# Patient Record
Sex: FEMALE | Race: WHITE | NOT HISPANIC OR LATINO | Employment: FULL TIME | URBAN - NONMETROPOLITAN AREA
[De-identification: names, ages, dates, MRNs, and addresses within clinical notes are randomized per-mention and may not be internally consistent; named-entity substitution may affect disease eponyms.]

---

## 2017-04-25 ENCOUNTER — TRANSCRIBE ORDERS (OUTPATIENT)
Dept: INFUSION THERAPY | Facility: HOSPITAL | Age: 27
End: 2017-04-25

## 2017-04-25 DIAGNOSIS — N39.0 URINARY TRACT INFECTION WITHOUT HEMATURIA, SITE UNSPECIFIED: Primary | ICD-10-CM

## 2017-04-26 ENCOUNTER — HOSPITAL ENCOUNTER (OUTPATIENT)
Dept: INFUSION THERAPY | Facility: HOSPITAL | Age: 27
Setting detail: INFUSION SERIES
Discharge: HOME | End: 2017-04-26

## 2017-04-26 ENCOUNTER — APPOINTMENT (OUTPATIENT)
Dept: CT IMAGING | Facility: HOSPITAL | Age: 27
End: 2017-04-26

## 2017-04-26 ENCOUNTER — APPOINTMENT (OUTPATIENT)
Dept: ULTRASOUND IMAGING | Facility: HOSPITAL | Age: 27
End: 2017-04-26

## 2017-04-26 ENCOUNTER — HOSPITAL ENCOUNTER (EMERGENCY)
Facility: HOSPITAL | Age: 27
Discharge: HOME | End: 2017-04-26
Attending: FAMILY MEDICINE | Admitting: FAMILY MEDICINE

## 2017-04-26 VITALS
RESPIRATION RATE: 18 BRPM | HEART RATE: 75 BPM | WEIGHT: 165 LBS | TEMPERATURE: 98 F | BODY MASS INDEX: 29.23 KG/M2 | HEIGHT: 63 IN | DIASTOLIC BLOOD PRESSURE: 79 MMHG | SYSTOLIC BLOOD PRESSURE: 121 MMHG

## 2017-04-26 VITALS
RESPIRATION RATE: 18 BRPM | OXYGEN SATURATION: 100 % | HEART RATE: 78 BPM | BODY MASS INDEX: 29.23 KG/M2 | SYSTOLIC BLOOD PRESSURE: 155 MMHG | DIASTOLIC BLOOD PRESSURE: 88 MMHG | TEMPERATURE: 98 F | WEIGHT: 165 LBS | HEIGHT: 63 IN

## 2017-04-26 DIAGNOSIS — N39.0 URINARY TRACT INFECTION, SITE UNSPECIFIED: ICD-10-CM

## 2017-04-26 DIAGNOSIS — R10.9 LEFT FLANK PAIN: Primary | ICD-10-CM

## 2017-04-26 LAB
ALBUMIN SERPL-MCNC: 4.7 G/DL (ref 3.5–5)
ALBUMIN/GLOB SERPL: 1.6 G/DL (ref 1.5–2.5)
ALP SERPL-CCNC: 94 U/L (ref 35–104)
ALT SERPL W P-5'-P-CCNC: 12 U/L (ref 10–36)
AMYLASE SERPL-CCNC: 59 U/L (ref 28–100)
ANION GAP SERPL CALCULATED.3IONS-SCNC: 3.1 MMOL/L (ref 3.6–11.2)
AST SERPL-CCNC: 18 U/L (ref 10–30)
BACTERIA UR QL AUTO: ABNORMAL /HPF
BASOPHILS # BLD AUTO: 0.02 10*3/MM3 (ref 0–0.3)
BASOPHILS NFR BLD AUTO: 0.2 % (ref 0–2)
BILIRUB SERPL-MCNC: 0.5 MG/DL (ref 0.2–1.8)
BILIRUB UR QL STRIP: NEGATIVE
BUN BLD-MCNC: 11 MG/DL (ref 7–21)
BUN/CREAT SERPL: 17.5 (ref 7–25)
CALCIUM SPEC-SCNC: 9.7 MG/DL (ref 7.7–10)
CHLORIDE SERPL-SCNC: 105 MMOL/L (ref 99–112)
CLARITY UR: CLEAR
CO2 SERPL-SCNC: 32.9 MMOL/L (ref 24.3–31.9)
COLOR UR: YELLOW
CREAT BLD-MCNC: 0.63 MG/DL (ref 0.43–1.29)
DEPRECATED RDW RBC AUTO: 39 FL (ref 37–54)
EOSINOPHIL # BLD AUTO: 0.19 10*3/MM3 (ref 0–0.7)
EOSINOPHIL NFR BLD AUTO: 2.1 % (ref 0–5)
ERYTHROCYTE [DISTWIDTH] IN BLOOD BY AUTOMATED COUNT: 12.8 % (ref 11.5–14.5)
GFR SERPL CREATININE-BSD FRML MDRD: 113 ML/MIN/1.73
GLOBULIN UR ELPH-MCNC: 3 GM/DL
GLUCOSE BLD-MCNC: 83 MG/DL (ref 70–110)
GLUCOSE UR STRIP-MCNC: NEGATIVE MG/DL
HCG SERPL QL: NEGATIVE
HCT VFR BLD AUTO: 41.2 % (ref 37–47)
HGB BLD-MCNC: 13.6 G/DL (ref 12–16)
HGB UR QL STRIP.AUTO: ABNORMAL
HYALINE CASTS UR QL AUTO: ABNORMAL /LPF
IMM GRANULOCYTES # BLD: 0.01 10*3/MM3 (ref 0–0.03)
IMM GRANULOCYTES NFR BLD: 0.1 % (ref 0–0.5)
KETONES UR QL STRIP: NEGATIVE
LEUKOCYTE ESTERASE UR QL STRIP.AUTO: ABNORMAL
LIPASE SERPL-CCNC: 28 U/L (ref 13–60)
LYMPHOCYTES # BLD AUTO: 3.45 10*3/MM3 (ref 1–3)
LYMPHOCYTES NFR BLD AUTO: 38.7 % (ref 21–51)
MCH RBC QN AUTO: 27.8 PG (ref 27–33)
MCHC RBC AUTO-ENTMCNC: 33 G/DL (ref 33–37)
MCV RBC AUTO: 84.3 FL (ref 80–94)
MONOCYTES # BLD AUTO: 0.67 10*3/MM3 (ref 0.1–0.9)
MONOCYTES NFR BLD AUTO: 7.5 % (ref 0–10)
NEUTROPHILS # BLD AUTO: 4.58 10*3/MM3 (ref 1.4–6.5)
NEUTROPHILS NFR BLD AUTO: 51.4 % (ref 30–70)
NITRITE UR QL STRIP: NEGATIVE
OSMOLALITY SERPL CALC.SUM OF ELEC: 279.8 MOSM/KG (ref 273–305)
PH UR STRIP.AUTO: 8 [PH] (ref 5–8)
PLATELET # BLD AUTO: 288 10*3/MM3 (ref 130–400)
PMV BLD AUTO: 10.9 FL (ref 6–10)
POTASSIUM BLD-SCNC: 3.9 MMOL/L (ref 3.5–5.3)
PROT SERPL-MCNC: 7.7 G/DL (ref 6–8)
PROT UR QL STRIP: NEGATIVE
RBC # BLD AUTO: 4.89 10*6/MM3 (ref 4.2–5.4)
RBC # UR: ABNORMAL /HPF
REF LAB TEST METHOD: ABNORMAL
SODIUM BLD-SCNC: 141 MMOL/L (ref 135–153)
SP GR UR STRIP: 1.01 (ref 1–1.03)
SQUAMOUS #/AREA URNS HPF: ABNORMAL /HPF
UROBILINOGEN UR QL STRIP: ABNORMAL
WBC NRBC COR # BLD: 8.92 10*3/MM3 (ref 4.5–12.5)
WBC UR QL AUTO: ABNORMAL /HPF

## 2017-04-26 PROCEDURE — 81001 URINALYSIS AUTO W/SCOPE: CPT | Performed by: FAMILY MEDICINE

## 2017-04-26 PROCEDURE — 82150 ASSAY OF AMYLASE: CPT | Performed by: FAMILY MEDICINE

## 2017-04-26 PROCEDURE — 96361 HYDRATE IV INFUSION ADD-ON: CPT

## 2017-04-26 PROCEDURE — 76775 US EXAM ABDO BACK WALL LIM: CPT | Performed by: RADIOLOGY

## 2017-04-26 PROCEDURE — 25010000002 KETOROLAC TROMETHAMINE PER 15 MG

## 2017-04-26 PROCEDURE — 83690 ASSAY OF LIPASE: CPT | Performed by: FAMILY MEDICINE

## 2017-04-26 PROCEDURE — 80053 COMPREHEN METABOLIC PANEL: CPT | Performed by: FAMILY MEDICINE

## 2017-04-26 PROCEDURE — 25010000002 CEFTRIAXONE PER 250 MG: Performed by: INTERNAL MEDICINE

## 2017-04-26 PROCEDURE — 99283 EMERGENCY DEPT VISIT LOW MDM: CPT

## 2017-04-26 PROCEDURE — 74176 CT ABD & PELVIS W/O CONTRAST: CPT | Performed by: RADIOLOGY

## 2017-04-26 PROCEDURE — 96374 THER/PROPH/DIAG INJ IV PUSH: CPT

## 2017-04-26 PROCEDURE — 84703 CHORIONIC GONADOTROPIN ASSAY: CPT | Performed by: FAMILY MEDICINE

## 2017-04-26 PROCEDURE — 76775 US EXAM ABDO BACK WALL LIM: CPT

## 2017-04-26 PROCEDURE — 96372 THER/PROPH/DIAG INJ SC/IM: CPT

## 2017-04-26 PROCEDURE — 74176 CT ABD & PELVIS W/O CONTRAST: CPT

## 2017-04-26 PROCEDURE — 85025 COMPLETE CBC W/AUTO DIFF WBC: CPT | Performed by: FAMILY MEDICINE

## 2017-04-26 RX ORDER — LIDOCAINE HYDROCHLORIDE 10 MG/ML
2.1 INJECTION, SOLUTION INFILTRATION; PERINEURAL ONCE
Status: CANCELLED
Start: 2017-04-27 | End: 2017-04-27

## 2017-04-26 RX ORDER — CYCLOBENZAPRINE HCL 10 MG
10 TABLET ORAL 3 TIMES DAILY PRN
Qty: 30 TABLET | Refills: 0 | Status: SHIPPED | OUTPATIENT
Start: 2017-04-26 | End: 2018-08-04

## 2017-04-26 RX ORDER — CEFTRIAXONE 1 G/1
1 INJECTION, POWDER, FOR SOLUTION INTRAMUSCULAR; INTRAVENOUS ONCE
Status: CANCELLED
Start: 2017-04-27 | End: 2017-04-27

## 2017-04-26 RX ORDER — KETOROLAC TROMETHAMINE 30 MG/ML
INJECTION, SOLUTION INTRAMUSCULAR; INTRAVENOUS
Status: COMPLETED
Start: 2017-04-26 | End: 2017-04-26

## 2017-04-26 RX ORDER — ORPHENADRINE CITRATE 30 MG/ML
60 INJECTION INTRAMUSCULAR; INTRAVENOUS ONCE
Status: DISCONTINUED | OUTPATIENT
Start: 2017-04-26 | End: 2017-04-26

## 2017-04-26 RX ORDER — SODIUM CHLORIDE 0.9 % (FLUSH) 0.9 %
10 SYRINGE (ML) INJECTION AS NEEDED
Status: DISCONTINUED | OUTPATIENT
Start: 2017-04-26 | End: 2017-04-26 | Stop reason: HOSPADM

## 2017-04-26 RX ORDER — CEFTRIAXONE 1 G/1
1 INJECTION, POWDER, FOR SOLUTION INTRAMUSCULAR; INTRAVENOUS ONCE
Status: COMPLETED | OUTPATIENT
Start: 2017-04-26 | End: 2017-04-26

## 2017-04-26 RX ORDER — ORPHENADRINE CITRATE 30 MG/ML
INJECTION INTRAMUSCULAR; INTRAVENOUS
Status: DISCONTINUED
Start: 2017-04-26 | End: 2017-04-26 | Stop reason: HOSPADM

## 2017-04-26 RX ORDER — GABAPENTIN 100 MG/1
100 CAPSULE ORAL 3 TIMES DAILY
COMMUNITY
End: 2018-08-10

## 2017-04-26 RX ORDER — KETOROLAC TROMETHAMINE 30 MG/ML
60 INJECTION, SOLUTION INTRAMUSCULAR; INTRAVENOUS ONCE
Status: DISCONTINUED | OUTPATIENT
Start: 2017-04-26 | End: 2017-04-26

## 2017-04-26 RX ORDER — LIDOCAINE HYDROCHLORIDE 10 MG/ML
2.1 INJECTION, SOLUTION INFILTRATION; PERINEURAL ONCE
Status: COMPLETED | OUTPATIENT
Start: 2017-04-26 | End: 2017-04-26

## 2017-04-26 RX ORDER — KETOROLAC TROMETHAMINE 30 MG/ML
30 INJECTION, SOLUTION INTRAMUSCULAR; INTRAVENOUS ONCE
Status: COMPLETED | OUTPATIENT
Start: 2017-04-26 | End: 2017-04-26

## 2017-04-26 RX ADMIN — SODIUM CHLORIDE 500 ML: 9 INJECTION, SOLUTION INTRAVENOUS at 19:38

## 2017-04-26 RX ADMIN — LIDOCAINE HYDROCHLORIDE 2.1 ML: 10 INJECTION, SOLUTION INFILTRATION; PERINEURAL at 11:41

## 2017-04-26 RX ADMIN — CEFTRIAXONE 1 G: 1 INJECTION, POWDER, FOR SOLUTION INTRAMUSCULAR; INTRAVENOUS at 11:40

## 2017-04-26 RX ADMIN — KETOROLAC TROMETHAMINE 30 MG: 30 INJECTION, SOLUTION INTRAMUSCULAR at 20:59

## 2017-04-26 RX ADMIN — KETOROLAC TROMETHAMINE 30 MG: 30 INJECTION, SOLUTION INTRAMUSCULAR; INTRAVENOUS at 20:59

## 2017-04-26 NOTE — PATIENT INSTRUCTIONS
Ceftriaxone injection  What is this medicine?  CEFTRIAXONE (sef try AX one) is a cephalosporin antibiotic. It is used to treat certain kinds of bacterial infections. It will not work for colds, flu, or other viral infections.  This medicine may be used for other purposes; ask your health care provider or pharmacist if you have questions.  COMMON BRAND NAME(S): Gypsy  What should I tell my health care provider before I take this medicine?  They need to know if you have any of these conditions:  -any chronic illness  -bowel disease, like colitis  -both kidney and liver disease  -high bilirubin level in  patients  -an unusual or allergic reaction to ceftriaxone, other cephalosporin or penicillin antibiotics, foods, dyes, or preservatives  -pregnant or trying to get pregnant  -breast-feeding  How should I use this medicine?  This medicine is injected into a muscle or infused it into a vein. It is usually given in a medical office or clinic. If you are to give this medicine you will be taught how to inject it. Follow instructions carefully. Use your doses at regular intervals. Do not take your medicine more often than directed. Do not skip doses or stop your medicine early even if you feel better. Do not stop taking except on your doctor's advice.  Talk to your pediatrician regarding the use of this medicine in children. Special care may be needed.  Overdosage: If you think you have taken too much of this medicine contact a poison control center or emergency room at once.  NOTE: This medicine is only for you. Do not share this medicine with others.  What if I miss a dose?  If you miss a dose, take it as soon as you can. If it is almost time for your next dose, take only that dose. Do not take double or extra doses.  What may interact with this medicine?  Do not take this medicine with any of the following medications:  -intravenous calcium  This medicine may also interact with the following medications:  -birth  control pills  This list may not describe all possible interactions. Give your health care provider a list of all the medicines, herbs, non-prescription drugs, or dietary supplements you use. Also tell them if you smoke, drink alcohol, or use illegal drugs. Some items may interact with your medicine.  What should I watch for while using this medicine?  Tell your doctor or health care professional if your symptoms do not improve or if they get worse.  Do not treat diarrhea with over the counter products. Contact your doctor if you have diarrhea that lasts more than 2 days or if it is severe and watery.  If you are being treated for a sexually transmitted disease, avoid sexual contact until you have finished your treatment. Having sex can infect your sexual partner.  Calcium may bind to this medicine and cause lung or kidney problems. Avoid calcium products while taking this medicine and for 48 hours after taking the last dose of this medicine.  What side effects may I notice from receiving this medicine?  Side effects that you should report to your doctor or health care professional as soon as possible:  -allergic reactions like skin rash, itching or hives, swelling of the face, lips, or tongue  -breathing problems  -fever, chills  -irregular heartbeat  -pain when passing urine  -seizures  -stomach pain, cramps  -unusual bleeding, bruising  -unusually weak or tired  Side effects that usually do not require medical attention (report to your doctor or health care professional if they continue or are bothersome):  -diarrhea  -dizzy, drowsy  -headache  -nausea, vomiting  -pain, swelling, irritation where injected  -stomach upset  -sweating  This list may not describe all possible side effects. Call your doctor for medical advice about side effects. You may report side effects to FDA at 8-229-FDA-7568.  Where should I keep my medicine?  Keep out of the reach of children.  Store at room temperature below 25 degrees C (24  degrees F). Protect from light. Throw away any unused vials after the expiration date.  NOTE: This sheet is a summary. It may not cover all possible information. If you have questions about this medicine, talk to your doctor, pharmacist, or health care provider.     © 2017, [employer]. (2015-07-06 09:14:54)

## 2017-04-26 NOTE — ED NOTES
Pt reports that she was seen at [facility] on Sunday and diagnosed with hydronephritis.  She reports that she is having increased pain at this time.  PT family at bedside.     Teri Stratton RN  04/26/17 1929

## 2017-04-26 NOTE — ED PROVIDER NOTES
Subjective   History of Present Illness  26 y/o F w/h/o Lupus p/w worsening L flank pain for 4 days. Pt states that the pain continues to worsen and she has increased difficulty urinating. Pt states that she was seen at [facility] on  and was diagnosed with nephritis. Pt states that since that time she has been receiving daily rocephin infusions. Pt denies any fevers/chills. No N/V/D.   Review of Systems   Constitutional: Negative for activity change, appetite change, chills and fever.   HENT: Negative for trouble swallowing and voice change.    Eyes: Negative for photophobia and visual disturbance.   Respiratory: Negative for apnea, cough, chest tightness, shortness of breath and wheezing.    Cardiovascular: Negative for chest pain and palpitations.   Gastrointestinal: Negative for abdominal distention, abdominal pain, diarrhea, nausea and vomiting.        +L flank pain   Genitourinary: Positive for decreased urine volume, difficulty urinating and flank pain. Negative for dysuria and urgency.   Musculoskeletal: Negative for neck pain and neck stiffness.   Skin: Negative for color change and pallor.       Past Medical History:   Diagnosis Date   • Fibromyalgia    • History of ear infections    • History of heart disease    • History of lupus    • History of migraine    • Hypertension    • Pulmonary stenosis        Allergies   Allergen Reactions   • Ciprofloxacin        Past Surgical History:   Procedure Laterality Date   •  SECTION     • ORIF SCAPHOID W VASCULARIZED BONE GRAFT         Family History   Problem Relation Age of Onset   • Heart disease Mother    • Heart disease Father    • Cancer Father        Social History     Social History   • Marital status: Single     Spouse name: N/A   • Number of children: N/A   • Years of education: N/A     Social History Main Topics   • Smoking status: Former Smoker   • Smokeless tobacco: None   • Alcohol use No   • Drug use: No   • Sexual activity: Defer      Other Topics Concern   • None     Social History Narrative   • None           Objective   Physical Exam   Constitutional: She is oriented to person, place, and time. She appears well-developed and well-nourished. She is active.   HENT:   Head: Normocephalic and atraumatic.   Right Ear: External ear normal.   Left Ear: External ear normal.   Nose: Nose normal.   Mouth/Throat: Uvula is midline and oropharynx is clear and moist.   Eyes: Conjunctivae, EOM and lids are normal. Pupils are equal, round, and reactive to light.   Neck: Trachea normal, normal range of motion, full passive range of motion without pain and phonation normal. Neck supple.   Cardiovascular: Normal rate, regular rhythm and normal heart sounds.    Pulmonary/Chest: Effort normal and breath sounds normal.   Abdominal: Soft. Normal appearance. There is no tenderness. There is CVA tenderness.   Neurological: She is alert and oriented to person, place, and time.   Skin: Skin is warm, dry and intact.   Psychiatric: She has a normal mood and affect. Her speech is normal and behavior is normal. Judgment and thought content normal. Cognition and memory are normal.   Nursing note and vitals reviewed.      Procedures         ED Course  ED Course      Pt's renal u/s and CT scan abd/pelvis were WNL. Pt to continue rocephin infusions. Pt given prescription for muscle relaxants given the chance that this pain is MSK. Pt to f/u with PCP within 24 hours.            MDM  Number of Diagnoses or Management Options  Left flank pain: established and worsening     Amount and/or Complexity of Data Reviewed  Clinical lab tests: ordered and reviewed  Tests in the radiology section of CPT®: reviewed and ordered  Independent visualization of images, tracings, or specimens: yes    Risk of Complications, Morbidity, and/or Mortality  Presenting problems: high  Diagnostic procedures: high  Management options: high    Patient Progress  Patient progress: stable      Final  diagnoses:   Left flank pain            Thanh Tovar MD  04/26/17 2049

## 2017-04-27 ENCOUNTER — HOSPITAL ENCOUNTER (OUTPATIENT)
Dept: INFUSION THERAPY | Facility: HOSPITAL | Age: 27
Setting detail: INFUSION SERIES
Discharge: HOME | End: 2017-04-27

## 2017-04-27 VITALS
SYSTOLIC BLOOD PRESSURE: 113 MMHG | TEMPERATURE: 98.3 F | RESPIRATION RATE: 17 BRPM | DIASTOLIC BLOOD PRESSURE: 67 MMHG | HEART RATE: 75 BPM

## 2017-04-27 DIAGNOSIS — N39.0 URINARY TRACT INFECTION, SITE UNSPECIFIED: ICD-10-CM

## 2017-04-27 PROCEDURE — 25010000002 CEFTRIAXONE PER 250 MG: Performed by: INTERNAL MEDICINE

## 2017-04-27 PROCEDURE — 96372 THER/PROPH/DIAG INJ SC/IM: CPT

## 2017-04-27 RX ORDER — CEFTRIAXONE 1 G/1
1 INJECTION, POWDER, FOR SOLUTION INTRAMUSCULAR; INTRAVENOUS ONCE
Status: COMPLETED | OUTPATIENT
Start: 2017-04-27 | End: 2017-04-27

## 2017-04-27 RX ORDER — LIDOCAINE HYDROCHLORIDE 10 MG/ML
2.1 INJECTION, SOLUTION INFILTRATION; PERINEURAL ONCE
Status: COMPLETED | OUTPATIENT
Start: 2017-04-27 | End: 2017-04-27

## 2017-04-27 RX ORDER — LIDOCAINE HYDROCHLORIDE 10 MG/ML
2.1 INJECTION, SOLUTION INFILTRATION; PERINEURAL ONCE
Status: CANCELLED
Start: 2017-04-28 | End: 2017-04-28

## 2017-04-27 RX ORDER — CEFTRIAXONE 1 G/1
1 INJECTION, POWDER, FOR SOLUTION INTRAMUSCULAR; INTRAVENOUS ONCE
Status: CANCELLED
Start: 2017-04-28 | End: 2017-04-28

## 2017-04-27 RX ADMIN — CEFTRIAXONE 1 G: 1 INJECTION, POWDER, FOR SOLUTION INTRAMUSCULAR; INTRAVENOUS at 11:14

## 2017-04-27 RX ADMIN — LIDOCAINE HYDROCHLORIDE 2.1 ML: 10 INJECTION, SOLUTION INFILTRATION; PERINEURAL at 11:15

## 2017-04-28 ENCOUNTER — HOSPITAL ENCOUNTER (OUTPATIENT)
Dept: INFUSION THERAPY | Facility: HOSPITAL | Age: 27
Setting detail: INFUSION SERIES
Discharge: HOME | End: 2017-04-28

## 2017-04-28 VITALS
SYSTOLIC BLOOD PRESSURE: 120 MMHG | RESPIRATION RATE: 16 BRPM | HEART RATE: 69 BPM | TEMPERATURE: 98.2 F | DIASTOLIC BLOOD PRESSURE: 67 MMHG

## 2017-04-28 DIAGNOSIS — N39.0 URINARY TRACT INFECTION, SITE UNSPECIFIED: ICD-10-CM

## 2017-04-28 PROCEDURE — 96372 THER/PROPH/DIAG INJ SC/IM: CPT

## 2017-04-28 PROCEDURE — 25010000002 CEFTRIAXONE PER 250 MG: Performed by: INTERNAL MEDICINE

## 2017-04-28 RX ORDER — CEFTRIAXONE 1 G/1
1 INJECTION, POWDER, FOR SOLUTION INTRAMUSCULAR; INTRAVENOUS ONCE
Status: CANCELLED
Start: 2017-04-29 | End: 2017-04-29

## 2017-04-28 RX ORDER — CEFTRIAXONE 1 G/1
1 INJECTION, POWDER, FOR SOLUTION INTRAMUSCULAR; INTRAVENOUS ONCE
Status: COMPLETED | OUTPATIENT
Start: 2017-04-28 | End: 2017-04-28

## 2017-04-28 RX ORDER — LIDOCAINE HYDROCHLORIDE 10 MG/ML
2.1 INJECTION, SOLUTION INFILTRATION; PERINEURAL ONCE
Status: COMPLETED | OUTPATIENT
Start: 2017-04-28 | End: 2017-04-28

## 2017-04-28 RX ORDER — LIDOCAINE HYDROCHLORIDE 10 MG/ML
2.1 INJECTION, SOLUTION INFILTRATION; PERINEURAL ONCE
Status: CANCELLED
Start: 2017-04-29 | End: 2017-04-29

## 2017-04-28 RX ADMIN — LIDOCAINE HYDROCHLORIDE 2.1 ML: 10 INJECTION, SOLUTION INFILTRATION; PERINEURAL at 11:22

## 2017-04-28 RX ADMIN — CEFTRIAXONE SODIUM 1 G: 1 INJECTION, POWDER, FOR SOLUTION INTRAMUSCULAR; INTRAVENOUS at 11:23

## 2017-04-29 ENCOUNTER — HOSPITAL ENCOUNTER (OUTPATIENT)
Dept: INFUSION THERAPY | Facility: HOSPITAL | Age: 27
Setting detail: INFUSION SERIES
Discharge: HOME | End: 2017-04-29

## 2017-04-29 VITALS
DIASTOLIC BLOOD PRESSURE: 78 MMHG | SYSTOLIC BLOOD PRESSURE: 130 MMHG | HEART RATE: 65 BPM | RESPIRATION RATE: 16 BRPM | TEMPERATURE: 98.3 F

## 2017-04-29 DIAGNOSIS — N39.0 URINARY TRACT INFECTION, SITE UNSPECIFIED: ICD-10-CM

## 2017-04-29 PROCEDURE — 25010000002 CEFTRIAXONE PER 250 MG: Performed by: INTERNAL MEDICINE

## 2017-04-29 PROCEDURE — 96372 THER/PROPH/DIAG INJ SC/IM: CPT

## 2017-04-29 RX ORDER — CEFTRIAXONE 1 G/1
1 INJECTION, POWDER, FOR SOLUTION INTRAMUSCULAR; INTRAVENOUS ONCE
Status: CANCELLED
Start: 2017-04-30 | End: 2017-04-30

## 2017-04-29 RX ORDER — LIDOCAINE HYDROCHLORIDE 10 MG/ML
2.1 INJECTION, SOLUTION INFILTRATION; PERINEURAL ONCE
Status: COMPLETED | OUTPATIENT
Start: 2017-04-29 | End: 2017-04-29

## 2017-04-29 RX ORDER — LIDOCAINE HYDROCHLORIDE 10 MG/ML
2.1 INJECTION, SOLUTION INFILTRATION; PERINEURAL ONCE
Status: CANCELLED
Start: 2017-04-30 | End: 2017-04-30

## 2017-04-29 RX ORDER — CEFTRIAXONE 1 G/1
1 INJECTION, POWDER, FOR SOLUTION INTRAMUSCULAR; INTRAVENOUS ONCE
Status: COMPLETED | OUTPATIENT
Start: 2017-04-29 | End: 2017-04-29

## 2017-04-29 RX ADMIN — LIDOCAINE HYDROCHLORIDE 2.1 ML: 10 INJECTION, SOLUTION INFILTRATION; PERINEURAL at 09:39

## 2017-04-29 RX ADMIN — CEFTRIAXONE SODIUM 1 G: 1 INJECTION, POWDER, FOR SOLUTION INTRAMUSCULAR; INTRAVENOUS at 09:39

## 2018-06-15 RX ORDER — TOPIRAMATE 25 MG/1
25 TABLET ORAL 2 TIMES DAILY
Qty: 60 TABLET | Refills: 0 | Status: CANCELLED | OUTPATIENT
Start: 2018-06-15

## 2018-06-16 ENCOUNTER — APPOINTMENT (OUTPATIENT)
Dept: GENERAL RADIOLOGY | Facility: HOSPITAL | Age: 28
End: 2018-06-16

## 2018-06-16 ENCOUNTER — HOSPITAL ENCOUNTER (EMERGENCY)
Facility: HOSPITAL | Age: 28
Discharge: HOME | End: 2018-06-16
Attending: EMERGENCY MEDICINE | Admitting: EMERGENCY MEDICINE

## 2018-06-16 ENCOUNTER — APPOINTMENT (OUTPATIENT)
Dept: ULTRASOUND IMAGING | Facility: HOSPITAL | Age: 28
End: 2018-06-16

## 2018-06-16 ENCOUNTER — APPOINTMENT (OUTPATIENT)
Dept: CT IMAGING | Facility: HOSPITAL | Age: 28
End: 2018-06-16

## 2018-06-16 VITALS
HEIGHT: 63 IN | WEIGHT: 192 LBS | TEMPERATURE: 98.6 F | RESPIRATION RATE: 17 BRPM | HEART RATE: 80 BPM | DIASTOLIC BLOOD PRESSURE: 70 MMHG | SYSTOLIC BLOOD PRESSURE: 112 MMHG | OXYGEN SATURATION: 99 % | BODY MASS INDEX: 34.02 KG/M2

## 2018-06-16 DIAGNOSIS — N83.209 CYST OF OVARY, UNSPECIFIED LATERALITY: ICD-10-CM

## 2018-06-16 DIAGNOSIS — N12 PYELONEPHRITIS: Primary | ICD-10-CM

## 2018-06-16 LAB
ALBUMIN SERPL-MCNC: 5.2 G/DL (ref 3.5–5)
ALBUMIN/GLOB SERPL: 1.9 G/DL (ref 1.5–2.5)
ALP SERPL-CCNC: 106 U/L (ref 35–104)
ALT SERPL W P-5'-P-CCNC: 15 U/L (ref 10–36)
ANION GAP SERPL CALCULATED.3IONS-SCNC: 11.9 MMOL/L (ref 3.6–11.2)
AST SERPL-CCNC: 19 U/L (ref 10–30)
B-HCG UR QL: NEGATIVE
BACTERIA UR QL AUTO: ABNORMAL /HPF
BASOPHILS # BLD AUTO: 0.03 10*3/MM3 (ref 0–0.3)
BASOPHILS NFR BLD AUTO: 0.2 % (ref 0–2)
BILIRUB SERPL-MCNC: 0.9 MG/DL (ref 0.2–1.8)
BILIRUB UR QL STRIP: NEGATIVE
BNP SERPL-MCNC: <2 PG/ML (ref 0–100)
BUN BLD-MCNC: 11 MG/DL (ref 7–21)
BUN/CREAT SERPL: 11.5 (ref 7–25)
C TRACH RRNA CVX QL NAA+PROBE: NOT DETECTED
CALCIUM SPEC-SCNC: 9.7 MG/DL (ref 7.7–10)
CHLORIDE SERPL-SCNC: 104 MMOL/L (ref 99–112)
CLARITY UR: ABNORMAL
CO2 SERPL-SCNC: 22.1 MMOL/L (ref 24.3–31.9)
COLOR UR: YELLOW
CREAT BLD-MCNC: 0.96 MG/DL (ref 0.43–1.29)
D DIMER PPP FEU-MCNC: <0.27 MCGFEU/ML (ref 0–0.5)
D-LACTATE SERPL-SCNC: 0.6 MMOL/L (ref 0.5–2)
D-LACTATE SERPL-SCNC: 2.4 MMOL/L (ref 0.5–2)
DEPRECATED RDW RBC AUTO: 40 FL (ref 37–54)
EOSINOPHIL # BLD AUTO: 0.43 10*3/MM3 (ref 0–0.7)
EOSINOPHIL NFR BLD AUTO: 3.4 % (ref 0–5)
ERYTHROCYTE [DISTWIDTH] IN BLOOD BY AUTOMATED COUNT: 12.9 % (ref 11.5–14.5)
GFR SERPL CREATININE-BSD FRML MDRD: 69 ML/MIN/1.73
GLOBULIN UR ELPH-MCNC: 2.8 GM/DL
GLUCOSE BLD-MCNC: 89 MG/DL (ref 70–110)
GLUCOSE UR STRIP-MCNC: NEGATIVE MG/DL
HCT VFR BLD AUTO: 44 % (ref 37–47)
HGB BLD-MCNC: 15 G/DL (ref 12–16)
HGB UR QL STRIP.AUTO: NEGATIVE
HOLD SPECIMEN: NORMAL
HYALINE CASTS UR QL AUTO: ABNORMAL /LPF
HYDATID CYST SPEC WET PREP: ABNORMAL
IMM GRANULOCYTES # BLD: 0.03 10*3/MM3 (ref 0–0.03)
IMM GRANULOCYTES NFR BLD: 0.2 % (ref 0–0.5)
INR PPP: 1.14 (ref 0.9–1.1)
KETONES UR QL STRIP: NEGATIVE
KOH PREP NAIL: NORMAL
LEUKOCYTE ESTERASE UR QL STRIP.AUTO: ABNORMAL
LIPASE SERPL-CCNC: 34 U/L (ref 13–60)
LYMPHOCYTES # BLD AUTO: 4.25 10*3/MM3 (ref 1–3)
LYMPHOCYTES NFR BLD AUTO: 33.6 % (ref 21–51)
MAGNESIUM SERPL-MCNC: 2.3 MG/DL (ref 1.7–2.6)
MCH RBC QN AUTO: 29.1 PG (ref 27–33)
MCHC RBC AUTO-ENTMCNC: 34.1 G/DL (ref 33–37)
MCV RBC AUTO: 85.4 FL (ref 80–94)
MONOCYTES # BLD AUTO: 1.33 10*3/MM3 (ref 0.1–0.9)
MONOCYTES NFR BLD AUTO: 10.5 % (ref 0–10)
N GONORRHOEA RRNA SPEC QL NAA+PROBE: NOT DETECTED
NEUTROPHILS # BLD AUTO: 6.58 10*3/MM3 (ref 1.4–6.5)
NEUTROPHILS NFR BLD AUTO: 52.1 % (ref 30–70)
NITRITE UR QL STRIP: NEGATIVE
OSMOLALITY SERPL CALC.SUM OF ELEC: 274.6 MOSM/KG (ref 273–305)
PH UR STRIP.AUTO: <=5 [PH] (ref 5–8)
PLATELET # BLD AUTO: 344 10*3/MM3 (ref 130–400)
PMV BLD AUTO: 11.4 FL (ref 6–10)
POTASSIUM BLD-SCNC: 3.3 MMOL/L (ref 3.5–5.3)
PROT SERPL-MCNC: 8 G/DL (ref 6–8)
PROT UR QL STRIP: NEGATIVE
PROTHROMBIN TIME: 14.8 SECONDS (ref 11–15.4)
RBC # BLD AUTO: 5.15 10*6/MM3 (ref 4.2–5.4)
RBC # UR: ABNORMAL /HPF
REF LAB TEST METHOD: ABNORMAL
SODIUM BLD-SCNC: 138 MMOL/L (ref 135–153)
SP GR UR STRIP: 1.02 (ref 1–1.03)
SQUAMOUS #/AREA URNS HPF: ABNORMAL /HPF
T VAGINALIS SPEC QL WET PREP: ABNORMAL
TROPONIN I SERPL-MCNC: <0.006 NG/ML
UROBILINOGEN UR QL STRIP: ABNORMAL
WBC NRBC COR # BLD: 12.65 10*3/MM3 (ref 4.5–12.5)
WBC SPEC QL WET PREP: ABNORMAL
WBC UR QL AUTO: ABNORMAL /HPF
WHOLE BLOOD HOLD SPECIMEN: NORMAL
WHOLE BLOOD HOLD SPECIMEN: NORMAL
YEAST GENITAL QL WET PREP: ABNORMAL

## 2018-06-16 PROCEDURE — 25010000002 MAGNESIUM SULFATE IN D5W 1G/100ML (PREMIX) 1-5 GM/100ML-% SOLUTION: Performed by: EMERGENCY MEDICINE

## 2018-06-16 PROCEDURE — 93005 ELECTROCARDIOGRAM TRACING: CPT | Performed by: EMERGENCY MEDICINE

## 2018-06-16 PROCEDURE — 71045 X-RAY EXAM CHEST 1 VIEW: CPT

## 2018-06-16 PROCEDURE — 99285 EMERGENCY DEPT VISIT HI MDM: CPT

## 2018-06-16 PROCEDURE — 83880 ASSAY OF NATRIURETIC PEPTIDE: CPT | Performed by: EMERGENCY MEDICINE

## 2018-06-16 PROCEDURE — 76830 TRANSVAGINAL US NON-OB: CPT

## 2018-06-16 PROCEDURE — 83605 ASSAY OF LACTIC ACID: CPT | Performed by: EMERGENCY MEDICINE

## 2018-06-16 PROCEDURE — 87491 CHLMYD TRACH DNA AMP PROBE: CPT | Performed by: EMERGENCY MEDICINE

## 2018-06-16 PROCEDURE — 96375 TX/PRO/DX INJ NEW DRUG ADDON: CPT

## 2018-06-16 PROCEDURE — 87210 SMEAR WET MOUNT SALINE/INK: CPT | Performed by: EMERGENCY MEDICINE

## 2018-06-16 PROCEDURE — 74176 CT ABD & PELVIS W/O CONTRAST: CPT | Performed by: RADIOLOGY

## 2018-06-16 PROCEDURE — 83735 ASSAY OF MAGNESIUM: CPT | Performed by: EMERGENCY MEDICINE

## 2018-06-16 PROCEDURE — 87077 CULTURE AEROBIC IDENTIFY: CPT | Performed by: EMERGENCY MEDICINE

## 2018-06-16 PROCEDURE — 87591 N.GONORRHOEAE DNA AMP PROB: CPT | Performed by: EMERGENCY MEDICINE

## 2018-06-16 PROCEDURE — 85025 COMPLETE CBC W/AUTO DIFF WBC: CPT | Performed by: EMERGENCY MEDICINE

## 2018-06-16 PROCEDURE — 81001 URINALYSIS AUTO W/SCOPE: CPT | Performed by: EMERGENCY MEDICINE

## 2018-06-16 PROCEDURE — 80053 COMPREHEN METABOLIC PANEL: CPT | Performed by: EMERGENCY MEDICINE

## 2018-06-16 PROCEDURE — 96367 TX/PROPH/DG ADDL SEQ IV INF: CPT

## 2018-06-16 PROCEDURE — 87040 BLOOD CULTURE FOR BACTERIA: CPT | Performed by: EMERGENCY MEDICINE

## 2018-06-16 PROCEDURE — 87186 SC STD MICRODIL/AGAR DIL: CPT | Performed by: EMERGENCY MEDICINE

## 2018-06-16 PROCEDURE — 25010000002 CEFTRIAXONE: Performed by: EMERGENCY MEDICINE

## 2018-06-16 PROCEDURE — 85610 PROTHROMBIN TIME: CPT | Performed by: EMERGENCY MEDICINE

## 2018-06-16 PROCEDURE — 87220 TISSUE EXAM FOR FUNGI: CPT | Performed by: EMERGENCY MEDICINE

## 2018-06-16 PROCEDURE — 81025 URINE PREGNANCY TEST: CPT | Performed by: EMERGENCY MEDICINE

## 2018-06-16 PROCEDURE — 71045 X-RAY EXAM CHEST 1 VIEW: CPT | Performed by: RADIOLOGY

## 2018-06-16 PROCEDURE — 83690 ASSAY OF LIPASE: CPT | Performed by: EMERGENCY MEDICINE

## 2018-06-16 PROCEDURE — 87086 URINE CULTURE/COLONY COUNT: CPT | Performed by: EMERGENCY MEDICINE

## 2018-06-16 PROCEDURE — 96365 THER/PROPH/DIAG IV INF INIT: CPT

## 2018-06-16 PROCEDURE — 76830 TRANSVAGINAL US NON-OB: CPT | Performed by: RADIOLOGY

## 2018-06-16 PROCEDURE — 85379 FIBRIN DEGRADATION QUANT: CPT | Performed by: EMERGENCY MEDICINE

## 2018-06-16 PROCEDURE — 74176 CT ABD & PELVIS W/O CONTRAST: CPT

## 2018-06-16 PROCEDURE — 25010000002 ONDANSETRON PER 1 MG: Performed by: EMERGENCY MEDICINE

## 2018-06-16 PROCEDURE — 36415 COLL VENOUS BLD VENIPUNCTURE: CPT

## 2018-06-16 PROCEDURE — 84484 ASSAY OF TROPONIN QUANT: CPT | Performed by: EMERGENCY MEDICINE

## 2018-06-16 RX ORDER — SODIUM CHLORIDE 0.9 % (FLUSH) 0.9 %
10 SYRINGE (ML) INJECTION AS NEEDED
Status: DISCONTINUED | OUTPATIENT
Start: 2018-06-16 | End: 2018-06-16 | Stop reason: HOSPADM

## 2018-06-16 RX ORDER — MAGNESIUM SULFATE 1 G/100ML
1 INJECTION INTRAVENOUS
Status: DISCONTINUED | OUTPATIENT
Start: 2018-06-16 | End: 2018-06-16

## 2018-06-16 RX ORDER — ONDANSETRON 2 MG/ML
4 INJECTION INTRAMUSCULAR; INTRAVENOUS ONCE
Status: COMPLETED | OUTPATIENT
Start: 2018-06-16 | End: 2018-06-16

## 2018-06-16 RX ORDER — FLUCONAZOLE 150 MG/1
150 TABLET ORAL ONCE
Qty: 1 TABLET | Refills: 0 | Status: SHIPPED | OUTPATIENT
Start: 2018-06-16 | End: 2018-06-16

## 2018-06-16 RX ORDER — POTASSIUM CHLORIDE 20 MEQ/1
40 TABLET, EXTENDED RELEASE ORAL ONCE
Status: COMPLETED | OUTPATIENT
Start: 2018-06-16 | End: 2018-06-16

## 2018-06-16 RX ORDER — HYDROCODONE BITARTRATE AND ACETAMINOPHEN 5; 325 MG/1; MG/1
1 TABLET ORAL ONCE
Status: COMPLETED | OUTPATIENT
Start: 2018-06-16 | End: 2018-06-16

## 2018-06-16 RX ORDER — CEFDINIR 300 MG/1
300 CAPSULE ORAL 2 TIMES DAILY
Qty: 20 CAPSULE | Refills: 0 | Status: SHIPPED | OUTPATIENT
Start: 2018-06-16 | End: 2018-08-04

## 2018-06-16 RX ORDER — CEFDINIR 300 MG/1
300 CAPSULE ORAL 2 TIMES DAILY
Qty: 20 CAPSULE | Refills: 0 | Status: SHIPPED | OUTPATIENT
Start: 2018-06-16 | End: 2018-06-16

## 2018-06-16 RX ADMIN — CEFTRIAXONE 2 G: 2 INJECTION, POWDER, FOR SOLUTION INTRAMUSCULAR; INTRAVENOUS at 11:56

## 2018-06-16 RX ADMIN — MAGNESIUM SULFATE HEPTAHYDRATE 1 G: 1 INJECTION, SOLUTION INTRAVENOUS at 10:50

## 2018-06-16 RX ADMIN — POTASSIUM CHLORIDE 40 MEQ: 1500 TABLET, EXTENDED RELEASE ORAL at 11:57

## 2018-06-16 RX ADMIN — SODIUM CHLORIDE 1000 ML: 9 INJECTION, SOLUTION INTRAVENOUS at 10:30

## 2018-06-16 RX ADMIN — SODIUM CHLORIDE 1000 ML: 9 INJECTION, SOLUTION INTRAVENOUS at 11:30

## 2018-06-16 RX ADMIN — HYDROCODONE BITARTRATE AND ACETAMINOPHEN 1 TABLET: 5; 325 TABLET ORAL at 17:50

## 2018-06-16 RX ADMIN — ONDANSETRON 4 MG: 2 INJECTION, SOLUTION INTRAMUSCULAR; INTRAVENOUS at 17:51

## 2018-06-16 NOTE — ED NOTES
Verbal order received from Dr. Ross to hold second bag of magnesium.  VORBV.     Amy Marquez RN  06/16/18 9125

## 2018-06-16 NOTE — ED PROVIDER NOTES
"Subjective   p tis a 27 yo lady who presented to the ED with multiple complaints:    1:  Pt c/o b/l flank pain assoicated with dysuria for the last few days. She says she has a hx of frequent UTIs and that she ends up with injections of Rocephin at least once a month for UTIs.  Patient denies any vaginal discharge or itching.  She is sexually active with only one partner.  She denies any significant pelvic pain.  No vaginal discharge or itching no bleeding.  No f/c. Pt does have some assoicated nausea but no vomiting    2: pt also c/o palpitations that she described as \"chest pain\".  no true chest pain.  No sob.  No hemopthysis. Pt has a hx of rapid heart rate but has not taken her meds for four days. No f/c.         Chest Pain   Associated symptoms: abdominal pain, nausea and palpitations    Associated symptoms: no fever, no shortness of breath and no vomiting        Review of Systems   Constitutional: Negative.  Negative for fever.   HENT: Negative.    Respiratory: Negative.  Negative for chest tightness, shortness of breath, wheezing and stridor.    Cardiovascular: Positive for palpitations. Negative for chest pain and leg swelling.   Gastrointestinal: Positive for abdominal pain and nausea. Negative for vomiting.   Endocrine: Negative.    Genitourinary: Positive for dysuria, flank pain and frequency. Negative for genital sores, pelvic pain, vaginal bleeding, vaginal discharge and vaginal pain.   Skin: Negative.    Neurological: Negative.    Psychiatric/Behavioral: Negative.    All other systems reviewed and are negative.      Past Medical History:   Diagnosis Date   • Fibromyalgia    • History of ear infections    • History of heart disease    • History of lupus    • History of migraine    • Hypertension    • Pulmonary stenosis        Allergies   Allergen Reactions   • Ciprofloxacin Rash       Past Surgical History:   Procedure Laterality Date   •  SECTION     • ORIF SCAPHOID W VASCULARIZED BONE GRAFT   "       Family History   Problem Relation Age of Onset   • Heart disease Mother    • Heart disease Father    • Cancer Father        Social History     Social History   • Marital status: Single     Social History Main Topics   • Smoking status: Former Smoker   • Alcohol use No   • Drug use: No   • Sexual activity: Defer     Other Topics Concern   • Not on file           Objective   Physical Exam   Constitutional: She is oriented to person, place, and time. She appears well-developed and well-nourished. No distress.   HENT:   Head: Normocephalic and atraumatic.   Right Ear: External ear normal.   Left Ear: External ear normal.   Nose: Nose normal.   Eyes: Conjunctivae and EOM are normal. Pupils are equal, round, and reactive to light.   Neck: Normal range of motion. Neck supple. No JVD present. No tracheal deviation present.   Cardiovascular: Normal rate, regular rhythm and normal heart sounds.    No murmur heard.  Pulmonary/Chest: Effort normal and breath sounds normal. No respiratory distress. She has no wheezes.   Abdominal: Soft. Bowel sounds are normal. There is tenderness.   Minimal suprapubic tenderness w/o rebound or guarding.  No cva tenderness    Genitourinary:   Genitourinary Comments: Chaperone nurse melanie.     Pt has minimal physiologic discharge.  No bleeding. No cmt. Mild rt adnexal tenderness on b/m exam but no mass appreciated b/l   Musculoskeletal: Normal range of motion. She exhibits no edema or deformity.   Neurological: She is alert and oriented to person, place, and time. No cranial nerve deficit.   Skin: Skin is warm and dry. Capillary refill takes less than 2 seconds. No rash noted. She is not diaphoretic. No erythema. No pallor.   Psychiatric: She has a normal mood and affect. Her behavior is normal. Thought content normal.   Vitals reviewed.      Procedures           ED Course  ED Course as of Aug 16 1123   Sat Jun 16, 2018   1707 Pt has been feeling much better for hours.  We did have a  delayed dispo secondary to waiting for pelvic exam.  The patient's lactic is normal after fluids.  Her pelvic shows no significant d/c, CMT, but she does have some rt adnexal tenderness on bimanual exam.  Nurse Amy was chaperoned my pelvic exam.  The patient understands all reasons return the emergency department I had a very long discussion with her about all reasons return.  She actually works here in the ED  and she understands all reasons to return to the ED.  I'm going to discharge the patient home at this time in improved and stable condition.  She has had a heart rate in the 80s for hours.  I will discharge her home with by mouth antibiotics. she was given Rocephin here in the emergency department. She was actually given  2 g Rocephin which is 24 hours of IV antibiotics. This should be sufficient start to transition to PO antibiotics.  She is not a diabetic  [NB]   1915 Note that the pt says she has home medication at home she just hadn't taken it for four days.  She says she has more than enough at home and the pharmacy has her refills at the pharmacy  [NB]   Mon Jul 02, 2018   1202 Urine Culture: (!) >100,000 CFU/mL Enterococcus faecalis [MELISSA]      ED Course User Index  [MELISSA] Cruz Plummer, APRN  [NB] Dustin Ross MD                  [facility]      Final diagnoses:   Pyelonephritis   Cyst of ovary, unspecified laterality            Dustin Ross MD  06/17/18 0957       Pam Montelongo,   08/16/18 1126

## 2018-06-16 NOTE — DISCHARGE INSTRUCTIONS
Read and follow all instructions in this handout  Fill and take all medications as directed.  Follow up with primary doctor or clinic in the next two days  Return to ED if symptoms persist or worsen.  Return to ED if you have any other medical concerns.  As discussed,If you develop worsening pain, fever, or can not drink fluids without vomiting return to the Emergency department immediately.

## 2018-06-19 LAB — BACTERIA SPEC AEROBE CULT: ABNORMAL

## 2018-06-21 LAB
BACTERIA SPEC AEROBE CULT: NORMAL
BACTERIA SPEC AEROBE CULT: NORMAL

## 2018-07-02 ENCOUNTER — HOSPITAL ENCOUNTER (EMERGENCY)
Facility: HOSPITAL | Age: 28
Discharge: HOME | End: 2018-07-02
Admitting: NURSE PRACTITIONER

## 2018-07-02 VITALS
RESPIRATION RATE: 20 BRPM | BODY MASS INDEX: 34.38 KG/M2 | TEMPERATURE: 98.2 F | WEIGHT: 194 LBS | SYSTOLIC BLOOD PRESSURE: 116 MMHG | HEART RATE: 68 BPM | HEIGHT: 63 IN | OXYGEN SATURATION: 100 % | DIASTOLIC BLOOD PRESSURE: 76 MMHG

## 2018-07-02 DIAGNOSIS — R10.9 FLANK PAIN: Primary | ICD-10-CM

## 2018-07-02 LAB
ALBUMIN SERPL-MCNC: 4.5 G/DL (ref 3.5–5)
ALBUMIN/GLOB SERPL: 1.8 G/DL (ref 1.5–2.5)
ALP SERPL-CCNC: 98 U/L (ref 35–104)
ALT SERPL W P-5'-P-CCNC: 12 U/L (ref 10–36)
ANION GAP SERPL CALCULATED.3IONS-SCNC: 6.8 MMOL/L (ref 3.6–11.2)
AST SERPL-CCNC: 17 U/L (ref 10–30)
BASOPHILS # BLD AUTO: 0.02 10*3/MM3 (ref 0–0.3)
BASOPHILS NFR BLD AUTO: 0.2 % (ref 0–2)
BILIRUB SERPL-MCNC: 0.4 MG/DL (ref 0.2–1.8)
BILIRUB UR QL STRIP: NEGATIVE
BUN BLD-MCNC: 17 MG/DL (ref 7–21)
BUN/CREAT SERPL: 21 (ref 7–25)
CALCIUM SPEC-SCNC: 9 MG/DL (ref 7.7–10)
CHLORIDE SERPL-SCNC: 110 MMOL/L (ref 99–112)
CLARITY UR: CLEAR
CO2 SERPL-SCNC: 24.2 MMOL/L (ref 24.3–31.9)
COLOR UR: YELLOW
CREAT BLD-MCNC: 0.81 MG/DL (ref 0.43–1.29)
D-LACTATE SERPL-SCNC: 1.4 MMOL/L (ref 0.5–2)
DEPRECATED RDW RBC AUTO: 38.6 FL (ref 37–54)
EOSINOPHIL # BLD AUTO: 0.24 10*3/MM3 (ref 0–0.7)
EOSINOPHIL NFR BLD AUTO: 2.3 % (ref 0–5)
ERYTHROCYTE [DISTWIDTH] IN BLOOD BY AUTOMATED COUNT: 12.6 % (ref 11.5–14.5)
GFR SERPL CREATININE-BSD FRML MDRD: 84 ML/MIN/1.73
GLOBULIN UR ELPH-MCNC: 2.5 GM/DL
GLUCOSE BLD-MCNC: 98 MG/DL (ref 70–110)
GLUCOSE UR STRIP-MCNC: NEGATIVE MG/DL
HCT VFR BLD AUTO: 36.8 % (ref 37–47)
HGB BLD-MCNC: 12.4 G/DL (ref 12–16)
HGB UR QL STRIP.AUTO: NEGATIVE
IMM GRANULOCYTES # BLD: 0.02 10*3/MM3 (ref 0–0.03)
IMM GRANULOCYTES NFR BLD: 0.2 % (ref 0–0.5)
KETONES UR QL STRIP: NEGATIVE
LEUKOCYTE ESTERASE UR QL STRIP.AUTO: NEGATIVE
LYMPHOCYTES # BLD AUTO: 2.6 10*3/MM3 (ref 1–3)
LYMPHOCYTES NFR BLD AUTO: 25.4 % (ref 21–51)
MCH RBC QN AUTO: 29 PG (ref 27–33)
MCHC RBC AUTO-ENTMCNC: 33.7 G/DL (ref 33–37)
MCV RBC AUTO: 86 FL (ref 80–94)
MONOCYTES # BLD AUTO: 0.63 10*3/MM3 (ref 0.1–0.9)
MONOCYTES NFR BLD AUTO: 6.2 % (ref 0–10)
NEUTROPHILS # BLD AUTO: 6.72 10*3/MM3 (ref 1.4–6.5)
NEUTROPHILS NFR BLD AUTO: 65.7 % (ref 30–70)
NITRITE UR QL STRIP: NEGATIVE
OSMOLALITY SERPL CALC.SUM OF ELEC: 282.8 MOSM/KG (ref 273–305)
PH UR STRIP.AUTO: 6 [PH] (ref 5–8)
PLATELET # BLD AUTO: 285 10*3/MM3 (ref 130–400)
PMV BLD AUTO: 11.2 FL (ref 6–10)
POTASSIUM BLD-SCNC: 3.6 MMOL/L (ref 3.5–5.3)
PROT SERPL-MCNC: 7 G/DL (ref 6–8)
PROT UR QL STRIP: NEGATIVE
RBC # BLD AUTO: 4.28 10*6/MM3 (ref 4.2–5.4)
SODIUM BLD-SCNC: 141 MMOL/L (ref 135–153)
SP GR UR STRIP: 1.02 (ref 1–1.03)
UROBILINOGEN UR QL STRIP: NORMAL
WBC NRBC COR # BLD: 10.23 10*3/MM3 (ref 4.5–12.5)

## 2018-07-02 PROCEDURE — 36415 COLL VENOUS BLD VENIPUNCTURE: CPT

## 2018-07-02 PROCEDURE — 83605 ASSAY OF LACTIC ACID: CPT | Performed by: NURSE PRACTITIONER

## 2018-07-02 PROCEDURE — 25010000002 CEFTRIAXONE: Performed by: NURSE PRACTITIONER

## 2018-07-02 PROCEDURE — 80053 COMPREHEN METABOLIC PANEL: CPT | Performed by: NURSE PRACTITIONER

## 2018-07-02 PROCEDURE — 96365 THER/PROPH/DIAG IV INF INIT: CPT

## 2018-07-02 PROCEDURE — 96361 HYDRATE IV INFUSION ADD-ON: CPT

## 2018-07-02 PROCEDURE — 85025 COMPLETE CBC W/AUTO DIFF WBC: CPT | Performed by: NURSE PRACTITIONER

## 2018-07-02 PROCEDURE — 87040 BLOOD CULTURE FOR BACTERIA: CPT | Performed by: NURSE PRACTITIONER

## 2018-07-02 PROCEDURE — 81003 URINALYSIS AUTO W/O SCOPE: CPT | Performed by: NURSE PRACTITIONER

## 2018-07-02 PROCEDURE — 99284 EMERGENCY DEPT VISIT MOD MDM: CPT

## 2018-07-02 RX ORDER — SODIUM CHLORIDE 0.9 % (FLUSH) 0.9 %
10 SYRINGE (ML) INJECTION AS NEEDED
Status: DISCONTINUED | OUTPATIENT
Start: 2018-07-02 | End: 2018-07-02 | Stop reason: HOSPADM

## 2018-07-02 RX ADMIN — CEFTRIAXONE 1 G: 1 INJECTION, POWDER, FOR SOLUTION INTRAMUSCULAR; INTRAVENOUS at 17:00

## 2018-07-02 RX ADMIN — SODIUM CHLORIDE 1000 ML: 9 INJECTION, SOLUTION INTRAVENOUS at 15:10

## 2018-07-02 NOTE — ED PROVIDER NOTES
Subjective   Patient is a 28 year old white female who returns to the emergency room today for recheck. Patient had urine culture that showed etiologic agent of Enterococcus faecalis. Patient was placed on cefdinir and she reports she had taken complete course. At time of urine culture review, Dr. Montelongo reviewed and advised patient be placed on Macrobid as sensitivity result did not test for cefdinir or cephalosporins specifically but did show susceptibility to nitrofurantoin. After attempting to contact patient unsuccessfully, patient received letter today and called ER. I spoke with patient and she stated she was still having some bilateral flank pain and reports that she is scheduled to see PCP tomorrow. Advises that if she felt she needed to be seen I would be happy to see her and recheck urine and labs in ER and evaluate for any problems.  Patient returns, denies fever, but reports she's had some chills and body aches and reports bilateral flank pain, worse on the right.  Patient denies any hematuria, dysuria, or other associated symptoms.        History provided by:  Patient   used: No    Flank Pain   Pain location:  R flank and L flank  Pain quality: sharp    Pain radiates to:  Does not radiate  Pain severity:  Moderate  Onset quality:  Sudden  Duration: reports she's had some bilateral flank pain last few days.  Timing:  Intermittent  Progression:  Waxing and waning  Chronicity:  Recurrent  Context: not alcohol use, not awakening from sleep, not diet changes, not eating, not laxative use, not medication withdrawal, not previous surgeries, not recent illness, not recent sexual activity, not recent travel, not sick contacts, not suspicious food intake and not trauma    Relieved by:  Nothing  Worsened by:  Nothing  Ineffective treatments:  None tried  Associated symptoms: chills    Associated symptoms: no anorexia, no belching, no chest pain, no constipation, no cough, no diarrhea, no  fatigue, no fever, no flatus, no hematemesis, no hematochezia, no melena, no nausea, no shortness of breath, no sore throat, no vaginal bleeding, no vaginal discharge and no vomiting    Risk factors: no alcohol abuse, no aspirin use, not elderly, has not had multiple surgeries, no NSAID use, not obese, not pregnant and no recent hospitalization        Review of Systems   Constitutional: Positive for chills. Negative for fatigue and fever.   HENT: Negative.  Negative for sore throat.    Eyes: Negative.    Respiratory: Negative.  Negative for cough and shortness of breath.    Cardiovascular: Negative.  Negative for chest pain.   Gastrointestinal: Negative.  Negative for anorexia, constipation, diarrhea, flatus, hematemesis, hematochezia, melena, nausea and vomiting.   Endocrine: Negative.    Genitourinary: Positive for flank pain. Negative for vaginal bleeding and vaginal discharge.   Skin: Negative.    Allergic/Immunologic: Negative.    Neurological: Negative.    Hematological: Negative.    Psychiatric/Behavioral: Negative.        Past Medical History:   Diagnosis Date   • Fibromyalgia    • History of ear infections    • History of heart disease    • History of lupus    • History of migraine    • Hypertension    • Pulmonary stenosis        Allergies   Allergen Reactions   • Ciprofloxacin Rash       Past Surgical History:   Procedure Laterality Date   •  SECTION     • ORIF SCAPHOID W VASCULARIZED BONE GRAFT         Family History   Problem Relation Age of Onset   • Heart disease Mother    • Heart disease Father    • Cancer Father        Social History     Social History   • Marital status: Single     Social History Main Topics   • Smoking status: Former Smoker   • Alcohol use No   • Drug use: No   • Sexual activity: Defer     Other Topics Concern   • Not on file           Objective   Physical Exam   Constitutional: She is oriented to person, place, and time. She appears well-developed and well-nourished.    HENT:   Head: Normocephalic.   Right Ear: External ear normal.   Left Ear: External ear normal.   Mouth/Throat: Oropharynx is clear and moist.   Eyes: EOM are normal. Pupils are equal, round, and reactive to light.   Neck: Normal range of motion. Neck supple.   Cardiovascular: Normal rate, regular rhythm and normal heart sounds.    Pulmonary/Chest: Effort normal and breath sounds normal.   Abdominal: Soft. Bowel sounds are normal.   Musculoskeletal: Normal range of motion.   Neurological: She is alert and oriented to person, place, and time.   Skin: Skin is warm and dry. Capillary refill takes less than 2 seconds.   Psychiatric: She has a normal mood and affect. Her behavior is normal.   Nursing note and vitals reviewed.      Procedures           ED Course                  MDM      Final diagnoses:   Flank pain            Cruz Plummer, APRN  07/02/18 7790

## 2018-07-02 NOTE — ED NOTES
Patient reports flank pain and straining to urinate. Patient was told by Maulik Plummer NP that she had an abnormal lab results.   Patient resting quietly on stretcher with no complaints. Pt AAOx4. No respiratory distress noted. Respirations even and unlabored. Pt denies any needs at this time. Skin PWD. Pt family at bedside. Will continue to monitor and follow plan of care. Bed rails up x 2, bed in lowest position, call light within reach.      Cheryl Lyons RN  07/02/18 3437

## 2018-07-07 LAB
BACTERIA SPEC AEROBE CULT: NORMAL
BACTERIA SPEC AEROBE CULT: NORMAL

## 2018-08-04 ENCOUNTER — HOSPITAL ENCOUNTER (EMERGENCY)
Facility: HOSPITAL | Age: 28
Discharge: HOME | End: 2018-08-04
Attending: EMERGENCY MEDICINE | Admitting: EMERGENCY MEDICINE

## 2018-08-04 VITALS
SYSTOLIC BLOOD PRESSURE: 134 MMHG | OXYGEN SATURATION: 98 % | DIASTOLIC BLOOD PRESSURE: 67 MMHG | HEIGHT: 64 IN | WEIGHT: 190 LBS | BODY MASS INDEX: 32.44 KG/M2 | HEART RATE: 70 BPM | RESPIRATION RATE: 18 BRPM | TEMPERATURE: 97.4 F

## 2018-08-04 DIAGNOSIS — N39.0 UTI (URINARY TRACT INFECTION), UNCOMPLICATED: Primary | ICD-10-CM

## 2018-08-04 LAB
ALBUMIN SERPL-MCNC: 4.6 G/DL (ref 3.5–5)
ALBUMIN/GLOB SERPL: 1.8 G/DL (ref 1.5–2.5)
ALP SERPL-CCNC: 85 U/L (ref 35–104)
ALT SERPL W P-5'-P-CCNC: 16 U/L (ref 10–36)
ANION GAP SERPL CALCULATED.3IONS-SCNC: 7.5 MMOL/L (ref 3.6–11.2)
AST SERPL-CCNC: 22 U/L (ref 10–30)
B-HCG UR QL: NEGATIVE
BACTERIA UR QL AUTO: ABNORMAL /HPF
BASOPHILS # BLD AUTO: 0.02 10*3/MM3 (ref 0–0.3)
BASOPHILS NFR BLD AUTO: 0.2 % (ref 0–2)
BILIRUB SERPL-MCNC: 0.7 MG/DL (ref 0.2–1.8)
BILIRUB UR QL STRIP: ABNORMAL
BUN BLD-MCNC: 17 MG/DL (ref 7–21)
BUN/CREAT SERPL: 17.9 (ref 7–25)
C TRACH RRNA CVX QL NAA+PROBE: NOT DETECTED
CALCIUM SPEC-SCNC: 9.4 MG/DL (ref 7.7–10)
CHLORIDE SERPL-SCNC: 109 MMOL/L (ref 99–112)
CLARITY UR: ABNORMAL
CO2 SERPL-SCNC: 22.5 MMOL/L (ref 24.3–31.9)
COLOR UR: ABNORMAL
CREAT BLD-MCNC: 0.95 MG/DL (ref 0.43–1.29)
DEPRECATED RDW RBC AUTO: 38.8 FL (ref 37–54)
EOSINOPHIL # BLD AUTO: 0.29 10*3/MM3 (ref 0–0.7)
EOSINOPHIL NFR BLD AUTO: 3 % (ref 0–5)
ERYTHROCYTE [DISTWIDTH] IN BLOOD BY AUTOMATED COUNT: 12.6 % (ref 11.5–14.5)
GFR SERPL CREATININE-BSD FRML MDRD: 70 ML/MIN/1.73
GLOBULIN UR ELPH-MCNC: 2.5 GM/DL
GLUCOSE BLD-MCNC: 94 MG/DL (ref 70–110)
GLUCOSE UR STRIP-MCNC: NEGATIVE MG/DL
HCT VFR BLD AUTO: 39.9 % (ref 37–47)
HGB BLD-MCNC: 13.3 G/DL (ref 12–16)
HGB UR QL STRIP.AUTO: NEGATIVE
HYALINE CASTS UR QL AUTO: ABNORMAL /LPF
IMM GRANULOCYTES # BLD: 0.02 10*3/MM3 (ref 0–0.03)
IMM GRANULOCYTES NFR BLD: 0.2 % (ref 0–0.5)
KETONES UR QL STRIP: ABNORMAL
LEUKOCYTE ESTERASE UR QL STRIP.AUTO: ABNORMAL
LYMPHOCYTES # BLD AUTO: 2.87 10*3/MM3 (ref 1–3)
LYMPHOCYTES NFR BLD AUTO: 30.1 % (ref 21–51)
MCH RBC QN AUTO: 28.7 PG (ref 27–33)
MCHC RBC AUTO-ENTMCNC: 33.3 G/DL (ref 33–37)
MCV RBC AUTO: 86.2 FL (ref 80–94)
MONOCYTES # BLD AUTO: 0.66 10*3/MM3 (ref 0.1–0.9)
MONOCYTES NFR BLD AUTO: 6.9 % (ref 0–10)
N GONORRHOEA RRNA SPEC QL NAA+PROBE: NOT DETECTED
NEUTROPHILS # BLD AUTO: 5.68 10*3/MM3 (ref 1.4–6.5)
NEUTROPHILS NFR BLD AUTO: 59.6 % (ref 30–70)
NITRITE UR QL STRIP: POSITIVE
OSMOLALITY SERPL CALC.SUM OF ELEC: 278.8 MOSM/KG (ref 273–305)
PH UR STRIP.AUTO: 6 [PH] (ref 5–8)
PLATELET # BLD AUTO: 320 10*3/MM3 (ref 130–400)
PMV BLD AUTO: 11 FL (ref 6–10)
POTASSIUM BLD-SCNC: 3.7 MMOL/L (ref 3.5–5.3)
PROT SERPL-MCNC: 7.1 G/DL (ref 6–8)
PROT UR QL STRIP: ABNORMAL
RBC # BLD AUTO: 4.63 10*6/MM3 (ref 4.2–5.4)
RBC # UR: ABNORMAL /HPF
REF LAB TEST METHOD: ABNORMAL
SODIUM BLD-SCNC: 139 MMOL/L (ref 135–153)
SP GR UR STRIP: 1.02 (ref 1–1.03)
SQUAMOUS #/AREA URNS HPF: ABNORMAL /HPF
UROBILINOGEN UR QL STRIP: ABNORMAL
WBC NRBC COR # BLD: 9.54 10*3/MM3 (ref 4.5–12.5)
WBC UR QL AUTO: ABNORMAL /HPF

## 2018-08-04 PROCEDURE — 99283 EMERGENCY DEPT VISIT LOW MDM: CPT

## 2018-08-04 PROCEDURE — 81001 URINALYSIS AUTO W/SCOPE: CPT | Performed by: EMERGENCY MEDICINE

## 2018-08-04 PROCEDURE — 80053 COMPREHEN METABOLIC PANEL: CPT | Performed by: PHYSICIAN ASSISTANT

## 2018-08-04 PROCEDURE — 87491 CHLMYD TRACH DNA AMP PROBE: CPT | Performed by: PHYSICIAN ASSISTANT

## 2018-08-04 PROCEDURE — 85025 COMPLETE CBC W/AUTO DIFF WBC: CPT | Performed by: PHYSICIAN ASSISTANT

## 2018-08-04 PROCEDURE — 87591 N.GONORRHOEAE DNA AMP PROB: CPT | Performed by: PHYSICIAN ASSISTANT

## 2018-08-04 PROCEDURE — 36415 COLL VENOUS BLD VENIPUNCTURE: CPT

## 2018-08-04 PROCEDURE — 81025 URINE PREGNANCY TEST: CPT | Performed by: PHYSICIAN ASSISTANT

## 2018-08-04 RX ORDER — LEVOFLOXACIN 750 MG/1
750 TABLET ORAL DAILY
Qty: 5 TABLET | Refills: 0 | Status: SHIPPED | OUTPATIENT
Start: 2018-08-04 | End: 2018-08-09

## 2018-08-04 RX ORDER — ONDANSETRON 4 MG/1
4 TABLET, ORALLY DISINTEGRATING ORAL EVERY 6 HOURS PRN
Qty: 10 TABLET | Refills: 0 | Status: SHIPPED | OUTPATIENT
Start: 2018-08-04 | End: 2019-09-03

## 2018-08-04 RX ORDER — ONDANSETRON 4 MG/1
4 TABLET, ORALLY DISINTEGRATING ORAL ONCE
Status: COMPLETED | OUTPATIENT
Start: 2018-08-04 | End: 2018-08-04

## 2018-08-04 RX ADMIN — ONDANSETRON 4 MG: 4 TABLET, ORALLY DISINTEGRATING ORAL at 10:44

## 2018-08-04 NOTE — ED PROVIDER NOTES
Subjective   28-year-old female presents to the ED today with urinary tract infection symptoms.  She states she has been dealing with these symptoms intermittently for the last 6 weeks.  She has been on Augmentin and Cefdinir most recently.  She states it will clear up for a couple days and then the symptoms will come back.  She states she finished her last round of antibiotics about 5-7 days ago.  The symptoms have been back for the last 2-3 days.  She states she has a history of frequent urinary tract infections since she was 12 years old.  She states she is having suprapubic pressure and low back pain.  She's been having chills.  She states she has dysuria with increased urinary urgency and frequency.  She denies any known fever.  She denies any vaginal discharge.  She states her last menstrual period was July 12.  She states she has been taking Azo home with no relief.  She is scheduled to see urology in about 2 weeks.        History provided by:  Patient  Urinary Tract Infection   Pain quality:  Burning  Pain severity:  Moderate  Onset quality:  Gradual  Duration:  3 days  Timing:  Constant  Progression:  Worsening  Chronicity:  Recurrent  Recent urinary tract infections: yes    Relieved by:  Nothing  Worsened by:  Nothing  Ineffective treatments:  Phenazopyridine  Urinary symptoms: discolored urine, foul-smelling urine, frequent urination and incontinence    Associated symptoms: abdominal pain and nausea    Associated symptoms: no fever, no flank pain, no genital lesions, no vaginal discharge and no vomiting    Risk factors: recurrent urinary tract infections    Risk factors: not pregnant        Review of Systems   Constitutional: Negative.  Negative for fever.   HENT: Negative.    Eyes: Negative.    Respiratory: Negative.    Cardiovascular: Negative.    Gastrointestinal: Positive for abdominal pain and nausea. Negative for constipation, diarrhea and vomiting.   Genitourinary: Positive for dysuria, frequency,  pelvic pain and urgency. Negative for difficulty urinating, flank pain, hematuria, vaginal bleeding, vaginal discharge and vaginal pain.   Musculoskeletal: Positive for back pain.   Skin: Negative.    Neurological: Negative.    Psychiatric/Behavioral: Negative.    All other systems reviewed and are negative.      Past Medical History:   Diagnosis Date   • Fibromyalgia    • History of ear infections    • History of heart disease    • History of lupus    • History of migraine    • Hypertension    • Pulmonary stenosis        No Known Allergies    Past Surgical History:   Procedure Laterality Date   •  SECTION     • LAPAROSCOPIC TUBAL LIGATION     • ORIF SCAPHOID W VASCULARIZED BONE GRAFT         Family History   Problem Relation Age of Onset   • Heart disease Mother    • Heart disease Father    • Cancer Father        Social History     Social History   • Marital status: Single     Social History Main Topics   • Smoking status: Former Smoker   • Alcohol use No   • Drug use: No   • Sexual activity: Defer     Other Topics Concern   • Not on file           Objective   Physical Exam   Constitutional: She is oriented to person, place, and time. She appears well-developed and well-nourished. No distress.   HENT:   Head: Normocephalic and atraumatic.   Right Ear: External ear normal.   Left Ear: External ear normal.   Nose: Nose normal.   Mouth/Throat: Oropharynx is clear and moist.   Eyes: Pupils are equal, round, and reactive to light. Conjunctivae and EOM are normal.   Neck: Normal range of motion. Neck supple.   Cardiovascular: Normal rate, regular rhythm, normal heart sounds and intact distal pulses.    Pulmonary/Chest: Effort normal and breath sounds normal.   Abdominal: Soft. Bowel sounds are normal. There is tenderness (mild in suprapubic area). There is no rebound and no guarding.   No CVA tenderness   Musculoskeletal: Normal range of motion.   Neurological: She is alert and oriented to person, place, and  time.   Skin: Skin is warm and dry. Capillary refill takes less than 2 seconds.   Psychiatric: She has a normal mood and affect. Her behavior is normal. Judgment and thought content normal.   Nursing note and vitals reviewed.      Procedures           ED Course  ED Course as of Aug 04 1105   Sat Aug 04, 2018   1037 Patient's urine culture was reviewed from June 16.  She grew out greater than 100,000 enterococcus.  It appeared to be susceptible to Levaquin.  She states the most recent antibiotic she was on was Augmentin and Cefdinir.  At this time I will start her on Levaquin 750 mg for 5 days.  She will follow-up with Dr. Melendez in the office for her scheduled appointment on August 16.  She will return to the ED if any of her symptoms change or worsen.  [AH]      ED Course User Index  [AH] Rosalba Horne PA                  MDM  Number of Diagnoses or Management Options  UTI (urinary tract infection), uncomplicated:      Amount and/or Complexity of Data Reviewed  Clinical lab tests: reviewed    Patient Progress  Patient progress: stable        Final diagnoses:   UTI (urinary tract infection), uncomplicated            Rosalba Horne PA  08/04/18 1104

## 2018-08-10 ENCOUNTER — APPOINTMENT (OUTPATIENT)
Dept: PREADMISSION TESTING | Facility: HOSPITAL | Age: 28
End: 2018-08-10

## 2018-08-13 ENCOUNTER — APPOINTMENT (OUTPATIENT)
Dept: GENERAL RADIOLOGY | Facility: HOSPITAL | Age: 28
End: 2018-08-13
Attending: OBSTETRICS & GYNECOLOGY

## 2018-08-13 ENCOUNTER — ANESTHESIA (OUTPATIENT)
Dept: PERIOP | Facility: HOSPITAL | Age: 28
End: 2018-08-13

## 2018-08-13 ENCOUNTER — ANESTHESIA EVENT (OUTPATIENT)
Dept: PERIOP | Facility: HOSPITAL | Age: 28
End: 2018-08-13

## 2018-08-13 ENCOUNTER — HOSPITAL ENCOUNTER (OUTPATIENT)
Facility: HOSPITAL | Age: 28
Setting detail: HOSPITAL OUTPATIENT SURGERY
Discharge: HOME | End: 2018-08-13
Attending: OBSTETRICS & GYNECOLOGY | Admitting: OBSTETRICS & GYNECOLOGY

## 2018-08-13 VITALS
DIASTOLIC BLOOD PRESSURE: 62 MMHG | RESPIRATION RATE: 16 BRPM | HEART RATE: 76 BPM | HEIGHT: 63 IN | BODY MASS INDEX: 34.38 KG/M2 | OXYGEN SATURATION: 97 % | SYSTOLIC BLOOD PRESSURE: 126 MMHG | TEMPERATURE: 98 F | WEIGHT: 194 LBS

## 2018-08-13 LAB
B-HCG UR QL: NEGATIVE
INTERNAL NEGATIVE CONTROL: NEGATIVE
INTERNAL POSITIVE CONTROL: POSITIVE
Lab: NORMAL

## 2018-08-13 PROCEDURE — 25010000002 MIDAZOLAM PER 1 MG: Performed by: NURSE ANESTHETIST, CERTIFIED REGISTERED

## 2018-08-13 PROCEDURE — 25010000002 ONDANSETRON PER 1 MG: Performed by: NURSE ANESTHETIST, CERTIFIED REGISTERED

## 2018-08-13 PROCEDURE — 25010000002 NEOSTIGMINE 10 MG/10ML SOLUTION: Performed by: NURSE ANESTHETIST, CERTIFIED REGISTERED

## 2018-08-13 PROCEDURE — 25010000002 FENTANYL CITRATE (PF) 100 MCG/2ML SOLUTION: Performed by: NURSE ANESTHETIST, CERTIFIED REGISTERED

## 2018-08-13 PROCEDURE — 25010000002 PROPOFOL 10 MG/ML EMULSION: Performed by: NURSE ANESTHETIST, CERTIFIED REGISTERED

## 2018-08-13 PROCEDURE — 81025 URINE PREGNANCY TEST: CPT | Performed by: OBSTETRICS & GYNECOLOGY

## 2018-08-13 DEVICE — CLIP FALLOP FILSHIE TI PR STRL BX/20: Type: IMPLANTABLE DEVICE | Site: FALLOPIAN TUBE | Status: FUNCTIONAL

## 2018-08-13 RX ORDER — ROCURONIUM BROMIDE 10 MG/ML
INJECTION, SOLUTION INTRAVENOUS AS NEEDED
Status: DISCONTINUED | OUTPATIENT
Start: 2018-08-13 | End: 2018-08-13 | Stop reason: SURG

## 2018-08-13 RX ORDER — OXYCODONE HYDROCHLORIDE AND ACETAMINOPHEN 5; 325 MG/1; MG/1
1 TABLET ORAL ONCE AS NEEDED
Status: DISCONTINUED | OUTPATIENT
Start: 2018-08-13 | End: 2018-08-13 | Stop reason: HOSPADM

## 2018-08-13 RX ORDER — MAGNESIUM HYDROXIDE 1200 MG/15ML
LIQUID ORAL AS NEEDED
Status: DISCONTINUED | OUTPATIENT
Start: 2018-08-13 | End: 2018-08-13 | Stop reason: HOSPADM

## 2018-08-13 RX ORDER — NEOSTIGMINE METHYLSULFATE 1 MG/ML
INJECTION, SOLUTION INTRAVENOUS AS NEEDED
Status: DISCONTINUED | OUTPATIENT
Start: 2018-08-13 | End: 2018-08-13 | Stop reason: SURG

## 2018-08-13 RX ORDER — LIDOCAINE HYDROCHLORIDE 20 MG/ML
INJECTION, SOLUTION INFILTRATION; PERINEURAL AS NEEDED
Status: DISCONTINUED | OUTPATIENT
Start: 2018-08-13 | End: 2018-08-13 | Stop reason: SURG

## 2018-08-13 RX ORDER — MIDAZOLAM HYDROCHLORIDE 1 MG/ML
INJECTION INTRAMUSCULAR; INTRAVENOUS AS NEEDED
Status: DISCONTINUED | OUTPATIENT
Start: 2018-08-13 | End: 2018-08-13 | Stop reason: SURG

## 2018-08-13 RX ORDER — SODIUM CHLORIDE 0.9 % (FLUSH) 0.9 %
1-10 SYRINGE (ML) INJECTION AS NEEDED
Status: DISCONTINUED | OUTPATIENT
Start: 2018-08-13 | End: 2018-08-13 | Stop reason: HOSPADM

## 2018-08-13 RX ORDER — FAMOTIDINE 10 MG/ML
INJECTION, SOLUTION INTRAVENOUS AS NEEDED
Status: DISCONTINUED | OUTPATIENT
Start: 2018-08-13 | End: 2018-08-13 | Stop reason: SURG

## 2018-08-13 RX ORDER — IPRATROPIUM BROMIDE AND ALBUTEROL SULFATE 2.5; .5 MG/3ML; MG/3ML
3 SOLUTION RESPIRATORY (INHALATION) ONCE AS NEEDED
Status: DISCONTINUED | OUTPATIENT
Start: 2018-08-13 | End: 2018-08-13 | Stop reason: HOSPADM

## 2018-08-13 RX ORDER — ONDANSETRON 2 MG/ML
INJECTION INTRAMUSCULAR; INTRAVENOUS AS NEEDED
Status: DISCONTINUED | OUTPATIENT
Start: 2018-08-13 | End: 2018-08-13 | Stop reason: SURG

## 2018-08-13 RX ORDER — IBUPROFEN 600 MG/1
600 TABLET ORAL EVERY 6 HOURS PRN
Qty: 60 TABLET | Refills: 0 | Status: SHIPPED | OUTPATIENT
Start: 2018-08-13 | End: 2019-09-03

## 2018-08-13 RX ORDER — MEPERIDINE HYDROCHLORIDE 50 MG/ML
12.5 INJECTION INTRAMUSCULAR; INTRAVENOUS; SUBCUTANEOUS
Status: DISCONTINUED | OUTPATIENT
Start: 2018-08-13 | End: 2018-08-13 | Stop reason: HOSPADM

## 2018-08-13 RX ORDER — ONDANSETRON 2 MG/ML
4 INJECTION INTRAMUSCULAR; INTRAVENOUS ONCE AS NEEDED
Status: DISCONTINUED | OUTPATIENT
Start: 2018-08-13 | End: 2018-08-13 | Stop reason: HOSPADM

## 2018-08-13 RX ORDER — SODIUM CHLORIDE, SODIUM LACTATE, POTASSIUM CHLORIDE, CALCIUM CHLORIDE 600; 310; 30; 20 MG/100ML; MG/100ML; MG/100ML; MG/100ML
125 INJECTION, SOLUTION INTRAVENOUS CONTINUOUS
Status: DISCONTINUED | OUTPATIENT
Start: 2018-08-13 | End: 2018-08-13 | Stop reason: HOSPADM

## 2018-08-13 RX ORDER — PROPOFOL 10 MG/ML
VIAL (ML) INTRAVENOUS AS NEEDED
Status: DISCONTINUED | OUTPATIENT
Start: 2018-08-13 | End: 2018-08-13 | Stop reason: SURG

## 2018-08-13 RX ORDER — FENTANYL CITRATE 50 UG/ML
INJECTION, SOLUTION INTRAMUSCULAR; INTRAVENOUS AS NEEDED
Status: DISCONTINUED | OUTPATIENT
Start: 2018-08-13 | End: 2018-08-13 | Stop reason: SURG

## 2018-08-13 RX ORDER — GLYCOPYRROLATE 0.2 MG/ML
INJECTION INTRAMUSCULAR; INTRAVENOUS AS NEEDED
Status: DISCONTINUED | OUTPATIENT
Start: 2018-08-13 | End: 2018-08-13 | Stop reason: SURG

## 2018-08-13 RX ORDER — FENTANYL CITRATE 50 UG/ML
50 INJECTION, SOLUTION INTRAMUSCULAR; INTRAVENOUS
Status: DISCONTINUED | OUTPATIENT
Start: 2018-08-13 | End: 2018-08-13 | Stop reason: HOSPADM

## 2018-08-13 RX ADMIN — FENTANYL CITRATE 50 MCG: 50 INJECTION INTRAMUSCULAR; INTRAVENOUS at 08:45

## 2018-08-13 RX ADMIN — ONDANSETRON 4 MG: 2 INJECTION, SOLUTION INTRAMUSCULAR; INTRAVENOUS at 08:22

## 2018-08-13 RX ADMIN — ROCURONIUM BROMIDE 5 MG: 10 INJECTION INTRAVENOUS at 08:45

## 2018-08-13 RX ADMIN — SODIUM CHLORIDE, POTASSIUM CHLORIDE, SODIUM LACTATE AND CALCIUM CHLORIDE 125 ML/HR: 600; 310; 30; 20 INJECTION, SOLUTION INTRAVENOUS at 07:29

## 2018-08-13 RX ADMIN — MIDAZOLAM HYDROCHLORIDE 2 MG: 1 INJECTION, SOLUTION INTRAMUSCULAR; INTRAVENOUS at 08:16

## 2018-08-13 RX ADMIN — ROCURONIUM BROMIDE 20 MG: 10 INJECTION INTRAVENOUS at 08:22

## 2018-08-13 RX ADMIN — FENTANYL CITRATE 50 MCG: 50 INJECTION INTRAMUSCULAR; INTRAVENOUS at 09:44

## 2018-08-13 RX ADMIN — LIDOCAINE HYDROCHLORIDE 40 MG: 20 INJECTION, SOLUTION INFILTRATION; PERINEURAL at 08:22

## 2018-08-13 RX ADMIN — FENTANYL CITRATE 50 MCG: 50 INJECTION INTRAMUSCULAR; INTRAVENOUS at 09:05

## 2018-08-13 RX ADMIN — GLYCOPYRROLATE 0.4 MG: 0.2 INJECTION, SOLUTION INTRAMUSCULAR; INTRAVENOUS at 09:01

## 2018-08-13 RX ADMIN — PROPOFOL 150 MG: 10 INJECTION, EMULSION INTRAVENOUS at 08:22

## 2018-08-13 RX ADMIN — FENTANYL CITRATE 100 MCG: 50 INJECTION INTRAMUSCULAR; INTRAVENOUS at 08:16

## 2018-08-13 RX ADMIN — SODIUM CHLORIDE, POTASSIUM CHLORIDE, SODIUM LACTATE AND CALCIUM CHLORIDE: 600; 310; 30; 20 INJECTION, SOLUTION INTRAVENOUS at 08:55

## 2018-08-13 RX ADMIN — FAMOTIDINE 20 MG: 10 INJECTION, SOLUTION INTRAVENOUS at 08:22

## 2018-08-13 RX ADMIN — MEPERIDINE HYDROCHLORIDE 12.5 MG: 50 INJECTION INTRAMUSCULAR; INTRAVENOUS; SUBCUTANEOUS at 09:55

## 2018-08-13 RX ADMIN — NEOSTIGMINE METHYLSULFATE 3 MG: 1 INJECTION, SOLUTION INTRAVENOUS at 09:01

## 2018-08-13 RX ADMIN — FENTANYL CITRATE 50 MCG: 50 INJECTION INTRAMUSCULAR; INTRAVENOUS at 09:36

## 2018-08-13 NOTE — ANESTHESIA POSTPROCEDURE EVALUATION
Patient: Cheryl Mcnulty    Procedure Summary     Date:  08/13/18 Room / Location:  [facility] OR  / [facility] OR    Anesthesia Start:  0816 Anesthesia Stop:  0911    Procedure:  TUBAL FALLOPE FILSHIE CLIPPING LAPAROSCOPIC EXTENSIVE LYSIS OF AHESIONS. DRAINAGE OF OVARIAN CYST. (Bilateral Vagina) Diagnosis:  (Z30.2)    Surgeon:  Rachel Caruso DO Provider:  Ronaldo Antonio DO    Anesthesia Type:  general ASA Status:  2          Anesthesia Type: general  Last vitals  BP   126/62 (08/13/18 1052)   Temp   98 °F (36.7 °C) (08/13/18 1006)   Pulse   76 (08/13/18 1052)   Resp   16 (08/13/18 1052)     SpO2   97 % (08/13/18 1052)     Post Anesthesia Care and Evaluation    Patient location during evaluation: PHASE II  Patient participation: complete - patient participated  Level of consciousness: awake and alert  Pain score: 1  Pain management: adequate  Airway patency: patent  Anesthetic complications: No anesthetic complications  PONV Status: controlled  Cardiovascular status: acceptable  Respiratory status: acceptable  Hydration status: acceptable

## 2018-08-13 NOTE — ANESTHESIA PREPROCEDURE EVALUATION
Anesthesia Evaluation                  Airway   Mallampati: II  TM distance: >3 FB  Neck ROM: full  No difficulty expected  Dental - normal exam   (+) poor dentition    Pulmonary - normal exam   Cardiovascular - normal exam    (+) hypertension,       Neuro/Psych  GI/Hepatic/Renal/Endo    (+) obesity,  GERD,      Musculoskeletal     Abdominal  - normal exam    Bowel sounds: normal.   Substance History      OB/GYN          Other   (+) arthritis                     Anesthesia Plan    ASA 2     general     intravenous induction   Anesthetic plan and risks discussed with patient.    Plan discussed with CRNA.

## 2018-08-13 NOTE — ANESTHESIA PROCEDURE NOTES
Airway  Urgency: elective    Date/Time: 8/13/2018 8:23 AM  Airway not difficult    General Information and Staff    Patient location during procedure: OR  Anesthesiologist: JOSUE ST  CRNA: JACQUELIN UNDERWOOD    Indications and Patient Condition  Indications for airway management: airway protection    Preoxygenated: yes  MILS maintained throughout  Mask difficulty assessment: 0 - not attempted    Final Airway Details  Final airway type: endotracheal airway      Successful airway: ETT  Cuffed: yes   Successful intubation technique: direct laryngoscopy  Facilitating devices/methods: intubating stylet  Endotracheal tube insertion site: oral  Blade: Annie  Blade size: #3  ETT size: 7.0 mm  Cormack-Lehane Classification: grade I - full view of glottis  Placement verified by: chest auscultation, capnometry and palpation of cuff   Cuff volume (mL): 10  Measured from: lips  ETT to lips (cm): 21  Number of attempts at approach: 1    Additional Comments  Dentition and lips same as preop

## 2018-08-13 NOTE — OP NOTE
Bilateral Tubal Ligation Operation Note    Cheryl Mcnulty  8/13/2018    Pre-op Diagnosis:   Desires sterilization    Post-op Diagnosis:     Same + dense adhesions from omentum to anterior abdominal wall and uterus, right ovarian cyst    Procedure(s):  TUBAL FALLOPE FILSHIE CLIPPING LAPAROSCOPIC EXTENSIVE LYSIS OF AHESIONS. DRAINAGE OF OVARIAN CYST.     Surgeon(s):  Rachel Caruso DO    Anesthesia: General    Staff:   Circulator: Atiya Samuel RN  Scrub Person: Meggan Bernal  Assistant: Marissa Rg CSA    Estimated Blood Loss: 10cc      Procedure     The patient was prepped and draped in normal sterile fashion. Umbillical incision was made with a scalpel. A nonbladed trocar was placed under direct visualization. The abdomen was insufflated with CO2 gas. Dense adhesions from omentum to anterior abd wall and uterine fundus were noted. A suprapubic port and RLQ port were also placed. The harmonic scalpel was used for lysis of these adhesions. A right ovarian simple appearing cyst was noted. The harmonic was used to puncture cyst wall and hemostasis was achieved with cautery. Filshie clips were placed on the pt's fallopian tube bilaterally. Adhesions distorted anatomy and 2 clips were placed on the right, one on the fallopian tube and one was previously placed on the round ligament. The skin was closed with 4-0 Monocryl and Dermabond. The patient tolerated the procedure and went to recovery room in stable condition.         Rachel Caruso DO     Date: 8/13/2018  Time: 9:04 AM

## 2018-08-13 NOTE — H&P
Chief complaint Desires sterilization    History of Present Illness: Patient is a 28 y.o. female who presents for a laparoscopic bilateral tubal ligation.      Past Medical History:   Diagnosis Date   • Congestive heart failure (CMS/HCC)    • Fibromyalgia    • GERD (gastroesophageal reflux disease)    • History of ear infections    • History of heart disease    • History of lupus    • History of migraine    • Hypertension    • Lown Ganong Parker syndrome    • Murmur    • Osteoarthritis    • Pulmonary stenosis        Past Surgical History:   Procedure Laterality Date   •  SECTION     • ORIF SCAPHOID W VASCULARIZED BONE GRAFT         Family History   Problem Relation Age of Onset   • Heart disease Mother    • Heart disease Father    • Cancer Father        Social History   Substance Use Topics   • Smoking status: Current Some Day Smoker     Types: Electronic Cigarette   • Smokeless tobacco: Never Used   • Alcohol use Yes      Comment: socially       Prescriptions Prior to Admission   Medication Sig Dispense Refill Last Dose   • atenolol (TENORMIN) 25 MG tablet Take 1 tablet by mouth Daily. 30 tablet 1 2018 at 2000   • cyclobenzaprine (FLEXERIL) 10 MG tablet Take 1 tablet by mouth Every Night at bedtime. 30 tablet 1 2018 at 2000   • topiramate (TOPAMAX) 25 MG tablet Take 1 tablet by mouth 2 (Two) Times a Day. 60 tablet 2 2018 at 2000   • vitamin D (ERGOCALCIFEROL) 94425 units capsule capsule Take 1 capsule by mouth 1 (One) Time Per Week. 4 capsule 0 2018 at Unknown time   • fluconazole (DIFLUCAN) 150 MG tablet Take 1 tablet by mouth 1 (One) Time Per Week. 2 tablet 0 2018   • fluconazole (DIFLUCAN) 150 MG tablet Take 1 tablet by mouth 1 (One) Time Per Week. 2 tablet 0 2018   • hepatitis A (HAVRIX) 1440 EL U/ML vaccine Inject 1 mL into the appropriate muscle as directed by prescriber. 1 mL 1 2018   • meloxicam (MOBIC) 15 MG tablet Take 1 tablet by mouth Daily. 30 tablet 1 8/10/2018    • NIFEdipine CC (ADALAT CC) 30 MG 24 hr tablet Take 1 tablet by mouth Daily. 30 tablet 1 8/10/2018   • omeprazole (priLOSEC) 40 MG capsule Take 1 capsule by mouth daily 30 capsule 0 8/9/2018   • ondansetron ODT (ZOFRAN-ODT) 4 MG disintegrating tablet Take 1 tablet by mouth Every 6 (Six) Hours As Needed for Nausea or Vomiting. 10 tablet 0 8/7/2018   • vitamin D (ERGOCALCIFEROL) 78955 units capsule capsule Take 1 capsule by mouth 1 (One) Time Per Week. 4 capsule 2 8/7/2018       Allergies:  Patient has no known allergies.      Vital Signs  Temp:  [98.5 °F (36.9 °C)] 98.5 °F (36.9 °C)  Heart Rate:  [81] 81  Resp:  [18] 18  BP: (132)/(84) 132/84      Review of Systems    The following systems were reviewed and negative;  ENT, respiratory, cardiovascular, gastrointestinal, genitourinary, breast, endocrine and allergies / immunologic.      Physical Exam:      General Appearance:    Alert, cooperative, in no acute distress   Head:    Normocephalic, without obvious abnormality, atraumatic   Eyes:            Lids and lashes normal, conjunctivae and sclerae normal, no   icterus, no pallor, corneas clear, PERRLA   Ears:    Ears appear intact with no abnormalities noted   Throat:   No oral lesions, no thrush, oral mucosa moist   Neck:   No adenopathy, supple, trachea midline, no thyromegaly, no     carotid bruit, no JVD   Back:     No kyphosis present, no scoliosis present, no skin lesions,       erythema or scars, no tenderness to percussion or                   palpation,   range of motion normal   Lungs:     Clear to auscultation,respirations regular, even and                   unlabored    Heart:    Regular rhythm and normal rate, normal S1 and S2, no            murmur, no gallop, no rub, no click   Breast Exam:    Deferred   Abdomen:     Normal bowel sounds, no masses, no organomegaly, soft        non-tender, non-distended, no guarding, no rebound                 tenderness   Genitalia:    Deferred   Extremities:   Moves  all extremities well, no edema, no cyanosis, no              redness   Pulses:   Pulses palpable and equal bilaterally   Skin:   No bleeding, bruising or rash   Lymph nodes:   No palpable adenopathy   Neurologic:   Cranial nerves 2 - 12 grossly intact, sensation intact, DTR        present and equal bilaterally         Assessment: Patient is a 28 y.o. female who presents with desire for sterilization.    Plan of Care: Proceed with laparoscopic bilateral tubal ligation.      Rachel Caruso DO  08/13/18  8:05 AM

## 2018-08-16 ENCOUNTER — OFFICE VISIT (OUTPATIENT)
Dept: UROLOGY | Facility: CLINIC | Age: 28
End: 2018-08-16

## 2018-08-16 VITALS
DIASTOLIC BLOOD PRESSURE: 84 MMHG | WEIGHT: 194 LBS | BODY MASS INDEX: 34.38 KG/M2 | HEIGHT: 63 IN | HEART RATE: 88 BPM | SYSTOLIC BLOOD PRESSURE: 172 MMHG

## 2018-08-16 DIAGNOSIS — N39.0 URINARY TRACT INFECTION WITHOUT HEMATURIA, SITE UNSPECIFIED: Primary | ICD-10-CM

## 2018-08-16 DIAGNOSIS — R33.9 INCOMPLETE BLADDER EMPTYING: ICD-10-CM

## 2018-08-16 LAB
BILIRUB BLD-MCNC: NEGATIVE MG/DL
CLARITY, POC: CLEAR
COLOR UR: YELLOW
GLUCOSE UR STRIP-MCNC: NEGATIVE MG/DL
KETONES UR QL: NEGATIVE
LEUKOCYTE EST, POC: NEGATIVE
NITRITE UR-MCNC: NEGATIVE MG/ML
PH UR: 5 [PH] (ref 5–8)
PROT UR STRIP-MCNC: ABNORMAL MG/DL
RBC # UR STRIP: ABNORMAL /UL
SP GR UR: 1.03 (ref 1–1.03)
UROBILINOGEN UR QL: NORMAL

## 2018-08-16 PROCEDURE — 81002 URINALYSIS NONAUTO W/O SCOPE: CPT | Performed by: UROLOGY

## 2018-08-16 PROCEDURE — 51798 US URINE CAPACITY MEASURE: CPT | Performed by: UROLOGY

## 2018-08-16 PROCEDURE — 99203 OFFICE O/P NEW LOW 30 MIN: CPT | Performed by: UROLOGY

## 2018-08-16 RX ORDER — SULFAMETHOXAZOLE AND TRIMETHOPRIM 400; 80 MG/1; MG/1
1 TABLET ORAL NIGHTLY
Qty: 30 TABLET | Refills: 11 | Status: SHIPPED | OUTPATIENT
Start: 2018-08-16 | End: 2019-01-02 | Stop reason: SDUPTHER

## 2018-08-16 NOTE — PROGRESS NOTES
Chief Complaint:          Urinary tract infections    HPI:   28 y.o. female.    HPI  Pt state that she has 6 to 7 infections a year.  Her ct scan in June did not show any stones or hydronephrosis.  Her urine culture in June grew >100,000 col of enterococcus.  Her post void bladder scan today was 50 cc.  Her ua today is negative for infection.  Her symptoms are bladder pressure and dysuria.    Past Medical History:        Past Medical History:   Diagnosis Date   • Congestive heart failure (CMS/HCC)    • Fibromyalgia    • GERD (gastroesophageal reflux disease)    • History of ear infections    • History of heart disease    • History of lupus    • History of migraine    • Hypertension    • Lown Ganong Parker syndrome    • Murmur    • Osteoarthritis    • Pulmonary stenosis          Current Meds:     Current Outpatient Prescriptions   Medication Sig Dispense Refill   • atenolol (TENORMIN) 25 MG tablet Take 1 tablet by mouth Daily. 30 tablet 1   • cyclobenzaprine (FLEXERIL) 10 MG tablet Take 1 tablet by mouth Every Night at bedtime. 30 tablet 1   • fluconazole (DIFLUCAN) 150 MG tablet Take 1 tablet by mouth 1 (One) Time Per Week. 2 tablet 0   • fluconazole (DIFLUCAN) 150 MG tablet Take 1 tablet by mouth 1 (One) Time Per Week. 2 tablet 0   • hepatitis A (HAVRIX) 1440 EL U/ML vaccine Inject 1 mL into the appropriate muscle as directed by prescriber. 1 mL 1   • HYDROcodone-acetaminophen (NORCO) 5-325 MG per tablet Take 1 tablet by mouth every 4 to 6 hours as needed for pain. 20 tablet 0   • ibuprofen (ADVIL,MOTRIN) 600 MG tablet Take 1 tablet by mouth Every 6 (Six) Hours As Needed for Mild Pain . 60 tablet 0   • meloxicam (MOBIC) 15 MG tablet Take 1 tablet by mouth Daily. 30 tablet 1   • NIFEdipine CC (ADALAT CC) 30 MG 24 hr tablet Take 1 tablet by mouth Daily. 30 tablet 1   • omeprazole (priLOSEC) 40 MG capsule Take 1 capsule by mouth daily 30 capsule 0   • ondansetron ODT (ZOFRAN-ODT) 4 MG disintegrating tablet Take 1  tablet by mouth Every 6 (Six) Hours As Needed for Nausea or Vomiting. 10 tablet 0   • topiramate (TOPAMAX) 25 MG tablet Take 1 tablet by mouth 2 (Two) Times a Day. 60 tablet 2   • vitamin D (ERGOCALCIFEROL) 25728 units capsule capsule Take 1 capsule by mouth 1 (One) Time Per Week. 4 capsule 2   • vitamin D (ERGOCALCIFEROL) 61829 units capsule capsule Take 1 capsule by mouth 1 (One) Time Per Week. 4 capsule 0     No current facility-administered medications for this visit.         Allergies:      No Known Allergies     Past Surgical History:     Past Surgical History:   Procedure Laterality Date   •  SECTION     • DIAGNOSTIC LAPAROSCOPY Right 2018    Procedure: LAPAROSCOPIC EXTENSIVE LYSIS OF AHESIONS. DRAINAGE OF OVARIAN CYST.;  Surgeon: Rachel Caruso DO;  Location: [facility];  Service: Obstetrics/Gynecology   • ORIF SCAPHOID W VASCULARIZED BONE GRAFT     • TUBAL ABDOMINAL LIGATION     • TUBAL COAGULATION LAPAROSCOPIC Bilateral 2018    Procedure: TUBAL FALLOPE FILSHIE CLIPPING LAPAROSCOPIC;  Surgeon: Rachel Caruso DO;  Location: [facility] OR;  Service: Obstetrics/Gynecology         Social History:     Social History     Social History   • Marital status: Single     Spouse name: N/A   • Number of children: N/A   • Years of education: N/A     Occupational History   • Not on file.     Social History Main Topics   • Smoking status: Former Smoker     Types: Electronic Cigarette   • Smokeless tobacco: Never Used   • Alcohol use Yes      Comment: socially   • Drug use: No   • Sexual activity: Defer     Other Topics Concern   • Not on file     Social History Narrative   • No narrative on file       Family History:     Family History   Problem Relation Age of Onset   • Heart disease Mother    • Heart disease Father    • Cancer Father        Review of Systems:     Review of Systems   Constitutional: Positive for fatigue.   HENT: Negative for sinus pain.    Eyes: Negative for pain.    Respiratory: Negative for chest tightness.    Cardiovascular: Negative for chest pain.   Gastrointestinal: Negative for abdominal pain and constipation.   Endocrine: Negative for heat intolerance.   Genitourinary: Positive for frequency and urgency.   Musculoskeletal: Positive for back pain.   Skin: Positive for rash.   Allergic/Immunologic: Negative for food allergies.   Neurological: Negative for headaches.   Hematological: Bruises/bleeds easily.   Psychiatric/Behavioral: The patient is not nervous/anxious.        I have reviewed the follow portions of the patient's history and confirmed they are accurate today:  allergies, current medications, past family history, past medical history, past social history, past surgical history, problem list and ROS  Physical Exam:     Physical Exam   Constitutional: She is oriented to person, place, and time. She appears well-developed.   HENT:   Head: Normocephalic.   Right Ear: External ear normal.   Left Ear: External ear normal.   Nose: Nose normal.   Mouth/Throat: Oropharynx is clear and moist.   Eyes: Pupils are equal, round, and reactive to light. Conjunctivae and EOM are normal.   Neck: Normal range of motion. Neck supple. No tracheal deviation present. No thyromegaly present.   Cardiovascular: Normal heart sounds.  Exam reveals no gallop and no friction rub.    No murmur heard.  Pulmonary/Chest: Effort normal and breath sounds normal. No respiratory distress. She has no wheezes. She has no rales. She exhibits no tenderness.   Abdominal: Soft. Bowel sounds are normal. She exhibits no distension and no mass. There is no tenderness. There is no rebound and no guarding. No hernia.   Genitourinary: Vagina normal and uterus normal.   Musculoskeletal: Normal range of motion. She exhibits no edema.   Lymphadenopathy:     She has no cervical adenopathy.   Neurological: She is alert and oriented to person, place, and time. She displays normal reflexes. No cranial nerve deficit.  "She exhibits normal muscle tone. Coordination normal.   Skin: Skin is warm. No rash noted.   Psychiatric: She has a normal mood and affect. Judgment normal.       /84 (BP Location: Right arm, Patient Position: Sitting, Cuff Size: Adult)   Pulse 88   Ht 160 cm (63\")   Wt 88 kg (194 lb)   LMP 07/23/2018   BMI 34.37 kg/m²    Procedure:         Assessment:     Encounter Diagnosis   Name Primary?   • Urinary tract infection without hematuria, site unspecified Yes     Orders Placed This Encounter   Procedures   • POC Urinalysis Dipstick       Plan:   Will try suppressive antibiotic therapy with septra.  Will see her back in 3 [facility]    Patient's Body mass index is 34.37 kg/m². BMI is above normal parameters. Recommendations include: educational material.      Counseling was given to patient for the following topics impressions as follows: recurrent infections. The interim medical history and current results were reviewed.  A treatment plan with follow-up was made for Urinary tract infection without hematuria, site unspecified [N39.0].  This document has been electronically signed by Izaiah Melendez MD August 16, 2018 1:41 PM  "

## 2018-09-07 RX ORDER — ATENOLOL 25 MG/1
25 TABLET ORAL DAILY
Qty: 30 TABLET | Refills: 1 | Status: CANCELLED | OUTPATIENT
Start: 2018-09-07

## 2018-09-14 ENCOUNTER — APPOINTMENT (OUTPATIENT)
Dept: GENERAL RADIOLOGY | Facility: HOSPITAL | Age: 28
End: 2018-09-14

## 2018-09-14 ENCOUNTER — HOSPITAL ENCOUNTER (EMERGENCY)
Facility: HOSPITAL | Age: 28
Discharge: HOME | End: 2018-09-14
Attending: EMERGENCY MEDICINE | Admitting: EMERGENCY MEDICINE

## 2018-09-14 VITALS
SYSTOLIC BLOOD PRESSURE: 110 MMHG | RESPIRATION RATE: 18 BRPM | DIASTOLIC BLOOD PRESSURE: 80 MMHG | HEIGHT: 63 IN | OXYGEN SATURATION: 99 % | TEMPERATURE: 97.2 F | BODY MASS INDEX: 32.78 KG/M2 | WEIGHT: 185 LBS | HEART RATE: 104 BPM

## 2018-09-14 DIAGNOSIS — R07.9 CHEST PAIN, UNSPECIFIED TYPE: Primary | ICD-10-CM

## 2018-09-14 DIAGNOSIS — R00.2 PALPITATIONS: ICD-10-CM

## 2018-09-14 LAB
ALBUMIN SERPL-MCNC: 4.7 G/DL (ref 3.5–5)
ALBUMIN/GLOB SERPL: 1.7 G/DL (ref 1.5–2.5)
ALP SERPL-CCNC: 93 U/L (ref 35–104)
ALT SERPL W P-5'-P-CCNC: 13 U/L (ref 10–36)
ANION GAP SERPL CALCULATED.3IONS-SCNC: 9.9 MMOL/L (ref 3.6–11.2)
APTT PPP: 27 SECONDS (ref 23.8–36.1)
AST SERPL-CCNC: 19 U/L (ref 10–30)
BACTERIA UR QL AUTO: ABNORMAL /HPF
BASOPHILS # BLD AUTO: 0.03 10*3/MM3 (ref 0–0.3)
BASOPHILS NFR BLD AUTO: 0.3 % (ref 0–2)
BILIRUB SERPL-MCNC: 0.4 MG/DL (ref 0.2–1.8)
BILIRUB UR QL STRIP: NEGATIVE
BUN BLD-MCNC: 13 MG/DL (ref 7–21)
BUN/CREAT SERPL: 14.9 (ref 7–25)
CALCIUM SPEC-SCNC: 9.3 MG/DL (ref 7.7–10)
CHLORIDE SERPL-SCNC: 109 MMOL/L (ref 99–112)
CK MB SERPL-CCNC: 0.34 NG/ML (ref 0–5)
CK MB SERPL-CCNC: <0.18 NG/ML (ref 0–5)
CK MB SERPL-RTO: 0.4 % (ref 0–3)
CK MB SERPL-RTO: NORMAL % (ref 0–3)
CK SERPL-CCNC: 85 U/L (ref 24–173)
CK SERPL-CCNC: 87 U/L (ref 24–173)
CLARITY UR: CLEAR
CO2 SERPL-SCNC: 20.1 MMOL/L (ref 24.3–31.9)
COLOR UR: YELLOW
CREAT BLD-MCNC: 0.87 MG/DL (ref 0.43–1.29)
DEPRECATED RDW RBC AUTO: 37.6 FL (ref 37–54)
EOSINOPHIL # BLD AUTO: 0.53 10*3/MM3 (ref 0–0.7)
EOSINOPHIL NFR BLD AUTO: 4.5 % (ref 0–5)
ERYTHROCYTE [DISTWIDTH] IN BLOOD BY AUTOMATED COUNT: 12.6 % (ref 11.5–14.5)
GFR SERPL CREATININE-BSD FRML MDRD: 78 ML/MIN/1.73
GLOBULIN UR ELPH-MCNC: 2.8 GM/DL
GLUCOSE BLD-MCNC: 101 MG/DL (ref 70–110)
GLUCOSE UR STRIP-MCNC: NEGATIVE MG/DL
HCT VFR BLD AUTO: 39.7 % (ref 37–47)
HGB BLD-MCNC: 13.5 G/DL (ref 12–16)
HGB UR QL STRIP.AUTO: NEGATIVE
HYALINE CASTS UR QL AUTO: ABNORMAL /LPF
IMM GRANULOCYTES # BLD: 0.01 10*3/MM3 (ref 0–0.03)
IMM GRANULOCYTES NFR BLD: 0.1 % (ref 0–0.5)
INR PPP: 1.15 (ref 0.9–1.1)
KETONES UR QL STRIP: NEGATIVE
LEUKOCYTE ESTERASE UR QL STRIP.AUTO: ABNORMAL
LYMPHOCYTES # BLD AUTO: 4.06 10*3/MM3 (ref 1–3)
LYMPHOCYTES NFR BLD AUTO: 34.8 % (ref 21–51)
MAGNESIUM SERPL-MCNC: 2.3 MG/DL (ref 1.7–2.6)
MCH RBC QN AUTO: 28.4 PG (ref 27–33)
MCHC RBC AUTO-ENTMCNC: 34 G/DL (ref 33–37)
MCV RBC AUTO: 83.6 FL (ref 80–94)
MONOCYTES # BLD AUTO: 0.83 10*3/MM3 (ref 0.1–0.9)
MONOCYTES NFR BLD AUTO: 7.1 % (ref 0–10)
NEUTROPHILS # BLD AUTO: 6.21 10*3/MM3 (ref 1.4–6.5)
NEUTROPHILS NFR BLD AUTO: 53.2 % (ref 30–70)
NITRITE UR QL STRIP: NEGATIVE
OSMOLALITY SERPL CALC.SUM OF ELEC: 277.8 MOSM/KG (ref 273–305)
PH UR STRIP.AUTO: 6 [PH] (ref 5–8)
PLATELET # BLD AUTO: 317 10*3/MM3 (ref 130–400)
PMV BLD AUTO: 11.1 FL (ref 6–10)
POTASSIUM BLD-SCNC: 3.6 MMOL/L (ref 3.5–5.3)
PROT SERPL-MCNC: 7.5 G/DL (ref 6–8)
PROT UR QL STRIP: NEGATIVE
PROTHROMBIN TIME: 14.9 SECONDS (ref 11–15.4)
RBC # BLD AUTO: 4.75 10*6/MM3 (ref 4.2–5.4)
RBC # UR: ABNORMAL /HPF
REF LAB TEST METHOD: ABNORMAL
SODIUM BLD-SCNC: 139 MMOL/L (ref 135–153)
SP GR UR STRIP: 1.01 (ref 1–1.03)
SQUAMOUS #/AREA URNS HPF: ABNORMAL /HPF
T4 FREE SERPL-MCNC: 1.26 NG/DL (ref 0.89–1.76)
TROPONIN I SERPL-MCNC: <0.006 NG/ML
TROPONIN I SERPL-MCNC: <0.006 NG/ML
TSH SERPL DL<=0.05 MIU/L-ACNC: 3.65 MIU/ML (ref 0.55–4.78)
UROBILINOGEN UR QL STRIP: ABNORMAL
WBC NRBC COR # BLD: 11.67 10*3/MM3 (ref 4.5–12.5)
WBC UR QL AUTO: ABNORMAL /HPF

## 2018-09-14 PROCEDURE — 93005 ELECTROCARDIOGRAM TRACING: CPT | Performed by: EMERGENCY MEDICINE

## 2018-09-14 PROCEDURE — 85610 PROTHROMBIN TIME: CPT | Performed by: EMERGENCY MEDICINE

## 2018-09-14 PROCEDURE — 93010 ELECTROCARDIOGRAM REPORT: CPT | Performed by: INTERNAL MEDICINE

## 2018-09-14 PROCEDURE — 84443 ASSAY THYROID STIM HORMONE: CPT | Performed by: EMERGENCY MEDICINE

## 2018-09-14 PROCEDURE — 80053 COMPREHEN METABOLIC PANEL: CPT | Performed by: EMERGENCY MEDICINE

## 2018-09-14 PROCEDURE — 96361 HYDRATE IV INFUSION ADD-ON: CPT

## 2018-09-14 PROCEDURE — 85025 COMPLETE CBC W/AUTO DIFF WBC: CPT | Performed by: EMERGENCY MEDICINE

## 2018-09-14 PROCEDURE — 81001 URINALYSIS AUTO W/SCOPE: CPT | Performed by: EMERGENCY MEDICINE

## 2018-09-14 PROCEDURE — 71046 X-RAY EXAM CHEST 2 VIEWS: CPT | Performed by: RADIOLOGY

## 2018-09-14 PROCEDURE — 71046 X-RAY EXAM CHEST 2 VIEWS: CPT

## 2018-09-14 PROCEDURE — 82550 ASSAY OF CK (CPK): CPT | Performed by: EMERGENCY MEDICINE

## 2018-09-14 PROCEDURE — 99283 EMERGENCY DEPT VISIT LOW MDM: CPT

## 2018-09-14 PROCEDURE — 85730 THROMBOPLASTIN TIME PARTIAL: CPT | Performed by: EMERGENCY MEDICINE

## 2018-09-14 PROCEDURE — 84484 ASSAY OF TROPONIN QUANT: CPT | Performed by: EMERGENCY MEDICINE

## 2018-09-14 PROCEDURE — 82553 CREATINE MB FRACTION: CPT | Performed by: EMERGENCY MEDICINE

## 2018-09-14 PROCEDURE — 84439 ASSAY OF FREE THYROXINE: CPT | Performed by: EMERGENCY MEDICINE

## 2018-09-14 PROCEDURE — 96360 HYDRATION IV INFUSION INIT: CPT

## 2018-09-14 PROCEDURE — 83735 ASSAY OF MAGNESIUM: CPT | Performed by: EMERGENCY MEDICINE

## 2018-09-14 PROCEDURE — 36415 COLL VENOUS BLD VENIPUNCTURE: CPT

## 2018-09-14 RX ORDER — SODIUM CHLORIDE 0.9 % (FLUSH) 0.9 %
10 SYRINGE (ML) INJECTION AS NEEDED
Status: DISCONTINUED | OUTPATIENT
Start: 2018-09-14 | End: 2018-09-14 | Stop reason: HOSPADM

## 2018-09-14 RX ORDER — SODIUM CHLORIDE 9 MG/ML
125 INJECTION, SOLUTION INTRAVENOUS CONTINUOUS
Status: DISCONTINUED | OUTPATIENT
Start: 2018-09-14 | End: 2018-09-14 | Stop reason: HOSPADM

## 2018-09-14 RX ADMIN — SODIUM CHLORIDE 125 ML/HR: 9 INJECTION, SOLUTION INTRAVENOUS at 02:29

## 2018-09-14 NOTE — ED PROVIDER NOTES
Subjective   Pt reports awakening from sleep feeling her heart racing and feeling like something was squeezing and twisting her heart.  Pt got up to walk it off.  Gradually decreased.  Now she feels pressure.  Heightened concern due to LGL and pulmonic stenosis.  No recent illness or injury.        History provided by:  Patient and medical records  Chest Pain   Pain location:  L chest  Pain quality: pressure    Pain quality: not aching, not burning, not crushing, not dull, not hot, not radiating, not sharp, not shooting, not stabbing, not tearing, not throbbing and no tightness    Pain radiates to:  Does not radiate  Pain severity:  Severe  Onset quality:  Sudden  Progression:  Partially resolved  Chronicity:  Recurrent  Context: not breathing, not drug use, not eating, not intercourse, not lifting, not movement, not raising an arm, not at rest, not stress and not trauma    Relieved by:  Nothing  Worsened by:  Nothing  Ineffective treatments:  None tried  Associated symptoms: diaphoresis and palpitations    Associated symptoms: no abdominal pain, no AICD problem, no altered mental status, no anorexia, no anxiety, no back pain, no claudication, no cough, no dizziness, no dysphagia, no fatigue, no fever, no headache, no heartburn, no lower extremity edema, no nausea, no near-syncope, no numbness, no orthopnea, no PND, no shortness of breath, no syncope, no vomiting and no weakness    Risk factors: no aortic disease, no birth control, no coronary artery disease, no diabetes mellitus, no Fatuma-Danlos syndrome, no high cholesterol, no hypertension, no immobilization, not male, no Marfan's syndrome, not obese, not pregnant, no prior DVT/PE, no smoking and no surgery        Review of Systems   Constitutional: Positive for diaphoresis. Negative for fatigue and fever.   HENT: Negative.  Negative for trouble swallowing.    Eyes: Negative.    Respiratory: Positive for chest tightness. Negative for cough and shortness of  breath.    Cardiovascular: Positive for chest pain and palpitations. Negative for orthopnea, claudication, syncope, PND and near-syncope.   Gastrointestinal: Negative.  Negative for abdominal pain, anorexia, heartburn, nausea and vomiting.   Endocrine: Negative.    Genitourinary: Negative.    Musculoskeletal: Negative.  Negative for back pain.   Skin: Negative.    Allergic/Immunologic: Negative.    Neurological: Negative.  Negative for dizziness, weakness, numbness and headaches.   Hematological: Negative.    Psychiatric/Behavioral: Negative.    All other systems reviewed and are negative.      Past Medical History:   Diagnosis Date   • Congestive heart failure (CMS/HCC)    • Fibromyalgia    • GERD (gastroesophageal reflux disease)    • History of ear infections    • History of heart disease    • History of lupus    • History of migraine    • Hypertension    • Lown Ganong Parker syndrome    • Murmur    • Osteoarthritis    • Pulmonary stenosis        No Known Allergies    Past Surgical History:   Procedure Laterality Date   •  SECTION     • DIAGNOSTIC LAPAROSCOPY Right 2018    Procedure: LAPAROSCOPIC EXTENSIVE LYSIS OF AHESIONS. DRAINAGE OF OVARIAN CYST.;  Surgeon: Rachel Caruso DO;  Location: [facility] OR;  Service: Obstetrics/Gynecology   • ORIF SCAPHOID W VASCULARIZED BONE GRAFT     • TUBAL ABDOMINAL LIGATION     • TUBAL COAGULATION LAPAROSCOPIC Bilateral 2018    Procedure: TUBAL FALLOPE FILSHIE CLIPPING LAPAROSCOPIC;  Surgeon: Rachel Caruso DO;  Location: [facility] OR;  Service: Obstetrics/Gynecology       Family History   Problem Relation Age of Onset   • Heart disease Mother    • Heart disease Father    • Cancer Father        Social History     Social History   • Marital status: Single     Social History Main Topics   • Smoking status: Former Smoker     Types: Electronic Cigarette   • Smokeless tobacco: Never Used   • Alcohol use Yes      Comment: socially   • Drug use: No    • Sexual activity: Defer     Other Topics Concern   • Not on file           Objective   Physical Exam   Constitutional: She is oriented to person, place, and time. She appears well-developed and well-nourished. No distress.   Tearful   HENT:   Head: Normocephalic and atraumatic.   Nose: Nose normal.   Mouth/Throat: Oropharynx is clear and moist.   Eyes: Conjunctivae and EOM are normal. Right eye exhibits no discharge. Left eye exhibits no discharge. No scleral icterus.   Neck: Normal range of motion. Neck supple. No JVD present. No tracheal deviation present. No thyromegaly present.   Cardiovascular: Normal heart sounds and intact distal pulses.  Exam reveals no gallop and no friction rub.    No murmur heard.  Minimal tachycardia   Pulmonary/Chest: Effort normal and breath sounds normal. No stridor. No respiratory distress. She has no wheezes. She has no rales.   Abdominal: Soft. She exhibits no distension and no mass. There is no tenderness. There is no guarding.   Genitourinary:   Genitourinary Comments: No CVAT   Musculoskeletal: Normal range of motion. She exhibits no edema.   Lymphadenopathy:     She has no cervical adenopathy.   Neurological: She is alert and oriented to person, place, and time. She exhibits normal muscle tone. Coordination normal.   Skin: Skin is warm and dry. Capillary refill takes less than 2 seconds. No pallor.   Psychiatric: She has a normal mood and affect. Her behavior is normal. Judgment and thought content normal.   Nursing note and vitals reviewed.      Procedures           ED Course  ED Course as of Sep 14 0555   Fri Sep 14, 2018   0431 12-lead EKG performed at 0144 hrs.  Interpreted by me at 0148 hrs.  Normal sinus rhythm.  Ventricular rate 94.  WA interval 124.  QRS duration 90.  .  QTc 460.  No pathologic blocks.  No sustained ectopy or dysrhythmia area no acute ischemic ST segment elevation.  No pathologic or anatomic T wave inversion.  No criteria for acute infarct,  STEMI or dysrhythmia. ECG 12 Lead [TZ]   0552 Patient hemodynamically stable.  Negative EKG and serial cardiac enzymes.  No dysrhythmia.  Labs unremarkable.  Patient can follow-up with Dr. Leong who is her routine cardiologist.  [TZ]      ED Course User Index  [TZ] Daniel Bronson MD      XR Chest 2 View    (Results Pending)     Labs Reviewed   COMPREHENSIVE METABOLIC PANEL - Abnormal; Notable for the following:        Result Value    CO2 20.1 (*)     All other components within normal limits   PROTIME-INR - Abnormal; Notable for the following:     INR 1.15 (*)     All other components within normal limits    Narrative:     Suggested INR therapeutic range for stable oral anticoagulant therapy:    Low Intensity therapy:   1.5-2.0  Moderate Intensity therapy:   2.0-3.0  High Intensity therapy:   2.5-4.0   URINALYSIS W/ MICROSCOPIC IF INDICATED (NO CULTURE) - Abnormal; Notable for the following:     Leuk Esterase, UA Trace (*)     All other components within normal limits   CBC WITH AUTO DIFFERENTIAL - Abnormal; Notable for the following:     MPV 11.1 (*)     Lymphocytes, Absolute 4.06 (*)     All other components within normal limits   URINALYSIS, MICROSCOPIC ONLY - Abnormal; Notable for the following:     WBC, UA 3-5 (*)     Bacteria, UA Trace (*)     Squamous Epithelial Cells, UA 3-6 (*)     All other components within normal limits   APTT - Normal    Narrative:     PTT Heparin Therapeutic Range:  59 - 95 seconds   MAGNESIUM - Normal   TSH - Normal   T4, FREE - Normal   CK - Normal   CK MB - Normal   TROPONIN (IN-HOUSE) - Normal    Narrative:     Ultra Troponin I Reference Range:         <=0.039 ng/mL: Negative    0.04-0.779 ng/mL: Indeterminate Range. Suspicious of MI.  Clinical correlation required.       >=0.78  ng/mL: Consistent with myocardial injury.  Clinical correlation required.   OSMOLALITY, CALCULATED - Normal   CK - Normal   CK MB - Normal   TROPONIN (IN-HOUSE) - Normal    Narrative:     Ultra  Troponin I Reference Range:         <=0.039 ng/mL: Negative    0.04-0.779 ng/mL: Indeterminate Range. Suspicious of MI.  Clinical correlation required.       >=0.78  ng/mL: Consistent with myocardial injury.  Clinical correlation required.   CKMB INDEX CALCULATION - Normal   CKMB INDEX CALCULATION   CBC AND DIFFERENTIAL    Narrative:     The following orders were created for panel order CBC & Differential.  Procedure                               Abnormality         Status                     ---------                               -----------         ------                     CBC Auto Differential[[id number]]        Abnormal            Final result                 Please view results for these tests on the individual orders.        Medication List      CONTINUE taking these medications    atenolol 25 MG tablet  Commonly known as:  TENORMIN  Take 1 tablet by mouth Daily.     cyclobenzaprine 10 MG tablet  Commonly known as:  FLEXERIL  Take 1 tablet by mouth at bedtime.     DULoxetine 60 MG capsule  Commonly known as:  CYMBALTA  Take 1 capsule by mouth Daily.     * fluconazole 150 MG tablet  Commonly known as:  DIFLUCAN  Take 1 tablet by mouth 1 (One) Time Per Week.     * fluconazole 150 MG tablet  Commonly known as:  DIFLUCAN  Take 1 tablet by mouth 1 (One) Time Per Week.     * fluconazole 150 MG tablet  Commonly known as:  DIFLUCAN  Take 1 tablet by mouth 1 (One) Time Per Week.     HAVRIX 1440 EL U/ML vaccine  Generic drug:  hepatitis A  Inject 1 mL into the appropriate muscle as directed by prescriber.     HYDROcodone-acetaminophen 5-325 MG per tablet  Commonly known as:  NORCO  Take 1 tablet by mouth every 4 to 6 hours as needed for pain.     ibuprofen 600 MG tablet  Commonly known as:  ADVIL,MOTRIN  Take 1 tablet by mouth Every 6 (Six) Hours As Needed for Mild Pain .     meloxicam 15 MG tablet  Commonly known as:  MOBIC  Take 1 tablet by mouth Daily.     NIFEdipine CC 30 MG 24 hr tablet  Commonly known as:   ADALAT CC  Take 1 tablet by mouth Daily.     omeprazole 40 MG capsule  Commonly known as:  priLOSEC  Take 1 capsule by mouth daily     ondansetron ODT 4 MG disintegrating tablet  Commonly known as:  ZOFRAN-ODT  Take 1 tablet by mouth Every 6 (Six) Hours As Needed for Nausea or   Vomiting.     REXULTI 2 MG tablet  Generic drug:  Brexpiprazole  Take 1 tablet by mouth daily     sulfamethoxazole-trimethoprim 400-80 MG tablet  Commonly known as:  BACTRIM,SEPTRA  Take 1 tablet by mouth Every Night.     topiramate 25 MG tablet  Commonly known as:  TOPAMAX  Take 1 tablet by mouth 2 (Two) Times a Day.     * vitamin D 93979 units capsule capsule  Commonly known as:  ERGOCALCIFEROL  Take 1 capsule by mouth 1 (One) Time Per Week.     * vitamin D 38285 units capsule capsule  Commonly known as:  ERGOCALCIFEROL  Take 1 capsule by mouth 1 (One) Time Per Week.        * This list has 5 medication(s) that are the same as other medications   prescribed for you. Read the directions carefully, and ask your doctor or   other care provider to review them with you.                        MDM  Number of Diagnoses or Management Options  Chest pain, unspecified type: new and requires workup  Palpitations: new and requires workup     Amount and/or Complexity of Data Reviewed  Clinical lab tests: ordered and reviewed  Tests in the radiology section of CPT®: ordered and reviewed  Decide to obtain previous medical records or to obtain history from someone other than the patient: yes  Independent visualization of images, tracings, or specimens: yes    Risk of Complications, Morbidity, and/or Mortality  Presenting problems: high  Diagnostic procedures: high  Management options: moderate    Patient Progress  Patient progress: improved        Final diagnoses:   Chest pain, unspecified type   Palpitations            Daniel Bronson MD  09/14/18 0555

## 2018-09-21 ENCOUNTER — TRANSCRIBE ORDERS (OUTPATIENT)
Dept: ADMINISTRATIVE | Facility: HOSPITAL | Age: 28
End: 2018-09-21

## 2018-09-24 ENCOUNTER — TRANSCRIBE ORDERS (OUTPATIENT)
Dept: ADMINISTRATIVE | Facility: HOSPITAL | Age: 28
End: 2018-09-24

## 2018-09-24 DIAGNOSIS — R07.9 CHEST PAIN, UNSPECIFIED TYPE: Primary | ICD-10-CM

## 2019-02-01 RX ORDER — NIFEDIPINE 30 MG/1
30 TABLET, FILM COATED, EXTENDED RELEASE ORAL DAILY
Qty: 30 TABLET | Refills: 0 | Status: CANCELLED | OUTPATIENT
Start: 2019-02-01

## 2019-02-07 ENCOUNTER — HOSPITAL ENCOUNTER (OUTPATIENT)
Dept: CARDIOLOGY | Facility: HOSPITAL | Age: 29
Discharge: HOME | End: 2019-02-07
Attending: INTERNAL MEDICINE | Admitting: INTERNAL MEDICINE

## 2019-02-07 ENCOUNTER — HOSPITAL ENCOUNTER (OUTPATIENT)
Dept: CARDIOLOGY | Facility: HOSPITAL | Age: 29
Discharge: HOME | End: 2019-02-07
Attending: INTERNAL MEDICINE

## 2019-02-07 VITALS — HEIGHT: 63 IN | BODY MASS INDEX: 31.89 KG/M2 | WEIGHT: 180 LBS

## 2019-02-07 DIAGNOSIS — R07.9 CHEST PAIN, UNSPECIFIED TYPE: ICD-10-CM

## 2019-02-07 LAB
BH CV ECHO MEAS - % IVS THICK: 21.3 %
BH CV ECHO MEAS - % LVPW THICK: 53.9 %
BH CV ECHO MEAS - ACS: 1.7 CM
BH CV ECHO MEAS - AO MAX PG: 9.1 MMHG
BH CV ECHO MEAS - AO MEAN PG: 3.9 MMHG
BH CV ECHO MEAS - AO ROOT AREA (BSA CORRECTED): 1.3
BH CV ECHO MEAS - AO ROOT AREA: 4.9 CM^2
BH CV ECHO MEAS - AO ROOT DIAM: 2.5 CM
BH CV ECHO MEAS - AO V2 MAX: 150.7 CM/SEC
BH CV ECHO MEAS - AO V2 MEAN: 87.4 CM/SEC
BH CV ECHO MEAS - AO V2 VTI: 30.5 CM
BH CV ECHO MEAS - BSA(HAYCOCK): 2 M^2
BH CV ECHO MEAS - BSA: 1.9 M^2
BH CV ECHO MEAS - BZI_BMI: 32.8 KILOGRAMS/M^2
BH CV ECHO MEAS - BZI_METRIC_HEIGHT: 160 CM
BH CV ECHO MEAS - BZI_METRIC_WEIGHT: 83.9 KG
BH CV ECHO MEAS - EDV(CUBED): 72.9 ML
BH CV ECHO MEAS - EDV(MOD-SP4): 55 ML
BH CV ECHO MEAS - EDV(TEICH): 77.6 ML
BH CV ECHO MEAS - EF(CUBED): 57.6 %
BH CV ECHO MEAS - EF(MOD-SP4): 58.2 %
BH CV ECHO MEAS - EF(TEICH): 49.6 %
BH CV ECHO MEAS - ESV(CUBED): 30.9 ML
BH CV ECHO MEAS - ESV(MOD-SP4): 23 ML
BH CV ECHO MEAS - ESV(TEICH): 39.1 ML
BH CV ECHO MEAS - FS: 24.9 %
BH CV ECHO MEAS - IVS/LVPW: 0.96
BH CV ECHO MEAS - IVSD: 0.85 CM
BH CV ECHO MEAS - IVSS: 1 CM
BH CV ECHO MEAS - LA DIMENSION: 2.9 CM
BH CV ECHO MEAS - LA/AO: 1.2
BH CV ECHO MEAS - LV DIASTOLIC VOL/BSA (35-75): 29.4 ML/M^2
BH CV ECHO MEAS - LV MASS(C)D: 111.3 GRAMS
BH CV ECHO MEAS - LV MASS(C)DI: 59.5 GRAMS/M^2
BH CV ECHO MEAS - LV MASS(C)S: 115 GRAMS
BH CV ECHO MEAS - LV MASS(C)SI: 61.4 GRAMS/M^2
BH CV ECHO MEAS - LV SYSTOLIC VOL/BSA (12-30): 12.3 ML/M^2
BH CV ECHO MEAS - LVIDD: 4.2 CM
BH CV ECHO MEAS - LVIDS: 3.1 CM
BH CV ECHO MEAS - LVLD AP4: 7.1 CM
BH CV ECHO MEAS - LVLS AP4: 6 CM
BH CV ECHO MEAS - LVOT AREA (M): 3.1 CM^2
BH CV ECHO MEAS - LVOT AREA: 3.2 CM^2
BH CV ECHO MEAS - LVOT DIAM: 2 CM
BH CV ECHO MEAS - LVPWD: 0.88 CM
BH CV ECHO MEAS - LVPWS: 1.4 CM
BH CV ECHO MEAS - MV A MAX VEL: 39.5 CM/SEC
BH CV ECHO MEAS - MV E MAX VEL: 72.6 CM/SEC
BH CV ECHO MEAS - MV E/A: 1.8
BH CV ECHO MEAS - PA MAX PG (FULL): 15.2 MMHG
BH CV ECHO MEAS - PA MAX PG: 18.3 MMHG
BH CV ECHO MEAS - PA MEAN PG (FULL): 8.3 MMHG
BH CV ECHO MEAS - PA MEAN PG: 9.5 MMHG
BH CV ECHO MEAS - PA V2 MAX: 213.8 CM/SEC
BH CV ECHO MEAS - PA V2 MEAN: 142 CM/SEC
BH CV ECHO MEAS - PA V2 VTI: 43.5 CM
BH CV ECHO MEAS - PVA(I,A): 1.3 CM^2
BH CV ECHO MEAS - PVA(I,D): 1.3 CM^2
BH CV ECHO MEAS - PVA(V,A): 1.4 CM^2
BH CV ECHO MEAS - PVA(V,D): 1.4 CM^2
BH CV ECHO MEAS - RAP SYSTOLE: 10 MMHG
BH CV ECHO MEAS - RV MAX PG: 3.1 MMHG
BH CV ECHO MEAS - RV MEAN PG: 1.3 MMHG
BH CV ECHO MEAS - RV V1 MAX: 87.4 CM/SEC
BH CV ECHO MEAS - RV V1 MEAN: 50 CM/SEC
BH CV ECHO MEAS - RV V1 VTI: 16.6 CM
BH CV ECHO MEAS - RVOT AREA: 3.4 CM^2
BH CV ECHO MEAS - RVOT DIAM: 2.1 CM
BH CV ECHO MEAS - RVSP: 38.3 MMHG
BH CV ECHO MEAS - SI(AO): 79.5 ML/M^2
BH CV ECHO MEAS - SI(CUBED): 22.5 ML/M^2
BH CV ECHO MEAS - SI(MOD-SP4): 17.1 ML/M^2
BH CV ECHO MEAS - SI(TEICH): 20.6 ML/M^2
BH CV ECHO MEAS - SV(AO): 148.8 ML
BH CV ECHO MEAS - SV(CUBED): 42 ML
BH CV ECHO MEAS - SV(MOD-SP4): 32 ML
BH CV ECHO MEAS - SV(RVOT): 57 ML
BH CV ECHO MEAS - SV(TEICH): 38.5 ML
BH CV ECHO MEAS - TR MAX VEL: 266 CM/SEC

## 2019-02-07 PROCEDURE — 93306 TTE W/DOPPLER COMPLETE: CPT

## 2019-02-07 PROCEDURE — 93306 TTE W/DOPPLER COMPLETE: CPT | Performed by: INTERNAL MEDICINE

## 2019-02-07 PROCEDURE — 93017 CV STRESS TEST TRACING ONLY: CPT

## 2019-02-07 PROCEDURE — 93018 CV STRESS TEST I&R ONLY: CPT | Performed by: INTERNAL MEDICINE

## 2019-02-11 LAB
BH CV STRESS BP STAGE 1: NORMAL
BH CV STRESS BP STAGE 2: NORMAL
BH CV STRESS BP STAGE 3: NORMAL
BH CV STRESS BP STAGE 4: NORMAL
BH CV STRESS DURATION MIN STAGE 1: 3
BH CV STRESS DURATION MIN STAGE 2: 3
BH CV STRESS DURATION MIN STAGE 3: 3
BH CV STRESS DURATION SEC STAGE 1: 0
BH CV STRESS DURATION SEC STAGE 2: 0
BH CV STRESS DURATION SEC STAGE 3: 0
BH CV STRESS DURATION SEC STAGE 4: 30
BH CV STRESS GRADE STAGE 1: 10
BH CV STRESS GRADE STAGE 2: 12
BH CV STRESS GRADE STAGE 3: 14
BH CV STRESS GRADE STAGE 4: 16
BH CV STRESS HR STAGE 1: 112
BH CV STRESS HR STAGE 2: 136
BH CV STRESS HR STAGE 3: 162
BH CV STRESS HR STAGE 4: 173
BH CV STRESS METS STAGE 1: 5
BH CV STRESS METS STAGE 2: 7.5
BH CV STRESS METS STAGE 3: 10
BH CV STRESS METS STAGE 4: 13.5
BH CV STRESS PROTOCOL 1: NORMAL
BH CV STRESS RECOVERY BP: NORMAL MMHG
BH CV STRESS RECOVERY HR: 91 BPM
BH CV STRESS SPEED STAGE 1: 1.7
BH CV STRESS SPEED STAGE 2: 2.5
BH CV STRESS SPEED STAGE 3: 3.4
BH CV STRESS SPEED STAGE 4: 4.2
BH CV STRESS STAGE 1: 1
BH CV STRESS STAGE 2: 2
BH CV STRESS STAGE 3: 3
BH CV STRESS STAGE 4: 4
MAXIMAL PREDICTED HEART RATE: 191 BPM
PERCENT MAX PREDICTED HR: 90.58 %
STRESS BASELINE BP: NORMAL MMHG
STRESS BASELINE HR: 87 BPM
STRESS PERCENT HR: 107 %
STRESS POST ESTIMATED WORKLOAD: 10.1 METS
STRESS POST EXERCISE DUR MIN: 9 MIN
STRESS POST EXERCISE DUR SEC: 30 SEC
STRESS POST PEAK BP: NORMAL MMHG
STRESS POST PEAK HR: 173 BPM
STRESS TARGET HR: 162 BPM

## 2019-02-13 ENCOUNTER — OFFICE VISIT (OUTPATIENT)
Dept: UROLOGY | Facility: CLINIC | Age: 29
End: 2019-02-13

## 2019-02-13 VITALS — HEIGHT: 63 IN | WEIGHT: 180 LBS | BODY MASS INDEX: 31.89 KG/M2

## 2019-02-13 DIAGNOSIS — N39.0 URINARY TRACT INFECTION WITHOUT HEMATURIA, SITE UNSPECIFIED: Primary | ICD-10-CM

## 2019-02-13 LAB
BILIRUB BLD-MCNC: NEGATIVE MG/DL
CLARITY, POC: ABNORMAL
COLOR UR: YELLOW
GLUCOSE UR STRIP-MCNC: NEGATIVE MG/DL
KETONES UR QL: NEGATIVE
LEUKOCYTE EST, POC: NEGATIVE
NITRITE UR-MCNC: NEGATIVE MG/ML
PH UR: 7 [PH] (ref 5–8)
PROT UR STRIP-MCNC: NEGATIVE MG/DL
RBC # UR STRIP: NEGATIVE /UL
SP GR UR: 1.01 (ref 1–1.03)
UROBILINOGEN UR QL: NORMAL

## 2019-02-13 PROCEDURE — 99213 OFFICE O/P EST LOW 20 MIN: CPT | Performed by: UROLOGY

## 2019-02-13 PROCEDURE — 81002 URINALYSIS NONAUTO W/O SCOPE: CPT | Performed by: UROLOGY

## 2019-02-13 RX ORDER — SULFAMETHOXAZOLE AND TRIMETHOPRIM 400; 80 MG/1; MG/1
1 TABLET ORAL NIGHTLY
Qty: 30 TABLET | Refills: 8 | Status: SHIPPED | OUTPATIENT
Start: 2019-02-13 | End: 2019-09-03 | Stop reason: SDUPTHER

## 2019-02-13 NOTE — PROGRESS NOTES
Chief Complaint:            recurrent infections    HPI:   29 y.o. female.  She is doing well on suppressive antibiotic therapy.  I has been six months since we have seen her and she has not had an infection.    HPI      Past Medical History:        Past Medical History:   Diagnosis Date   • Congestive heart failure (CMS/HCC)    • Fibromyalgia    • GERD (gastroesophageal reflux disease)    • History of ear infections    • History of heart disease    • History of lupus    • History of migraine    • Hypertension    • Lown Ganong Parker syndrome    • Murmur    • Osteoarthritis    • Pulmonary stenosis          Current Meds:     Current Outpatient Medications   Medication Sig Dispense Refill   • atenolol (TENORMIN) 25 MG tablet Take 1 tablet by mouth Daily. 30 tablet 2   • atenolol (TENORMIN) 25 MG tablet Take 1 tablet by mouth Daily. 30 tablet 2   • Brexpiprazole 2 MG tablet Take 1 tablet by mouth daily 30 tablet 2   • Brexpiprazole 2 MG tablet Take 1 tablet by mouth Daily. 30 tablet 1   • cyclobenzaprine (FLEXERIL) 10 MG tablet Take 1 tablet by mouth at bedtime. 30 tablet 2   • cyclobenzaprine (FLEXERIL) 10 MG tablet Take 1 tablet by mouth Every Night at bedtime 30 tablet 0   • DULoxetine (CYMBALTA) 60 MG capsule Take 1 capsule by mouth Daily. 30 capsule 2   • DULoxetine (CYMBALTA) 60 MG capsule Take 1 capsule by mouth Daily. 30 capsule 2   • fluconazole (DIFLUCAN) 150 MG tablet Take 1 tablet by mouth 1 (One) Time Per Week. 2 tablet 0   • fluconazole (DIFLUCAN) 150 MG tablet Take 1 tablet by mouth 1 (One) Time Per Week. 2 tablet 0   • fluconazole (DIFLUCAN) 150 MG tablet Take 1 tablet by mouth 1 (One) Time Per Week. 2 tablet 0   • fluconazole (DIFLUCAN) 150 MG tablet Take 1 tablet by mouth 1 (One) Time Per Week. 2 tablet 0   • hepatitis A (HAVRIX) 1440 EL U/ML vaccine Inject 1 mL into the appropriate muscle as directed by prescriber. 1 mL 1   • hydrochlorothiazide (HYDRODIURIL) 12.5 MG tablet Take 1 tablet by mouth  Daily. 30 tablet 0   • hydrochlorothiazide (HYDRODIURIL) 12.5 MG tablet Take 1 tablet by mouth Daily. 30 tablet 2   • HYDROcodone-acetaminophen (NORCO) 5-325 MG per tablet Take 1 tablet by mouth every 4 to 6 hours as needed for pain. 20 tablet 0   • ibuprofen (ADVIL,MOTRIN) 600 MG tablet Take 1 tablet by mouth Every 6 (Six) Hours As Needed for Mild Pain . 60 tablet 0   • ibuprofen (ADVIL,MOTRIN) 800 MG tablet Take 1 tablet by mouth 3 (Three) Times a Day. 90 tablet 2   • lisinopril (PRINIVIL,ZESTRIL) 10 MG tablet Take 1 tablet by mouth Daily. 30 tablet 2   • meloxicam (MOBIC) 15 MG tablet Take 1 tablet by mouth Daily. 30 tablet 1   • NIFEdipine CC (ADALAT CC) 30 MG 24 hr tablet Take 1 tablet by mouth Daily. 30 tablet 2   • NIFEdipine CC (ADALAT CC) 30 MG 24 hr tablet Take 1 tablet by mouth Daily. 30 tablet 0   • nitroglycerin (NITROSTAT) 0.3 MG SL tablet Place 1 tablet under tongue at onset of chest pain; may repeat dose every 5 minutes x 3 total.  If no relief after 3rd dose, go to E.R. 100 tablet 0   • omeprazole (priLOSEC) 40 MG capsule Take 1 capsule by mouth daily 30 capsule 0   • ondansetron (ZOFRAN) 4 MG tablet Take 1 tablet by mouth  every 4 (Four) hours as needed. 30 tablet 1   • ondansetron ODT (ZOFRAN-ODT) 4 MG disintegrating tablet Take 1 tablet by mouth Every 6 (Six) Hours As Needed for Nausea or Vomiting. 10 tablet 0   • Probiotic Product (PROBIOTIC ADVANCED) capsule Take 1 capsule by mouth Daily. 30 capsule 2   • sulfamethoxazole-trimethoprim (BACTRIM,SEPTRA) 400-80 MG tablet Take 1 tablet by mouth Every Night at bedtime. 30 tablet 8   • topiramate (TOPAMAX) 25 MG tablet Take 1 tablet by mouth 2 (Two) Times a Day. 60 tablet 2   • topiramate (TOPAMAX) 25 MG tablet Take 1 tablet by mouth 2 (Two) Times a Day. 60 tablet 1   • topiramate (TOPAMAX) 50 MG tablet Take 1 tablet by mouth 2 (Two) Times a Day. 60 tablet 2   • vitamin D (ERGOCALCIFEROL) 44114 units capsule capsule Take 1 capsule by mouth 1 (One)  Time Per Week. 4 capsule 2   • vitamin D (ERGOCALCIFEROL) 83884 units capsule capsule Take 1 capsule by mouth 1 (One) Time Per Week. 4 capsule 0     No current facility-administered medications for this visit.         Allergies:      No Known Allergies     Past Surgical History:     Past Surgical History:   Procedure Laterality Date   •  SECTION     • DIAGNOSTIC LAPAROSCOPY Right 2018    Procedure: LAPAROSCOPIC EXTENSIVE LYSIS OF AHESIONS. DRAINAGE OF OVARIAN CYST.;  Surgeon: Rachel Caruso DO;  Location: [facility];  Service: Obstetrics/Gynecology   • ORIF SCAPHOID W VASCULARIZED BONE GRAFT     • TUBAL ABDOMINAL LIGATION     • TUBAL COAGULATION LAPAROSCOPIC Bilateral 2018    Procedure: TUBAL FALLOPE FILSHIE CLIPPING LAPAROSCOPIC;  Surgeon: Rachel Caruso DO;  Location: [facility] OR;  Service: Obstetrics/Gynecology         Social History:     Social History     Socioeconomic History   • Marital status: Single     Spouse name: Not on file   • Number of children: Not on file   • Years of education: Not on file   • Highest education level: Not on file   Social Needs   • Financial resource strain: Not on file   • Food insecurity - worry: Not on file   • Food insecurity - inability: Not on file   • Transportation needs - medical: Not on file   • Transportation needs - non-medical: Not on file   Occupational History   • Not on file   Tobacco Use   • Smoking status: Former Smoker     Types: Electronic Cigarette   • Smokeless tobacco: Never Used   Substance and Sexual Activity   • Alcohol use: Yes     Comment: socially   • Drug use: No   • Sexual activity: Yes     Partners: Male     Birth control/protection: Surgical   Other Topics Concern   • Not on file   Social History Narrative   • Not on file       Family History:     Family History   Problem Relation Age of Onset   • Heart disease Mother    • Heart disease Father    • Cancer Father        Review of Systems:     Review of Systems  "  Constitutional: Negative for fatigue.   HENT: Negative for congestion.    Eyes: Negative for visual disturbance.   Respiratory: Negative for shortness of breath.    Cardiovascular: Negative for chest pain.   Gastrointestinal: Negative for abdominal pain.   Endocrine: Negative for cold intolerance and heat intolerance.   Genitourinary: Positive for difficulty urinating, flank pain and frequency. Negative for decreased urine volume, dyspareunia, dysuria, enuresis, genital sores, hematuria, menstrual problem, pelvic pain, urgency, vaginal bleeding, vaginal discharge and vaginal pain.   Musculoskeletal: Negative for back pain.   Skin: Negative for color change.   Allergic/Immunologic: Negative for immunocompromised state.   Neurological: Negative for dizziness and headaches.   Hematological: Does not bruise/bleed easily.   Psychiatric/Behavioral: Negative for confusion.         I have reviewed the follow portions of the patient's history and confirmed they are accurate today:  allergies, current medications, past family history, past medical history, past social history, past surgical history, problem list and ROS  Physical Exam:     Physical Exam    Ht 160 cm (63\")   Wt 81.6 kg (180 lb)   BMI 31.89 kg/m²    Procedure:         Assessment:     Encounter Diagnosis   Name Primary?   • Urinary tract infection without hematuria, site unspecified Yes     Orders Placed This Encounter   Procedures   • POC Urinalysis Dipstick       Plan:   Will see pt back in a year.    Patient's Body mass index is 31.89 kg/m². BMI is above normal parameters. Recommendations include: educational material.      Counseling was given to patient for the following topics instructions for management as follows: uti. The interim medical history and current results were reviewed.  A treatment plan with follow-up was made for Urinary tract infection without hematuria, site unspecified [N39.0]. I spent 18 minutes face to face with Cheryl Mcnulty and " 80 percentage was spent in counseling.    This document has been electronically signed by Izaiah Melendez MD February 13, 2019 10:31 AM

## 2019-03-01 ENCOUNTER — DOCUMENTATION (OUTPATIENT)
Dept: GASTROENTEROLOGY | Facility: CLINIC | Age: 29
End: 2019-03-01

## 2019-03-01 DIAGNOSIS — N39.0 RECURRENT UTI: Primary | ICD-10-CM

## 2019-03-01 RX ORDER — SULFAMETHOXAZOLE AND TRIMETHOPRIM 400; 80 MG/1; MG/1
1 TABLET ORAL NIGHTLY
Qty: 30 TABLET | Refills: 8 | Status: SHIPPED | OUTPATIENT
Start: 2019-03-01 | End: 2019-10-31

## 2019-03-01 NOTE — PROGRESS NOTES
Patient called and was concerned about her prescription for Bactrim that was sent to her Pharamacy. I called and spoke with Joshua at [facility] and she stated that they did have patient's prescription and it was ready for her to . Patient is aware that it is at [facility] pharmacy. Patient has 2 pharmacy's listed in chart and was just wondering where the Rx went to.

## 2019-05-15 RX ORDER — ERGOCALCIFEROL 1.25 MG/1
50000 CAPSULE ORAL WEEKLY
Qty: 4 CAPSULE | Refills: 0 | Status: CANCELLED | OUTPATIENT
Start: 2019-05-15

## 2019-05-22 ENCOUNTER — APPOINTMENT (OUTPATIENT)
Dept: GENERAL RADIOLOGY | Facility: HOSPITAL | Age: 29
End: 2019-05-22

## 2019-05-22 ENCOUNTER — HOSPITAL ENCOUNTER (EMERGENCY)
Facility: HOSPITAL | Age: 29
Discharge: HOME | End: 2019-05-22
Admitting: EMERGENCY MEDICINE

## 2019-05-22 VITALS
BODY MASS INDEX: 35.08 KG/M2 | SYSTOLIC BLOOD PRESSURE: 124 MMHG | WEIGHT: 198 LBS | HEART RATE: 95 BPM | OXYGEN SATURATION: 100 % | DIASTOLIC BLOOD PRESSURE: 75 MMHG | HEIGHT: 63 IN | TEMPERATURE: 98.2 F | RESPIRATION RATE: 18 BRPM

## 2019-05-22 DIAGNOSIS — S93.401A SPRAIN OF RIGHT ANKLE, UNSPECIFIED LIGAMENT, INITIAL ENCOUNTER: Primary | ICD-10-CM

## 2019-05-22 PROCEDURE — 73590 X-RAY EXAM OF LOWER LEG: CPT | Performed by: RADIOLOGY

## 2019-05-22 PROCEDURE — 99283 EMERGENCY DEPT VISIT LOW MDM: CPT

## 2019-05-22 PROCEDURE — 73590 X-RAY EXAM OF LOWER LEG: CPT

## 2019-05-22 PROCEDURE — 73610 X-RAY EXAM OF ANKLE: CPT | Performed by: RADIOLOGY

## 2019-05-22 PROCEDURE — 73610 X-RAY EXAM OF ANKLE: CPT

## 2019-05-22 RX ORDER — TRAMADOL HYDROCHLORIDE 50 MG/1
50 TABLET ORAL EVERY 6 HOURS PRN
Qty: 10 TABLET | Refills: 0 | Status: SHIPPED | OUTPATIENT
Start: 2019-05-22 | End: 2019-09-03

## 2019-09-03 ENCOUNTER — OFFICE VISIT (OUTPATIENT)
Dept: FAMILY MEDICINE CLINIC | Facility: CLINIC | Age: 29
End: 2019-09-03

## 2019-09-03 VITALS
OXYGEN SATURATION: 98 % | HEART RATE: 81 BPM | HEIGHT: 63 IN | BODY MASS INDEX: 36.32 KG/M2 | DIASTOLIC BLOOD PRESSURE: 80 MMHG | TEMPERATURE: 98.5 F | WEIGHT: 205 LBS | SYSTOLIC BLOOD PRESSURE: 115 MMHG

## 2019-09-03 DIAGNOSIS — R31.29 OTHER MICROSCOPIC HEMATURIA: ICD-10-CM

## 2019-09-03 DIAGNOSIS — F32.9 MAJOR DEPRESSIVE DISORDER, SINGLE EPISODE: ICD-10-CM

## 2019-09-03 DIAGNOSIS — M54.50 CHRONIC BILATERAL LOW BACK PAIN WITHOUT SCIATICA: Primary | ICD-10-CM

## 2019-09-03 DIAGNOSIS — G89.29 CHRONIC BILATERAL LOW BACK PAIN WITHOUT SCIATICA: Primary | ICD-10-CM

## 2019-09-03 DIAGNOSIS — I10 ESSENTIAL (PRIMARY) HYPERTENSION: ICD-10-CM

## 2019-09-03 DIAGNOSIS — M79.7 FIBROMYALGIA: ICD-10-CM

## 2019-09-03 LAB
BILIRUB BLD-MCNC: NEGATIVE MG/DL
CLARITY, POC: ABNORMAL
COLOR UR: YELLOW
GLUCOSE UR STRIP-MCNC: NEGATIVE MG/DL
KETONES UR QL: NEGATIVE
LEUKOCYTE EST, POC: NEGATIVE
NITRITE UR-MCNC: POSITIVE MG/ML
PH UR: 6 [PH] (ref 5–8)
PROT UR STRIP-MCNC: NEGATIVE MG/DL
RBC # UR STRIP: ABNORMAL /UL
SP GR UR: 1.02 (ref 1–1.03)
UROBILINOGEN UR QL: NORMAL

## 2019-09-03 PROCEDURE — 87088 URINE BACTERIA CULTURE: CPT | Performed by: FAMILY MEDICINE

## 2019-09-03 PROCEDURE — 84443 ASSAY THYROID STIM HORMONE: CPT | Performed by: FAMILY MEDICINE

## 2019-09-03 PROCEDURE — 80053 COMPREHEN METABOLIC PANEL: CPT | Performed by: FAMILY MEDICINE

## 2019-09-03 PROCEDURE — 81003 URINALYSIS AUTO W/O SCOPE: CPT | Performed by: FAMILY MEDICINE

## 2019-09-03 PROCEDURE — 87086 URINE CULTURE/COLONY COUNT: CPT | Performed by: FAMILY MEDICINE

## 2019-09-03 PROCEDURE — 82607 VITAMIN B-12: CPT | Performed by: FAMILY MEDICINE

## 2019-09-03 PROCEDURE — 99204 OFFICE O/P NEW MOD 45 MIN: CPT | Performed by: FAMILY MEDICINE

## 2019-09-03 PROCEDURE — 85025 COMPLETE CBC W/AUTO DIFF WBC: CPT | Performed by: FAMILY MEDICINE

## 2019-09-03 PROCEDURE — 87186 SC STD MICRODIL/AGAR DIL: CPT | Performed by: FAMILY MEDICINE

## 2019-09-03 RX ORDER — DULOXETIN HYDROCHLORIDE 60 MG/1
60 CAPSULE, DELAYED RELEASE ORAL DAILY
Qty: 90 CAPSULE | Refills: 1 | Status: SHIPPED | OUTPATIENT
Start: 2019-09-03 | End: 2020-03-10 | Stop reason: SDUPTHER

## 2019-09-03 RX ORDER — TOPIRAMATE 50 MG/1
50 TABLET, FILM COATED ORAL 2 TIMES DAILY
Qty: 180 TABLET | Refills: 1 | Status: SHIPPED | OUTPATIENT
Start: 2019-09-03 | End: 2020-03-10

## 2019-09-03 RX ORDER — ATENOLOL 25 MG/1
25 TABLET ORAL DAILY
Qty: 90 TABLET | Refills: 1 | Status: SHIPPED | OUTPATIENT
Start: 2019-09-03 | End: 2020-03-10 | Stop reason: SDUPTHER

## 2019-09-03 RX ORDER — OMEPRAZOLE 40 MG/1
40 CAPSULE, DELAYED RELEASE ORAL DAILY
Qty: 90 CAPSULE | Refills: 1 | Status: SHIPPED | OUTPATIENT
Start: 2019-09-03 | End: 2020-03-10

## 2019-09-03 NOTE — PATIENT INSTRUCTIONS
Call Dr. Brooks for an appointment.     Walk - it will help the circulation - 30 minutes daily at a pace where you cannot talk on the phone.

## 2019-09-04 ENCOUNTER — TELEPHONE (OUTPATIENT)
Dept: FAMILY MEDICINE CLINIC | Facility: CLINIC | Age: 29
End: 2019-09-04

## 2019-09-04 LAB
ALBUMIN SERPL-MCNC: 4.2 G/DL (ref 3.5–5.2)
ALBUMIN/GLOB SERPL: 1.5 G/DL
ALP SERPL-CCNC: 102 U/L (ref 39–117)
ALT SERPL W P-5'-P-CCNC: 7 U/L (ref 1–33)
ANION GAP SERPL CALCULATED.3IONS-SCNC: 13.5 MMOL/L (ref 5–15)
AST SERPL-CCNC: 13 U/L (ref 1–32)
BASOPHILS # BLD AUTO: 0.03 10*3/MM3 (ref 0–0.2)
BASOPHILS NFR BLD AUTO: 0.3 % (ref 0–1.5)
BILIRUB SERPL-MCNC: 0.3 MG/DL (ref 0.2–1.2)
BUN BLD-MCNC: 10 MG/DL (ref 6–20)
BUN/CREAT SERPL: 11.8 (ref 7–25)
CALCIUM SPEC-SCNC: 9.2 MG/DL (ref 8.6–10.5)
CHLORIDE SERPL-SCNC: 104 MMOL/L (ref 98–107)
CO2 SERPL-SCNC: 21.5 MMOL/L (ref 22–29)
CREAT BLD-MCNC: 0.85 MG/DL (ref 0.57–1)
DEPRECATED RDW RBC AUTO: 43.1 FL (ref 37–54)
EOSINOPHIL # BLD AUTO: 0.15 10*3/MM3 (ref 0–0.4)
EOSINOPHIL NFR BLD AUTO: 1.7 % (ref 0.3–6.2)
ERYTHROCYTE [DISTWIDTH] IN BLOOD BY AUTOMATED COUNT: 12.5 % (ref 12.3–15.4)
GFR SERPL CREATININE-BSD FRML MDRD: 79 ML/MIN/1.73
GLOBULIN UR ELPH-MCNC: 2.8 GM/DL
GLUCOSE BLD-MCNC: 82 MG/DL (ref 65–99)
HCT VFR BLD AUTO: 41.8 % (ref 34–46.6)
HGB BLD-MCNC: 12.5 G/DL (ref 12–15.9)
IMM GRANULOCYTES # BLD AUTO: 0.03 10*3/MM3 (ref 0–0.05)
IMM GRANULOCYTES NFR BLD AUTO: 0.3 % (ref 0–0.5)
LYMPHOCYTES # BLD AUTO: 2.16 10*3/MM3 (ref 0.7–3.1)
LYMPHOCYTES NFR BLD AUTO: 23.8 % (ref 19.6–45.3)
MCH RBC QN AUTO: 28 PG (ref 26.6–33)
MCHC RBC AUTO-ENTMCNC: 29.9 G/DL (ref 31.5–35.7)
MCV RBC AUTO: 93.5 FL (ref 79–97)
MONOCYTES # BLD AUTO: 0.65 10*3/MM3 (ref 0.1–0.9)
MONOCYTES NFR BLD AUTO: 7.2 % (ref 5–12)
NEUTROPHILS # BLD AUTO: 6.05 10*3/MM3 (ref 1.7–7)
NEUTROPHILS NFR BLD AUTO: 66.7 % (ref 42.7–76)
NRBC BLD AUTO-RTO: 0 /100 WBC (ref 0–0.2)
PLATELET # BLD AUTO: 280 10*3/MM3 (ref 140–450)
PMV BLD AUTO: 11.3 FL (ref 6–12)
POTASSIUM BLD-SCNC: 4.2 MMOL/L (ref 3.5–5.2)
PROT SERPL-MCNC: 7 G/DL (ref 6–8.5)
RBC # BLD AUTO: 4.47 10*6/MM3 (ref 3.77–5.28)
SODIUM BLD-SCNC: 139 MMOL/L (ref 136–145)
TSH SERPL DL<=0.05 MIU/L-ACNC: 2.06 UIU/ML (ref 0.27–4.2)
VIT B12 BLD-MCNC: 553 PG/ML (ref 211–946)
WBC NRBC COR # BLD: 9.07 10*3/MM3 (ref 3.4–10.8)

## 2019-09-04 NOTE — TELEPHONE ENCOUNTER
----- Message from Suzanne Jackson MD sent at 9/4/2019  9:14 AM EDT -----  Labs stable. Okay to either call or send letter to patient. Thanks.      Stable letter mailed.

## 2019-09-05 LAB — BACTERIA SPEC AEROBE CULT: ABNORMAL

## 2019-09-23 NOTE — PROGRESS NOTES
"Cheryl Mcnulty     VITALS: Blood pressure 115/80, pulse 81, temperature 98.5 °F (36.9 °C), temperature source Oral, height 160 cm (62.99\"), weight 93 kg (205 lb), SpO2 98 %.    Subjective  Chief Complaint:   Chief Complaint   Patient presents with   • Fatigue   • Weight Gain        History of Present Illness:  Patient is a 29 y.o.  female who presents to clinic secondary to establishment of care.  She complains of fatigue today along with lower back pain.  She denies any dysuria, hematuria, urinary frequency, or urinary retention.  She denies any fevers or chills.  She denies any acute injury or trauma.  Lower back pain is a dull ache localized right above her buttocks.    Patient has fibromyalgia and is currently on duloxetine 60 mg orally daily and topiramate 50 mg orally twice a day without any side effects.  She states that this does help her fibromyalgia.  On bad days, however, her fibromyalgia is exacerbated.    Patient has GERD and is currently on omeprazole 40 mg orally daily without any side effects.  She denies any exacerbations.  She denies any metallic taste in her mouth.  She denies any problems with sleep.  She denies any nausea, vomiting, hiccuping, midepigastric pain.    Patient has hypertension and is currently on atenolol 25 mg orally daily without any side effects.  She tries to follow a low-salt diet.  She denies any dizziness, blurry vision, shortness of breath, chest pain, or edema.    Patient has depression with anxiety and is currently on duloxetine 60 mg orally daily, Rexulti 2 mg orally daily, topiramate 50 mg orally twice a day without any side effects.  Patient has anhedonia.  She is not sure if the medications are really working.  She is able to sleep.  She does have an appetite.  She is able to make goals.  She is not crying.  She denies any auditory or visual hallucinations.  She denies any suicidal or homicidal ideations.    No complaints about any of the medications.    The following " portions of the patient's history were reviewed and updated as appropriate: allergies, current medications, past family history, past medical history, past social history, past surgical history and problem list.    Past Medical History  Past Medical History:   Diagnosis Date   • Congestive heart failure (CMS/HCC)    • Fibromyalgia    • GERD (gastroesophageal reflux disease)    • History of ear infections    • History of heart disease    • History of lupus    • History of migraine    • Hypertension    • Lown Ganong Parker syndrome    • Murmur    • Osteoarthritis    • Pulmonary stenosis        Review of Systems   Constitutional: Positive for fatigue. Negative for chills and fever.   Respiratory: Negative for shortness of breath and wheezing.    Cardiovascular: Negative for chest pain and palpitations.   Gastrointestinal: Negative for constipation, diarrhea, nausea and vomiting.       Surgical History  Past Surgical History:   Procedure Laterality Date   •  SECTION     • DIAGNOSTIC LAPAROSCOPY Right 2018    Procedure: LAPAROSCOPIC EXTENSIVE LYSIS OF AHESIONS. DRAINAGE OF OVARIAN CYST.;  Surgeon: Rachel Caruso DO;  Location: [facility];  Service: Obstetrics/Gynecology   • ORIF SCAPHOID W VASCULARIZED BONE GRAFT     • TUBAL ABDOMINAL LIGATION     • TUBAL COAGULATION LAPAROSCOPIC Bilateral 2018    Procedure: TUBAL FALLOPE FILSHIE CLIPPING LAPAROSCOPIC;  Surgeon: Rachel Caruso DO;  Location: [facility] OR;  Service: Obstetrics/Gynecology       Family History  Family History   Problem Relation Age of Onset   • Heart disease Mother    • Thyroid disease Mother    • Anxiety disorder Mother    • Depression Mother    • Bipolar disorder Mother    • Heart disease Father    • Cancer Father    • Heart disease Maternal Grandmother    • Diabetes Maternal Grandmother    • Diabetes Maternal Grandfather    • Lupus Paternal Grandmother    • Rheum arthritis Paternal Grandmother        Social  History  Social History     Socioeconomic History   • Marital status: Single     Spouse name: Not on file   • Number of children: Not on file   • Years of education: Not on file   • Highest education level: Not on file   Tobacco Use   • Smoking status: Former Smoker     Packs/day: 0.00     Types: Electronic Cigarette   • Smokeless tobacco: Never Used   Substance and Sexual Activity   • Alcohol use: Yes     Comment: socially   • Drug use: No   • Sexual activity: Yes     Partners: Male     Birth control/protection: Surgical       Objective  Physical Exam    Gen: Patient in NAD. Pleasant and answers appropriately. A&Ox3.    Skin: Warm and dry with normal turgor. No purpura, rashes, or unusual pigmentation noted. Hair is normal in appearance and distribution.    HEENT: NC/AT. No lesions noted. Conjunctiva clear, sclera nonicteric. PERRL. EOMI without nystagmus or strabismus. Fundi appear benign. No hemorrhages or exudates of eyes. Auditory canals are patent bilaterally without lesions. TMs intact,  nonerythematous, nonbulging without lesions. Nasal mucosa pink, nonerythematous, and nonedematous. Frontal and maxillary sinuses are nontender. O/P nonerythematous and moist without exudate.    Neck: Supple without lymph nodes palpated. FROM.     Lungs: CTA B/L without rales, rhonchi, crackles, or wheezes.    Heart: RRR. S1 and S2 normal. No S3 or S4. No MRGT.    Abd: Soft, nontender,nondistended. (+)BSx4 quadrants.     Extrem: No CCE. Radial pulses 2+/4 and equal B/L. FROMx4. Decreased ROM of back.    Neuro: No focal motor/sensory deficits.    Procedures    Assessment/Plan  Cheryl Mcnulty is a 29 y.o. here for establishment of care.  Diagnoses and all orders for this visit:    Chronic bilateral low back pain without sciatica  -     POCT urinalysis dipstick, automated    Essential (primary) hypertension  -     atenolol (TENORMIN) 25 MG tablet; Take 1 tablet by mouth Daily.    Major depressive disorder, single episode  -      Brexpiprazole 2 MG tablet; Take 1 tablet by mouth daily  -     DULoxetine (CYMBALTA) 60 MG capsule; Take 1 capsule by mouth Daily.    Fibromyalgia  -     omeprazole (priLOSEC) 40 MG capsule; Take 1 capsule by mouth Daily.  -     topiramate (TOPAMAX) 50 MG tablet; Take 1 tablet by mouth 2 (Two) Times a Day.  -     Comprehensive Metabolic Panel; Future  -     CBC Auto Differential; Future  -     TSH; Future  -     Vitamin B12; Future  -     Comprehensive Metabolic Panel  -     CBC Auto Differential  -     TSH  -     Vitamin B12    Other microscopic hematuria  -     Urine Culture - Urine, Urine, Clean Catch; Future  -     Urine Culture - Urine, Urine, Clean Catch      Patient's Body mass index is 36.32 kg/m². BMI is above normal parameters. Recommendations include: exercise counseling and nutrition counseling.     Findings and plans discussed with patient who verbalizes understanding and agreement. Will followup with patient once results are in. Patient to followup at clinic PRN or in one month for further medical followup.    MD SAHIL Dougherty Dragon/Transcription Disclaimer:  Much of this encounter note is an electronic transcription/translation of spoken language to printed text.  The electronic translation of spoken language may permit erroneous, or at times, nonsensical words or phrases to be inadvertently transcribed.  Although I have reviewed the note for such errors, some may still exist.

## 2019-09-26 ENCOUNTER — TELEPHONE (OUTPATIENT)
Dept: FAMILY MEDICINE CLINIC | Facility: CLINIC | Age: 29
End: 2019-09-26

## 2019-09-26 RX ORDER — CIPROFLOXACIN 500 MG/1
500 TABLET, FILM COATED ORAL 2 TIMES DAILY
Qty: 14 TABLET | Refills: 0 | Status: SHIPPED | OUTPATIENT
Start: 2019-09-26 | End: 2019-10-31

## 2019-09-27 RX ORDER — FLUCONAZOLE 150 MG/1
150 TABLET ORAL ONCE
Qty: 1 TABLET | Refills: 0 | Status: SHIPPED | OUTPATIENT
Start: 2019-09-27 | End: 2020-01-18

## 2019-10-01 ENCOUNTER — OFFICE VISIT (OUTPATIENT)
Dept: FAMILY MEDICINE CLINIC | Facility: CLINIC | Age: 29
End: 2019-10-01

## 2019-10-01 VITALS
HEIGHT: 63 IN | HEART RATE: 80 BPM | BODY MASS INDEX: 36.68 KG/M2 | DIASTOLIC BLOOD PRESSURE: 70 MMHG | OXYGEN SATURATION: 99 % | SYSTOLIC BLOOD PRESSURE: 119 MMHG | TEMPERATURE: 97.9 F | WEIGHT: 207 LBS

## 2019-10-01 DIAGNOSIS — R30.0 DYSURIA: Primary | ICD-10-CM

## 2019-10-01 LAB
BILIRUB BLD-MCNC: NEGATIVE MG/DL
CLARITY, POC: CLEAR
COLOR UR: YELLOW
GLUCOSE UR STRIP-MCNC: NEGATIVE MG/DL
KETONES UR QL: NEGATIVE
LEUKOCYTE EST, POC: NEGATIVE
NITRITE UR-MCNC: NEGATIVE MG/ML
PH UR: 6 [PH] (ref 5–8)
PROT UR STRIP-MCNC: NEGATIVE MG/DL
RBC # UR STRIP: ABNORMAL /UL
SP GR UR: 1.03 (ref 1–1.03)
UROBILINOGEN UR QL: NORMAL

## 2019-10-01 PROCEDURE — 99213 OFFICE O/P EST LOW 20 MIN: CPT | Performed by: FAMILY MEDICINE

## 2019-10-01 PROCEDURE — 81003 URINALYSIS AUTO W/O SCOPE: CPT | Performed by: FAMILY MEDICINE

## 2019-10-02 PROCEDURE — 87086 URINE CULTURE/COLONY COUNT: CPT | Performed by: FAMILY MEDICINE

## 2019-10-03 LAB — BACTERIA SPEC AEROBE CULT: NO GROWTH

## 2019-10-04 ENCOUNTER — TELEPHONE (OUTPATIENT)
Dept: FAMILY MEDICINE CLINIC | Facility: CLINIC | Age: 29
End: 2019-10-04

## 2019-10-04 NOTE — TELEPHONE ENCOUNTER
----- Message from Suzanne Jackson MD sent at 10/4/2019  8:57 AM EDT -----  Labs stable. Okay to either call or send letter to patient. Thanks.      Stable letter mailed.

## 2019-10-21 NOTE — PROGRESS NOTES
"Cheryl Mcnulty     VITALS: Blood pressure 119/70, pulse 80, temperature 97.9 °F (36.6 °C), temperature source Oral, height 160 cm (62.99\"), weight 93.9 kg (207 lb), SpO2 99 %, not currently breastfeeding.    Subjective  Chief Complaint:   Chief Complaint   Patient presents with   • Follow-up   • Back Pain        History of Present Illness:  Patient is a 29 y.o.  female with a medical history significant for lupus, CHF, GERD, fibromyalgia who presents to clinic secondary to an acute concern.  She has been having a 2-day history of fatigue, lower back pain, dysuria.  She denies any hematuria, urinary frequency, or urinary retention.  She denies any fevers or chills.    No complaints about any of the medications.    The following portions of the patient's history were reviewed and updated as appropriate: allergies, current medications, past family history, past medical history, past social history, past surgical history and problem list.    Past Medical History  Past Medical History:   Diagnosis Date   • Congestive heart failure (CMS/HCC)    • Fibromyalgia    • GERD (gastroesophageal reflux disease)    • History of ear infections    • History of heart disease    • History of lupus (CMS/HCC)    • History of migraine    • Hypertension    • Lown Ganong Parker syndrome    • Murmur    • Osteoarthritis    • Pulmonary stenosis        Review of Systems   Respiratory: Negative for shortness of breath and wheezing.    Cardiovascular: Negative for chest pain and palpitations.   Genitourinary: Positive for dysuria. Negative for frequency and hematuria.       Surgical History  Past Surgical History:   Procedure Laterality Date   •  SECTION     • DIAGNOSTIC LAPAROSCOPY Right 2018    Procedure: LAPAROSCOPIC EXTENSIVE LYSIS OF AHESIONS. DRAINAGE OF OVARIAN CYST.;  Surgeon: Rachel Caruso DO;  Location: [facility];  Service: Obstetrics/Gynecology   • ORIF SCAPHOID W VASCULARIZED BONE GRAFT     • TUBAL ABDOMINAL " LIGATION     • TUBAL COAGULATION LAPAROSCOPIC Bilateral 8/13/2018    Procedure: TUBAL FALLOPE FILSHIE CLIPPING LAPAROSCOPIC;  Surgeon: Rachel Caruso DO;  Location: [facility];  Service: Obstetrics/Gynecology       Family History  Family History   Problem Relation Age of Onset   • Heart disease Mother    • Thyroid disease Mother    • Anxiety disorder Mother    • Depression Mother    • Bipolar disorder Mother    • Heart disease Father    • Cancer Father    • Heart disease Maternal Grandmother    • Diabetes Maternal Grandmother    • Diabetes Maternal Grandfather    • Lupus Paternal Grandmother    • Rheum arthritis Paternal Grandmother        Social History  Social History     Socioeconomic History   • Marital status: Single     Spouse name: Not on file   • Number of children: Not on file   • Years of education: Not on file   • Highest education level: Not on file   Tobacco Use   • Smoking status: Former Smoker     Packs/day: 0.00     Types: Electronic Cigarette   • Smokeless tobacco: Never Used   Substance and Sexual Activity   • Alcohol use: Yes     Comment: socially   • Drug use: No   • Sexual activity: Yes     Partners: Male     Birth control/protection: Surgical       Objective  Physical Exam    Gen: Patient in NAD. Pleasant and answers appropriately. A&Ox3.    Skin: Warm and dry with normal turgor. No purpura, rashes, or unusual pigmentation noted. Hair is normal in appearance and distribution.    HEENT: NC/AT. No lesions noted. Conjunctiva clear, sclera nonicteric. PERRL. EOMI without nystagmus or strabismus. Fundi appear benign. No hemorrhages or exudates of eyes. Auditory canals are patent bilaterally without lesions. TMs intact,  nonerythematous, nonbulging without lesions. Nasal mucosa pink, nonerythematous, and nonedematous. Frontal and maxillary sinuses are nontender. O/P nonerythematous and moist without exudate.    Neck: Supple without lymph nodes palpated. FROM.     Lungs: CTA B/L without  rales, rhonchi, crackles, or wheezes.    Heart: RRR. S1 and S2 normal. No S3 or S4. No MRGT.    Abd: Soft, nontender,nondistended. (+)BSx4 quadrants.     Extrem: No CCE. Radial pulses 2+/4 and equal B/L. FROMx4. No bone, joint, or muscle tenderness noted.    Neuro: No focal motor/sensory deficits.    Procedures    Assessment/Plan  Cheryl Mcnulty is a 29 y.o. here for an acute concern.  Diagnoses and all orders for this visit:    Dysuria  -     POC Urinalysis Dipstick, Automated  -     Urine Culture - Urine, Urine, Clean Catch; Future  -     Urine Culture - Urine, Urine, Clean Catch      Patient's Body mass index is 36.68 kg/m². BMI is above normal parameters. Recommendations include: exercise counseling and nutrition counseling.     Findings and plans discussed with patient who verbalizes understanding and agreement. Will followup with patient once results are in. Patient to followup at clinic PRN or in three months for further medical followup.    Suzanne Jackson MD    EMR Dragon/Transcription Disclaimer:  Much of this encounter note is an electronic transcription/translation of spoken language to printed text.  The electronic translation of spoken language may permit erroneous, or at times, nonsensical words or phrases to be inadvertently transcribed.  Although I have reviewed the note for such errors, some may still exist.

## 2019-10-24 ENCOUNTER — OFFICE VISIT (OUTPATIENT)
Dept: FAMILY MEDICINE CLINIC | Facility: CLINIC | Age: 29
End: 2019-10-24

## 2019-10-24 VITALS
HEIGHT: 63 IN | WEIGHT: 207 LBS | BODY MASS INDEX: 36.68 KG/M2 | TEMPERATURE: 98.5 F | DIASTOLIC BLOOD PRESSURE: 82 MMHG | SYSTOLIC BLOOD PRESSURE: 122 MMHG | HEART RATE: 81 BPM | OXYGEN SATURATION: 97 %

## 2019-10-24 DIAGNOSIS — R63.5 WEIGHT GAIN: Primary | ICD-10-CM

## 2019-10-24 DIAGNOSIS — F32.4 MAJOR DEPRESSIVE DISORDER WITH SINGLE EPISODE, IN PARTIAL REMISSION (HCC): ICD-10-CM

## 2019-10-24 DIAGNOSIS — M25.50 ARTHRALGIA, UNSPECIFIED JOINT: ICD-10-CM

## 2019-10-24 PROCEDURE — 99214 OFFICE O/P EST MOD 30 MIN: CPT | Performed by: FAMILY MEDICINE

## 2019-10-31 NOTE — PROGRESS NOTES
"Cheryl Mcnulty     VITALS: Blood pressure 122/82, pulse 81, temperature 98.5 °F (36.9 °C), temperature source Oral, height 160 cm (62.99\"), weight 93.9 kg (207 lb), SpO2 97 %, not currently breastfeeding.    Subjective  Chief Complaint:   Chief Complaint   Patient presents with   • Weight Gain        History of Present Illness:  Patient is a 29 y.o.  female with a medical history significant for GERD who presents to clinic secondary to medical followup.  She is wanting FMLA paperwork done today secondary to fibromyalgia and osteoarthritis.  She states that she was diagnosed with fibromyalgia by Dr. Mcgregor.  She is not sure how she was diagnosed.  She states that she has osteoarthritis in her wrists.  She states that this prevents her from working on occasion.  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  She states that her hands hurt so she cannot type at work.  She states that she needs the FMLA paperwork filled out because she had called in on Saturday secondary to the pain.  We do not have any paperwork addressing her diagnosis of fibromyalgia.  Her last wrist x-ray done in July 2016 shows mild arthritis of her wrist.  She currently is on Mobic 15 mg orally daily and Cymbalta 60 mg orally daily.    No complaints about any of the medications.    The following portions of the patient's history were reviewed and updated as appropriate: allergies, current medications, past family history, past medical history, past social history, past surgical history and problem list.    Past Medical History  Past Medical History:   Diagnosis Date   • Congestive heart failure (CMS/HCC)    • Fibromyalgia    • GERD (gastroesophageal reflux disease)    • History of ear infections    • History of heart disease    • History of lupus (CMS/HCC)    • History of migraine    • Hypertension    • Lown Ganong Parker syndrome    • Murmur    • Osteoarthritis    • Pulmonary stenosis        Review of Systems   Respiratory: Negative for shortness " of breath and wheezing.    Cardiovascular: Negative for chest pain and palpitations.   Musculoskeletal: Positive for arthralgias and myalgias.       Surgical History  Past Surgical History:   Procedure Laterality Date   •  SECTION     • DIAGNOSTIC LAPAROSCOPY Right 2018    Procedure: LAPAROSCOPIC EXTENSIVE LYSIS OF AHESIONS. DRAINAGE OF OVARIAN CYST.;  Surgeon: Rachel Caruso DO;  Location: [facility];  Service: Obstetrics/Gynecology   • ORIF SCAPHOID W VASCULARIZED BONE GRAFT     • TUBAL ABDOMINAL LIGATION     • TUBAL COAGULATION LAPAROSCOPIC Bilateral 2018    Procedure: TUBAL FALLOPE FILSHIE CLIPPING LAPAROSCOPIC;  Surgeon: Rachel Caruso DO;  Location: [facility] OR;  Service: Obstetrics/Gynecology       Family History  Family History   Problem Relation Age of Onset   • Heart disease Mother    • Thyroid disease Mother    • Anxiety disorder Mother    • Depression Mother    • Bipolar disorder Mother    • Heart disease Father    • Cancer Father    • Heart disease Maternal Grandmother    • Diabetes Maternal Grandmother    • Diabetes Maternal Grandfather    • Lupus Paternal Grandmother    • Rheum arthritis Paternal Grandmother        Social History  Social History     Socioeconomic History   • Marital status: Single     Spouse name: Not on file   • Number of children: Not on file   • Years of education: Not on file   • Highest education level: Not on file   Tobacco Use   • Smoking status: Former Smoker     Packs/day: 0.00     Types: Electronic Cigarette   • Smokeless tobacco: Never Used   Substance and Sexual Activity   • Alcohol use: Yes     Comment: socially   • Drug use: No   • Sexual activity: Yes     Partners: Male     Birth control/protection: Surgical       Objective  Physical Exam    Gen: Patient in NAD. Pleasant and answers appropriately. A&Ox3.    Skin: Warm and dry with normal turgor. No purpura, rashes, or unusual pigmentation noted. Hair is normal in appearance and  distribution.    HEENT: NC/AT. No lesions noted. Conjunctiva clear, sclera nonicteric. PERRL. EOMI without nystagmus or strabismus. Fundi appear benign. No hemorrhages or exudates of eyes. Auditory canals are patent bilaterally without lesions. TMs intact,  nonerythematous, nonbulging without lesions. Nasal mucosa pink, nonerythematous, and nonedematous. Frontal and maxillary sinuses are nontender. O/P nonerythematous and moist without exudate.    Neck: Supple without lymph nodes palpated. FROM.     Lungs: CTA B/L without rales, rhonchi, crackles, or wheezes.    Heart: RRR. S1 and S2 normal. No S3 or S4. No MRGT.    Abd: Soft, nontender,nondistended. (+)BSx4 quadrants.     Extrem: No CCE. Radial pulses 2+/4 and equal B/L. FROMx4, inconsistent with subjective exam.    Neuro: No focal motor/sensory deficits.    Procedures    Assessment/Plan  Cheryl Mcnulty is a 29 y.o. here for medical followup.  Diagnoses and all orders for this visit:    Weight gain  -     metFORMIN (GLUCOPHAGE) 500 MG tablet; Take 1 tablet by mouth 2 (Two) Times a Day With Meals.    Major depressive disorder with single episode, in partial remission (CMS/HCC)  Continue Cymbalta 60 mg orally daily and brexpiprazole 2 mg orally daily.     Arthralgia, unspecified joint  Discussed at length with patient.  In order to fill out the [facility] paperwork, we usually request old records.  Physical exam is not consistent with her subjective story.  Her x-ray does not really indicate worsening arthritis.  She is not in physical therapy.  I discussed with the patient that we need her old records in regards to how she was diagnosed with fibromyalgia and how her arthritis has been treated in the past prior to even filling out her [place] paperwork.    Patient's Body mass index is 36.68 kg/m². BMI is above normal parameters. Recommendations include: exercise counseling and nutrition counseling.     Findings and plans discussed with patient who verbalizes understanding and  agreement. Will followup with patient once results are in. Patient to followup at clinic PRN or in one month for further medical followup.    Suzanne Jackson MD    EMR Dragon/Transcription Disclaimer:  Much of this encounter note is an electronic transcription/translation of spoken language to printed text.  The electronic translation of spoken language may permit erroneous, or at times, nonsensical words or phrases to be inadvertently transcribed.  Although I have reviewed the note for such errors, some may still exist.

## 2019-11-24 ENCOUNTER — HOSPITAL ENCOUNTER (EMERGENCY)
Facility: HOSPITAL | Age: 29
Discharge: HOME | End: 2019-11-24
Attending: EMERGENCY MEDICINE | Admitting: EMERGENCY MEDICINE

## 2019-11-24 ENCOUNTER — APPOINTMENT (OUTPATIENT)
Dept: CT IMAGING | Facility: HOSPITAL | Age: 29
End: 2019-11-24

## 2019-11-24 VITALS
SYSTOLIC BLOOD PRESSURE: 157 MMHG | HEART RATE: 74 BPM | BODY MASS INDEX: 36.5 KG/M2 | RESPIRATION RATE: 18 BRPM | TEMPERATURE: 98 F | DIASTOLIC BLOOD PRESSURE: 99 MMHG | OXYGEN SATURATION: 98 % | HEIGHT: 63 IN | WEIGHT: 206 LBS

## 2019-11-24 DIAGNOSIS — S29.012A STRAIN OF THORACIC BACK REGION: ICD-10-CM

## 2019-11-24 DIAGNOSIS — S16.1XXA STRAIN OF NECK MUSCLE, INITIAL ENCOUNTER: Primary | ICD-10-CM

## 2019-11-24 PROCEDURE — 99283 EMERGENCY DEPT VISIT LOW MDM: CPT

## 2019-11-24 PROCEDURE — 72128 CT CHEST SPINE W/O DYE: CPT

## 2019-11-24 PROCEDURE — 72125 CT NECK SPINE W/O DYE: CPT

## 2019-11-24 NOTE — DISCHARGE INSTRUCTIONS
Follow up with your primary care provider in 2-3 days.    Return to the emergency room for worsening symptom
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
## 2020-03-10 ENCOUNTER — OFFICE VISIT (OUTPATIENT)
Dept: FAMILY MEDICINE CLINIC | Facility: CLINIC | Age: 30
End: 2020-03-10

## 2020-03-10 VITALS
OXYGEN SATURATION: 100 % | BODY MASS INDEX: 35.08 KG/M2 | HEART RATE: 81 BPM | TEMPERATURE: 98.4 F | HEIGHT: 63 IN | SYSTOLIC BLOOD PRESSURE: 131 MMHG | WEIGHT: 198 LBS | DIASTOLIC BLOOD PRESSURE: 82 MMHG

## 2020-03-10 DIAGNOSIS — I10 ESSENTIAL (PRIMARY) HYPERTENSION: Primary | ICD-10-CM

## 2020-03-10 DIAGNOSIS — M25.50 ARTHRALGIA, UNSPECIFIED JOINT: ICD-10-CM

## 2020-03-10 DIAGNOSIS — F32.0 CURRENT MILD EPISODE OF MAJOR DEPRESSIVE DISORDER WITHOUT PRIOR EPISODE (HCC): ICD-10-CM

## 2020-03-10 PROCEDURE — 99214 OFFICE O/P EST MOD 30 MIN: CPT | Performed by: FAMILY MEDICINE

## 2020-03-10 RX ORDER — ATENOLOL 25 MG/1
25 TABLET ORAL DAILY
Qty: 90 TABLET | Refills: 1 | Status: SHIPPED | OUTPATIENT
Start: 2020-03-10 | End: 2021-03-17 | Stop reason: SDUPTHER

## 2020-03-10 RX ORDER — DULOXETIN HYDROCHLORIDE 60 MG/1
60 CAPSULE, DELAYED RELEASE ORAL DAILY
Qty: 90 CAPSULE | Refills: 1 | Status: SHIPPED | OUTPATIENT
Start: 2020-03-10 | End: 2021-03-17 | Stop reason: SDUPTHER

## 2020-03-23 ENCOUNTER — OFFICE VISIT (OUTPATIENT)
Dept: FAMILY MEDICINE CLINIC | Facility: CLINIC | Age: 30
End: 2020-03-23

## 2020-03-23 VITALS
SYSTOLIC BLOOD PRESSURE: 123 MMHG | TEMPERATURE: 98.4 F | HEART RATE: 102 BPM | OXYGEN SATURATION: 98 % | WEIGHT: 195 LBS | HEIGHT: 63 IN | DIASTOLIC BLOOD PRESSURE: 70 MMHG | BODY MASS INDEX: 34.55 KG/M2

## 2020-03-23 DIAGNOSIS — N39.0 URINARY TRACT INFECTION WITHOUT HEMATURIA, SITE UNSPECIFIED: Primary | ICD-10-CM

## 2020-03-23 LAB
BILIRUB BLD-MCNC: NEGATIVE MG/DL
CLARITY, POC: CLEAR
COLOR UR: YELLOW
GLUCOSE UR STRIP-MCNC: NEGATIVE MG/DL
KETONES UR QL: NEGATIVE
LEUKOCYTE EST, POC: ABNORMAL
NITRITE UR-MCNC: POSITIVE MG/ML
PH UR: 6.5 [PH] (ref 5–8)
PROT UR STRIP-MCNC: ABNORMAL MG/DL
RBC # UR STRIP: NEGATIVE /UL
SP GR UR: 1.01 (ref 1–1.03)
UROBILINOGEN UR QL: NORMAL

## 2020-03-23 PROCEDURE — 99213 OFFICE O/P EST LOW 20 MIN: CPT | Performed by: FAMILY MEDICINE

## 2020-03-23 PROCEDURE — 87186 SC STD MICRODIL/AGAR DIL: CPT | Performed by: FAMILY MEDICINE

## 2020-03-23 PROCEDURE — 87077 CULTURE AEROBIC IDENTIFY: CPT | Performed by: FAMILY MEDICINE

## 2020-03-23 PROCEDURE — 81003 URINALYSIS AUTO W/O SCOPE: CPT | Performed by: FAMILY MEDICINE

## 2020-03-23 PROCEDURE — 87086 URINE CULTURE/COLONY COUNT: CPT | Performed by: FAMILY MEDICINE

## 2020-03-23 RX ORDER — SULFAMETHOXAZOLE AND TRIMETHOPRIM 800; 160 MG/1; MG/1
1 TABLET ORAL 2 TIMES DAILY
Qty: 6 TABLET | Refills: 0 | Status: SHIPPED | OUTPATIENT
Start: 2020-03-23 | End: 2020-03-31

## 2020-03-25 ENCOUNTER — TELEPHONE (OUTPATIENT)
Dept: FAMILY MEDICINE CLINIC | Facility: CLINIC | Age: 30
End: 2020-03-25

## 2020-03-25 LAB
BACTERIA SPEC AEROBE CULT: ABNORMAL
BACTERIA SPEC AEROBE CULT: ABNORMAL

## 2020-03-25 NOTE — TELEPHONE ENCOUNTER
----- Message from Suzanne Jackson MD sent at 3/25/2020  9:07 AM EDT -----  Please call Cheryl and let her know that she did have an UTI and it grew out two bacterias - E. Coli and Klebsiella. The bactrim I gave her should have taken care of both. Is she feeling better? Thanks.      Spoke with patient she is feeling much better.

## 2020-03-30 NOTE — PROGRESS NOTES
"Cheryl Mcnulty     VITALS: Blood pressure 131/82, pulse 81, temperature 98.4 °F (36.9 °C), temperature source Oral, height 160 cm (62.99\"), weight 89.8 kg (198 lb), SpO2 100 %, not currently breastfeeding.    Subjective  Chief Complaint:   Chief Complaint   Patient presents with   • Hypertension        History of Present Illness:  Patient is a 30 y.o.  female with a medical history significant for pulmonary valvar stenosis, hypertension, and GERD who presents to clinic secondary to medical followup.  No new or acute concerns.  Patient is doing well.  She has not been on her medications.  She would like to get back on her medications.    Patient has hypertension and used to be on atenolol 25 mg orally daily without any side effects.  She denies any current dizziness, blurry vision, shortness of breath, chest pain, or edema.  She does not check her blood pressures at home.  She tries to follow a low-salt diet.    Patient has depression and used to be on Cymbalta 60 mg orally daily.  She has failed brexpiprazole in the past.  She would like to go back on the Cymbalta.  It did not give her any side effects.  It helped her mood.  Right now, her sleep and her appetite are affected.  She has a hard time getting out of bed to do daily activities.  She is in increased pain.  She finds it hard to make goals.  Positive anhedonia.  No guilt.  No crying.  Decreased memory and concentration ability.  She does not have any energy.  She denies any auditory or visual hallucinations.  She denies any suicidal or homicidal ideations.    Patient has chronic joint pain.  She sees Dr. Brooks.  She states that she has been diagnosed with fibromyalgia and osteoarthritis.  She is currently on Mobic 15 mg orally daily.  She was previously on Topamax, prednisone, and Zanaflex.  She states that she saw Dr. Brooks recently.  She did not want to be put back on meds.  Most recent x-ray of the wrist in July 2016 shows mild arthritis.    No complaints " about any of the medications.    The following portions of the patient's history were reviewed and updated as appropriate: allergies, current medications, past family history, past medical history, past social history, past surgical history and problem list.    Past Medical History  Past Medical History:   Diagnosis Date   • Congestive heart failure (CMS/HCC)    • Fibromyalgia    • GERD (gastroesophageal reflux disease)    • History of ear infections    • History of heart disease    • History of lupus (CMS/HCC)    • History of migraine    • Hypertension    • Lown Ganong Parker syndrome    • Murmur    • Osteoarthritis    • Pulmonary stenosis        Review of Systems   Respiratory: Negative for shortness of breath and wheezing.    Cardiovascular: Negative for chest pain and palpitations.       Surgical History  Past Surgical History:   Procedure Laterality Date   •  SECTION     • DIAGNOSTIC LAPAROSCOPY Right 2018    Procedure: LAPAROSCOPIC EXTENSIVE LYSIS OF AHESIONS. DRAINAGE OF OVARIAN CYST.;  Surgeon: Rachel Caruso DO;  Location: [facility];  Service: Obstetrics/Gynecology   • ORIF SCAPHOID W VASCULARIZED BONE GRAFT     • TUBAL ABDOMINAL LIGATION     • TUBAL COAGULATION LAPAROSCOPIC Bilateral 2018    Procedure: TUBAL FALLOPE FILSHIE CLIPPING LAPAROSCOPIC;  Surgeon: Rachel Caruso DO;  Location: [facility];  Service: Obstetrics/Gynecology       Family History  Family History   Problem Relation Age of Onset   • Heart disease Mother    • Thyroid disease Mother    • Anxiety disorder Mother    • Depression Mother    • Bipolar disorder Mother    • Heart disease Father    • Cancer Father    • Heart disease Maternal Grandmother    • Diabetes Maternal Grandmother    • Diabetes Maternal Grandfather    • Lupus Paternal Grandmother    • Rheum arthritis Paternal Grandmother        Social History  Social History     Socioeconomic History   • Marital status: Single     Spouse name: Not on  file   • Number of children: Not on file   • Years of education: Not on file   • Highest education level: Not on file   Tobacco Use   • Smoking status: Former Smoker     Packs/day: 0.00     Types: Electronic Cigarette   • Smokeless tobacco: Never Used   Substance and Sexual Activity   • Alcohol use: Yes     Comment: socially   • Drug use: No   • Sexual activity: Yes     Partners: Male     Birth control/protection: Surgical       Objective  Physical Exam    Gen: Patient in NAD. Pleasant and answers appropriately. A&Ox3.    Skin: Warm and dry with normal turgor. No purpura, rashes, or unusual pigmentation noted. Hair is normal in appearance and distribution.    HEENT: NC/AT. No lesions noted. Conjunctiva clear, sclera nonicteric. PERRL. EOMI without nystagmus or strabismus. Fundi appear benign. No hemorrhages or exudates of eyes. Auditory canals are patent bilaterally without lesions. TMs intact,  nonerythematous, nonbulging without lesions. Nasal mucosa pink, nonerythematous, and nonedematous. Frontal and maxillary sinuses are nontender. O/P nonerythematous and moist without exudate.    Neck: Supple without lymph nodes palpated. FROM.     Lungs: CTA B/L without rales, rhonchi, crackles, or wheezes.    Heart: RRR. S1 and S2 normal. No S3 or S4. No MRGT.    Abd: Soft, nontender,nondistended. (+)BSx4 quadrants.     Extrem: No CCE. Radial pulses 2+/4 and equal B/L. FROMx4. No bone, joint, or muscle tenderness noted.    Neuro: No focal motor/sensory deficits.    Procedures    Assessment/Plan  Cheryl Mcnulty is a 30 y.o. here for medical followup.  Diagnoses and all orders for this visit:    Essential (primary) hypertension  -     atenolol (TENORMIN) 25 MG tablet; Take 1 tablet by mouth Daily.  We will start patient back on atenolol 25 mg orally daily.  Continue to monitor.    Current mild episode of major depressive disorder without prior episode (CMS/HCC)  -     DULoxetine (CYMBALTA) 60 MG capsule; Take 1 capsule by  mouth Daily.  Restart Cymbalta 60 mg orally daily.    Arthralgia, unspecified joint  -     DULoxetine (CYMBALTA) 60 MG capsule; Take 1 capsule by mouth Daily.  Continue on Mobic 15 mg orally daily and Cymbalta 60 mg orally daily.  Will need to get old rheumatology notes to compare.    Patient's Body mass index is 35.08 kg/m². BMI is above normal parameters. Recommendations include: exercise counseling and nutrition counseling.      Findings and plans discussed with patient who verbalizes understanding and agreement. Will followup with patient once results are in. Patient to followup at clinic PRN or in three months for further medical followup.    Suzanne Jackson MD    EMR Dragon/Transcription Disclaimer:  Much of this encounter note is an electronic transcription/translation of spoken language to printed text.  The electronic translation of spoken language may permit erroneous, or at times, nonsensical words or phrases to be inadvertently transcribed.  Although I have reviewed the note for such errors, some may still exist.

## 2020-03-31 ENCOUNTER — OFFICE VISIT (OUTPATIENT)
Dept: FAMILY MEDICINE CLINIC | Facility: CLINIC | Age: 30
End: 2020-03-31

## 2020-03-31 VITALS
OXYGEN SATURATION: 99 % | HEIGHT: 63 IN | WEIGHT: 198.6 LBS | BODY MASS INDEX: 35.19 KG/M2 | TEMPERATURE: 98 F | DIASTOLIC BLOOD PRESSURE: 72 MMHG | SYSTOLIC BLOOD PRESSURE: 126 MMHG | HEART RATE: 91 BPM

## 2020-03-31 DIAGNOSIS — M54.50 CHRONIC RIGHT-SIDED LOW BACK PAIN WITHOUT SCIATICA: ICD-10-CM

## 2020-03-31 DIAGNOSIS — G89.29 CHRONIC RIGHT-SIDED LOW BACK PAIN WITHOUT SCIATICA: ICD-10-CM

## 2020-03-31 DIAGNOSIS — R30.0 DYSURIA: Primary | ICD-10-CM

## 2020-03-31 LAB
BILIRUB BLD-MCNC: NEGATIVE MG/DL
CLARITY, POC: CLEAR
COLOR UR: YELLOW
GLUCOSE UR STRIP-MCNC: NEGATIVE MG/DL
KETONES UR QL: NEGATIVE
LEUKOCYTE EST, POC: NEGATIVE
NITRITE UR-MCNC: NEGATIVE MG/ML
PH UR: 6.5 [PH] (ref 5–8)
PROT UR STRIP-MCNC: NEGATIVE MG/DL
RBC # UR STRIP: NEGATIVE /UL
SP GR UR: 1.02 (ref 1–1.03)
UROBILINOGEN UR QL: NORMAL

## 2020-03-31 PROCEDURE — 99214 OFFICE O/P EST MOD 30 MIN: CPT | Performed by: FAMILY MEDICINE

## 2020-03-31 PROCEDURE — 81003 URINALYSIS AUTO W/O SCOPE: CPT | Performed by: FAMILY MEDICINE

## 2020-04-07 ENCOUNTER — TELEPHONE (OUTPATIENT)
Dept: FAMILY MEDICINE CLINIC | Facility: CLINIC | Age: 30
End: 2020-04-07

## 2020-04-07 LAB
A VAGINAE DNA VAG QL NAA+PROBE: NORMAL SCORE
BVAB2 DNA VAG QL NAA+PROBE: NORMAL SCORE
C ALBICANS DNA VAG QL NAA+PROBE: NEGATIVE
C GLABRATA DNA VAG QL NAA+PROBE: NEGATIVE
MEGA1 DNA VAG QL NAA+PROBE: NORMAL SCORE
T VAGINALIS DNA VAG QL NAA+PROBE: NEGATIVE

## 2020-04-07 NOTE — TELEPHONE ENCOUNTER
----- Message from Suzanne Jackson MD sent at 4/7/2020  8:33 AM EDT -----  Please call Cheryl. Swabs are negative. Is she still having her urinary symptoms? Lots of water. Also could try cranberry juice without the sugar.      Spoke with patient,she is asymptomatic,verbalized understanding.

## 2020-04-13 NOTE — PROGRESS NOTES
"Cheryl Mcnulty     VITALS: Blood pressure 123/70, pulse 102, temperature 98.4 °F (36.9 °C), temperature source Oral, height 160 cm (62.99\"), weight 88.5 kg (195 lb), SpO2 98 %, not currently breastfeeding.    Subjective  Chief Complaint:   Chief Complaint   Patient presents with   • Back Pain     Right    • Urinary Frequency        History of Present Illness:  Patient is a 30 y.o.  female with a medical history significant for depression and anxiety who presents to clinic secondary to an acute concern.  She is having a 1 day history of right lower back pain along with urinary frequency.  She denies any fevers or chills.  She denies any hematuria, dysuria, or urinary retention.    No complaints about any of the medications.    The following portions of the patient's history were reviewed and updated as appropriate: allergies, current medications, past family history, past medical history, past social history, past surgical history and problem list.    Past Medical History  Past Medical History:   Diagnosis Date   • Congestive heart failure (CMS/[facility])    • Fibromyalgia    • GERD (gastroesophageal reflux disease)    • History of ear infections    • History of heart disease    • History of lupus (CMS/HCC)    • History of migraine    • Hypertension    • Lown Ganong Parker syndrome    • Murmur    • Osteoarthritis    • Pulmonary stenosis        Review of Systems   Respiratory: Negative for shortness of breath and wheezing.    Cardiovascular: Negative for chest pain and palpitations.       Surgical History  Past Surgical History:   Procedure Laterality Date   •  SECTION     • DIAGNOSTIC LAPAROSCOPY Right 2018    Procedure: LAPAROSCOPIC EXTENSIVE LYSIS OF AHESIONS. DRAINAGE OF OVARIAN CYST.;  Surgeon: Rachel Caruso DO;  Location: [facility];  Service: Obstetrics/Gynecology   • ORIF SCAPHOID W VASCULARIZED BONE GRAFT     • TUBAL ABDOMINAL LIGATION     • TUBAL COAGULATION LAPAROSCOPIC Bilateral 2018 "    Procedure: TUBAL FALLOPE FILSHIE CLIPPING LAPAROSCOPIC;  Surgeon: Rachel Caruso DO;  Location: [facility] OR;  Service: Obstetrics/Gynecology       Family History  Family History   Problem Relation Age of Onset   • Heart disease Mother    • Thyroid disease Mother    • Anxiety disorder Mother    • Depression Mother    • Bipolar disorder Mother    • Heart disease Father    • Cancer Father    • Heart disease Maternal Grandmother    • Diabetes Maternal Grandmother    • Diabetes Maternal Grandfather    • Lupus Paternal Grandmother    • Rheum arthritis Paternal Grandmother        Social History  Social History     Socioeconomic History   • Marital status: Single     Spouse name: Not on file   • Number of children: Not on file   • Years of education: Not on file   • Highest education level: Not on file   Tobacco Use   • Smoking status: Former Smoker     Packs/day: 0.00     Types: Electronic Cigarette   • Smokeless tobacco: Never Used   Substance and Sexual Activity   • Alcohol use: Yes     Comment: socially   • Drug use: No   • Sexual activity: Yes     Partners: Male     Birth control/protection: Surgical       Objective  Physical Exam    Gen: Patient in NAD. Pleasant and answers appropriately. A&Ox3.    Skin: Warm and dry with normal turgor. No purpura, rashes, or unusual pigmentation noted. Hair is normal in appearance and distribution.    HEENT: NC/AT. No lesions noted. Conjunctiva clear, sclera nonicteric. PERRL. EOMI without nystagmus or strabismus. Fundi appear benign. No hemorrhages or exudates of eyes. Auditory canals are patent bilaterally without lesions. TMs intact,  nonerythematous, nonbulging without lesions. Nasal mucosa pink, nonerythematous, and nonedematous. Frontal and maxillary sinuses are nontender. O/P nonerythematous and moist without exudate.    Neck: Supple without lymph nodes palpated. FROM.     Lungs: CTA B/L without rales, rhonchi, crackles, or wheezes.    Heart: RRR. S1 and S2 normal.  No S3 or S4. No MRGT.    Abd: Soft, nontender,nondistended. (+)BSx4 quadrants. Positive right flank tenderness.    Extrem: No CCE. Radial pulses 2+/4 and equal B/L. FROMx4.      Neuro: No focal motor/sensory deficits.    Procedures    Assessment/Plan  Cheryl Mcnulty is a 30 y.o. here for an acute concern.  Diagnoses and all orders for this visit:    Urinary tract infection without hematuria, site unspecified  -     POCT urinalysis dipstick, automated  -     Urine Culture - Urine, Urine, Clean Catch  -     sulfamethoxazole-trimethoprim (Bactrim DS) 800-160 MG per tablet; Take 1 tablet by mouth 2 (Two) Times a Day.  Supportive care indicated, including increased fluids and rest. Patient to monitor. Patient to call if symptoms continue or worsen.       Patient's Body mass index is 34.55 kg/m². BMI is above normal parameters. Recommendations include: exercise counseling and nutrition counseling.     Findings and plans discussed with patient who verbalizes understanding and agreement. Will followup with patient once results are in. Patient to followup at clinic PRN or in three months for further medical followup.    Suzanne Jackson MD    EMR Dragon/Transcription Disclaimer:  Much of this encounter note is an electronic transcription/translation of spoken language to printed text.  The electronic translation of spoken language may permit erroneous, or at times, nonsensical words or phrases to be inadvertently transcribed.  Although I have reviewed the note for such errors, some may still exist.

## 2020-04-20 NOTE — PROGRESS NOTES
"Cheryl Mcnulty     VITALS: Blood pressure 126/72, pulse 91, temperature 98 °F (36.7 °C), temperature source Oral, height 160 cm (62.99\"), weight 90.1 kg (198 lb 9.6 oz), SpO2 99 %, not currently breastfeeding.    Subjective  Chief Complaint:   Chief Complaint   Patient presents with   • Dysuria        History of Present Illness:  Patient is a 30 y.o.  female with a medical history significant for hypertension who presents to clinic secondary to an acute concern.  Patient continues to have dysuria status post treatment of an UTI.  She complains of burning in her genitals.  She denies any hematuria, urinary frequency, or urinary retention.  She denies any vaginal discharge, vaginal odor, or vaginal bleeding.  She denies any changes in sexual partners.  She denies any changes in underwear or soaps.  Patient also complains of pain over the right side of her lower back.  She denies any new trauma or injury.  She states that the pain is a dull, throbbing ache that started approximately 2 or 3 weeks ago.  It does not radiate to her legs.  She denies any numbness or tingling in her lower extremities.  She denies any saddle anesthesia.  She denies any urinary incontinence or changes in bowel movements.  She has not tried anything for the pain.  She does have a history of joint pain and possible fibromyalgia.    No complaints about any of the medications.    The following portions of the patient's history were reviewed and updated as appropriate: allergies, current medications, past family history, past medical history, past social history, past surgical history and problem list.    Past Medical History  Past Medical History:   Diagnosis Date   • Congestive heart failure (CMS/[facility])    • Fibromyalgia    • GERD (gastroesophageal reflux disease)    • History of ear infections    • History of heart disease    • History of lupus (CMS/HCC)    • History of migraine    • Hypertension    • Lown Ganong Parker syndrome    • Murmur    • " Osteoarthritis    • Pulmonary stenosis        Review of Systems   Respiratory: Negative for shortness of breath and wheezing.    Cardiovascular: Negative for chest pain and palpitations.       Surgical History  Past Surgical History:   Procedure Laterality Date   •  SECTION     • DIAGNOSTIC LAPAROSCOPY Right 2018    Procedure: LAPAROSCOPIC EXTENSIVE LYSIS OF AHESIONS. DRAINAGE OF OVARIAN CYST.;  Surgeon: Rachel Caruso DO;  Location: [facility] OR;  Service: Obstetrics/Gynecology   • ORIF SCAPHOID W VASCULARIZED BONE GRAFT     • TUBAL ABDOMINAL LIGATION     • TUBAL COAGULATION LAPAROSCOPIC Bilateral 2018    Procedure: TUBAL FALLOPE FILSHIE CLIPPING LAPAROSCOPIC;  Surgeon: Rachel Caruso DO;  Location: [facility] OR;  Service: Obstetrics/Gynecology       Family History  Family History   Problem Relation Age of Onset   • Heart disease Mother    • Thyroid disease Mother    • Anxiety disorder Mother    • Depression Mother    • Bipolar disorder Mother    • Heart disease Father    • Cancer Father    • Heart disease Maternal Grandmother    • Diabetes Maternal Grandmother    • Diabetes Maternal Grandfather    • Lupus Paternal Grandmother    • Rheum arthritis Paternal Grandmother        Social History  Social History     Socioeconomic History   • Marital status: Single     Spouse name: Not on file   • Number of children: Not on file   • Years of education: Not on file   • Highest education level: Not on file   Tobacco Use   • Smoking status: Former Smoker     Packs/day: 0.00     Types: Electronic Cigarette   • Smokeless tobacco: Never Used   Substance and Sexual Activity   • Alcohol use: Yes     Comment: socially   • Drug use: No   • Sexual activity: Yes     Partners: Male     Birth control/protection: Surgical       Objective  Physical Exam    Gen: Patient in NAD. Pleasant and answers appropriately. A&Ox3.    Skin: Warm and dry with normal turgor. No purpura, rashes, or unusual pigmentation  noted. Hair is normal in appearance and distribution.    HEENT: NC/AT. No lesions noted. Conjunctiva clear, sclera nonicteric. PERRL. O/P nonerythematous and moist without exudate.    Neck: Supple without lymph nodes palpated. FROM.     Lungs: CTA B/L without rales, rhonchi, crackles, or wheezes.    Heart: RRR. S1 and S2 normal. No S3 or S4. No MRGT.    Abd: Soft, nontender,nondistended. (+)BSx4 quadrants.     Back: Tenderness to palpation over right lower back at L2-L4.  Range of motion within normal limits.  No flank tenderness bilaterally.  Straight leg raise testing benign bilaterally.    Extrem: No CCE. Radial pulses 2+/4 and equal B/L. FROMx4.     Neuro: No focal motor/sensory deficits.    Procedures    Assessment/Plan  Cheryl Mcnulty is a 30 y.o. here for an acute concern.  Diagnoses and all orders for this visit:    Dysuria  -     POCT urinalysis dipstick, automated  -     NuSwab Vaginitis (VG) - Swab, Vagina  Urinalysis benign.  Will test Nuswab.  In the meantime, patient to increase fluids.    Chronic right-sided low back pain without sciatica  -     POCT urinalysis dipstick, automated  -     NuSwab Vaginitis (VG) - Swab, Vagina  Most likely secondary to tight muscles.  Patient to do stretching exercises at home.  Home exercises given to patient.  Patient declines physical therapy at this point.    Patient's Body mass index is 35.19 kg/m². BMI is above normal parameters. Recommendations include: exercise counseling and nutrition counseling.        Findings and plans discussed with patient who verbalizes understanding and agreement. Will followup with patient once results are in. Patient to followup at clinic PRN or in three months for further medical followup.    Suzanne Jackson MD    EMR Dragon/Transcription Disclaimer:  Much of this encounter note is an electronic transcription/translation of spoken language to printed text.  The electronic translation of spoken language may permit erroneous, or at  times, nonsensical words or phrases to be inadvertently transcribed.  Although I have reviewed the note for such errors, some may still exist.

## 2020-05-08 ENCOUNTER — HOSPITAL ENCOUNTER (EMERGENCY)
Facility: HOSPITAL | Age: 30
Discharge: HOME | End: 2020-05-08
Attending: EMERGENCY MEDICINE | Admitting: EMERGENCY MEDICINE

## 2020-05-08 ENCOUNTER — APPOINTMENT (OUTPATIENT)
Dept: CT IMAGING | Facility: HOSPITAL | Age: 30
End: 2020-05-08

## 2020-05-08 VITALS
SYSTOLIC BLOOD PRESSURE: 123 MMHG | HEART RATE: 102 BPM | HEIGHT: 63 IN | DIASTOLIC BLOOD PRESSURE: 80 MMHG | TEMPERATURE: 98.9 F | WEIGHT: 190 LBS | RESPIRATION RATE: 18 BRPM | BODY MASS INDEX: 33.66 KG/M2 | OXYGEN SATURATION: 99 %

## 2020-05-08 DIAGNOSIS — N12 PYELONEPHRITIS: Primary | ICD-10-CM

## 2020-05-08 LAB
ALBUMIN SERPL-MCNC: 4.51 G/DL (ref 3.5–5.2)
ALBUMIN/GLOB SERPL: 1.6 G/DL
ALP SERPL-CCNC: 115 U/L (ref 39–117)
ALT SERPL W P-5'-P-CCNC: 10 U/L (ref 1–33)
ANION GAP SERPL CALCULATED.3IONS-SCNC: 18.4 MMOL/L (ref 5–15)
AST SERPL-CCNC: 16 U/L (ref 1–32)
BACTERIA UR QL AUTO: ABNORMAL /HPF
BASOPHILS # BLD AUTO: 0.05 10*3/MM3 (ref 0–0.2)
BASOPHILS NFR BLD AUTO: 0.3 % (ref 0–1.5)
BILIRUB SERPL-MCNC: 1.2 MG/DL (ref 0.2–1.2)
BILIRUB UR QL STRIP: NEGATIVE
BUN BLD-MCNC: 9 MG/DL (ref 6–20)
BUN/CREAT SERPL: 11.4 (ref 7–25)
CALCIUM SPEC-SCNC: 9.1 MG/DL (ref 8.6–10.5)
CHLORIDE SERPL-SCNC: 98 MMOL/L (ref 98–107)
CLARITY UR: ABNORMAL
CO2 SERPL-SCNC: 19.6 MMOL/L (ref 22–29)
COLOR UR: YELLOW
CREAT BLD-MCNC: 0.79 MG/DL (ref 0.57–1)
D-LACTATE SERPL-SCNC: 1.6 MMOL/L (ref 0.5–2)
DEPRECATED RDW RBC AUTO: 38.8 FL (ref 37–54)
EOSINOPHIL # BLD AUTO: 0.02 10*3/MM3 (ref 0–0.4)
EOSINOPHIL NFR BLD AUTO: 0.1 % (ref 0.3–6.2)
ERYTHROCYTE [DISTWIDTH] IN BLOOD BY AUTOMATED COUNT: 12.8 % (ref 12.3–15.4)
GFR SERPL CREATININE-BSD FRML MDRD: 85 ML/MIN/1.73
GLOBULIN UR ELPH-MCNC: 2.8 GM/DL
GLUCOSE BLD-MCNC: 110 MG/DL (ref 65–99)
GLUCOSE UR STRIP-MCNC: NEGATIVE MG/DL
HCT VFR BLD AUTO: 40.4 % (ref 34–46.6)
HGB BLD-MCNC: 13.3 G/DL (ref 12–15.9)
HGB UR QL STRIP.AUTO: ABNORMAL
HYALINE CASTS UR QL AUTO: ABNORMAL /LPF
IMM GRANULOCYTES # BLD AUTO: 0.12 10*3/MM3 (ref 0–0.05)
IMM GRANULOCYTES NFR BLD AUTO: 0.7 % (ref 0–0.5)
KETONES UR QL STRIP: NEGATIVE
LEUKOCYTE ESTERASE UR QL STRIP.AUTO: ABNORMAL
LYMPHOCYTES # BLD AUTO: 0.7 10*3/MM3 (ref 0.7–3.1)
LYMPHOCYTES NFR BLD AUTO: 4 % (ref 19.6–45.3)
MCH RBC QN AUTO: 27.5 PG (ref 26.6–33)
MCHC RBC AUTO-ENTMCNC: 32.9 G/DL (ref 31.5–35.7)
MCV RBC AUTO: 83.6 FL (ref 79–97)
MONOCYTES # BLD AUTO: 0.84 10*3/MM3 (ref 0.1–0.9)
MONOCYTES NFR BLD AUTO: 4.8 % (ref 5–12)
NEUTROPHILS # BLD AUTO: 15.64 10*3/MM3 (ref 1.7–7)
NEUTROPHILS NFR BLD AUTO: 90.1 % (ref 42.7–76)
NITRITE UR QL STRIP: POSITIVE
NRBC BLD AUTO-RTO: 0 /100 WBC (ref 0–0.2)
PH UR STRIP.AUTO: 7.5 [PH] (ref 5–8)
PLATELET # BLD AUTO: 213 10*3/MM3 (ref 140–450)
PMV BLD AUTO: 11.2 FL (ref 6–12)
POTASSIUM BLD-SCNC: 3.6 MMOL/L (ref 3.5–5.2)
PROT SERPL-MCNC: 7.3 G/DL (ref 6–8.5)
PROT UR QL STRIP: ABNORMAL
RBC # BLD AUTO: 4.83 10*6/MM3 (ref 3.77–5.28)
RBC # UR: ABNORMAL /HPF
REF LAB TEST METHOD: ABNORMAL
SODIUM BLD-SCNC: 136 MMOL/L (ref 136–145)
SP GR UR STRIP: 1.02 (ref 1–1.03)
SQUAMOUS #/AREA URNS HPF: ABNORMAL /HPF
UROBILINOGEN UR QL STRIP: ABNORMAL
WBC NRBC COR # BLD: 17.37 10*3/MM3 (ref 3.4–10.8)
WBC UR QL AUTO: ABNORMAL /HPF

## 2020-05-08 PROCEDURE — 87086 URINE CULTURE/COLONY COUNT: CPT | Performed by: EMERGENCY MEDICINE

## 2020-05-08 PROCEDURE — 80053 COMPREHEN METABOLIC PANEL: CPT | Performed by: EMERGENCY MEDICINE

## 2020-05-08 PROCEDURE — 87040 BLOOD CULTURE FOR BACTERIA: CPT | Performed by: EMERGENCY MEDICINE

## 2020-05-08 PROCEDURE — 81001 URINALYSIS AUTO W/SCOPE: CPT | Performed by: EMERGENCY MEDICINE

## 2020-05-08 PROCEDURE — 25010000002 MORPHINE PER 10 MG: Performed by: EMERGENCY MEDICINE

## 2020-05-08 PROCEDURE — 99285 EMERGENCY DEPT VISIT HI MDM: CPT

## 2020-05-08 PROCEDURE — 96365 THER/PROPH/DIAG IV INF INIT: CPT

## 2020-05-08 PROCEDURE — 74176 CT ABD & PELVIS W/O CONTRAST: CPT | Performed by: RADIOLOGY

## 2020-05-08 PROCEDURE — 85025 COMPLETE CBC W/AUTO DIFF WBC: CPT | Performed by: EMERGENCY MEDICINE

## 2020-05-08 PROCEDURE — 25010000002 LEVOFLOXACIN PER 250 MG: Performed by: EMERGENCY MEDICINE

## 2020-05-08 PROCEDURE — 87088 URINE BACTERIA CULTURE: CPT | Performed by: EMERGENCY MEDICINE

## 2020-05-08 PROCEDURE — 25010000002 ONDANSETRON PER 1 MG: Performed by: EMERGENCY MEDICINE

## 2020-05-08 PROCEDURE — 74176 CT ABD & PELVIS W/O CONTRAST: CPT

## 2020-05-08 PROCEDURE — 83605 ASSAY OF LACTIC ACID: CPT | Performed by: EMERGENCY MEDICINE

## 2020-05-08 PROCEDURE — 87186 SC STD MICRODIL/AGAR DIL: CPT | Performed by: EMERGENCY MEDICINE

## 2020-05-08 PROCEDURE — 36415 COLL VENOUS BLD VENIPUNCTURE: CPT

## 2020-05-08 PROCEDURE — 25010000002 KETOROLAC TROMETHAMINE PER 15 MG: Performed by: EMERGENCY MEDICINE

## 2020-05-08 PROCEDURE — 96375 TX/PRO/DX INJ NEW DRUG ADDON: CPT

## 2020-05-08 RX ORDER — ONDANSETRON 2 MG/ML
4 INJECTION INTRAMUSCULAR; INTRAVENOUS ONCE
Status: COMPLETED | OUTPATIENT
Start: 2020-05-08 | End: 2020-05-08

## 2020-05-08 RX ORDER — LEVOFLOXACIN 750 MG/1
750 TABLET ORAL DAILY
Qty: 7 TABLET | Refills: 0 | Status: SHIPPED | OUTPATIENT
Start: 2020-05-08 | End: 2021-03-17 | Stop reason: SDUPTHER

## 2020-05-08 RX ORDER — KETOROLAC TROMETHAMINE 30 MG/ML
30 INJECTION, SOLUTION INTRAMUSCULAR; INTRAVENOUS ONCE
Status: COMPLETED | OUTPATIENT
Start: 2020-05-08 | End: 2020-05-08

## 2020-05-08 RX ORDER — SODIUM CHLORIDE 0.9 % (FLUSH) 0.9 %
10 SYRINGE (ML) INJECTION AS NEEDED
Status: DISCONTINUED | OUTPATIENT
Start: 2020-05-08 | End: 2020-05-08 | Stop reason: HOSPADM

## 2020-05-08 RX ORDER — CYCLOBENZAPRINE HCL 10 MG
10 TABLET ORAL AS NEEDED
COMMUNITY
End: 2020-05-12

## 2020-05-08 RX ORDER — LEVOFLOXACIN 5 MG/ML
750 INJECTION, SOLUTION INTRAVENOUS ONCE
Status: COMPLETED | OUTPATIENT
Start: 2020-05-08 | End: 2020-05-08

## 2020-05-08 RX ADMIN — ONDANSETRON HYDROCHLORIDE 4 MG: 2 SOLUTION INTRAMUSCULAR; INTRAVENOUS at 11:59

## 2020-05-08 RX ADMIN — MORPHINE SULFATE 4 MG: 4 INJECTION, SOLUTION INTRAMUSCULAR; INTRAVENOUS at 12:01

## 2020-05-08 RX ADMIN — KETOROLAC TROMETHAMINE 30 MG: 30 INJECTION, SOLUTION INTRAMUSCULAR at 08:38

## 2020-05-08 RX ADMIN — LEVOFLOXACIN 750 MG: 750 INJECTION, SOLUTION INTRAVENOUS at 12:23

## 2020-05-08 NOTE — ED PROVIDER NOTES
Subjective   30-year-old white female complains of flank pain.  Patient complains of a 2-week history of right flank pain.  In recent days she has had more midline back pain radiating to the right leg.  She has a history of frequent UTIs and was on daily suppression therapy for a year in the past.  She has had subjective fever and chills.  She denies any dysuria, hematuria, nausea, vomiting, diarrhea or other complaints.  She denies cough, shortness of breath, travel, ill contacts.          Review of Systems   All other systems reviewed and are negative.      Past Medical History:   Diagnosis Date   • Congestive heart failure (CMS/HCC)    • Fibromyalgia    • GERD (gastroesophageal reflux disease)    • History of ear infections    • History of heart disease    • History of lupus (CMS/HCC)    • History of migraine    • Hypertension    • Lown Ganong Parker syndrome    • Murmur    • Osteoarthritis    • Pulmonary stenosis        No Known Allergies    Past Surgical History:   Procedure Laterality Date   •  SECTION     • DIAGNOSTIC LAPAROSCOPY Right 2018    Procedure: LAPAROSCOPIC EXTENSIVE LYSIS OF AHESIONS. DRAINAGE OF OVARIAN CYST.;  Surgeon: Rachel Caruso DO;  Location: [facility] OR;  Service: Obstetrics/Gynecology   • ORIF SCAPHOID W VASCULARIZED BONE GRAFT     • TUBAL ABDOMINAL LIGATION     • TUBAL COAGULATION LAPAROSCOPIC Bilateral 2018    Procedure: TUBAL FALLOPE FILSHIE CLIPPING LAPAROSCOPIC;  Surgeon: Rachel Caruso DO;  Location: [facility] OR;  Service: Obstetrics/Gynecology       Family History   Problem Relation Age of Onset   • Heart disease Mother    • Thyroid disease Mother    • Anxiety disorder Mother    • Depression Mother    • Bipolar disorder Mother    • Heart disease Father    • Cancer Father    • Heart disease Maternal Grandmother    • Diabetes Maternal Grandmother    • Diabetes Maternal Grandfather    • Lupus Paternal Grandmother    • Rheum arthritis Paternal  Grandmother        Social History     Socioeconomic History   • Marital status: Single     Spouse name: Not on file   • Number of children: Not on file   • Years of education: Not on file   • Highest education level: Not on file   Tobacco Use   • Smoking status: Former Smoker     Packs/day: 0.00     Types: Electronic Cigarette   • Smokeless tobacco: Never Used   Substance and Sexual Activity   • Alcohol use: Yes     Comment: socially   • Drug use: No   • Sexual activity: Yes     Partners: Male     Birth control/protection: Surgical           Objective   Physical Exam   Constitutional: She is oriented to person, place, and time. She appears well-developed and well-nourished.   HENT:   Head: Normocephalic and atraumatic.   Eyes: No scleral icterus.   Neck: Normal range of motion.   Cardiovascular: Normal rate, regular rhythm and normal heart sounds. Exam reveals no gallop and no friction rub.   No murmur heard.  Pulmonary/Chest: Effort normal and breath sounds normal. No respiratory distress. She has no wheezes. She has no rales.   Abdominal: Soft. Bowel sounds are normal. She exhibits no distension. There is no tenderness.   Musculoskeletal: Normal range of motion. She exhibits no edema.   Neurological: She is alert and oriented to person, place, and time.   Skin: Skin is warm and dry.   Psychiatric: She has a normal mood and affect.   Nursing note and vitals reviewed.      Procedures  Results for orders placed or performed during the hospital encounter of 05/08/20   Comprehensive Metabolic Panel   Result Value Ref Range    Glucose 110 (H) 65 - 99 mg/dL    BUN 9 6 - 20 mg/dL    Creatinine 0.79 0.57 - 1.00 mg/dL    Sodium 136 136 - 145 mmol/L    Potassium 3.6 3.5 - 5.2 mmol/L    Chloride 98 98 - 107 mmol/L    CO2 19.6 (L) 22.0 - 29.0 mmol/L    Calcium 9.1 8.6 - 10.5 mg/dL    Total Protein 7.3 6.0 - 8.5 g/dL    Albumin 4.51 3.50 - 5.20 g/dL    ALT (SGPT) 10 1 - 33 U/L    AST (SGOT) 16 1 - 32 U/L    Alkaline Phosphatase  115 39 - 117 U/L    Total Bilirubin 1.2 0.2 - 1.2 mg/dL    eGFR Non African Amer 85 >60 mL/min/1.73    Globulin 2.8 gm/dL    A/G Ratio 1.6 g/dL    BUN/Creatinine Ratio 11.4 7.0 - 25.0    Anion Gap 18.4 (H) 5.0 - 15.0 mmol/L   Urinalysis With Culture If Indicated - Urine, Clean Catch   Result Value Ref Range    Color, UA Yellow Yellow, Straw    Appearance, UA Cloudy (A) Clear    pH, UA 7.5 5.0 - 8.0    Specific Gravity, UA 1.023 1.005 - 1.030    Glucose, UA Negative Negative    Ketones, UA Negative Negative    Bilirubin, UA Negative Negative    Blood, UA Moderate (2+) (A) Negative    Protein, UA 30 mg/dL (1+) (A) Negative    Leuk Esterase, UA Large (3+) (A) Negative    Nitrite, UA Positive (A) Negative    Urobilinogen, UA 1.0 E.U./dL 0.2 - 1.0 E.U./dL   CBC Auto Differential   Result Value Ref Range    WBC 17.37 (H) 3.40 - 10.80 10*3/mm3    RBC 4.83 3.77 - 5.28 10*6/mm3    Hemoglobin 13.3 12.0 - 15.9 g/dL    Hematocrit 40.4 34.0 - 46.6 %    MCV 83.6 79.0 - 97.0 fL    MCH 27.5 26.6 - 33.0 pg    MCHC 32.9 31.5 - 35.7 g/dL    RDW 12.8 12.3 - 15.4 %    RDW-SD 38.8 37.0 - 54.0 fl    MPV 11.2 6.0 - 12.0 fL    Platelets 213 140 - 450 10*3/mm3    Neutrophil % 90.1 (H) 42.7 - 76.0 %    Lymphocyte % 4.0 (L) 19.6 - 45.3 %    Monocyte % 4.8 (L) 5.0 - 12.0 %    Eosinophil % 0.1 (L) 0.3 - 6.2 %    Basophil % 0.3 0.0 - 1.5 %    Immature Grans % 0.7 (H) 0.0 - 0.5 %    Neutrophils, Absolute 15.64 (H) 1.70 - 7.00 10*3/mm3    Lymphocytes, Absolute 0.70 0.70 - 3.10 10*3/mm3    Monocytes, Absolute 0.84 0.10 - 0.90 10*3/mm3    Eosinophils, Absolute 0.02 0.00 - 0.40 10*3/mm3    Basophils, Absolute 0.05 0.00 - 0.20 10*3/mm3    Immature Grans, Absolute 0.12 (H) 0.00 - 0.05 10*3/mm3    nRBC 0.0 0.0 - 0.2 /100 WBC   Urinalysis, Microscopic Only - Urine, Clean Catch   Result Value Ref Range    RBC, UA 31-50 (A) None Seen, 0-2 /HPF    WBC, UA Too Numerous to Count (A) None Seen, 0-2 /HPF    Bacteria, UA 3+ (A) None Seen /HPF    Squamous  Epithelial Cells, UA 13-20 (A) None Seen, 0-2 /HPF    Hyaline Casts, UA None Seen None Seen /LPF    Methodology Manual Light Microscopy    Lactic Acid, Plasma   Result Value Ref Range    Lactate 1.6 0.5 - 2.0 mmol/L     Ct Abdomen Pelvis Stone Protocol    Result Date: 5/8/2020  Narrative:  CT ABDOMEN PELVIS STONE PROTOCOL-  TECHNIQUE: Multiple axial CT images were obtained from lung bases through pubic symphysis without administration of IV contrast. Reformatted images in the coronal and/or sagittal plane(s) were generated from the axial data set to facilitate diagnostic accuracy and/or surgical planning. Oral Contrast:NONE.  Radiation dose reduction techniques were utilized per ALARA protocol. Automated exposure control was initiated through either or [employer] or [employer] software packages by  protocol.     Clinical information Flank pain, stone disease suspected  Comparison NONE.  Findings LOWER THORAX: Clear. No effusions.  ABDOMEN:     LIVER: Homogeneous. No focal hepatic mass or ductal dilatation.     GALLBLADDER: No dilation or stone identified.     PANCREAS: Unremarkable. No mass or ductal dilatation.     SPLEEN: Homogeneous. No splenomegaly.     ADRENALS: No mass.     KIDNEYS: STRANDING AROUND THE RIGHT KIDNEY AND THERE IS MILD HYDRONEPHROSIS WITH NO DEFINITE CALCULUS IDENTIFIED AND COULD REPRESENT SEQUELAE OF RECENTLY PASSED CALCULUS OR INFECTION.     GI TRACT: Non-dilated. No definite wall thickening.     PERITONEUM: No free air. No free fluid or loculated fluid collections.     MESENTERY: Unremarkable.     LYMPH NODES: No lymphadenopathy.     VASCULATURE: No evidence of aneurysm.     ABDOMINAL WALL: No focal hernia or mass.      OTHER: None.  PELVIS:     BLADDER: No focal mass or significant wall thickening     REPRODUCTIVE: Unremarkable as visualized.     APPENDIX: Nondistended. No surrounding inflammation.  BONES: No acute bony abnormality.      Impression: Impression: Stranding around the  right kidney and there is mild hydronephrosis with no definite calculus identified and could represent sequelae of recently passed calculus or infection.  This report was finalized on 5/8/2020 11:27 AM by Dr. Boyd Bailey MD.               ED Course                                           MDM  Number of Diagnoses or Management Options  Pyelonephritis:      Amount and/or Complexity of Data Reviewed  Clinical lab tests: reviewed  Tests in the radiology section of CPT®: reviewed    Risk of Complications, Morbidity, and/or Mortality  Presenting problems: high  Diagnostic procedures: high  Management options: high        Final diagnoses:   Pyelonephritis            Fady Reid MD  05/08/20 1246

## 2020-05-10 LAB — BACTERIA SPEC AEROBE CULT: ABNORMAL

## 2020-05-11 ENCOUNTER — EPISODE CHANGES (OUTPATIENT)
Dept: CASE MANAGEMENT | Facility: OTHER | Age: 30
End: 2020-05-11

## 2020-05-12 ENCOUNTER — HOSPITAL ENCOUNTER (OUTPATIENT)
Dept: CT IMAGING | Facility: HOSPITAL | Age: 30
Discharge: HOME | End: 2020-05-12
Admitting: FAMILY MEDICINE

## 2020-05-12 ENCOUNTER — OFFICE VISIT (OUTPATIENT)
Dept: FAMILY MEDICINE CLINIC | Facility: CLINIC | Age: 30
End: 2020-05-12

## 2020-05-12 VITALS
HEART RATE: 68 BPM | BODY MASS INDEX: 35.51 KG/M2 | SYSTOLIC BLOOD PRESSURE: 108 MMHG | WEIGHT: 193 LBS | DIASTOLIC BLOOD PRESSURE: 80 MMHG | TEMPERATURE: 97.3 F | OXYGEN SATURATION: 98 % | HEIGHT: 62 IN

## 2020-05-12 DIAGNOSIS — N10 ACUTE PYELONEPHRITIS: Primary | ICD-10-CM

## 2020-05-12 DIAGNOSIS — N10 ACUTE PYELONEPHRITIS: ICD-10-CM

## 2020-05-12 LAB
ALBUMIN SERPL-MCNC: 4.01 G/DL (ref 3.5–5.2)
ALBUMIN/GLOB SERPL: 1.4 G/DL
ALP SERPL-CCNC: 78 U/L (ref 39–117)
ALT SERPL W P-5'-P-CCNC: 8 U/L (ref 1–33)
ANION GAP SERPL CALCULATED.3IONS-SCNC: 13 MMOL/L (ref 5–15)
AST SERPL-CCNC: 8 U/L (ref 1–32)
B-HCG UR QL: NEGATIVE
BASOPHILS # BLD AUTO: 0.03 10*3/MM3 (ref 0–0.2)
BASOPHILS NFR BLD AUTO: 0.4 % (ref 0–1.5)
BILIRUB BLD-MCNC: NEGATIVE MG/DL
BILIRUB SERPL-MCNC: 0.3 MG/DL (ref 0.2–1.2)
BUN BLD-MCNC: 9 MG/DL (ref 6–20)
BUN/CREAT SERPL: 11.7 (ref 7–25)
CALCIUM SPEC-SCNC: 9.5 MG/DL (ref 8.6–10.5)
CHLORIDE SERPL-SCNC: 102 MMOL/L (ref 98–107)
CLARITY, POC: CLEAR
CO2 SERPL-SCNC: 26 MMOL/L (ref 22–29)
COLOR UR: ABNORMAL
CREAT BLD-MCNC: 0.77 MG/DL (ref 0.57–1)
DEPRECATED RDW RBC AUTO: 39.3 FL (ref 37–54)
EOSINOPHIL # BLD AUTO: 0.1 10*3/MM3 (ref 0–0.4)
EOSINOPHIL NFR BLD AUTO: 1.2 % (ref 0.3–6.2)
ERYTHROCYTE [DISTWIDTH] IN BLOOD BY AUTOMATED COUNT: 12.8 % (ref 12.3–15.4)
GFR SERPL CREATININE-BSD FRML MDRD: 88 ML/MIN/1.73
GLOBULIN UR ELPH-MCNC: 2.9 GM/DL
GLUCOSE BLD-MCNC: 92 MG/DL (ref 65–99)
GLUCOSE UR STRIP-MCNC: NEGATIVE MG/DL
HCT VFR BLD AUTO: 36.5 % (ref 34–46.6)
HGB BLD-MCNC: 11.8 G/DL (ref 12–15.9)
IMM GRANULOCYTES # BLD AUTO: 0.05 10*3/MM3 (ref 0–0.05)
IMM GRANULOCYTES NFR BLD AUTO: 0.6 % (ref 0–0.5)
INTERNAL NEGATIVE CONTROL: NEGATIVE
INTERNAL POSITIVE CONTROL: POSITIVE
KETONES UR QL: NEGATIVE
LEUKOCYTE EST, POC: NEGATIVE
LYMPHOCYTES # BLD AUTO: 1.95 10*3/MM3 (ref 0.7–3.1)
LYMPHOCYTES NFR BLD AUTO: 23.9 % (ref 19.6–45.3)
Lab: NORMAL
MCH RBC QN AUTO: 27.3 PG (ref 26.6–33)
MCHC RBC AUTO-ENTMCNC: 32.3 G/DL (ref 31.5–35.7)
MCV RBC AUTO: 84.3 FL (ref 79–97)
MONOCYTES # BLD AUTO: 0.87 10*3/MM3 (ref 0.1–0.9)
MONOCYTES NFR BLD AUTO: 10.7 % (ref 5–12)
NEUTROPHILS # BLD AUTO: 5.15 10*3/MM3 (ref 1.7–7)
NEUTROPHILS NFR BLD AUTO: 63.2 % (ref 42.7–76)
NITRITE UR-MCNC: NEGATIVE MG/ML
NRBC BLD AUTO-RTO: 0 /100 WBC (ref 0–0.2)
PH UR: 6 [PH] (ref 5–8)
PLATELET # BLD AUTO: 257 10*3/MM3 (ref 140–450)
PMV BLD AUTO: 10.9 FL (ref 6–12)
POTASSIUM BLD-SCNC: 5 MMOL/L (ref 3.5–5.2)
PROT SERPL-MCNC: 6.9 G/DL (ref 6–8.5)
PROT UR STRIP-MCNC: ABNORMAL MG/DL
RBC # BLD AUTO: 4.33 10*6/MM3 (ref 3.77–5.28)
RBC # UR STRIP: ABNORMAL /UL
SODIUM BLD-SCNC: 141 MMOL/L (ref 136–145)
SP GR UR: 1.02 (ref 1–1.03)
UROBILINOGEN UR QL: NORMAL
WBC NRBC COR # BLD: 8.15 10*3/MM3 (ref 3.4–10.8)

## 2020-05-12 PROCEDURE — 81025 URINE PREGNANCY TEST: CPT | Performed by: FAMILY MEDICINE

## 2020-05-12 PROCEDURE — 81003 URINALYSIS AUTO W/O SCOPE: CPT | Performed by: FAMILY MEDICINE

## 2020-05-12 PROCEDURE — 74176 CT ABD & PELVIS W/O CONTRAST: CPT | Performed by: RADIOLOGY

## 2020-05-12 PROCEDURE — 87086 URINE CULTURE/COLONY COUNT: CPT | Performed by: FAMILY MEDICINE

## 2020-05-12 PROCEDURE — 99213 OFFICE O/P EST LOW 20 MIN: CPT | Performed by: FAMILY MEDICINE

## 2020-05-12 PROCEDURE — 85025 COMPLETE CBC W/AUTO DIFF WBC: CPT | Performed by: FAMILY MEDICINE

## 2020-05-12 PROCEDURE — 80053 COMPREHEN METABOLIC PANEL: CPT | Performed by: FAMILY MEDICINE

## 2020-05-12 PROCEDURE — 74176 CT ABD & PELVIS W/O CONTRAST: CPT

## 2020-05-12 RX ORDER — HYDROCODONE BITARTRATE AND ACETAMINOPHEN 5; 325 MG/1; MG/1
1 TABLET ORAL 2 TIMES DAILY PRN
Qty: 6 TABLET | Refills: 0 | Status: SHIPPED | OUTPATIENT
Start: 2020-05-12 | End: 2021-03-17

## 2020-05-12 RX ORDER — TIZANIDINE 4 MG/1
4 TABLET ORAL NIGHTLY PRN
COMMUNITY
End: 2021-03-17 | Stop reason: SDUPTHER

## 2020-05-13 DIAGNOSIS — R71.0 DECREASED HEMOGLOBIN: ICD-10-CM

## 2020-05-13 DIAGNOSIS — D73.89 SPLENIC LESION: Primary | ICD-10-CM

## 2020-05-13 LAB
BACTERIA SPEC AEROBE CULT: NO GROWTH
BACTERIA SPEC AEROBE CULT: NORMAL
BACTERIA SPEC AEROBE CULT: NORMAL

## 2020-05-13 NOTE — PROGRESS NOTES
Patient notified and expressed understanding.  No fever noted.  She wants to move forward with the MRI.

## 2020-05-14 ENCOUNTER — EPISODE CHANGES (OUTPATIENT)
Dept: CASE MANAGEMENT | Facility: OTHER | Age: 30
End: 2020-05-14

## 2020-05-18 ENCOUNTER — TELEPHONE (OUTPATIENT)
Dept: FAMILY MEDICINE CLINIC | Facility: CLINIC | Age: 30
End: 2020-05-18

## 2020-05-18 NOTE — TELEPHONE ENCOUNTER
Cheryl called this morning stating if you didn't fill out her FMLA that she is going to lose her job.  Please advise.

## 2020-05-18 NOTE — TELEPHONE ENCOUNTER
Please reiterate that I am really sorry, but FMLA and excused absences have to be discussed previously before she takes off. She can't just take off when she feels like the situation is warranted. We had discussed this when she had taken off a weekend for her fibromyalgia last year. We can write her a letter saying that she was in the ER 5/8 and saw us again 5/12. I would write an excuse for those days or fill out paperwork for those days if that would help her.

## 2020-05-18 NOTE — TELEPHONE ENCOUNTER
Called patient to make her aware, no answer and voicemail box is full.     Attempted to reach patient again, no answer vm box is full.     Patient returned called and says it is the policy at work that she have FMLA for anything over 3 days She says a letter for those days she was in the ER and office will be okay.

## 2020-05-19 ENCOUNTER — HOSPITAL ENCOUNTER (OUTPATIENT)
Dept: MRI IMAGING | Facility: HOSPITAL | Age: 30
Discharge: HOME | End: 2020-05-19
Admitting: FAMILY MEDICINE

## 2020-05-19 ENCOUNTER — OFFICE VISIT (OUTPATIENT)
Dept: UROLOGY | Facility: CLINIC | Age: 30
End: 2020-05-19

## 2020-05-19 VITALS — WEIGHT: 199 LBS | HEIGHT: 62 IN | TEMPERATURE: 98.8 F | BODY MASS INDEX: 36.62 KG/M2

## 2020-05-19 DIAGNOSIS — D73.89 SPLENIC LESION: ICD-10-CM

## 2020-05-19 DIAGNOSIS — R71.0 DECREASED HEMOGLOBIN: ICD-10-CM

## 2020-05-19 DIAGNOSIS — N20.1 URETERAL CALCULUS: ICD-10-CM

## 2020-05-19 DIAGNOSIS — N39.0 URINARY TRACT INFECTION WITHOUT HEMATURIA, SITE UNSPECIFIED: Primary | ICD-10-CM

## 2020-05-19 LAB
BILIRUB BLD-MCNC: NEGATIVE MG/DL
CLARITY, POC: CLEAR
COLOR UR: YELLOW
GLUCOSE UR STRIP-MCNC: NEGATIVE MG/DL
KETONES UR QL: NEGATIVE
LEUKOCYTE EST, POC: NEGATIVE
NITRITE UR-MCNC: NEGATIVE MG/ML
PH UR: 7 [PH] (ref 5–8)
PROT UR STRIP-MCNC: NEGATIVE MG/DL
RBC # UR STRIP: NEGATIVE /UL
SP GR UR: 1.01 (ref 1–1.03)
UROBILINOGEN UR QL: NORMAL

## 2020-05-19 PROCEDURE — 81003 URINALYSIS AUTO W/O SCOPE: CPT | Performed by: UROLOGY

## 2020-05-19 PROCEDURE — 74183 MRI ABD W/O CNTR FLWD CNTR: CPT

## 2020-05-19 PROCEDURE — 0 GADOBENATE DIMEGLUMINE 529 MG/ML SOLUTION: Performed by: FAMILY MEDICINE

## 2020-05-19 PROCEDURE — A9577 INJ MULTIHANCE: HCPCS | Performed by: FAMILY MEDICINE

## 2020-05-19 PROCEDURE — 74183 MRI ABD W/O CNTR FLWD CNTR: CPT | Performed by: RADIOLOGY

## 2020-05-19 PROCEDURE — 99213 OFFICE O/P EST LOW 20 MIN: CPT | Performed by: UROLOGY

## 2020-05-19 RX ORDER — NITROFURANTOIN 25; 75 MG/1; MG/1
100 CAPSULE ORAL DAILY
Qty: 56 CAPSULE | Refills: 3 | Status: SHIPPED | OUTPATIENT
Start: 2020-05-19 | End: 2020-11-10 | Stop reason: SDUPTHER

## 2020-05-19 RX ADMIN — GADOBENATE DIMEGLUMINE 17 ML: 529 INJECTION, SOLUTION INTRAVENOUS at 10:06

## 2020-05-19 NOTE — PROGRESS NOTES
Chief Complaint:          Chief Complaint   Patient presents with   • Polynephritis       HPI:   30 y.o. female furred from the emergency room with a history of pyelonephritis.  She had a urinary tract infection had severe right-sided flank pain the CT was reviewed looks like she passed a stone she is much better she is doing well she has recurrent infections will get started on prophylactic antibiotics she has normal bowel movements.  She has had 2 C-sections.  Her exam is entirely unremarkable.  Currently, she has recurrent infections characterized with frequency urgency pressure etc. she does not have significant fevers.      Past Medical History:        Past Medical History:   Diagnosis Date   • Congestive heart failure (CMS/HCC)    • Fibromyalgia    • GERD (gastroesophageal reflux disease)    • History of ear infections    • History of heart disease    • History of lupus (CMS/HCC)    • History of migraine    • Hypertension    • Lown Ganong Parker syndrome    • Murmur    • Nephritis    • Osteoarthritis    • Pulmonary stenosis          Current Meds:     Current Outpatient Medications   Medication Sig Dispense Refill   • atenolol (TENORMIN) 25 MG tablet Take 1 tablet by mouth Daily. 90 tablet 1   • DULoxetine (CYMBALTA) 60 MG capsule Take 1 capsule by mouth Daily. 90 capsule 1   • HYDROcodone-acetaminophen (NORCO) 5-325 MG per tablet Take 1 tablet by mouth 2 (Two) Times a Day As Needed for Moderate Pain . 6 tablet 0   • levoFLOXacin (LEVAQUIN) 750 MG tablet Take 1 tablet by mouth Daily. 7 tablet 0   • meloxicam (MOBIC) 15 MG tablet Take 1 tablet by mouth Daily. 30 tablet 3   • tiZANidine (ZANAFLEX) 4 MG tablet Take 4 mg by mouth At Night As Needed for Muscle Spasms.       No current facility-administered medications for this visit.         Allergies:      No Known Allergies     Past Surgical History:     Past Surgical History:   Procedure Laterality Date   •  SECTION     • DIAGNOSTIC LAPAROSCOPY Right  8/13/2018    Procedure: LAPAROSCOPIC EXTENSIVE LYSIS OF AHESIONS. DRAINAGE OF OVARIAN CYST.;  Surgeon: Rachel Caruso DO;  Location: [facility] OR;  Service: Obstetrics/Gynecology   • ORIF SCAPHOID W VASCULARIZED BONE GRAFT     • TUBAL ABDOMINAL LIGATION     • TUBAL COAGULATION LAPAROSCOPIC Bilateral 8/13/2018    Procedure: TUBAL FALLOPE FILSHIE CLIPPING LAPAROSCOPIC;  Surgeon: Rachel Caruso DO;  Location: [facility] OR;  Service: Obstetrics/Gynecology         Social History:     Social History     Socioeconomic History   • Marital status: Single     Spouse name: Not on file   • Number of children: Not on file   • Years of education: Not on file   • Highest education level: Not on file   Tobacco Use   • Smoking status: Former Smoker     Packs/day: 0.00     Types: Electronic Cigarette   • Smokeless tobacco: Never Used   Substance and Sexual Activity   • Alcohol use: Yes     Comment: socially   • Drug use: No   • Sexual activity: Yes     Partners: Male     Birth control/protection: Surgical       Family History:     Family History   Problem Relation Age of Onset   • Heart disease Mother    • Thyroid disease Mother    • Anxiety disorder Mother    • Depression Mother    • Bipolar disorder Mother    • Heart disease Father    • Cancer Father    • Heart disease Maternal Grandmother    • Diabetes Maternal Grandmother    • Diabetes Maternal Grandfather    • Lupus Paternal Grandmother    • Rheum arthritis Paternal Grandmother        Review of Systems:     Review of Systems   Constitutional: Negative.  Negative for activity change, appetite change, chills, diaphoresis, fatigue and unexpected weight change.   HENT: Negative for congestion, dental problem, drooling, ear discharge, ear pain, facial swelling, hearing loss, mouth sores, nosebleeds, postnasal drip, rhinorrhea, sinus pressure, sneezing, sore throat, tinnitus, trouble swallowing and voice change.    Eyes: Negative.  Negative for photophobia, pain,  discharge, redness, itching and visual disturbance.   Respiratory: Negative.  Negative for apnea, cough, choking, chest tightness, shortness of breath, wheezing and stridor.    Cardiovascular: Negative.  Negative for chest pain, palpitations and leg swelling.   Gastrointestinal: Negative.  Negative for abdominal distention, abdominal pain, anal bleeding, blood in stool, constipation, diarrhea, nausea, rectal pain and vomiting.   Endocrine: Negative.  Negative for cold intolerance, heat intolerance, polydipsia, polyphagia and polyuria.   Musculoskeletal: Negative.  Negative for arthralgias, back pain, gait problem, joint swelling, myalgias, neck pain and neck stiffness.   Skin: Negative.  Negative for color change, pallor, rash and wound.   Allergic/Immunologic: Negative.  Negative for environmental allergies, food allergies and immunocompromised state.   Neurological: Negative.  Negative for dizziness, tremors, seizures, syncope, facial asymmetry, speech difficulty, weakness, light-headedness, numbness and headaches.   Hematological: Negative.  Negative for adenopathy. Does not bruise/bleed easily.   Psychiatric/Behavioral: Negative for agitation, behavioral problems, confusion, decreased concentration, dysphoric mood, hallucinations, self-injury, sleep disturbance and suicidal ideas. The patient is not nervous/anxious and is not hyperactive.    All other systems reviewed and are negative.      Physical Exam:     Physical Exam   Constitutional: She appears well-developed and well-nourished.   HENT:   Head: Normocephalic and atraumatic.   Right Ear: External ear normal.   Left Ear: External ear normal.   Mouth/Throat: Oropharynx is clear and moist.   Eyes: Pupils are equal, round, and reactive to light. Conjunctivae are normal.   Cardiovascular: Normal rate, regular rhythm, normal heart sounds and intact distal pulses.   Pulmonary/Chest: Effort normal and breath sounds normal.   Abdominal: Soft. Bowel sounds are  normal. She exhibits no distension and no mass. There is no tenderness. There is no rebound and no guarding.   Genitourinary: No vaginal discharge found.   Genitourinary Comments: Soft nontender abdomen with no organomegaly, rigidity, guarding or tenderness.  Normal vaginal orifice.  No evidence of prolapse no palpable masses.  No significant perineal body abnormalities and a normal external anus.  Neurologic exam is nonfocal.   Musculoskeletal: Normal range of motion.   Neurological: She is alert. She has normal reflexes.   Skin: Skin is warm and dry.   Psychiatric: She has a normal mood and affect. Her behavior is normal. Judgment and thought content normal.       I have reviewed the following portions of the patient's history: allergies, current medications, past family history, past medical history, past social history, past surgical history, problem list and ROS and confirm it's accurate.      Procedure:       Assessment/Plan:   Ureteral calculus-patient has been diagnosed with a ureteral calculus.  We have discussed the various parameters regarding spontaneous passage including the notion that a 2 mm stone has a high likelihood of spontaneous passage versus a larger stone being caught up in the upper areas of the urinary tract.  We also discussed the medical management of stone disease and the use of medical expulsive therapy in the form of Flomax.  This is used in an off label setting.  We discussed the indicators for intervention including  absolute indicators such as sepsis and uncontrollable severe pain as well as  the relative indicators of moderate pain that is well-controlled with various analgesia.  I also talked about nonoperative management including ambulation and increasing fluids and hot tub as being an effective adjuncts in the treatment of a ureteral stone.  She passed a ureteral stone clearly as she had a CT that showed changes consistent with passage and resolution of the pain  Recurrent  urinary tract infections-patient has been referred and diagnosed with recurrent urinary tract infections.  We discussed the types of organisms that are found in the urinary tract indicating that the vast majority are results of the patient's own gastrointestinal patience.  We discussed how many of the antibiotics that are utilized can actually exacerbate these infections by creating resistant organisms and there is only a very few antibiotics that are concentrated in the urine and do not affect the rectal reservoir nor cause recurrent yeast vaginitis.  We discussed the risk factors for recurrent infections being intercourse in younger patients and atrophic changes in older patients.  We discussed the symptoms that are found including pain, pressure, burning, frequency, urgency suprapubic pain and painful intercourse.  I discussed upper tract symptoms including fevers, chills, and indicated the workup would be much more aggressive if the patient were to present with recurrent infections in the face of upper tract symptomatology such as fever.  I discussed the history of vesicoureteral reflux in young patients and finally chronic renal scarring as a result of such.  I recommend concomitant probiotics with treatment with antibiotics to protect the rectal reservoir including over-the-counter yogurt preparations to rebecca oral pills containing the appropriate probiotics.  Started on prophylactic antibiotics            Patient's Body mass index is 36.4 kg/m². BMI is above normal parameters. Recommendations include: educational material.              This document has been electronically signed by NAKUL AWAD MD May 19, 2020 11:22

## 2020-05-20 ENCOUNTER — TELEPHONE (OUTPATIENT)
Dept: FAMILY MEDICINE CLINIC | Facility: CLINIC | Age: 30
End: 2020-05-20

## 2020-05-20 ENCOUNTER — EPISODE CHANGES (OUTPATIENT)
Dept: CASE MANAGEMENT | Facility: OTHER | Age: 30
End: 2020-05-20

## 2020-05-20 NOTE — TELEPHONE ENCOUNTER
----- Message from Suzanne Jackson MD sent at 5/20/2020  6:23 AM EDT -----  Please call Cheryl. Her MRI just showed a splenic cyst. It is okay. We will monitor.      Called patient to make her aware, no answer and voicemail box is full.

## 2020-05-22 PROBLEM — N20.1 URETERAL CALCULUS: Status: ACTIVE | Noted: 2020-05-22

## 2020-05-28 ENCOUNTER — EPISODE CHANGES (OUTPATIENT)
Dept: CASE MANAGEMENT | Facility: OTHER | Age: 30
End: 2020-05-28

## 2020-06-01 NOTE — PROGRESS NOTES
"Cheryl Mcnulty     VITALS: Blood pressure 108/80, pulse 68, temperature 97.3 °F (36.3 °C), height 157.5 cm (62\"), weight 87.5 kg (193 lb), last menstrual period 05/01/2020, SpO2 98 %, not currently breastfeeding.    Subjective  Chief Complaint:   Chief Complaint   Patient presents with   • Follow-up     recent ER visit    • Back Pain     low back pain        History of Present Illness:  Patient is a 30 y.o.  female with a medical history significant for pulmonary valve stenosis, hypertension, and GERD who presents to clinic secondary to medical followup.  She had been seen in the ER approximately 4 days ago for right flank pain.  She states that she continues to have symptoms of pain over her right back.  She states that this is been going on for 3 weeks.  She states that the pain is sharp, stabbing, and continuous.  She denies any fevers or chills.  She denies any dysuria, hematuria, urinary frequency, or urinary retention.  At the ER, her CT of the abdomen and pelvis showed: Stranding around the right kidney and there is mild hydronephrosis with no definite calculus identified and could represent sequelae of recently passed calculus or infection.  She did have leukocytosis.  She was discharged on a course of Levaquin.  She continues on the course of Levaquin.  Her urine culture did grow out E. coli.  Patient states that she has been using alcohol to deal with the pain.    No complaints about any of the medications.    The following portions of the patient's history were reviewed and updated as appropriate: allergies, current medications, past family history, past medical history, past social history, past surgical history and problem list.    Past Medical History  Past Medical History:   Diagnosis Date   • Congestive heart failure (CMS/HCC)    • Fibromyalgia    • GERD (gastroesophageal reflux disease)    • History of ear infections    • History of heart disease    • History of lupus (CMS/HCC)    • History of " migraine    • Hypertension    • Lown Ganong Parker syndrome    • Murmur    • Nephritis    • Osteoarthritis    • Pulmonary stenosis        Review of Systems   Respiratory: Negative for shortness of breath and wheezing.    Cardiovascular: Negative for chest pain and palpitations.       Surgical History  Past Surgical History:   Procedure Laterality Date   •  SECTION     • DIAGNOSTIC LAPAROSCOPY Right 2018    Procedure: LAPAROSCOPIC EXTENSIVE LYSIS OF AHESIONS. DRAINAGE OF OVARIAN CYST.;  Surgeon: Rachel Caruso DO;  Location: [facility] OR;  Service: Obstetrics/Gynecology   • ORIF SCAPHOID W VASCULARIZED BONE GRAFT     • TUBAL ABDOMINAL LIGATION     • TUBAL COAGULATION LAPAROSCOPIC Bilateral 2018    Procedure: TUBAL FALLOPE FILSHIE CLIPPING LAPAROSCOPIC;  Surgeon: Rachel Caruso DO;  Location: [facility] OR;  Service: Obstetrics/Gynecology       Family History  Family History   Problem Relation Age of Onset   • Heart disease Mother    • Thyroid disease Mother    • Anxiety disorder Mother    • Depression Mother    • Bipolar disorder Mother    • Heart disease Father    • Cancer Father    • Heart disease Maternal Grandmother    • Diabetes Maternal Grandmother    • Diabetes Maternal Grandfather    • Lupus Paternal Grandmother    • Rheum arthritis Paternal Grandmother        Social History  Social History     Socioeconomic History   • Marital status: Single     Spouse name: Not on file   • Number of children: Not on file   • Years of education: Not on file   • Highest education level: Not on file   Tobacco Use   • Smoking status: Former Smoker     Packs/day: 0.00     Types: Electronic Cigarette   • Smokeless tobacco: Never Used   Substance and Sexual Activity   • Alcohol use: Yes     Comment: socially   • Drug use: No   • Sexual activity: Yes     Partners: Male     Birth control/protection: Surgical       Objective  Physical Exam    Gen: Patient in NAD. Pleasant and answers appropriately.  A&Ox3.  Patient keeps rubbing her right flank.    Skin: Warm and dry with normal turgor. No purpura, rashes, or unusual pigmentation noted. Hair is normal in appearance and distribution.    HEENT: NC/AT. No lesions noted. Conjunctiva clear, sclera nonicteric. PERRL. EOMI without nystagmus or strabismus. Fundi appear benign. No hemorrhages or exudates of eyes. Auditory canals are patent bilaterally without lesions. TMs intact,  nonerythematous, nonbulging without lesions. Nasal mucosa pink, nonerythematous, and nonedematous. Frontal and maxillary sinuses are nontender. O/P nonerythematous and moist without exudate.    Neck: Supple without lymph nodes palpated. FROM.     Lungs: CTA B/L without rales, rhonchi, crackles, or wheezes.    Heart: RRR. S1 and S2 normal. No S3 or S4. No MRGT.    Abd: Soft, nontender,nondistended. (+)BSx4 quadrants.  Interestingly enough, negative for bilateral flank tenderness.    Extrem: No CCE. Radial pulses 2+/4 and equal B/L. FROMx4. No bone, joint, or muscle tenderness noted.    Neuro: No focal motor/sensory deficits.    Procedures    Assessment/Plan  Cheryl Mcnulty is a 30 y.o. here for medical followup.  Diagnoses and all orders for this visit:    Acute pyelonephritis  -     CT Abdomen Pelvis Stone Protocol; Future  -     CBC & Differential; Future  -     Comprehensive Metabolic Panel; Future  -     POC Pregnancy, Urine  -     Urine Culture - Urine, Urine, Clean Catch; Future  -     POCT urinalysis dipstick, automated  -     HYDROcodone-acetaminophen (NORCO) 5-325 MG per tablet; Take 1 tablet by mouth 2 (Two) Times a Day As Needed for Moderate Pain .  -     CBC & Differential  -     Comprehensive Metabolic Panel  -     Urine Culture - Urine, Urine, Clean Catch  -     CBC Auto Differential  Secondary to a white blood cell count of 17 in the ER, will repeat lab work today.  Secondary to patient complaining that she continues to have pain in her right flank, will repeat the CT.  We will  also give patient a trial of pain medications.    Oliver #[id number] reviewed and is consistent.  UDS  reviewed and is consistent.  Patient comfort assessment guide reviewed and updated today.    As part of the patient's treatment plan they are being prescribed a controlled substance/ substances with potential for abuse.  This patient has been made aware of the appropriate use of such medications, including potential risk of somnolence, limited ability to drive and/or work safely, and potential for overdose.  It has also been made clear these medications are for use by the patient only, without concomitant use of alcohol or other substances unless prescribed/advised by medical provider.    Patient has completed prescribing agreement detailing terms of continued prescribing of controlled substances including monitoring OLIVER reports, urine drug screens, and pill counts.  The patient is aware OLIVER reports are reviewed on a regular basis and scanned into the chart.    History and physical exam exhibit continued safe and appropriate use of controlled substances.        Patient's Body mass index is 35.3 kg/m². BMI is above normal parameters. Recommendations include: exercise counseling and nutrition counseling.     Findings and plans discussed with patient who verbalizes understanding and agreement. Will followup with patient once results are in. Patient to followup at clinic PRN or in two weeks for further medical followup.    Suzanne Jackson MD    EMR Dragon/Transcription Disclaimer:  Much of this encounter note is an electronic transcription/translation of spoken language to printed text.  The electronic translation of spoken language may permit erroneous, or at times, nonsensical words or phrases to be inadvertently transcribed.  Although I have reviewed the note for such errors, some may still exist.

## 2020-07-23 ENCOUNTER — TELEPHONE (OUTPATIENT)
Dept: UROLOGY | Facility: CLINIC | Age: 30
End: 2020-07-23

## 2020-07-23 NOTE — TELEPHONE ENCOUNTER
Pt states that she is keeping yeast infections. Pt was seen by her regular doctor today, and they suggested asking Dr. Orozco to decrease her Macrobid to 50mg instead of 100. If so she uses [facility] at the hospital.      [phone]

## 2020-07-23 NOTE — TELEPHONE ENCOUNTER
Dr Orozco stated patient is not getting yeast infection from her macrobid. Dr orozco stated patient needs to come in sooner for an appt. Transferred call to [place] for a sooner appt.

## 2020-07-29 ENCOUNTER — TRANSCRIBE ORDERS (OUTPATIENT)
Dept: LAB | Facility: HOSPITAL | Age: 30
End: 2020-07-29

## 2020-07-29 ENCOUNTER — LAB (OUTPATIENT)
Dept: LAB | Facility: HOSPITAL | Age: 30
End: 2020-07-29

## 2020-07-29 DIAGNOSIS — M35.9 DIFFUSE DISEASE OF CONNECTIVE TISSUE (HCC): ICD-10-CM

## 2020-07-29 DIAGNOSIS — R53.83 TIREDNESS: ICD-10-CM

## 2020-07-29 DIAGNOSIS — M13.0 POLYARTHROPATHY: ICD-10-CM

## 2020-07-29 DIAGNOSIS — M79.7 SCAPULOHUMERAL FIBROSITIS: ICD-10-CM

## 2020-07-29 DIAGNOSIS — G93.32 CHRONIC FATIGUE SYNDROME: ICD-10-CM

## 2020-07-29 DIAGNOSIS — I73.00 SECONDARY RAYNAUD'S PHENOMENON: Primary | ICD-10-CM

## 2020-07-29 DIAGNOSIS — I73.00 SECONDARY RAYNAUD'S PHENOMENON: ICD-10-CM

## 2020-07-29 LAB — CRP SERPL-MCNC: 0.13 MG/DL (ref 0–0.5)

## 2020-07-29 PROCEDURE — 36415 COLL VENOUS BLD VENIPUNCTURE: CPT

## 2020-07-29 PROCEDURE — 86140 C-REACTIVE PROTEIN: CPT

## 2020-07-29 PROCEDURE — 85652 RBC SED RATE AUTOMATED: CPT

## 2020-07-30 LAB — ERYTHROCYTE [SEDIMENTATION RATE] IN BLOOD: 5 MM/HR (ref 0–20)

## 2020-08-13 ENCOUNTER — OFFICE VISIT (OUTPATIENT)
Dept: UROLOGY | Facility: CLINIC | Age: 30
End: 2020-08-13

## 2020-08-13 ENCOUNTER — HOSPITAL ENCOUNTER (OUTPATIENT)
Dept: GENERAL RADIOLOGY | Facility: HOSPITAL | Age: 30
Discharge: HOME | End: 2020-08-13
Admitting: UROLOGY

## 2020-08-13 VITALS — TEMPERATURE: 98.5 F | WEIGHT: 194 LBS | HEIGHT: 62 IN | BODY MASS INDEX: 35.7 KG/M2

## 2020-08-13 DIAGNOSIS — N39.0 URINARY TRACT INFECTION WITHOUT HEMATURIA, SITE UNSPECIFIED: Primary | ICD-10-CM

## 2020-08-13 DIAGNOSIS — N20.1 URETERAL CALCULUS: ICD-10-CM

## 2020-08-13 PROCEDURE — 74021 RADEX ABDOMEN 3+ VIEWS: CPT | Performed by: RADIOLOGY

## 2020-08-13 PROCEDURE — 74018 RADEX ABDOMEN 1 VIEW: CPT

## 2020-08-13 PROCEDURE — 99213 OFFICE O/P EST LOW 20 MIN: CPT | Performed by: UROLOGY

## 2020-08-13 PROCEDURE — 81001 URINALYSIS AUTO W/SCOPE: CPT | Performed by: UROLOGY

## 2020-08-13 NOTE — PROGRESS NOTES
Chief Complaint:          Chief Complaint   Patient presents with   • Pyelonephritis       HPI:   30 y.o. female returns today she is here for follow-up of kidney infection her urine is negative she had some right flank pain she has lupus she is on Diflucan for recurrent yeast her physical exam is entirely unremarkable.  I will see her back on an as-needed basis.      Past Medical History:        Past Medical History:   Diagnosis Date   • Congestive heart failure (CMS/[facility])    • Fibromyalgia    • GERD (gastroesophageal reflux disease)    • History of ear infections    • History of heart disease    • History of lupus (CMS/HCC)    • History of migraine    • Hypertension    • Lown Ganong Parker syndrome    • Murmur    • Nephritis    • Osteoarthritis    • Pulmonary stenosis          Current Meds:     Current Outpatient Medications   Medication Sig Dispense Refill   • atenolol (TENORMIN) 25 MG tablet Take 1 tablet by mouth Daily. 90 tablet 1   • Brexpiprazole 0.5 MG tablet Take tablet by mouth Daily. 30 tablet 2   • Brexpiprazole 1 MG tablet Take 1 tablet by mouth daily 30 tablet 2   • DULoxetine (CYMBALTA) 30 MG capsule Take 1 capsule by mouth Daily. 30 capsule 2   • DULoxetine (CYMBALTA) 60 MG capsule Take 1 capsule by mouth Daily. 90 capsule 1   • DULoxetine (CYMBALTA) 60 MG capsule Take 1 capsule by mouth Daily. 30 capsule 2   • fluconazole (DIFLUCAN) 150 MG tablet Take 1 tablet by mouth once a week 4 tablet 0   • HYDROcodone-acetaminophen (NORCO) 5-325 MG per tablet Take 1 tablet by mouth 2 (Two) Times a Day As Needed for Moderate Pain . 6 tablet 0   • hydroxychloroquine (PLAQUENIL) 200 MG tablet Take 1 tablet by mouth Daily. 30 tablet 4   • levoFLOXacin (LEVAQUIN) 750 MG tablet Take 1 tablet by mouth Daily. 7 tablet 0   • meloxicam (MOBIC) 15 MG tablet Take 1 tablet by mouth Daily. 30 tablet 3   • meloxicam (MOBIC) 15 MG tablet Take 1 tablet by mouth Daily. 30 tablet 2   • meloxicam (MOBIC) 7.5 MG tablet Take 1  tablet by mouth Daily As Needed with food for joint and muscle pains. 30 tablet 4   • nitrofurantoin, macrocrystal-monohydrate, (Macrobid) 100 MG capsule Take 1 capsule by mouth Daily. 56 capsule 3   • ondansetron (ZOFRAN) 4 MG tablet Take 1 tablet every four hours, as needed 30 tablet 1   • tiZANidine (ZANAFLEX) 4 MG tablet Take 4 mg by mouth At Night As Needed for Muscle Spasms.     • tiZANidine (ZANAFLEX) 4 MG tablet Take 1 tablet by mouth Daily. 30 tablet 3     No current facility-administered medications for this visit.         Allergies:      No Known Allergies     Past Surgical History:     Past Surgical History:   Procedure Laterality Date   •  SECTION     • DIAGNOSTIC LAPAROSCOPY Right 2018    Procedure: LAPAROSCOPIC EXTENSIVE LYSIS OF AHESIONS. DRAINAGE OF OVARIAN CYST.;  Surgeon: Rachel Caruso DO;  Location: [facility];  Service: Obstetrics/Gynecology   • ORIF SCAPHOID W VASCULARIZED BONE GRAFT     • TUBAL ABDOMINAL LIGATION     • TUBAL COAGULATION LAPAROSCOPIC Bilateral 2018    Procedure: TUBAL FALLOPE FILSHIE CLIPPING LAPAROSCOPIC;  Surgeon: Rachel Caruso DO;  Location: [facility];  Service: Obstetrics/Gynecology         Social History:     Social History     Socioeconomic History   • Marital status: Single     Spouse name: Not on file   • Number of children: Not on file   • Years of education: Not on file   • Highest education level: Not on file   Tobacco Use   • Smoking status: Former Smoker     Packs/day: 0.00     Types: Electronic Cigarette   • Smokeless tobacco: Never Used   Substance and Sexual Activity   • Alcohol use: Yes     Comment: socially   • Drug use: No   • Sexual activity: Yes     Partners: Male     Birth control/protection: Surgical       Family History:     Family History   Problem Relation Age of Onset   • Heart disease Mother    • Thyroid disease Mother    • Anxiety disorder Mother    • Depression Mother    • Bipolar disorder Mother    • Heart  disease Father    • Cancer Father    • Heart disease Maternal Grandmother    • Diabetes Maternal Grandmother    • Diabetes Maternal Grandfather    • Lupus Paternal Grandmother    • Rheum arthritis Paternal Grandmother        Review of Systems:     Review of Systems   Constitutional: Negative.  Negative for activity change, appetite change, chills, diaphoresis, fatigue and unexpected weight change.   HENT: Negative for congestion, dental problem, drooling, ear discharge, ear pain, facial swelling, hearing loss, mouth sores, nosebleeds, postnasal drip, rhinorrhea, sinus pressure, sneezing, sore throat, tinnitus, trouble swallowing and voice change.    Eyes: Negative.  Negative for photophobia, pain, discharge, redness, itching and visual disturbance.   Respiratory: Negative.  Negative for apnea, cough, choking, chest tightness, shortness of breath, wheezing and stridor.    Cardiovascular: Negative.  Negative for chest pain, palpitations and leg swelling.   Gastrointestinal: Negative.  Negative for abdominal distention, abdominal pain, anal bleeding, blood in stool, constipation, diarrhea, nausea, rectal pain and vomiting.   Endocrine: Negative.  Negative for cold intolerance, heat intolerance, polydipsia, polyphagia and polyuria.   Musculoskeletal: Negative.  Negative for arthralgias, back pain, gait problem, joint swelling, myalgias, neck pain and neck stiffness.   Skin: Negative.  Negative for color change, pallor, rash and wound.   Allergic/Immunologic: Negative.  Negative for environmental allergies, food allergies and immunocompromised state.   Neurological: Negative.  Negative for dizziness, tremors, seizures, syncope, facial asymmetry, speech difficulty, weakness, light-headedness, numbness and headaches.   Hematological: Negative.  Negative for adenopathy. Does not bruise/bleed easily.   Psychiatric/Behavioral: Negative for agitation, behavioral problems, confusion, decreased concentration, dysphoric mood,  hallucinations, self-injury, sleep disturbance and suicidal ideas. The patient is not nervous/anxious and is not hyperactive.    All other systems reviewed and are negative.      Physical Exam:     Physical Exam   Constitutional: She appears well-developed and well-nourished.   HENT:   Head: Normocephalic and atraumatic.   Right Ear: External ear normal.   Left Ear: External ear normal.   Mouth/Throat: Oropharynx is clear and moist.   Eyes: Pupils are equal, round, and reactive to light. Conjunctivae are normal.   Cardiovascular: Normal rate, regular rhythm, normal heart sounds and intact distal pulses.   Pulmonary/Chest: Effort normal and breath sounds normal.   Abdominal: Soft. Bowel sounds are normal. She exhibits no distension and no mass. There is no tenderness. There is no rebound and no guarding.   Genitourinary: No vaginal discharge found.   Musculoskeletal: Normal range of motion.   Neurological: She is alert. She has normal reflexes.   Skin: Skin is warm and dry.   Psychiatric: She has a normal mood and affect. Her behavior is normal. Judgment and thought content normal.       I have reviewed the following portions of the patient's history: allergies, current medications, past family history, past medical history, past social history, past surgical history, problem list and ROS and confirm it's accurate.      Procedure:       Assessment/Plan:   Recurrent urinary tract infections-patient has been referred and diagnosed with recurrent urinary tract infections.  We discussed the types of organisms that are found in the urinary tract indicating that the vast majority are results of the patient's own gastrointestinal patience.  We discussed how many of the antibiotics that are utilized can actually exacerbate these infections by creating resistant organisms and there is only a very few antibiotics that are concentrated in the urine and do not affect the rectal reservoir nor cause recurrent yeast vaginitis.  We  discussed the risk factors for recurrent infections being intercourse in younger patients and atrophic changes in older patients.  We discussed the symptoms that are found including pain, pressure, burning, frequency, urgency suprapubic pain and painful intercourse.  I discussed upper tract symptoms including fevers, chills, and indicated the workup would be much more aggressive if the patient were to present with recurrent infections in the face of upper tract symptomatology such as fever.  I discussed the history of vesicoureteral reflux in young patients and finally chronic renal scarring as a result of such.  I recommend concomitant probiotics with treatment with antibiotics to protect the rectal reservoir including over-the-counter yogurt preparations to rebecca oral pills containing the appropriate probiotics.      .      Patient's Body mass index is 35.47 kg/m². BMI is above normal parameters. Recommendations include: educational material.              This document has been electronically signed by NAKUL AWAD MD August 13, 2020 10:04

## 2020-09-28 ENCOUNTER — TRANSCRIBE ORDERS (OUTPATIENT)
Dept: ADMINISTRATIVE | Facility: HOSPITAL | Age: 30
End: 2020-09-28

## 2020-09-28 ENCOUNTER — LAB (OUTPATIENT)
Dept: LAB | Facility: HOSPITAL | Age: 30
End: 2020-09-28

## 2020-09-28 DIAGNOSIS — Z20.828 EXPOSURE TO SARS-ASSOCIATED CORONAVIRUS: ICD-10-CM

## 2020-09-28 DIAGNOSIS — Z20.828 EXPOSURE TO SARS-ASSOCIATED CORONAVIRUS: Primary | ICD-10-CM

## 2020-09-28 PROCEDURE — C9803 HOPD COVID-19 SPEC COLLECT: HCPCS

## 2020-09-28 PROCEDURE — U0004 COV-19 TEST NON-CDC HGH THRU: HCPCS

## 2020-09-29 LAB — SARS-COV-2 RNA NOSE QL NAA+PROBE: NOT DETECTED

## 2020-11-10 ENCOUNTER — OFFICE VISIT (OUTPATIENT)
Dept: UROLOGY | Facility: CLINIC | Age: 30
End: 2020-11-10

## 2020-11-10 VITALS — HEIGHT: 62 IN | BODY MASS INDEX: 35.7 KG/M2 | WEIGHT: 194 LBS

## 2020-11-10 DIAGNOSIS — N39.0 RECURRENT UTI: ICD-10-CM

## 2020-11-10 DIAGNOSIS — N39.0 URINARY TRACT INFECTION WITHOUT HEMATURIA, SITE UNSPECIFIED: Primary | ICD-10-CM

## 2020-11-10 DIAGNOSIS — R35.0 URINE FREQUENCY: ICD-10-CM

## 2020-11-10 DIAGNOSIS — R30.0 SPASTIC DYSURIA: ICD-10-CM

## 2020-11-10 PROCEDURE — 81003 URINALYSIS AUTO W/O SCOPE: CPT | Performed by: NURSE PRACTITIONER

## 2020-11-10 PROCEDURE — 99214 OFFICE O/P EST MOD 30 MIN: CPT | Performed by: NURSE PRACTITIONER

## 2020-11-10 PROCEDURE — 51798 US URINE CAPACITY MEASURE: CPT | Performed by: NURSE PRACTITIONER

## 2020-11-10 RX ORDER — PHENAZOPYRIDINE HYDROCHLORIDE 200 MG/1
200 TABLET, FILM COATED ORAL 3 TIMES DAILY PRN
Qty: 20 TABLET | Refills: 0 | Status: SHIPPED | OUTPATIENT
Start: 2020-11-10 | End: 2021-03-17 | Stop reason: SDUPTHER

## 2020-11-10 RX ORDER — NITROFURANTOIN 25; 75 MG/1; MG/1
100 CAPSULE ORAL DAILY
Qty: 56 CAPSULE | Refills: 3 | Status: SHIPPED | OUTPATIENT
Start: 2020-11-10 | End: 2021-05-20 | Stop reason: SDUPTHER

## 2020-11-10 NOTE — PROGRESS NOTES
Chief Complaint:          Chief Complaint   Patient presents with   • Urinary Tract Infection       HPI:   30 y.o. female.  Patient evaluated in clinic today.  She is a follow-up for recurrent urinary tract infections and Pyelonephritis.  She reports currently doing better on antibiotic prophylaxis with Macrobid.  She also reports an improvement in her urinary symptoms.    However she reports she ran out of her medications five days ago and has had some dysuria and burning on urination since then. She states she still has episodes of frequency, urgency, and nocturia 1-2 times.  However, she denies any episodes of gross hematuria.  Denies back pain, pelvic pain and pressure, she denies fever or chills, she does not have nausea, vomiting, or diarrhea. Her Urine dipstick today complete negative for any infection, she has trace microscopic hematuria, negative nitrites, negative gross hematuria. Her PVR -O    She reports generalized discomfort with body aches, and pains. Patient has Lupus. Overall she reports doing better, she states she was  taking her antibiotics and probiotics diligently, until she ran out and later became symptomatic.  She reports  increasing her p.o. fluid intake to at least 2 L daily, and has significantly reduced her intake of soda drinks-Mountain Dew.       Past Medical History:        Past Medical History:   Diagnosis Date   • Congestive heart failure (CMS/[facility])    • Fibromyalgia    • GERD (gastroesophageal reflux disease)    • History of ear infections    • History of heart disease    • History of lupus    • History of migraine    • Hypertension    • Lown Ganong Parker syndrome    • Murmur    • Nephritis    • Osteoarthritis    • Pulmonary stenosis      The following portions of the patient's history were reviewed and updated as appropriate: allergies, current medications, past family history, past medical history, past social history, past surgical history and problem list.    Current Meds:      Current Outpatient Medications   Medication Sig Dispense Refill   • atenolol (TENORMIN) 25 MG tablet Take 1 tablet by mouth Daily. 90 tablet 1   • atenolol (TENORMIN) 25 MG tablet Take 1 tablet by mouth Daily. 30 tablet 2   • atenolol (TENORMIN) 25 MG tablet Take 1 tablet by mouth Daily. 90 tablet 2   • atomoxetine (STRATTERA) 10 MG capsule Take 1 capsule by mouth daily 30 capsule 2   • atomoxetine (STRATTERA) 18 MG capsule Take 1 (one) Capsule daily 30 capsule 2   • atomoxetine (STRATTERA) 25 MG capsule Take 1 capsule by mouth Daily. 30 capsule 2   • atomoxetine (STRATTERA) 40 MG capsule Take 1 capsule by mouth daily 30 capsule 2   • Brexpiprazole 0.5 MG tablet Take tablet by mouth Daily. 30 tablet 2   • Brexpiprazole 1 MG tablet Take 1 tablet by mouth daily 30 tablet 2   • Brexpiprazole 1 MG tablet Take 1 tablet by mouth Daily. 90 tablet 2   • DULoxetine (CYMBALTA) 30 MG capsule Take 1 capsule by mouth Daily. 30 capsule 2   • DULoxetine (CYMBALTA) 60 MG capsule Take 1 capsule by mouth Daily. 90 capsule 1   • DULoxetine (CYMBALTA) 60 MG capsule Take 1 capsule by mouth Daily. 30 capsule 2   • DULoxetine (CYMBALTA) 60 MG capsule Take 1 capsule by mouth Daily. 90 capsule 2   • fluconazole (DIFLUCAN) 150 MG tablet Take 1 tablet by mouth once repeat 2nd dose in 48 hours 2 tablet 0   • HYDROcodone-acetaminophen (NORCO) 5-325 MG per tablet Take 1 tablet by mouth 2 (Two) Times a Day As Needed for Moderate Pain . 6 tablet 0   • hydroxychloroquine (PLAQUENIL) 200 MG tablet Take 1 tablet by mouth Daily. 30 tablet 4   • hydroxychloroquine (PLAQUENIL) 200 MG tablet Take 1 tablet by mouth Daily. 30 tablet 4   • levoFLOXacin (LEVAQUIN) 750 MG tablet Take 1 tablet by mouth Daily. 7 tablet 0   • meloxicam (MOBIC) 15 MG tablet Take 1 tablet by mouth Daily. 30 tablet 3   • meloxicam (MOBIC) 15 MG tablet Take 1 tablet by mouth Daily. 30 tablet 2   • meloxicam (MOBIC) 15 MG tablet Take 1 tablet by mouth Daily. 90 tablet 2   •  meloxicam (MOBIC) 7.5 MG tablet Take 1 tablet by mouth Daily As Needed with food for joint and muscle pains. 30 tablet 4   • meloxicam (MOBIC) 7.5 MG tablet Take 1 tablet by mouth Daily As Needed with food for joint and muscle pains. 30 tablet 4   • nitrofurantoin, macrocrystal-monohydrate, (Macrobid) 100 MG capsule Take 1 capsule by mouth Daily. 56 capsule 3   • nitrofurantoin, macrocrystal-monohydrate, (MACROBID) 100 MG capsule Take 1 capsule by mouth Daily. 56 capsule 1   • omeprazole (priLOSEC) 40 MG capsule Take 1 capsule by mouth Daily. 30 capsule 2   • omeprazole (priLOSEC) 40 MG capsule Take 1 capsule by mouth Daily. 90 capsule 2   • ondansetron (ZOFRAN) 4 MG tablet Take 1 tablet every four hours, as needed 30 tablet 1   • terconazole (TERAZOL 7) 0.4 % vaginal cream Insert 1 applicatorful by vaginal route every day for 7 days at bedtime 45 g 0   • tiZANidine (ZANAFLEX) 4 MG tablet Take 4 mg by mouth At Night As Needed for Muscle Spasms.     • tiZANidine (ZANAFLEX) 4 MG tablet Take 1 tablet by mouth Daily. 30 tablet 3   • tiZANidine (ZANAFLEX) 4 MG tablet Take 1 tablet by mouth Daily. 30 tablet 3     No current facility-administered medications for this visit.         Allergies:      No Known Allergies     Past Surgical History:     Past Surgical History:   Procedure Laterality Date   •  SECTION     • DIAGNOSTIC LAPAROSCOPY Right 2018    Procedure: LAPAROSCOPIC EXTENSIVE LYSIS OF AHESIONS. DRAINAGE OF OVARIAN CYST.;  Surgeon: Rachel Caruso DO;  Location: [facility] OR;  Service: Obstetrics/Gynecology   • ORIF SCAPHOID W VASCULARIZED BONE GRAFT     • TUBAL ABDOMINAL LIGATION     • TUBAL COAGULATION LAPAROSCOPIC Bilateral 2018    Procedure: TUBAL FALLOPE FILSHIE CLIPPING LAPAROSCOPIC;  Surgeon: Rachel Caruso DO;  Location: [facility] OR;  Service: Obstetrics/Gynecology         Social History:     Social History     Socioeconomic History   • Marital status: Single     Spouse  name: Not on file   • Number of children: Not on file   • Years of education: Not on file   • Highest education level: Not on file   Tobacco Use   • Smoking status: Former Smoker     Packs/day: 0.00     Types: Electronic Cigarette   • Smokeless tobacco: Never Used   Substance and Sexual Activity   • Alcohol use: Yes     Comment: socially   • Drug use: No   • Sexual activity: Yes     Partners: Male     Birth control/protection: Surgical       Family History:     Family History   Problem Relation Age of Onset   • Heart disease Mother    • Thyroid disease Mother    • Anxiety disorder Mother    • Depression Mother    • Bipolar disorder Mother    • Heart disease Father    • Cancer Father    • Heart disease Maternal Grandmother    • Diabetes Maternal Grandmother    • Diabetes Maternal Grandfather    • Lupus Paternal Grandmother    • Rheum arthritis Paternal Grandmother        Review of Systems:     Review of Systems   Constitutional: Positive for activity change, appetite change and fatigue. Negative for chills and fever.   HENT: Negative for congestion.    Eyes: Negative for blurred vision.   Gastrointestinal: Positive for abdominal distention and abdominal pain. Negative for nausea and vomiting.   Genitourinary: Positive for dyspareunia, dysuria, flank pain, frequency, pelvic pain, pelvic pressure and urgency. Negative for difficulty urinating, genital sores, hematuria, urinary incontinence and vaginal discharge.   Musculoskeletal: Negative for back pain.   Neurological: Negative for dizziness, headache and confusion.   Psychiatric/Behavioral: Positive for stress. Negative for behavioral problems and decreased concentration. The patient is nervous/anxious.         Physical Exam:     Physical Exam  Constitutional:       General: She is not in acute distress.     Appearance: She is well-developed.   HENT:      Head: Normocephalic and atraumatic.   Eyes:      Pupils: Pupils are equal, round, and reactive to light.   Neck:       Musculoskeletal: Normal range of motion.      Thyroid: No thyromegaly.      Trachea: No tracheal deviation.   Cardiovascular:      Rate and Rhythm: Normal rate and regular rhythm.      Heart sounds: No murmur.   Pulmonary:      Effort: Pulmonary effort is normal. No respiratory distress.      Breath sounds: Normal breath sounds. No stridor. No wheezing.   Abdominal:      General: Bowel sounds are normal.      Palpations: Abdomen is soft.      Tenderness: There is abdominal tenderness.   Genitourinary:     Labia:         Right: No tenderness.         Left: No tenderness.       Vagina: Normal. No vaginal discharge.   Musculoskeletal: Normal range of motion.         General: Tenderness present. No deformity.   Skin:     General: Skin is warm and dry.      Capillary Refill: Capillary refill takes less than 2 seconds.      Coloration: Skin is not pale.      Findings: No erythema or rash.   Neurological:      Mental Status: She is alert and oriented to person, place, and time.      Cranial Nerves: No cranial nerve deficit.      Sensory: No sensory deficit.      Coordination: Coordination normal.   Psychiatric:         Behavior: Behavior normal.         Thought Content: Thought content normal.         Judgment: Judgment normal.         Procedure:       Assessment/Plan:        ASSESSMENT  Recurrent UTI/Urine Frequency /Dysuria : Very pleasant 30 year old female evaluated in clinic today for Recurrent UTI's,Dysuria and burning on urination. she reports episodes of urine frequency, incontinence and urgency, however she denies back/abdominal pain. She does not have CVA tenderness or pain at her pubic area. Her urine dipstick today is completely negative for any infection, it shows trace microscopic hematuria otherwise negative for nitrites/gross hematuria.    She reports doing relatively better on her antibiotic prophylaxis , and would like to continue. We discussed the risk factors for recurrent infections being  intercourse in younger patients and atrophic changes in older patients.  We discussed the symptoms that are found including pain, pressure, burning, frequency, urgency suprapubic pain and painful intercourse.     I discussed upper tract symptoms including fevers, chills, and indicated the workup would be much more aggressive if the patient were to present with recurrent infections in the face of upper tract symptomatology such as fever.       PLAN  We will resend her urine for culture today, I will call her with results if any positive bacterial growth, not sensitive to current therapy.    We discussed she continue her antibiotic prophylaxis with Macrobid daily as directed.  She should increase her p.o. fluid intake to at least 1 to 2 L daily and avoid bladder irritants such as caffeine products, spicy foods, and citrusy foods.     I recommend concomitant probiotics with treatment with antibiotics to protect the rectal reservoir including over-the-counter yogurt preparations to rebecca oral pills containing the appropriate probiotics. Patient reports the diligent use of Probiotics.    Pyridium 200mg TID PRN Dysuria    Will see her back in 6 weeks for Follow up in clinic and recheck her urinalysis at that time.    Patient is agreeable to plan of care.    Patient reports that she is not currently experiencing any symptoms of urinary incontinence.    Patient's Body mass index is 35.47 kg/m². BMI is above normal parameters. Recommendations include: educational material, exercise counseling and nutrition counseling    Smoking Cessation Counseling:  Former smoker.Electronic Cigs  I advised patient to quit tobacco use and offered support.  I provided patient with tobacco cessation educational material printed in the patient's After Visit Summary.     Counseling was given to patient for the following topics diagnostic results including: Recurrent UTIs, dysuria., instructions for management as follows: Increase p.o. fluid  intake, continue antibiotic suppressive therapy, Pyridium as needed and risk factor reductions including: Bladder irritants such as caffeine products, soda drinks, citrusy/spicy foods.. The interim medical history and current results were reviewed.  A treatment plan with follow-up was made for Urinary tract infection without hematuria, site unspecified [N39.0].            This document has been electronically signed by Griselda Cheng-Akwa, APRN November 10, 2020 14:11 EST

## 2020-11-16 NOTE — PATIENT INSTRUCTIONS
Dysuria  Dysuria is pain or discomfort while urinating. The pain or discomfort may be felt in the part of your body that drains urine from the bladder (urethra) or in the surrounding tissue of the genitals. The pain may also be felt in the groin area, lower abdomen, or lower back. You may have to urinate frequently or have the sudden feeling that you have to urinate (urgency). Dysuria can affect both men and women, but it is more common in women.  Dysuria can be caused by many different things, including:  · Urinary tract infection.  · Kidney stones or bladder stones.  · Certain sexually transmitted infections (STIs), such as chlamydia.  · Dehydration.  · Inflammation of the tissues of the vagina.  · Use of certain medicines.  · Use of certain soaps or scented products that cause irritation.  Follow these instructions at home:  General instructions  · Watch your condition for any changes.  · Urinate often. Avoid holding urine for long periods of time.  · After a bowel movement or urination, women should cleanse from front to back, using each tissue only once.  · Urinate after sexual intercourse.  · Keep all follow-up visits as told by your health care provider. This is important.  · If you had any tests done to find the cause of dysuria, it is up to you to get your test results. Ask your health care provider, or the department that is doing the test, when your results will be ready.  Eating and drinking    · Drink enough fluid to keep your urine pale yellow.  · Avoid caffeine, tea, and alcohol. They can irritate the bladder and make dysuria worse. In men, alcohol may irritate the prostate.  Medicines  · Take over-the-counter and prescription medicines only as told by your health care provider.  · If you were prescribed an antibiotic medicine, take it as told by your health care provider. Do not stop taking the antibiotic even if you start to feel better.  Contact a health care provider if:  · You have a  fever.  · You develop pain in your back or sides.  · You have nausea or vomiting.  · You have blood in your urine.  · You are not urinating as often as you usually do.  Get help right away if:  · Your pain is severe and not relieved with medicines.  · You cannot eat or drink without vomiting.  · You are confused.  · You have a rapid heartbeat while at rest.  · You have shaking or chills.  · You feel extremely weak.  Summary  · Dysuria is pain or discomfort while urinating. Many different conditions can lead to dysuria.  · If you have dysuria, you may have to urinate frequently or have the sudden feeling that you have to urinate (urgency).  · Watch your condition for any changes. Keep all follow-up visits as told by your health care provider.  · Make sure that you urinate often and drink enough fluid to keep your urine pale yellow.  This information is not intended to replace advice given to you by your health care provider. Make sure you discuss any questions you have with your health care provider.  Document Released: 09/15/2005 Document Revised: 11/30/2018 Document Reviewed: 10/04/2018  [employer] Patient Education © 2020 [employer]  Urinary Tract Infection, Adult    A urinary tract infection (UTI) is an infection of any part of the urinary tract. The urinary tract includes the kidneys, ureters, bladder, and urethra. These organs make, store, and get rid of urine in the body.  Your health care provider may use other names to describe the infection. An upper UTI affects the ureters and kidneys (pyelonephritis). A lower UTI affects the bladder (cystitis) and urethra (urethritis).  What are the causes?  Most urinary tract infections are caused by bacteria in your genital area, around the entrance to your urinary tract (urethra). These bacteria grow and cause inflammation of your urinary tract.  What increases the risk?  You are more likely to develop this condition if:  · You have a urinary catheter that stays in  place (indwelling).  · You are not able to control when you urinate or have a bowel movement (you have incontinence).  · You are female and you:  ? Use a spermicide or diaphragm for birth control.  ? Have low estrogen levels.  ? Are pregnant.  · You have certain genes that increase your risk (genetics).  · You are sexually active.  · You take antibiotic medicines.  · You have a condition that causes your flow of urine to slow down, such as:  ? An enlarged prostate, if you are male.  ? Blockage in your urethra (stricture).  ? A kidney stone.  ? A nerve condition that affects your bladder control (neurogenic bladder).  ? Not getting enough to drink, or not urinating often.  · You have certain medical conditions, such as:  ? Diabetes.  ? A weak disease-fighting system (immunesystem).  ? Sickle cell disease.  ? Gout.  ? Spinal cord injury.  What are the signs or symptoms?  Symptoms of this condition include:  · Needing to urinate right away (urgently).  · Frequent urination or passing small amounts of urine frequently.  · Pain or burning with urination.  · Blood in the urine.  · Urine that smells bad or unusual.  · Trouble urinating.  · Cloudy urine.  · Vaginal discharge, if you are female.  · Pain in the abdomen or the lower back.  You may also have:  · Vomiting or a decreased appetite.  · Confusion.  · Irritability or tiredness.  · A fever.  · Diarrhea.  The first symptom in older adults may be confusion. In some cases, they may not have any symptoms until the infection has worsened.  How is this diagnosed?  This condition is diagnosed based on your medical history and a physical exam. You may also have other tests, including:  · Urine tests.  · Blood tests.  · Tests for sexually transmitted infections (STIs).  If you have had more than one UTI, a cystoscopy or imaging studies may be done to determine the cause of the infections.  How is this treated?  Treatment for this condition includes:  · Antibiotic  medicine.  · Over-the-counter medicines to treat discomfort.  · Drinking enough water to stay hydrated.  If you have frequent infections or have other conditions such as a kidney stone, you may need to see a health care provider who specializes in the urinary tract (urologist).  In rare cases, urinary tract infections can cause sepsis. Sepsis is a life-threatening condition that occurs when the body responds to an infection. Sepsis is treated in the hospital with IV antibiotics, fluids, and other medicines.  Follow these instructions at home:    Medicines  · Take over-the-counter and prescription medicines only as told by your health care provider.  · If you were prescribed an antibiotic medicine, take it as told by your health care provider. Do not stop using the antibiotic even if you start to feel better.  General instructions  · Make sure you:  ? Empty your bladder often and completely. Do not hold urine for long periods of time.  ? Empty your bladder after sex.  ? Wipe from front to back after a bowel movement if you are female. Use each tissue one time when you wipe.  · Drink enough fluid to keep your urine pale yellow.  · Keep all follow-up visits as told by your health care provider. This is important.  Contact a health care provider if:  · Your symptoms do not get better after 1-2 days.  · Your symptoms go away and then return.  Get help right away if you have:  · Severe pain in your back or your lower abdomen.  · A fever.  · Nausea or vomiting.  Summary  · A urinary tract infection (UTI) is an infection of any part of the urinary tract, which includes the kidneys, ureters, bladder, and urethra.  · Most urinary tract infections are caused by bacteria in your genital area, around the entrance to your urinary tract (urethra).  · Treatment for this condition often includes antibiotic medicines.  · If you were prescribed an antibiotic medicine, take it as told by your health care provider. Do not stop using the  antibiotic even if you start to feel better.  · Keep all follow-up visits as told by your health care provider. This is important.  This information is not intended to replace advice given to you by your health care provider. Make sure you discuss any questions you have with your health care provider.  Document Released: 09/27/2006 Document Revised: 12/05/2019 Document Reviewed: 06/27/2019  [employer] Patient Education © 2020 [employer]

## 2020-12-01 ENCOUNTER — TRANSCRIBE ORDERS (OUTPATIENT)
Dept: ADMINISTRATIVE | Facility: HOSPITAL | Age: 30
End: 2020-12-01

## 2020-12-01 ENCOUNTER — LAB (OUTPATIENT)
Dept: LAB | Facility: HOSPITAL | Age: 30
End: 2020-12-01

## 2020-12-01 DIAGNOSIS — R05.9 COUGH: Primary | ICD-10-CM

## 2020-12-01 DIAGNOSIS — R05.9 COUGH: ICD-10-CM

## 2020-12-01 PROCEDURE — C9803 HOPD COVID-19 SPEC COLLECT: HCPCS

## 2020-12-01 PROCEDURE — U0004 COV-19 TEST NON-CDC HGH THRU: HCPCS | Performed by: INTERNAL MEDICINE

## 2020-12-02 LAB — SARS-COV-2 RNA RESP QL NAA+PROBE: NOT DETECTED

## 2021-01-15 ENCOUNTER — IMMUNIZATION (OUTPATIENT)
Dept: VACCINE CLINIC | Facility: HOSPITAL | Age: 31
End: 2021-01-15

## 2021-01-15 PROCEDURE — 0002A: CPT | Performed by: FAMILY MEDICINE

## 2021-01-15 PROCEDURE — 91300 HC SARSCOV02 VAC 30MCG/0.3ML IM: CPT | Performed by: FAMILY MEDICINE

## 2021-01-15 PROCEDURE — 0001A: CPT | Performed by: FAMILY MEDICINE

## 2021-02-05 ENCOUNTER — IMMUNIZATION (OUTPATIENT)
Dept: VACCINE CLINIC | Facility: HOSPITAL | Age: 31
End: 2021-02-05

## 2021-02-05 PROCEDURE — 0002A: CPT | Performed by: INTERNAL MEDICINE

## 2021-02-05 PROCEDURE — 91300 HC SARSCOV02 VAC 30MCG/0.3ML IM: CPT | Performed by: INTERNAL MEDICINE

## 2021-02-23 ENCOUNTER — OFFICE VISIT (OUTPATIENT)
Dept: CARDIOLOGY | Facility: CLINIC | Age: 31
End: 2021-02-23

## 2021-02-23 VITALS
HEART RATE: 63 BPM | HEIGHT: 62 IN | WEIGHT: 207.8 LBS | BODY MASS INDEX: 38.24 KG/M2 | SYSTOLIC BLOOD PRESSURE: 125 MMHG | DIASTOLIC BLOOD PRESSURE: 87 MMHG | RESPIRATION RATE: 16 BRPM | TEMPERATURE: 97.3 F

## 2021-02-23 DIAGNOSIS — R00.2 PALPITATIONS: Primary | ICD-10-CM

## 2021-02-23 DIAGNOSIS — R07.9 CHEST PAIN, UNSPECIFIED TYPE: ICD-10-CM

## 2021-02-23 PROCEDURE — 93000 ELECTROCARDIOGRAM COMPLETE: CPT | Performed by: INTERNAL MEDICINE

## 2021-02-23 PROCEDURE — 99204 OFFICE O/P NEW MOD 45 MIN: CPT | Performed by: INTERNAL MEDICINE

## 2021-02-23 RX ORDER — YOHIMBE BARK 500 MG
CAPSULE ORAL DAILY
COMMUNITY
End: 2022-08-25

## 2021-02-23 NOTE — PROGRESS NOTES
Maureen Gerardo APRN  Cheryl Mcnulty  2021    Patient Active Problem List   Diagnosis   • UTI (urinary tract infection)   • Ureteral calculus       Dear Maureen Gearrdo APRN:    Subjective     Cheryl Mcnulty is a 31 y.o. female with the problems as listed above, presents with,    Chief complaint: Recurrent chest pains and palpitations.    History of Present Illness: Cheryl is a pleasant 31-year-old  female with no history of known heart disease or cardiac arrhythmias, presents with complaints of having recurrent episodes of chest pains for about a month or so.  She describes these chest pains as being felt as sharp stabbing pains on the left side of her chest which seem to occur at random with no relation to exertion and sometimes have woken her up from sleep.  They sometimes are fairly intense and associated with shortness of breath.  She would just try to relax and take a deep breath and the pain seems to ease off slowly.  She also has been having a recurrent episodes of palpitations with skipped heartbeats and fluttering in her chest associated with some dizziness but no syncope.  These seem to occur almost daily.  She admits to drinking about 3-4 bottles of Mountain Dew daily.  She has history of lupus and is on hydroxychloroquine for that.     Cheryl Mcnulty  Echo Complete w/Doppler and Color Flow  Order# [id number]  Reading physician:   Russell Leong MD Ordering physician:   Aby Bernstein MD Study date: 19   Patient Information    Patient Name   Cheryl Mcnulty MRN   [id number] Sex   Female  (Age)   1990 (31 y.o.)   Interpretation Summary    · Normal left ventricular cavity size and wall thickness noted. All left ventricular wall segments contract normally.  · Estimated EF appears to be in the range of 61 - 65%.  · The aortic valve is structurally normal. No aortic valve regurgitation is present. No aortic valve stenosis is present.  · The mitral valve  is normal in structure. No mitral valve regurgitation is present. No significant mitral valve stenosis is present.  · The tricuspid valve is normal. No tricuspid valve stenosis is present. Mild tricuspid valve regurgitation is present. Estimated right ventricular systolic pressure from tricuspid regurgitation is mildly elevated (35-45 mmHg).  · There is no evidence of pericardial effusion.       No Known Allergies:    Current Outpatient Medications:   •  atenolol (TENORMIN) 25 MG tablet, Take 1 tablet by mouth Daily., Disp: 90 tablet, Rfl: 1  •  atomoxetine (STRATTERA) 60 MG capsule, Take 1 (one) Capsule by mouth daily, Disp: 30 capsule, Rfl: 2  •  Brexpiprazole 1 MG tablet, Take 1 tablet by mouth daily, Disp: 30 tablet, Rfl: 2  •  DULoxetine (CYMBALTA) 60 MG capsule, Take 1 capsule by mouth Daily., Disp: 90 capsule, Rfl: 2  •  folic acid (FOLVITE) 1 MG tablet, Take 1 tablet by mouth Daily., Disp: 30 tablet, Rfl: 4  •  hydroxychloroquine (PLAQUENIL) 200 MG tablet, Take 1 tablet by mouth Daily., Disp: 30 tablet, Rfl: 4  •  Lactobacillus (Acidophilus) 100 MG capsule, Take  by mouth Daily., Disp: , Rfl:   •  meloxicam (MOBIC) 15 MG tablet, Take 1 tablet by mouth Daily. (Patient taking differently: Take 7.5 mg by mouth Daily.), Disp: 30 tablet, Rfl: 3  •  nitrofurantoin, macrocrystal-monohydrate, (Macrobid) 100 MG capsule, Take 1 capsule by mouth Daily., Disp: 56 capsule, Rfl: 3  •  omeprazole (priLOSEC) 40 MG capsule, Take 1 capsule by mouth Daily., Disp: 30 capsule, Rfl: 2  •  tiZANidine (ZANAFLEX) 4 MG tablet, Take 1 tablet by mouth 2 (two) times a day., Disp: 60 tablet, Rfl: 4  •  atenolol (TENORMIN) 25 MG tablet, Take 1 tablet by mouth Daily., Disp: 30 tablet, Rfl: 2  •  atomoxetine (STRATTERA) 10 MG capsule, Take 1 capsule by mouth daily, Disp: 30 capsule, Rfl: 2  •  Brexpiprazole (Rexulti) 1 MG tablet, Take 1 tablet by mouth Daily., Disp: 90 tablet, Rfl: 2  •  DULoxetine (CYMBALTA) 30 MG capsule, Take 1 capsule  by mouth Daily., Disp: 30 capsule, Rfl: 2  •  fluconazole (DIFLUCAN) 150 MG tablet, Take 1 tablet by mouth once repeat 2nd dose in 48 hours, Disp: 2 tablet, Rfl: 0  •  HYDROcodone-acetaminophen (NORCO) 5-325 MG per tablet, Take 1 tablet by mouth 2 (Two) Times a Day As Needed for Moderate Pain ., Disp: 6 tablet, Rfl: 0  •  levoFLOXacin (LEVAQUIN) 750 MG tablet, Take 1 tablet by mouth Daily., Disp: 7 tablet, Rfl: 0  •  meloxicam (MOBIC) 7.5 MG tablet, Take 1 tablet by mouth Daily As Needed with food for joint and muscle pains., Disp: 30 tablet, Rfl: 4  •  nitrofurantoin, macrocrystal-monohydrate, (MACROBID) 100 MG capsule, Take 1 capsule by mouth Daily., Disp: 56 capsule, Rfl: 1  •  omeprazole (priLOSEC) 40 MG capsule, Take 1 capsule by mouth Daily., Disp: 90 capsule, Rfl: 2  •  ondansetron (ZOFRAN) 4 MG tablet, Take 1 tablet every four hours, as needed, Disp: 30 tablet, Rfl: 1  •  phenazopyridine (Pyridium) 200 MG tablet, Take 1 tablet by mouth 3 (Three) Times a Day As Needed for Bladder Spasms., Disp: 20 tablet, Rfl: 0  •  terconazole (TERAZOL 7) 0.4 % vaginal cream, Insert 1 applicatorful vaginally once daily as directed, Disp: 45 g, Rfl: 0  •  tiZANidine (ZANAFLEX) 4 MG tablet, Take 4 mg by mouth At Night As Needed for Muscle Spasms., Disp: , Rfl:     Past Medical History:   Diagnosis Date   • Congestive heart failure (CMS/HCC)    • Fibromyalgia    • GERD (gastroesophageal reflux disease)    • History of ear infections    • History of heart disease    • History of lupus    • History of migraine    • Hypertension    • 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 DRAINAGE OF OVARIAN CYST.;  Surgeon: Rachel Caruso DO;  Location: [facility] OR;  Service: Obstetrics/Gynecology   •  ORIF SCAPHOID W VASCULARIZED BONE GRAFT     • TUBAL ABDOMINAL LIGATION     • TUBAL COAGULATION LAPAROSCOPIC Bilateral 8/13/2018    Procedure: TUBAL FALLOPE FILSHIE CLIPPING LAPAROSCOPIC;  Surgeon: Rachel Caruso DO;  Location: [facility];  Service: Obstetrics/Gynecology     Family History   Problem Relation Age of Onset   • Heart disease Mother    • Thyroid disease Mother    • Anxiety disorder Mother    • Depression Mother    • Bipolar disorder Mother    • Heart disease Father    • Cancer Father    • Heart disease Maternal Grandmother    • Diabetes Maternal Grandmother    • Diabetes Maternal Grandfather    • Lupus Paternal Grandmother    • Rheum arthritis Paternal Grandmother      Social History     Tobacco Use   • Smoking status: Former Smoker     Packs/day: 0.00     Types: Electronic Cigarette   • Smokeless tobacco: Never Used   Substance Use Topics   • Alcohol use: Yes     Comment: socially   • Drug use: No       Review of Systems   Constitution: Positive for malaise/fatigue. Negative for chills, diaphoresis and fever.   HENT:        Mouth sores   Eyes: Positive for visual disturbance.   Cardiovascular: Positive for chest pain, leg swelling and palpitations. Negative for orthopnea and paroxysmal nocturnal dyspnea.   Respiratory: Positive for shortness of breath. Negative for cough and hemoptysis.    Endocrine: Negative for cold intolerance and heat intolerance.   Hematologic/Lymphatic: Does not bruise/bleed easily.        Hx anemia   Skin: Negative for rash.   Musculoskeletal: Positive for joint pain, muscle weakness and stiffness. Negative for myalgias.   Gastrointestinal: Negative for abdominal pain, constipation, diarrhea, nausea and vomiting.   Genitourinary: Positive for bladder incontinence, dysuria and hesitancy. Negative for hematuria.   Neurological: Positive for headaches and light-headedness. Negative for dizziness, focal weakness and numbness.   Psychiatric/Behavioral: Positive for  "depression.       Objective   Blood pressure 125/87, pulse 63, temperature 97.3 °F (36.3 °C), resp. rate 16, height 157.5 cm (62\"), weight 94.3 kg (207 lb 12.8 oz), not currently breastfeeding.  Body mass index is 38.01 kg/m².    Vitals signs reviewed.   Constitutional:       Appearance: Well-developed.   Eyes:      Pupils: Pupils are equal, round, and reactive to light.   Neck:      Musculoskeletal: Neck supple.      Thyroid: No thyromegaly.      Vascular: No JVD.      Trachea: No tracheal deviation.      Lymphadenopathy: No cervical adenopathy.   Pulmonary:      Effort: Pulmonary effort is normal.      Breath sounds: Normal breath sounds.   Cardiovascular:      PMI at left midclavicular line. Normal rate. Regular rhythm. Normal S1. Normal S2.      Murmurs: There is no murmur.      No gallop. No click. No rub.   Pulses:     Intact distal pulses.   Edema:     Peripheral edema absent.   Abdominal:      General: Bowel sounds are normal.      Palpations: Abdomen is soft. There is no abdominal mass.      Tenderness: There is no abdominal tenderness.   Musculoskeletal: Normal range of motion.   Skin:     General: Skin is warm and dry.      Findings: No rash.   Neurological:      Mental Status: Alert and oriented to person, place, and time.         Lab Results   Component Value Date     05/12/2020    K 5.0 05/12/2020     05/12/2020    CO2 26.0 05/12/2020    BUN 9 05/12/2020    CREATININE 0.77 05/12/2020    GLUCOSE 92 05/12/2020    CALCIUM 9.5 05/12/2020    AST 8 05/12/2020    ALT 8 05/12/2020    ALKPHOS 78 05/12/2020    LABIL2 1.7 05/25/2016     Lab Results   Component Value Date    CKTOTAL 85 09/14/2018     Lab Results   Component Value Date    WBC 8.15 05/12/2020    HGB 11.8 (L) 05/12/2020    HCT 36.5 05/12/2020     05/12/2020     Lab Results   Component Value Date    INR 1.15 (H) 09/14/2018    INR 1.14 (H) 06/16/2018     Lab Results   Component Value Date    MG 2.3 09/14/2018     Lab Results "   Component Value Date    TSH 2.060 09/03/2019    CHLPL 153 05/25/2016    TRIG 55 05/25/2016    HDL 61 05/25/2016    LDL 81 05/25/2016      Lab Results   Component Value Date    BNP <2.0 06/16/2018       ECG 12 Lead    Date/Time: 2/23/2021 3:54 PM  Performed by: Russell Leong MD  Authorized by: Russell Leong MD   Comparison: compared with previous ECG from 9/14/2018  Comparison to previous ECG: Patient had some nonspecific ST-T wave changes on the previous EKG which are not evident on this EKG.  Rhythm: sinus rhythm  Conduction: conduction normal  ST Segments: ST segments normal  T Waves: T waves normal    Clinical impression: normal ECG               Assessment/Plan :   Diagnosis Plan   1. Palpitations  Cardiac Event Monitor   2. Chest pain, unspecified type  Treadmill Stress Test        Recommendations:  Orders Placed This Encounter   Procedures   • Cardiac Event Monitor   • Treadmill Stress Test   • ECG 12 Lead        1. We will evaluate her chest pains with a treadmill stress EKG test.  2. We will evaluate her palpitations with an event monitor.  3. I told her to cut back on consumption of Mountain Dew and other caffeinated beverages.    Return in about 5 weeks (around 3/30/2021) for or sooner if needed.    As always, Maureen  I appreciate very much the opportunity to participate in the cardiovascular care of your patients. Please do not hesitate to call me with any questions with regards to Cheryl Mcnulty evaluation and management.       With Best Regards,        Russell Leong MD, [facility]    Please note that portions of this note were completed with a voice recognition program.

## 2021-03-01 ENCOUNTER — TELEPHONE (OUTPATIENT)
Dept: CARDIOLOGY | Facility: CLINIC | Age: 31
End: 2021-03-01

## 2021-03-01 NOTE — TELEPHONE ENCOUNTER
Called pt to advise that I called her insurance Brian and they stated as of today she no longer has insurance with them REF-I -[id number].     She stated she is going to call medicare and get it started and call us with the info.

## 2021-03-09 ENCOUNTER — APPOINTMENT (OUTPATIENT)
Dept: CT IMAGING | Facility: HOSPITAL | Age: 31
End: 2021-03-09

## 2021-03-09 ENCOUNTER — HOSPITAL ENCOUNTER (EMERGENCY)
Facility: HOSPITAL | Age: 31
Discharge: HOME | End: 2021-03-09
Attending: EMERGENCY MEDICINE | Admitting: EMERGENCY MEDICINE

## 2021-03-09 ENCOUNTER — APPOINTMENT (OUTPATIENT)
Dept: GENERAL RADIOLOGY | Facility: HOSPITAL | Age: 31
End: 2021-03-09

## 2021-03-09 VITALS
HEIGHT: 63 IN | SYSTOLIC BLOOD PRESSURE: 132 MMHG | TEMPERATURE: 99.1 F | BODY MASS INDEX: 35.44 KG/M2 | WEIGHT: 200 LBS | OXYGEN SATURATION: 98 % | DIASTOLIC BLOOD PRESSURE: 83 MMHG | RESPIRATION RATE: 16 BRPM | HEART RATE: 66 BPM

## 2021-03-09 DIAGNOSIS — A49.9 BACTERIAL UTI: Primary | ICD-10-CM

## 2021-03-09 DIAGNOSIS — N39.0 BACTERIAL UTI: Primary | ICD-10-CM

## 2021-03-09 DIAGNOSIS — R30.0 DYSURIA: ICD-10-CM

## 2021-03-09 LAB
ALBUMIN SERPL-MCNC: 4.56 G/DL (ref 3.5–5.2)
ALBUMIN/GLOB SERPL: 1.9 G/DL
ALP SERPL-CCNC: 104 U/L (ref 39–117)
ALT SERPL W P-5'-P-CCNC: 11 U/L (ref 1–33)
AMYLASE SERPL-CCNC: 59 U/L (ref 28–100)
ANION GAP SERPL CALCULATED.3IONS-SCNC: 10.3 MMOL/L (ref 5–15)
AST SERPL-CCNC: 15 U/L (ref 1–32)
BACTERIA UR QL AUTO: ABNORMAL /HPF
BASOPHILS # BLD AUTO: 0.03 10*3/MM3 (ref 0–0.2)
BASOPHILS NFR BLD AUTO: 0.4 % (ref 0–1.5)
BILIRUB SERPL-MCNC: 0.4 MG/DL (ref 0–1.2)
BILIRUB UR QL STRIP: NEGATIVE
BUN SERPL-MCNC: 8 MG/DL (ref 6–20)
BUN/CREAT SERPL: 10.4 (ref 7–25)
CALCIUM SPEC-SCNC: 9.3 MG/DL (ref 8.6–10.5)
CHLORIDE SERPL-SCNC: 105 MMOL/L (ref 98–107)
CLARITY UR: CLEAR
CO2 SERPL-SCNC: 26.7 MMOL/L (ref 22–29)
COLOR UR: ABNORMAL
CREAT SERPL-MCNC: 0.77 MG/DL (ref 0.57–1)
CRP SERPL-MCNC: 0.49 MG/DL (ref 0–0.5)
D-LACTATE SERPL-SCNC: 1.1 MMOL/L (ref 0.5–2)
DEPRECATED RDW RBC AUTO: 39 FL (ref 37–54)
EOSINOPHIL # BLD AUTO: 0.15 10*3/MM3 (ref 0–0.4)
EOSINOPHIL NFR BLD AUTO: 1.8 % (ref 0.3–6.2)
ERYTHROCYTE [DISTWIDTH] IN BLOOD BY AUTOMATED COUNT: 12.1 % (ref 12.3–15.4)
FERRITIN SERPL-MCNC: 49.29 NG/ML (ref 13–150)
FLUAV RNA RESP QL NAA+PROBE: NOT DETECTED
FLUBV RNA RESP QL NAA+PROBE: NOT DETECTED
GFR SERPL CREATININE-BSD FRML MDRD: 87 ML/MIN/1.73
GLOBULIN UR ELPH-MCNC: 2.4 GM/DL
GLUCOSE SERPL-MCNC: 86 MG/DL (ref 65–99)
GLUCOSE UR STRIP-MCNC: NEGATIVE MG/DL
HCG SERPL QL: NEGATIVE
HCT VFR BLD AUTO: 39.5 % (ref 34–46.6)
HGB BLD-MCNC: 12.8 G/DL (ref 12–15.9)
HGB UR QL STRIP.AUTO: ABNORMAL
HOLD SPECIMEN: NORMAL
HYALINE CASTS UR QL AUTO: ABNORMAL /LPF
IMM GRANULOCYTES # BLD AUTO: 0.01 10*3/MM3 (ref 0–0.05)
IMM GRANULOCYTES NFR BLD AUTO: 0.1 % (ref 0–0.5)
KETONES UR QL STRIP: NEGATIVE
LDH SERPL-CCNC: 211 U/L (ref 135–214)
LEUKOCYTE ESTERASE UR QL STRIP.AUTO: ABNORMAL
LIPASE SERPL-CCNC: 21 U/L (ref 13–60)
LYMPHOCYTES # BLD AUTO: 2.58 10*3/MM3 (ref 0.7–3.1)
LYMPHOCYTES NFR BLD AUTO: 30.2 % (ref 19.6–45.3)
MAGNESIUM SERPL-MCNC: 2.3 MG/DL (ref 1.6–2.6)
MCH RBC QN AUTO: 28.1 PG (ref 26.6–33)
MCHC RBC AUTO-ENTMCNC: 32.4 G/DL (ref 31.5–35.7)
MCV RBC AUTO: 86.8 FL (ref 79–97)
MONOCYTES # BLD AUTO: 0.55 10*3/MM3 (ref 0.1–0.9)
MONOCYTES NFR BLD AUTO: 6.4 % (ref 5–12)
NEUTROPHILS NFR BLD AUTO: 5.21 10*3/MM3 (ref 1.7–7)
NEUTROPHILS NFR BLD AUTO: 61.1 % (ref 42.7–76)
NITRITE UR QL STRIP: NEGATIVE
NRBC BLD AUTO-RTO: 0 /100 WBC (ref 0–0.2)
PH UR STRIP.AUTO: 7 [PH] (ref 5–8)
PLATELET # BLD AUTO: 263 10*3/MM3 (ref 140–450)
PMV BLD AUTO: 10.8 FL (ref 6–12)
POTASSIUM SERPL-SCNC: 4.1 MMOL/L (ref 3.5–5.2)
PROT SERPL-MCNC: 7 G/DL (ref 6–8.5)
PROT UR QL STRIP: NEGATIVE
RBC # BLD AUTO: 4.55 10*6/MM3 (ref 3.77–5.28)
RBC # UR: ABNORMAL /HPF
REF LAB TEST METHOD: ABNORMAL
SARS-COV-2 RNA RESP QL NAA+PROBE: NOT DETECTED
SODIUM SERPL-SCNC: 142 MMOL/L (ref 136–145)
SP GR UR STRIP: 1.02 (ref 1–1.03)
SQUAMOUS #/AREA URNS HPF: ABNORMAL /HPF
UROBILINOGEN UR QL STRIP: ABNORMAL
WBC # BLD AUTO: 8.53 10*3/MM3 (ref 3.4–10.8)
WBC UR QL AUTO: ABNORMAL /HPF
WHOLE BLOOD HOLD SPECIMEN: NORMAL
WHOLE BLOOD HOLD SPECIMEN: NORMAL

## 2021-03-09 PROCEDURE — 25010000002 KETOROLAC TROMETHAMINE PER 15 MG: Performed by: EMERGENCY MEDICINE

## 2021-03-09 PROCEDURE — 83690 ASSAY OF LIPASE: CPT | Performed by: PHYSICIAN ASSISTANT

## 2021-03-09 PROCEDURE — 80053 COMPREHEN METABOLIC PANEL: CPT | Performed by: PHYSICIAN ASSISTANT

## 2021-03-09 PROCEDURE — 96375 TX/PRO/DX INJ NEW DRUG ADDON: CPT

## 2021-03-09 PROCEDURE — 99285 EMERGENCY DEPT VISIT HI MDM: CPT

## 2021-03-09 PROCEDURE — 36415 COLL VENOUS BLD VENIPUNCTURE: CPT

## 2021-03-09 PROCEDURE — 83615 LACTATE (LD) (LDH) ENZYME: CPT | Performed by: PHYSICIAN ASSISTANT

## 2021-03-09 PROCEDURE — 85025 COMPLETE CBC W/AUTO DIFF WBC: CPT | Performed by: PHYSICIAN ASSISTANT

## 2021-03-09 PROCEDURE — 82728 ASSAY OF FERRITIN: CPT | Performed by: PHYSICIAN ASSISTANT

## 2021-03-09 PROCEDURE — 87077 CULTURE AEROBIC IDENTIFY: CPT | Performed by: PHYSICIAN ASSISTANT

## 2021-03-09 PROCEDURE — 87040 BLOOD CULTURE FOR BACTERIA: CPT | Performed by: PHYSICIAN ASSISTANT

## 2021-03-09 PROCEDURE — 25010000002 CEFTRIAXONE PER 250 MG: Performed by: EMERGENCY MEDICINE

## 2021-03-09 PROCEDURE — 87086 URINE CULTURE/COLONY COUNT: CPT | Performed by: PHYSICIAN ASSISTANT

## 2021-03-09 PROCEDURE — 87186 SC STD MICRODIL/AGAR DIL: CPT | Performed by: PHYSICIAN ASSISTANT

## 2021-03-09 PROCEDURE — 87636 SARSCOV2 & INF A&B AMP PRB: CPT | Performed by: PHYSICIAN ASSISTANT

## 2021-03-09 PROCEDURE — 71045 X-RAY EXAM CHEST 1 VIEW: CPT | Performed by: RADIOLOGY

## 2021-03-09 PROCEDURE — 81001 URINALYSIS AUTO W/SCOPE: CPT | Performed by: PHYSICIAN ASSISTANT

## 2021-03-09 PROCEDURE — 83735 ASSAY OF MAGNESIUM: CPT | Performed by: PHYSICIAN ASSISTANT

## 2021-03-09 PROCEDURE — 96365 THER/PROPH/DIAG IV INF INIT: CPT

## 2021-03-09 PROCEDURE — 84145 PROCALCITONIN (PCT): CPT | Performed by: PHYSICIAN ASSISTANT

## 2021-03-09 PROCEDURE — 86140 C-REACTIVE PROTEIN: CPT | Performed by: PHYSICIAN ASSISTANT

## 2021-03-09 PROCEDURE — 82150 ASSAY OF AMYLASE: CPT | Performed by: PHYSICIAN ASSISTANT

## 2021-03-09 PROCEDURE — 71045 X-RAY EXAM CHEST 1 VIEW: CPT

## 2021-03-09 PROCEDURE — 74176 CT ABD & PELVIS W/O CONTRAST: CPT

## 2021-03-09 PROCEDURE — 83605 ASSAY OF LACTIC ACID: CPT | Performed by: PHYSICIAN ASSISTANT

## 2021-03-09 PROCEDURE — 84703 CHORIONIC GONADOTROPIN ASSAY: CPT | Performed by: PHYSICIAN ASSISTANT

## 2021-03-09 RX ORDER — SODIUM CHLORIDE 0.9 % (FLUSH) 0.9 %
10 SYRINGE (ML) INJECTION AS NEEDED
Status: DISCONTINUED | OUTPATIENT
Start: 2021-03-09 | End: 2021-03-10 | Stop reason: HOSPADM

## 2021-03-09 RX ORDER — LEVOFLOXACIN 750 MG/1
750 TABLET ORAL DAILY
Qty: 10 TABLET | Refills: 0 | Status: SHIPPED | OUTPATIENT
Start: 2021-03-09 | End: 2021-04-16

## 2021-03-09 RX ORDER — PROMETHAZINE HYDROCHLORIDE 25 MG/1
25 TABLET ORAL EVERY 6 HOURS PRN
Qty: 30 TABLET | Refills: 0 | Status: SHIPPED | OUTPATIENT
Start: 2021-03-09 | End: 2021-04-16 | Stop reason: SDUPTHER

## 2021-03-09 RX ORDER — KETOROLAC TROMETHAMINE 30 MG/ML
30 INJECTION, SOLUTION INTRAMUSCULAR; INTRAVENOUS ONCE
Status: COMPLETED | OUTPATIENT
Start: 2021-03-09 | End: 2021-03-09

## 2021-03-09 RX ORDER — KETOROLAC TROMETHAMINE 10 MG/1
10 TABLET, FILM COATED ORAL EVERY 6 HOURS PRN
Qty: 15 TABLET | Refills: 0 | Status: SHIPPED | OUTPATIENT
Start: 2021-03-09 | End: 2022-08-25

## 2021-03-09 RX ADMIN — SODIUM CHLORIDE 1000 ML: 9 INJECTION, SOLUTION INTRAVENOUS at 19:56

## 2021-03-09 RX ADMIN — CEFTRIAXONE 1 G: 1 INJECTION, POWDER, FOR SOLUTION INTRAMUSCULAR; INTRAVENOUS at 21:30

## 2021-03-09 RX ADMIN — SODIUM CHLORIDE 1000 ML: 9 INJECTION, SOLUTION INTRAVENOUS at 21:30

## 2021-03-09 RX ADMIN — KETOROLAC TROMETHAMINE 30 MG: 30 INJECTION, SOLUTION INTRAMUSCULAR at 21:30

## 2021-03-10 LAB — PROCALCITONIN SERPL-MCNC: 0.04 NG/ML (ref 0–0.25)

## 2021-03-10 NOTE — ED PROVIDER NOTES
Subjective   31-year-old female who presents to the ED today for generalized weakness and fatigue.  She states this started suddenly yesterday.  She states she has been having a difficult time urinating.  She states she feels like she has to push a lot to get any urine out.  She states she is also having some right flank pain.  She states she is having diffuse body aches.  She has not had a fever but she has had chills.  She is had nausea but no vomiting or diarrhea.  She states she has been having normal bowel movements.  She states she has had some congestion but denies any sore throat or cough.  She states she has had both of her Covid vaccines.  She states last night she broke out in a diffuse rash all over.  She showed me a picture of the rash on her phone and it looked urticarial.  She states it was burning.  She states she put aloe gel on it last night and when she woke up this morning it was gone.  She is concerned that she may be having a lupus flare.  She states she has been taking all of her regular medications as prescribed.      History provided by:  Patient  Illness  Severity:  Moderate  Onset quality:  Sudden  Duration: started last night.  Timing:  Constant  Progression:  Unchanged  Chronicity:  New  Associated symptoms: abdominal pain, congestion, fatigue, myalgias, nausea and rash    Associated symptoms: no chest pain, no cough, no diarrhea, no ear pain, no fever, no headaches, no rhinorrhea, no shortness of breath, no sore throat, no vomiting and no wheezing        Review of Systems   Constitutional: Positive for chills and fatigue. Negative for fever.   HENT: Positive for congestion. Negative for ear pain, rhinorrhea and sore throat.    Eyes: Negative.    Respiratory: Negative for cough, shortness of breath and wheezing.    Cardiovascular: Negative for chest pain.   Gastrointestinal: Positive for abdominal pain and nausea. Negative for diarrhea and vomiting.   Genitourinary: Positive for flank  pain.   Musculoskeletal: Positive for myalgias.   Skin: Positive for rash.   Neurological: Positive for weakness. Negative for headaches.   Psychiatric/Behavioral: Negative.    All other systems reviewed and are negative.      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 DRAINAGE OF OVARIAN CYST.;  Surgeon: Rachel Caruso DO;  Location: [facility];  Service: Obstetrics/Gynecology   • ORIF SCAPHOID W VASCULARIZED BONE GRAFT     • TUBAL ABDOMINAL LIGATION     • TUBAL COAGULATION LAPAROSCOPIC Bilateral 2018    Procedure: TUBAL FALLOPE FILSHIE CLIPPING LAPAROSCOPIC;  Surgeon: Rachel Caruso DO;  Location: [facility];  Service: Obstetrics/Gynecology       Family History   Problem Relation Age of Onset   • Heart disease Mother    • Thyroid disease Mother    • Anxiety disorder Mother    • Depression Mother    • Bipolar disorder Mother    • Heart disease Father    • Cancer Father    • Heart disease Maternal Grandmother    • Diabetes Maternal Grandmother    • Diabetes Maternal Grandfather    • Lupus Paternal Grandmother    • Rheum arthritis Paternal Grandmother        Social History     Socioeconomic History   • Marital status: Single     Spouse name: Not on file   • Number of children: Not on file   • Years of education: Not on file   • Highest education level: Not on file   Tobacco Use   • Smoking status: Former Smoker     Packs/day: 0.00     Types: Electronic Cigarette   •  Smokeless tobacco: Never Used   Substance and Sexual Activity   • Alcohol use: Yes     Comment: socially   • Drug use: No   • Sexual activity: Yes     Partners: Male     Birth control/protection: Surgical           Objective   Physical Exam  Vitals and nursing note reviewed.   Constitutional:       General: She is not in acute distress.     Appearance: Normal appearance.   HENT:      Head: Normocephalic and atraumatic.      Right Ear: External ear normal.      Left Ear: External ear normal.   Eyes:      Conjunctiva/sclera: Conjunctivae normal.      Pupils: Pupils are equal, round, and reactive to light.   Cardiovascular:      Rate and Rhythm: Normal rate and regular rhythm.      Pulses: Normal pulses.      Heart sounds: Normal heart sounds.   Pulmonary:      Effort: Pulmonary effort is normal.      Breath sounds: Normal breath sounds.   Abdominal:      General: Bowel sounds are normal.      Palpations: Abdomen is soft.      Tenderness: There is abdominal tenderness (suprapubic). There is right CVA tenderness. There is no guarding or rebound.   Musculoskeletal:         General: Normal range of motion.      Cervical back: Normal range of motion and neck supple.   Skin:     General: Skin is warm and dry.      Capillary Refill: Capillary refill takes less than 2 seconds.   Neurological:      General: No focal deficit present.      Mental Status: She is alert and oriented to person, place, and time.   Psychiatric:         Mood and Affect: Mood normal.         Procedures           ED Course  ED Course as of Mar 17 0814   Tue Mar 09, 2021   2003 Endorsed to Dr. Garvey    []   5201 IMPRESSION:    Unremarkable exam. No acute cardiopulmonary findings identified.   XR Chest 1 View [ES]   2241 IMPRESSION:  1. No definite acute intra-abdominal abnormality. There is mild prominence to the patient's right renal pelvis and proximal right ureter but there is no definite radiopaque obstructing stone. Consider clinical  correlation for infection or potential other  etiology.  2. Nonspecific cystic lesion is seen in the inferior portion of the spleen and appears slightly larger.   CT Abdomen Pelvis Without Contrast [ES]      ED Course User Index  [AH] Rosalba Horne PA  [ES] Baron Garvey MD                                           MDM  Number of Diagnoses or Management Options     Amount and/or Complexity of Data Reviewed  Clinical lab tests: reviewed        Final diagnoses:   Bacterial UTI   Dysuria            Rosalba Horne PA  03/09/21 2009       Rosalba Horne PA  03/17/21 0815

## 2021-03-11 LAB — BACTERIA SPEC AEROBE CULT: ABNORMAL

## 2021-03-14 LAB
BACTERIA SPEC AEROBE CULT: NORMAL
BACTERIA SPEC AEROBE CULT: NORMAL

## 2021-03-17 ENCOUNTER — OFFICE VISIT (OUTPATIENT)
Dept: UROLOGY | Facility: CLINIC | Age: 31
End: 2021-03-17

## 2021-03-17 VITALS — WEIGHT: 205 LBS | BODY MASS INDEX: 36.32 KG/M2 | TEMPERATURE: 98.5 F | HEIGHT: 63 IN

## 2021-03-17 DIAGNOSIS — N39.0 URINARY TRACT INFECTION WITHOUT HEMATURIA, SITE UNSPECIFIED: Primary | ICD-10-CM

## 2021-03-17 DIAGNOSIS — R35.0 URINE FREQUENCY: ICD-10-CM

## 2021-03-17 DIAGNOSIS — R30.0 SPASTIC DYSURIA: ICD-10-CM

## 2021-03-17 PROCEDURE — 99214 OFFICE O/P EST MOD 30 MIN: CPT | Performed by: NURSE PRACTITIONER

## 2021-03-17 RX ORDER — PHENAZOPYRIDINE HYDROCHLORIDE 200 MG/1
200 TABLET, FILM COATED ORAL 3 TIMES DAILY PRN
Qty: 20 TABLET | Refills: 1 | Status: SHIPPED | OUTPATIENT
Start: 2021-03-17 | End: 2021-04-16 | Stop reason: SDUPTHER

## 2021-03-17 NOTE — PROGRESS NOTES
Chief Complaint  Recurrent UTI (4 mnth f/u) and Er f/u    Subjective          Cheryl Mcnulty presents to [facility]  History of Present Illness  Very pleasant Patient evaluated in clinic  today with significant other at site..  She is an ER follow-up for difficulty urinating.  Patient had a lot of diffuse symptoms, she reports she feels like she has to push a lot to get any urine out, which causes her significant flank pain.  When she does, she denies any burning on urination, denies urine frequency, urgency, or any episodes of gross hematuria.  She reports pelvic pressure and suprapubic discomfort, she has lower back pain, but denies any CVA tenderness.      Patient's last urine culture was positive for Klebsiella aeruginenes on 03/9/2021 for which she was treated with antibiotic therapy Levaquin 750 mg daily for 10 days by her ER..  Repeat culture on 03/15/2021 Per PCP was negative for growth. Her Urine Dipstick today is completely negative.     She also reports  A significant  improvement in her urinary symptoms.  She denies having any more episodes of frequency, urgency, or nocturia. She denies dysuria, burning on urination, or gross hematuria.  Denies back pain, pelvic pain and pressure, she denies fever or chills, she does not have nausea, vomiting, or diarrhea.  Reports generalized malaise patient believes she is having a lupus flare with significant rash all over her body.    We both reviewed/discussed her recent CT scan done which shows no definite acute intra-abdominal abnormality. There is mild prominence to the patient's right renal pelvis and proximal right ureter but there is no definite radiopaque obstructing stone.     Overall she reports doing better, post ER.   She reports  increasing her p.o. fluid intake to at least 2 L daily, and has significantly reduced her intake of soda drinks. She drinks more water and cranberry drinks.    Patient has been  "advised to follow up with rheumatology, states she has scheduled appointment next month.    Objective   Vital Signs:   Temp 98.5 °F (36.9 °C)   Ht 160 cm (63\")   Wt 93 kg (205 lb)   BMI 36.31 kg/m²     Physical Exam  Constitutional:       General: She is not in acute distress.     Appearance: She is well-developed. She is obese. She is ill-appearing.   HENT:      Head: Normocephalic and atraumatic.   Eyes:      Pupils: Pupils are equal, round, and reactive to light.   Neck:      Thyroid: No thyromegaly.      Trachea: No tracheal deviation.   Cardiovascular:      Rate and Rhythm: Normal rate and regular rhythm.      Heart sounds: No murmur heard.     Pulmonary:      Effort: Pulmonary effort is normal. No respiratory distress.      Breath sounds: Normal breath sounds. No stridor. No wheezing.   Abdominal:      General: Bowel sounds are normal.      Palpations: Abdomen is soft.      Tenderness: There is abdominal tenderness.   Genitourinary:     Labia:         Right: No tenderness.         Left: No tenderness.       Vagina: Normal. No vaginal discharge.   Musculoskeletal:         General: Tenderness present. No deformity. Normal range of motion.      Cervical back: Normal range of motion.   Skin:     General: Skin is warm and dry.      Capillary Refill: Capillary refill takes less than 2 seconds.      Coloration: Skin is not pale.      Findings: No erythema or rash.   Neurological:      Mental Status: She is alert and oriented to person, place, and time.      Cranial Nerves: No cranial nerve deficit.      Sensory: No sensory deficit.      Coordination: Coordination normal.   Psychiatric:         Behavior: Behavior normal.         Thought Content: Thought content normal.         Judgment: Judgment normal.        Result Review :     Common labs    Common Labsle 5/8/20 5/8/20 5/12/20 5/12/20 3/9/21 3/9/21    0835 0835 1055 1055 1958 1958   Glucose  110 (A)  92  86   BUN  9  9  8   Creatinine  0.79  0.77  0.77   eGFR " Non  Am  85  88  87   Sodium  136  141  142   Potassium  3.6  5.0  4.1   Chloride  98  102  105   Calcium  9.1  9.5  9.3   Albumin  4.51  4.01  4.56   Total Bilirubin  1.2  0.3  0.4   Alkaline Phosphatase  115  78  104   AST (SGOT)  16  8  15   ALT (SGPT)  10  8  11   WBC 17.37 (A)  8.15  8.53    Hemoglobin 13.3  11.8 (A)  12.8    Hematocrit 40.4  36.5  39.5    Platelets 213  257  263    (A) Abnormal value            CMP    CMP 5/8/20 5/12/20 3/9/21   Glucose 110 (A) 92 86   BUN 9 9 8   Creatinine 0.79 0.77 0.77   eGFR Non African Am 85 88 87   Sodium 136 141 142   Potassium 3.6 5.0 4.1   Chloride 98 102 105   Calcium 9.1 9.5 9.3   Albumin 4.51 4.01 4.56   Total Bilirubin 1.2 0.3 0.4   Alkaline Phosphatase 115 78 104   AST (SGOT) 16 8 15   ALT (SGPT) 10 8 11   (A) Abnormal value            CBC w/diff    CBC w/Diff 5/8/20 5/12/20 3/9/21   WBC 17.37 (A) 8.15 8.53   RBC 4.83 4.33 4.55   Hemoglobin 13.3 11.8 (A) 12.8   Hematocrit 40.4 36.5 39.5   MCV 83.6 84.3 86.8   MCH 27.5 27.3 28.1   MCHC 32.9 32.3 32.4   RDW 12.8 12.8 12.1 (A)   Platelets 213 257 263   Neutrophil Rel % 90.1 (A) 63.2 61.1   Immature Granulocyte Rel % 0.7 (A) 0.6 (A) 0.1   Lymphocyte Rel % 4.0 (A) 23.9 30.2   Monocyte Rel % 4.8 (A) 10.7 6.4   Eosinophil Rel % 0.1 (A) 1.2 1.8   Basophil Rel % 0.3 0.4 0.4   (A) Abnormal value            Renal Profile    Renal Profile 5/8/20 5/12/20 3/9/21   BUN 9 9 8   Creatinine 0.79 0.77 0.77   eGFR Non  Am 85 88 87           UA    Urinalysis 8/13/20 11/10/20 3/9/21 3/9/21      2055 2055   Squamous Epithelial Cells, UA    3-6 (A)   Specific Gravity, UA   1.016    Ketones, UA Negative Negative Negative    Blood, UA   Trace (A)    Leukocytes, UA Negative Negative Small (1+) (A)    Nitrite, UA   Negative    RBC, UA    3-5 (A)   WBC, UA    21-30 (A)   Bacteria, UA    1+ (A)   (A) Abnormal value            Urine Culture    Urine Culture 5/8/20 5/12/20 3/9/21   Urine Culture >100,000 CFU/mL Escherichia coli  (A) No growth >100,000 CFU/mL Klebsiella aerogenes (A)   (A) Abnormal value            Covid Tests    Common Labsle 3/9/21   COVID19 Not Detected           Data reviewed: Radiologic studies Scan done 03/09/2021.          Assessment and Plan    Diagnoses and all orders for this visit:    1. Urinary tract infection without hematuria, site unspecified (Primary)  -     phenazopyridine (Pyridium) 200 MG tablet; Take 1 tablet by mouth 3 (Three) Times a Day As Needed for Bladder Spasms.  Dispense: 20 tablet; Refill: 1    2. Urine frequency  -     POC Urinalysis Dipstick, Automated  -     phenazopyridine (Pyridium) 200 MG tablet; Take 1 tablet by mouth 3 (Three) Times a Day As Needed for Bladder Spasms.  Dispense: 20 tablet; Refill: 1    3. Spastic dysuria  -     phenazopyridine (Pyridium) 200 MG tablet; Take 1 tablet by mouth 3 (Three) Times a Day As Needed for Bladder Spasms.  Dispense: 20 tablet; Refill: 1    Recurrent urinary tract infections /Acute cystitis without hematuria/spastic dysuria    We discussed the types of organisms that are found in the urinary tract indicating that the vast majority are results of the patient's own gastrointestinal patience.  We discussed how many of the antibiotics that are utilized can actually exacerbate these infections by creating resistant organisms and there is only a very few antibiotics that are concentrated in the urine and do not affect the rectal reservoir nor cause recurrent yeast vaginitis.      We discussed the risk factors for recurrent infections being intercourse in younger patients and atrophic changes in older patients.  We discussed the symptoms that are found including pain, pressure, burning, frequency, urgency suprapubic pain and painful intercourse.  I discussed the history of vesicoureteral reflux in young patients and finally chronic renal scarring as a result of such.      I discussed upper tract symptoms including fevers, chills, and indicated the workup would  be much more aggressive if the patient were to present with recurrent infections in the face of upper tract symptomatology such as fever.     Will Send her urine out for culture, I will call her if resistant to current Antibiotics.    We discussed she continue her antibiotic prophylaxis if positive. She should increase her p.o. fluid intake to at least 1 to 2 L daily and avoid bladder irritants such as caffeine products, spicy foods, and citrusy foods.     I recommend concomitant probiotics with treatment with antibiotics to protect the rectal reservoir including over-the-counter yogurt preparations to rebecca oral pills containing the appropriate probiotics. Patient reports the diligent use of Probiotics.     Will see her back in Six weeks for Follow up in clinic and recheck her urinalysis at that time.    Patient is agreeable to plan of care         Follow Up   Return in about 6 weeks (around 4/28/2021) for Next scheduled follow up.  Patient was given instructions and counseling regarding her condition or for health maintenance advice. Please see specific information pulled into the AVS if appropriate.

## 2021-03-23 LAB
BILIRUB BLD-MCNC: NEGATIVE MG/DL
CLARITY, POC: ABNORMAL
COLOR UR: YELLOW
GLUCOSE UR STRIP-MCNC: NEGATIVE MG/DL
KETONES UR QL: NEGATIVE
LEUKOCYTE EST, POC: NEGATIVE
NITRITE UR-MCNC: NEGATIVE MG/ML
PH UR: 5.5 [PH] (ref 5–8)
PROT UR STRIP-MCNC: ABNORMAL MG/DL
RBC # UR STRIP: NEGATIVE /UL
SP GR UR: 1.02 (ref 1–1.03)
UROBILINOGEN UR QL: NORMAL

## 2021-03-24 NOTE — PATIENT INSTRUCTIONS
Dysuria  Dysuria is pain or discomfort while urinating. The pain or discomfort may be felt in the part of your body that drains urine from the bladder (urethra) or in the surrounding tissue of the genitals. The pain may also be felt in the groin area, lower abdomen, or lower back. You may have to urinate frequently or have the sudden feeling that you have to urinate (urgency). Dysuria can affect both men and women, but it is more common in women.  Dysuria can be caused by many different things, including:  · Urinary tract infection.  · Kidney stones or bladder stones.  · Certain sexually transmitted infections (STIs), such as chlamydia.  · Dehydration.  · Inflammation of the tissues of the vagina.  · Use of certain medicines.  · Use of certain soaps or scented products that cause irritation.  Follow these instructions at home:  General instructions  · Watch your condition for any changes.  · Urinate often. Avoid holding urine for long periods of time.  · After a bowel movement or urination, women should cleanse from front to back, using each tissue only once.  · Urinate after sexual intercourse.  · Keep all follow-up visits as told by your health care provider. This is important.  · If you had any tests done to find the cause of dysuria, it is up to you to get your test results. Ask your health care provider, or the department that is doing the test, when your results will be ready.  Eating and drinking    · Drink enough fluid to keep your urine pale yellow.  · Avoid caffeine, tea, and alcohol. They can irritate the bladder and make dysuria worse. In men, alcohol may irritate the prostate.  Medicines  · Take over-the-counter and prescription medicines only as told by your health care provider.  · If you were prescribed an antibiotic medicine, take it as told by your health care provider. Do not stop taking the antibiotic even if you start to feel better.  Contact a health care provider if:  · You have a  fever.  · You develop pain in your back or sides.  · You have nausea or vomiting.  · You have blood in your urine.  · You are not urinating as often as you usually do.  Get help right away if:  · Your pain is severe and not relieved with medicines.  · You cannot eat or drink without vomiting.  · You are confused.  · You have a rapid heartbeat while at rest.  · You have shaking or chills.  · You feel extremely weak.  Summary  · Dysuria is pain or discomfort while urinating. Many different conditions can lead to dysuria.  · If you have dysuria, you may have to urinate frequently or have the sudden feeling that you have to urinate (urgency).  · Watch your condition for any changes. Keep all follow-up visits as told by your health care provider.  · Make sure that you urinate often and drink enough fluid to keep your urine pale yellow.  This information is not intended to replace advice given to you by your health care provider. Make sure you discuss any questions you have with your health care provider.  Document Revised: 11/30/2018 Document Reviewed: 10/04/2018  [employer]. Patient Education © 2021 [employer]  Urinary Tract Infection, Adult    A urinary tract infection (UTI) is an infection of any part of the urinary tract. The urinary tract includes the kidneys, ureters, bladder, and urethra. These organs make, store, and get rid of urine in the body.  Your health care provider may use other names to describe the infection. An upper UTI affects the ureters and kidneys (pyelonephritis). A lower UTI affects the bladder (cystitis) and urethra (urethritis).  What are the causes?  Most urinary tract infections are caused by bacteria in your genital area, around the entrance to your urinary tract (urethra). These bacteria grow and cause inflammation of your urinary tract.  What increases the risk?  You are more likely to develop this condition if:  · You have a urinary catheter that stays in place (indwelling).  · You are  not able to control when you urinate or have a bowel movement (you have incontinence).  · You are female and you:  ? Use a spermicide or diaphragm for birth control.  ? Have low estrogen levels.  ? Are pregnant.  · You have certain genes that increase your risk (genetics).  · You are sexually active.  · You take antibiotic medicines.  · You have a condition that causes your flow of urine to slow down, such as:  ? An enlarged prostate, if you are male.  ? Blockage in your urethra (stricture).  ? A kidney stone.  ? A nerve condition that affects your bladder control (neurogenic bladder).  ? Not getting enough to drink, or not urinating often.  · You have certain medical conditions, such as:  ? Diabetes.  ? A weak disease-fighting system (immunesystem).  ? Sickle cell disease.  ? Gout.  ? Spinal cord injury.  What are the signs or symptoms?  Symptoms of this condition include:  · Needing to urinate right away (urgently).  · Frequent urination or passing small amounts of urine frequently.  · Pain or burning with urination.  · Blood in the urine.  · Urine that smells bad or unusual.  · Trouble urinating.  · Cloudy urine.  · Vaginal discharge, if you are female.  · Pain in the abdomen or the lower back.  You may also have:  · Vomiting or a decreased appetite.  · Confusion.  · Irritability or tiredness.  · A fever.  · Diarrhea.  The first symptom in older adults may be confusion. In some cases, they may not have any symptoms until the infection has worsened.  How is this diagnosed?  This condition is diagnosed based on your medical history and a physical exam. You may also have other tests, including:  · Urine tests.  · Blood tests.  · Tests for sexually transmitted infections (STIs).  If you have had more than one UTI, a cystoscopy or imaging studies may be done to determine the cause of the infections.  How is this treated?  Treatment for this condition includes:  · Antibiotic medicine.  · Over-the-counter medicines to  treat discomfort.  · Drinking enough water to stay hydrated.  If you have frequent infections or have other conditions such as a kidney stone, you may need to see a health care provider who specializes in the urinary tract (urologist).  In rare cases, urinary tract infections can cause sepsis. Sepsis is a life-threatening condition that occurs when the body responds to an infection. Sepsis is treated in the hospital with IV antibiotics, fluids, and other medicines.  Follow these instructions at home:    Medicines  · Take over-the-counter and prescription medicines only as told by your health care provider.  · If you were prescribed an antibiotic medicine, take it as told by your health care provider. Do not stop using the antibiotic even if you start to feel better.  General instructions  · Make sure you:  ? Empty your bladder often and completely. Do not hold urine for long periods of time.  ? Empty your bladder after sex.  ? Wipe from front to back after a bowel movement if you are female. Use each tissue one time when you wipe.  · Drink enough fluid to keep your urine pale yellow.  · Keep all follow-up visits as told by your health care provider. This is important.  Contact a health care provider if:  · Your symptoms do not get better after 1-2 days.  · Your symptoms go away and then return.  Get help right away if you have:  · Severe pain in your back or your lower abdomen.  · A fever.  · Nausea or vomiting.  Summary  · A urinary tract infection (UTI) is an infection of any part of the urinary tract, which includes the kidneys, ureters, bladder, and urethra.  · Most urinary tract infections are caused by bacteria in your genital area, around the entrance to your urinary tract (urethra).  · Treatment for this condition often includes antibiotic medicines.  · If you were prescribed an antibiotic medicine, take it as told by your health care provider. Do not stop using the antibiotic even if you start to feel  better.  · Keep all follow-up visits as told by your health care provider. This is important.  This information is not intended to replace advice given to you by your health care provider. Make sure you discuss any questions you have with your health care provider.  Document Revised: 12/05/2019 Document Reviewed: 06/27/2019  [employer] Patient Education © 2021 [employer]

## 2021-03-30 ENCOUNTER — CLINICAL SUPPORT (OUTPATIENT)
Dept: CARDIOLOGY | Facility: CLINIC | Age: 31
End: 2021-03-30

## 2021-03-30 PROCEDURE — 93270 REMOTE 30 DAY ECG REV/REPORT: CPT | Performed by: INTERNAL MEDICINE

## 2021-04-16 ENCOUNTER — HOSPITAL ENCOUNTER (OUTPATIENT)
Dept: GENERAL RADIOLOGY | Facility: HOSPITAL | Age: 31
Discharge: HOME | End: 2021-04-16
Admitting: NURSE PRACTITIONER

## 2021-04-16 ENCOUNTER — OFFICE VISIT (OUTPATIENT)
Dept: UROLOGY | Facility: CLINIC | Age: 31
End: 2021-04-16

## 2021-04-16 VITALS — WEIGHT: 207 LBS | BODY MASS INDEX: 36.68 KG/M2 | TEMPERATURE: 97.8 F | HEIGHT: 63 IN

## 2021-04-16 DIAGNOSIS — R30.0 SPASTIC DYSURIA: ICD-10-CM

## 2021-04-16 DIAGNOSIS — K59.04 CHRONIC IDIOPATHIC CONSTIPATION: ICD-10-CM

## 2021-04-16 DIAGNOSIS — R10.9 FLANK PAIN: ICD-10-CM

## 2021-04-16 DIAGNOSIS — N20.1 URETERAL CALCULUS: ICD-10-CM

## 2021-04-16 DIAGNOSIS — R11.0 NAUSEA WITHOUT VOMITING: ICD-10-CM

## 2021-04-16 DIAGNOSIS — R10.30 LOWER ABDOMINAL PAIN: ICD-10-CM

## 2021-04-16 DIAGNOSIS — N39.0 RECURRENT UTI: Primary | ICD-10-CM

## 2021-04-16 DIAGNOSIS — R35.0 URINE FREQUENCY: ICD-10-CM

## 2021-04-16 DIAGNOSIS — N39.0 URINARY TRACT INFECTION WITHOUT HEMATURIA, SITE UNSPECIFIED: ICD-10-CM

## 2021-04-16 LAB
BILIRUB BLD-MCNC: NEGATIVE MG/DL
CLARITY, POC: ABNORMAL
COLOR UR: YELLOW
GLUCOSE UR STRIP-MCNC: NEGATIVE MG/DL
KETONES UR QL: NEGATIVE
LEUKOCYTE EST, POC: NEGATIVE
NITRITE UR-MCNC: NEGATIVE MG/ML
PH UR: 6 [PH] (ref 5–8)
PROT UR STRIP-MCNC: NEGATIVE MG/DL
RBC # UR STRIP: NEGATIVE /UL
SP GR UR: 1.01 (ref 1–1.03)
UROBILINOGEN UR QL: NORMAL

## 2021-04-16 PROCEDURE — 74018 RADEX ABDOMEN 1 VIEW: CPT

## 2021-04-16 PROCEDURE — 87077 CULTURE AEROBIC IDENTIFY: CPT | Performed by: NURSE PRACTITIONER

## 2021-04-16 PROCEDURE — 74018 RADEX ABDOMEN 1 VIEW: CPT | Performed by: RADIOLOGY

## 2021-04-16 PROCEDURE — 99214 OFFICE O/P EST MOD 30 MIN: CPT | Performed by: NURSE PRACTITIONER

## 2021-04-16 PROCEDURE — 87086 URINE CULTURE/COLONY COUNT: CPT | Performed by: NURSE PRACTITIONER

## 2021-04-16 PROCEDURE — 87186 SC STD MICRODIL/AGAR DIL: CPT | Performed by: NURSE PRACTITIONER

## 2021-04-16 RX ORDER — ATOMOXETINE 40 MG/1
40 CAPSULE ORAL DAILY
COMMUNITY
Start: 2021-03-23 | End: 2021-08-18 | Stop reason: DRUGHIGH

## 2021-04-16 RX ORDER — PHENAZOPYRIDINE HYDROCHLORIDE 200 MG/1
200 TABLET, FILM COATED ORAL 3 TIMES DAILY PRN
Qty: 20 TABLET | Refills: 1 | Status: SHIPPED | OUTPATIENT
Start: 2021-04-16 | End: 2021-05-20 | Stop reason: SDUPTHER

## 2021-04-16 RX ORDER — POLYETHYLENE GLYCOL 3350 17 G/17G
17 POWDER, FOR SOLUTION ORAL DAILY PRN
Qty: 30 EACH | Refills: 3 | Status: SHIPPED | OUTPATIENT
Start: 2021-04-16 | End: 2021-08-18 | Stop reason: SDUPTHER

## 2021-04-16 RX ORDER — PROMETHAZINE HYDROCHLORIDE 25 MG/1
25 TABLET ORAL EVERY 6 HOURS PRN
Qty: 30 TABLET | Refills: 0 | Status: SHIPPED | OUTPATIENT
Start: 2021-04-16 | End: 2021-05-20 | Stop reason: SDUPTHER

## 2021-04-16 RX ORDER — MELOXICAM 15 MG/1
7.5 TABLET ORAL DAILY
COMMUNITY
Start: 2021-03-20 | End: 2021-08-18 | Stop reason: DRUGHIGH

## 2021-04-16 NOTE — PROGRESS NOTES
Chief Complaint  Difficulty Urinating/Back Pain/ Recurrent UTIS (FOLOW UP DYSURIA/FLANK PAIN)    Subjective          Cheryl Mcnulty presents to [facility]  History of Present Illness     MS. Cheryl Mcnulty is a very pleasant  31-year-old very Patient WHO returns to clinic for evaluation today. She is a follow-up for recurrent urinary tract infections and dysuria. She reports currently doing better , and to 3 days ago she started having dysuria and low back pain.  She reports pelvic pressure, suprapubic discomfort, no abdominal pain, back pain but denies any CVA tenderness. She denies any burning on urination, and denies any episodes of gross hematuria. She denies having any more episodes  urgency, or nocturia. Her Urine dipstick today show trace leukocyte esterase, it is negative for gross/microscopic hematuria.    Patient was seen in ER last month with urinary symptoms including difficulty urinating.Patient's last urine culture was positive for Klebsiella aeruginenes on 03/9/2021 for which she was treated with antibiotic therapy Levaquin 750 mg daily for 10 days by her ER. Repeat culture on 03/15/2021 Per PCP was negative for growth.         We both reviewed/discussed her recent CT scan IN ER which showed no definite acute intra-abdominal abnormality. There is mild prominence to the patient's right renal pelvis and proximal right ureter but there is no definite radiopaque obstructing stone.  Nevertheless,  Her repeat KUB today is also unremarkable, however patient has Abundant Right Colonic stool.  No dilation.    Patient has significant irritable bowel syndrome, characterized by extreme amounts of constipation.  We spoke about the impact of this on bladder function.  We spoke about  its relationship to recurrent urinary tract infections.  We discussed the need for increasing p.o. fluid intake to at least 2 to 3 L of water daily, and discussed the physiology of  "colonic motility as well as use of MiraLAX as a bulk laxative versus the newer class of serotonin uptake blockers such as Linzess.  We stressed the need for a daily bowel movement and discussed the [place] stool scale at length.  Patient has been scheduled follow-up with GI     Objective   Vital Signs:   Temp 97.8 °F (36.6 °C)   Ht 160 cm (63\")   Wt 93.9 kg (207 lb)   BMI 36.67 kg/m²     Physical Exam  Constitutional:       General: She is not in acute distress.     Appearance: She is well-developed. She is ill-appearing.   HENT:      Head: Normocephalic and atraumatic.   Eyes:      Pupils: Pupils are equal, round, and reactive to light.   Neck:      Thyroid: No thyromegaly.      Trachea: No tracheal deviation.   Cardiovascular:      Rate and Rhythm: Normal rate and regular rhythm.      Heart sounds: No murmur heard.     Pulmonary:      Effort: Pulmonary effort is normal. No respiratory distress.      Breath sounds: Normal breath sounds. No stridor. No wheezing.   Abdominal:      General: Bowel sounds are normal.      Palpations: Abdomen is soft.      Tenderness: There is abdominal tenderness.   Genitourinary:     Labia:         Right: No tenderness.         Left: No tenderness.       Vagina: Normal. No vaginal discharge.   Musculoskeletal:         General: Tenderness present. No deformity. Normal range of motion.      Cervical back: Normal range of motion.   Skin:     General: Skin is warm and dry.      Coloration: Skin is not pale.      Findings: No erythema or rash.   Neurological:      Mental Status: She is alert and oriented to person, place, and time.      Cranial Nerves: No cranial nerve deficit.      Sensory: No sensory deficit.      Coordination: Coordination normal.   Psychiatric:         Behavior: Behavior normal.         Thought Content: Thought content normal.         Judgment: Judgment normal.        Result Review :     UA    Urinalysis 3/9/21 3/9/21 3/23/21 4/16/21    2055 2055     Squamous " Epithelial Cells, UA  3-6 (A)     Specific Gravity, UA 1.016      Ketones, UA Negative  Negative Negative   Blood, UA Trace (A)      Leukocytes, UA Small (1+) (A)  Negative Negative   Nitrite, UA Negative      RBC, UA  3-5 (A)     WBC, UA  21-30 (A)     Bacteria, UA  1+ (A)     (A) Abnormal value            Urine Culture    Urine Culture 5/12/20 3/9/21 4/16/21   Urine Culture No growth >100,000 CFU/mL Klebsiella aerogenes (A) 25,000 CFU/mL Klebsiella aerogenes (A)   (A) Abnormal value            Data reviewed: Radiologic studies CT abdomen and pelvis done 03/09/2021, KUB done 04/16/2021          Assessment and Plan    Diagnoses and all orders for this visit:    1. Recurrent UTI (Primary)  -     Urine Culture - Urine, Urine, Random Void  -     polyethylene glycol (MiraLax Mix-In [place]) 17 g packet; Take 17 g by mouth Daily As Needed (constipation).  Dispense: 30 each; Refill: 3  -     Ambulatory Referral to Gastroenterology  -     promethazine (PHENERGAN) 25 MG tablet; Take 1 tablet by mouth Every 6 (Six) Hours As Needed for Vomiting.  Dispense: 30 tablet; Refill: 0  -     phenazopyridine (Pyridium) 200 MG tablet; Take 1 tablet by mouth 3 (Three) Times a Day As Needed for Bladder Spasms.  Dispense: 20 tablet; Refill: 1    2. Urine frequency  -     POC Urinalysis Dipstick, Automated  -     polyethylene glycol (MiraLax Mix-In [place]) 17 g packet; Take 17 g by mouth Daily As Needed (constipation).  Dispense: 30 each; Refill: 3  -     Ambulatory Referral to Gastroenterology  -     promethazine (PHENERGAN) 25 MG tablet; Take 1 tablet by mouth Every 6 (Six) Hours As Needed for Vomiting.  Dispense: 30 tablet; Refill: 0  -     phenazopyridine (Pyridium) 200 MG tablet; Take 1 tablet by mouth 3 (Three) Times a Day As Needed for Bladder Spasms.  Dispense: 20 tablet; Refill: 1    3. Ureteral calculus  -     XR abdomen kub    4. Chronic idiopathic constipation  -     polyethylene glycol (MiraLax Mix-In [place]) 17 g packet; Take 17 g by  mouth Daily As Needed (constipation).  Dispense: 30 each; Refill: 3  -     Ambulatory Referral to Gastroenterology    5. Flank pain  -     polyethylene glycol (MiraLax Mix-In [place]) 17 g packet; Take 17 g by mouth Daily As Needed (constipation).  Dispense: 30 each; Refill: 3  -     Ambulatory Referral to Gastroenterology  -     promethazine (PHENERGAN) 25 MG tablet; Take 1 tablet by mouth Every 6 (Six) Hours As Needed for Vomiting.  Dispense: 30 tablet; Refill: 0  -     phenazopyridine (Pyridium) 200 MG tablet; Take 1 tablet by mouth 3 (Three) Times a Day As Needed for Bladder Spasms.  Dispense: 20 tablet; Refill: 1    6. Lower abdominal pain    7. Nausea without vomiting  -     polyethylene glycol (MiraLax Mix-In [place]) 17 g packet; Take 17 g by mouth Daily As Needed (constipation).  Dispense: 30 each; Refill: 3  -     Ambulatory Referral to Gastroenterology    8. Urinary tract infection without hematuria, site unspecified  -     phenazopyridine (Pyridium) 200 MG tablet; Take 1 tablet by mouth 3 (Three) Times a Day As Needed for Bladder Spasms.  Dispense: 20 tablet; Refill: 1    9. Spastic dysuria  -     phenazopyridine (Pyridium) 200 MG tablet; Take 1 tablet by mouth 3 (Three) Times a Day As Needed for Bladder Spasms.  Dispense: 20 tablet; Refill: 1      Recurrent urinary tract infections/FLANK PAIN/CONSTIPATION: Evaluated with a lower back pain today, urinary symptoms of dysuria, pelvic pressure with suprapubic discomfort.  Again,  We discussed the types of organisms that are found in the urinary tract indicating that the vast majority are results of the patient's own gastrointestinal patience.  We discussed how many of the antibiotics that are utilized can actually exacerbate these infections by creating resistant organisms and there is only a very few antibiotics that are concentrated in the urine and do not affect the rectal reservoir nor cause recurrent yeast vaginitis.      We discussed the risk factors for  recurrent infections being intercourse in younger patients and atrophic changes in older patients.  We discussed the symptoms that are found including pain, pressure, burning, frequency, urgency suprapubic pain and painful intercourse.  I discussed upper tract symptoms including fevers, chills, and indicated the workup would be much more aggressive if the patient were to present with recurrent infections in the face of upper tract symptomatology such as fever. I discussed the history of vesicoureteral reflux in young patients and finally chronic renal scarring as a result of such.         PLAN  We resent her urine for culture. I will call her with results if any bacteria growth.    We discussed starting Macrobid 100 mg PO Daily -Suppressive Therapy. She should increase her p.o. fluid intake to at least 1 to 2 L daily and avoid bladder irritants such as caffeine products, spicy foods, and citrusy foods.     I recommend concomitant probiotics with treatment with antibiotics to protect the rectal reservoir including over-the-counter yogurt preparations to rebecca oral pills containing the appropriate probiotics. Patient reports the diligent use of Probiotics.    Also continue bowel regimen as prescribed by GI-severe constipation    Will see her back in 2 months for Follow up in clinic and recheck her urinalysis at that time.    Patient is agreeable to plan of care.      Follow Up   Return in about 2 months (around 6/17/2021) for Next scheduled follow up, recheck urinalysis.  Patient was given instructions and counseling regarding her condition or for health maintenance advice. Please see specific information pulled into the AVS if appropriate.

## 2021-04-16 NOTE — PATIENT INSTRUCTIONS
Flank Pain, Adult  Flank pain is pain in your side. The flank is the area of your side between your upper belly (abdomen) and your back. The pain may occur over a short time (acute), or it may be long-term or come back often (chronic). It may be mild or very bad. Pain in this area can be caused by many different things.  Follow these instructions at home:    · Drink enough fluid to keep your pee (urine) clear or pale yellow.  · Rest as told by your doctor.  · Take over-the-counter and prescription medicines only as told by your doctor.  · Keep a journal to keep track of:  ? What has caused your flank pain.  ? What has made it feel better.  · Keep all follow-up visits as told by your doctor. This is important.  Contact a doctor if:  · Medicine does not help your pain.  · You have new symptoms.  · Your pain gets worse.  · You have a fever.  · Your symptoms last longer than 2-3 days.  · You have trouble peeing.  · You are peeing more often than normal.  Get help right away if:  · You have trouble breathing.  · You are short of breath.  · Your belly hurts, or it is swollen or red.  · You feel sick to your stomach (nauseous).  · You throw up (vomit).  · You feel like you will pass out, or you do pass out (faint).  · You have blood in your pee.  Summary  · Flank pain is pain in your side. The flank is the area of your side between your upper belly (abdomen) and your back.  · Flank pain may occur over a short time (acute), or it may be long-term or come back often (chronic). It may be mild or very bad.  · Pain in this area can be caused by many different things.  · Contact your doctor if your symptoms get worse or they last longer than 2-3 days.  This information is not intended to replace advice given to you by your health care provider. Make sure you discuss any questions you have with your health care provider.  Document Revised: 11/30/2018 Document Reviewed: 04/09/2018  Elsevier Patient Education © 2021 [employer]  Dysuria  Dysuria is pain or discomfort while urinating. The pain or discomfort may be felt in the part of your body that drains urine from the bladder (urethra) or in the surrounding tissue of the genitals. The pain may also be felt in the groin area, lower abdomen, or lower back. You may have to urinate frequently or have the sudden feeling that you have to urinate (urgency). Dysuria can affect both men and women, but it is more common in women.  Dysuria can be caused by many different things, including:  · Urinary tract infection.  · Kidney stones or bladder stones.  · Certain sexually transmitted infections (STIs), such as chlamydia.  · Dehydration.  · Inflammation of the tissues of the vagina.  · Use of certain medicines.  · Use of certain soaps or scented products that cause irritation.  Follow these instructions at home:  General instructions  · Watch your condition for any changes.  · Urinate often. Avoid holding urine for long periods of time.  · After a bowel movement or urination, women should cleanse from front to back, using each tissue only once.  · Urinate after sexual intercourse.  · Keep all follow-up visits as told by your health care provider. This is important.  · If you had any tests done to find the cause of dysuria, it is up to you to get your test results. Ask your health care provider, or the department that is doing the test, when your results will be ready.  Eating and drinking    · Drink enough fluid to keep your urine pale yellow.  · Avoid caffeine, tea, and alcohol. They can irritate the bladder and make dysuria worse. In men, alcohol may irritate the prostate.  Medicines  · Take over-the-counter and prescription medicines only as told by your health care provider.  · If you were prescribed an antibiotic medicine, take it as told by your health care provider. Do not stop taking the antibiotic even if you start to feel better.  Contact a health care provider if:  · You have a  fever.  · You develop pain in your back or sides.  · You have nausea or vomiting.  · You have blood in your urine.  · You are not urinating as often as you usually do.  Get help right away if:  · Your pain is severe and not relieved with medicines.  · You cannot eat or drink without vomiting.  · You are confused.  · You have a rapid heartbeat while at rest.  · You have shaking or chills.  · You feel extremely weak.  Summary  · Dysuria is pain or discomfort while urinating. Many different conditions can lead to dysuria.  · If you have dysuria, you may have to urinate frequently or have the sudden feeling that you have to urinate (urgency).  · Watch your condition for any changes. Keep all follow-up visits as told by your health care provider.  · Make sure that you urinate often and drink enough fluid to keep your urine pale yellow.  This information is not intended to replace advice given to you by your health care provider. Make sure you discuss any questions you have with your health care provider.  Document Revised: 11/30/2018 Document Reviewed: 10/04/2018  [employer] Patient Education © 2021 Elsevier Inc.

## 2021-04-18 LAB — BACTERIA SPEC AEROBE CULT: ABNORMAL

## 2021-04-19 ENCOUNTER — TELEPHONE (OUTPATIENT)
Dept: UROLOGY | Facility: CLINIC | Age: 31
End: 2021-04-19

## 2021-04-19 NOTE — TELEPHONE ENCOUNTER
Called patient to check on her post clinic visit, and also to discuss urine culture results positive.  She is completely asymptomatic at this time, denies any urinary symptoms.  I will increase Macrobid twice daily x7 days and then 100 mg nightly for suppressive therapy.  Drop of urine clinic in 2 weeks for recheck, patient is agreeable plan of care.        Urine Culture 25,000 CFU/mL Klebsiella aerogenes       Abnormal           Resulting Agency:  [facility]   Susceptibility     Klebsiella aerogenes     HIMANSHU     Cefazolin Resistant     Cefepime Susceptible     Ceftazidime Susceptible     Ceftriaxone Susceptible     Gentamicin Susceptible     Levofloxacin Susceptible     Nitrofurantoin Intermediate     Piperacillin + Tazobactam Susceptible     Tetracycline Susceptible     Trimethoprim + Sulfamethoxazole Susceptible

## 2021-05-03 PROCEDURE — 93272 ECG/REVIEW INTERPRET ONLY: CPT | Performed by: INTERNAL MEDICINE

## 2021-05-11 ENCOUNTER — TREATMENT (OUTPATIENT)
Dept: CARDIOLOGY | Facility: CLINIC | Age: 31
End: 2021-05-11

## 2021-05-11 DIAGNOSIS — R00.2 PALPITATIONS: ICD-10-CM

## 2021-05-20 ENCOUNTER — OFFICE VISIT (OUTPATIENT)
Dept: UROLOGY | Facility: CLINIC | Age: 31
End: 2021-05-20

## 2021-05-20 VITALS — BODY MASS INDEX: 35.84 KG/M2 | HEIGHT: 63 IN | WEIGHT: 202.3 LBS

## 2021-05-20 DIAGNOSIS — R30.0 SPASTIC DYSURIA: ICD-10-CM

## 2021-05-20 DIAGNOSIS — R35.0 FREQUENCY OF URINATION: Primary | ICD-10-CM

## 2021-05-20 DIAGNOSIS — R10.9 FLANK PAIN: ICD-10-CM

## 2021-05-20 DIAGNOSIS — N39.0 RECURRENT UTI: ICD-10-CM

## 2021-05-20 DIAGNOSIS — R35.0 URINE FREQUENCY: ICD-10-CM

## 2021-05-20 DIAGNOSIS — N39.0 URINARY TRACT INFECTION WITHOUT HEMATURIA, SITE UNSPECIFIED: ICD-10-CM

## 2021-05-20 LAB
BILIRUB BLD-MCNC: NEGATIVE MG/DL
CLARITY, POC: CLEAR
COLOR UR: YELLOW
GLUCOSE UR STRIP-MCNC: NEGATIVE MG/DL
KETONES UR QL: NEGATIVE
LEUKOCYTE EST, POC: ABNORMAL
NITRITE UR-MCNC: NEGATIVE MG/ML
PH UR: 5.5 [PH] (ref 5–8)
PROT UR STRIP-MCNC: ABNORMAL MG/DL
RBC # UR STRIP: ABNORMAL /UL
SP GR UR: 1.03 (ref 1–1.03)
UROBILINOGEN UR QL: NORMAL

## 2021-05-20 PROCEDURE — 87086 URINE CULTURE/COLONY COUNT: CPT | Performed by: NURSE PRACTITIONER

## 2021-05-20 PROCEDURE — 87186 SC STD MICRODIL/AGAR DIL: CPT | Performed by: NURSE PRACTITIONER

## 2021-05-20 PROCEDURE — 99214 OFFICE O/P EST MOD 30 MIN: CPT | Performed by: NURSE PRACTITIONER

## 2021-05-20 PROCEDURE — 87077 CULTURE AEROBIC IDENTIFY: CPT | Performed by: NURSE PRACTITIONER

## 2021-05-20 PROCEDURE — 51798 US URINE CAPACITY MEASURE: CPT | Performed by: NURSE PRACTITIONER

## 2021-05-20 PROCEDURE — 96372 THER/PROPH/DIAG INJ SC/IM: CPT | Performed by: NURSE PRACTITIONER

## 2021-05-20 RX ORDER — NITROFURANTOIN 25; 75 MG/1; MG/1
100 CAPSULE ORAL DAILY
Qty: 56 CAPSULE | Refills: 3 | Status: SHIPPED | OUTPATIENT
Start: 2021-05-20 | End: 2021-07-15 | Stop reason: SDUPTHER

## 2021-05-20 RX ORDER — PROMETHAZINE HYDROCHLORIDE 25 MG/1
25 TABLET ORAL EVERY 6 HOURS PRN
Qty: 20 TABLET | Refills: 0 | Status: SHIPPED | OUTPATIENT
Start: 2021-05-20 | End: 2022-08-25

## 2021-05-20 RX ORDER — GENTAMICIN SULFATE 40 MG/ML
80 INJECTION, SOLUTION INTRAMUSCULAR; INTRAVENOUS ONCE
Status: COMPLETED | OUTPATIENT
Start: 2021-05-20 | End: 2021-05-20

## 2021-05-20 RX ORDER — PHENAZOPYRIDINE HYDROCHLORIDE 200 MG/1
200 TABLET, FILM COATED ORAL 3 TIMES DAILY PRN
Qty: 20 TABLET | Refills: 1 | Status: SHIPPED | OUTPATIENT
Start: 2021-05-20 | End: 2021-08-18

## 2021-05-20 RX ADMIN — GENTAMICIN SULFATE 80 MG: 40 INJECTION, SOLUTION INTRAMUSCULAR; INTRAVENOUS at 09:48

## 2021-05-22 LAB — BACTERIA SPEC AEROBE CULT: ABNORMAL

## 2021-05-24 ENCOUNTER — TELEPHONE (OUTPATIENT)
Dept: UROLOGY | Facility: CLINIC | Age: 31
End: 2021-05-24

## 2021-05-24 NOTE — TELEPHONE ENCOUNTER
Call patient to check on her post clinic visit, and also to discuss urine culture results came back positive.      Continue Macrobid go up to twice daily for another 7 days and then nightly for suppressive therapy.  Follow-up in clinic as discussed   thank you        Urine Culture 50,000 CFU/mL Enterobacter cloacae complexAbnormal           Resulting Agency: [facility]   Susceptibility     Enterobacter cloacae complex     HIMANSHU     Cefazolin Resistant     Cefepime Susceptible     Ceftazidime Susceptible     Ceftriaxone Susceptible     Gentamicin Susceptible     Levofloxacin Susceptible     Nitrofurantoin Intermediate     Piperacillin + Tazobactam Susceptible     Tetracycline Susceptible     Trimethoprim + Sulfamethoxazole Susceptible

## 2021-05-27 ENCOUNTER — TELEPHONE (OUTPATIENT)
Dept: UROLOGY | Facility: CLINIC | Age: 31
End: 2021-05-27

## 2021-05-27 NOTE — TELEPHONE ENCOUNTER
Did call patient back x2.  Got her voicemail again.  Please please discuss urine culture results with her when she calls back let me know if she wants anything at this time.  It is okay to continue with Macrobid twice daily for 7 days and then back on suppressive therapy she has enough refills at the pharmacy.thank you

## 2021-06-17 ENCOUNTER — OFFICE VISIT (OUTPATIENT)
Dept: GASTROENTEROLOGY | Facility: CLINIC | Age: 31
End: 2021-06-17

## 2021-06-17 ENCOUNTER — OFFICE VISIT (OUTPATIENT)
Dept: UROLOGY | Facility: CLINIC | Age: 31
End: 2021-06-17

## 2021-06-17 VITALS — HEIGHT: 63 IN | BODY MASS INDEX: 35.53 KG/M2

## 2021-06-17 VITALS
BODY MASS INDEX: 35.54 KG/M2 | OXYGEN SATURATION: 96 % | SYSTOLIC BLOOD PRESSURE: 142 MMHG | HEART RATE: 88 BPM | HEIGHT: 63 IN | WEIGHT: 200.6 LBS | DIASTOLIC BLOOD PRESSURE: 92 MMHG

## 2021-06-17 DIAGNOSIS — N39.0 RECURRENT UTI: Primary | ICD-10-CM

## 2021-06-17 DIAGNOSIS — R10.9 FLANK PAIN: ICD-10-CM

## 2021-06-17 DIAGNOSIS — R35.0 URINE FREQUENCY: ICD-10-CM

## 2021-06-17 DIAGNOSIS — R10.30 LOWER ABDOMINAL PAIN: ICD-10-CM

## 2021-06-17 DIAGNOSIS — N39.0 RECURRENT UTI: ICD-10-CM

## 2021-06-17 DIAGNOSIS — K59.00 CONSTIPATION, UNSPECIFIED CONSTIPATION TYPE: Primary | ICD-10-CM

## 2021-06-17 DIAGNOSIS — R11.0 NAUSEA WITHOUT VOMITING: ICD-10-CM

## 2021-06-17 DIAGNOSIS — K59.04 CHRONIC IDIOPATHIC CONSTIPATION: ICD-10-CM

## 2021-06-17 LAB
BILIRUB BLD-MCNC: ABNORMAL MG/DL
CLARITY, POC: CLEAR
COLOR UR: YELLOW
GLUCOSE UR STRIP-MCNC: NEGATIVE MG/DL
KETONES UR QL: NEGATIVE
LEUKOCYTE EST, POC: ABNORMAL
NITRITE UR-MCNC: NEGATIVE MG/ML
PH UR: 5.5 [PH] (ref 5–8)
PROT UR STRIP-MCNC: NEGATIVE MG/DL
RBC # UR STRIP: NEGATIVE /UL
SP GR UR: 1.03 (ref 1–1.03)
UROBILINOGEN UR QL: NORMAL

## 2021-06-17 PROCEDURE — 99203 OFFICE O/P NEW LOW 30 MIN: CPT | Performed by: PHYSICIAN ASSISTANT

## 2021-06-17 PROCEDURE — 87086 URINE CULTURE/COLONY COUNT: CPT | Performed by: NURSE PRACTITIONER

## 2021-06-17 PROCEDURE — 99214 OFFICE O/P EST MOD 30 MIN: CPT | Performed by: NURSE PRACTITIONER

## 2021-06-17 RX ORDER — POLYETHYLENE GLYCOL 3350 17 G/17G
17 POWDER, FOR SOLUTION ORAL DAILY
Qty: 510 G | Refills: 2 | Status: SHIPPED | OUTPATIENT
Start: 2021-06-17 | End: 2021-08-19 | Stop reason: SDUPTHER

## 2021-06-17 NOTE — PROGRESS NOTES
Chief Complaint   Patient presents with   • Abdominal Pain   • Constipation   • Nausea       Cheryl Mcnulty is a 31 y.o. female who presents to the office today for evaluation of Abdominal Pain, Constipation, and Nausea  .    HPI    The patient was seen for a GI evaluation due to constipation and abdominal pain.  Recent xray revealed constipation.  She only has one bowel movement per week.  She has always struggled with constipation.  She takes Miralax every other day which does not relieve her constipation.  Patient drinks two bottles of water, 2 sodas, and 1 tea per day.  Patient reports that when she very constipated she sometimes has blood when wiping.  She has never had a colonoscopy.  She is unsure of family history because she is adopted.  Patient takes daily Mobic.  Patient has Lupus.      Review of Systems   Constitutional: Negative.    HENT: Negative for sore throat and trouble swallowing.    Eyes: Negative.    Respiratory: Negative for chest tightness.    Cardiovascular: Negative for chest pain.   Gastrointestinal: Positive for abdominal distention, abdominal pain, anal bleeding, blood in stool, constipation and nausea. Negative for diarrhea, rectal pain and vomiting.   Endocrine: Negative.    Genitourinary: Positive for difficulty urinating.   Musculoskeletal: Positive for back pain. Negative for neck pain.   Skin: Positive for rash.   Allergic/Immunologic: Negative for environmental allergies and food allergies.   Neurological: Negative for dizziness and headaches.   Hematological: Bruises/bleeds easily.   Psychiatric/Behavioral: Negative for agitation and sleep disturbance. The patient is not nervous/anxious.        ACTIVE PROBLEMS:   Specialty Problems     None          PAST MEDICAL HISTORY:  Past Medical History:   Diagnosis Date   • Congestive heart failure (CMS/HCC)    • Fibromyalgia    • GERD (gastroesophageal reflux disease)    • History of ear infections    • History of heart disease    •  History of lupus    • History of migraine    • Hypertension    • Lown Ganong Parker syndrome    • Murmur    • Nephritis    • Osteoarthritis    • Pulmonary stenosis        SURGICAL HISTORY:  Past Surgical History:   Procedure Laterality Date   •  SECTION     • DIAGNOSTIC LAPAROSCOPY Right 2018    Procedure: LAPAROSCOPIC EXTENSIVE LYSIS OF AHESIONS. DRAINAGE OF OVARIAN CYST.;  Surgeon: Rachel Caruso DO;  Location: [facility] OR;  Service: Obstetrics/Gynecology   • ORIF SCAPHOID W VASCULARIZED BONE GRAFT     • TUBAL ABDOMINAL LIGATION     • TUBAL COAGULATION LAPAROSCOPIC Bilateral 2018    Procedure: TUBAL FALLOPE FILSHIE CLIPPING LAPAROSCOPIC;  Surgeon: Rachel Caruso DO;  Location: [facility] OR;  Service: Obstetrics/Gynecology       FAMILY HISTORY:  Family History   Problem Relation Age of Onset   • Heart disease Mother    • Thyroid disease Mother    • Anxiety disorder Mother    • Depression Mother    • Bipolar disorder Mother    • Heart disease Father    • Cancer Father    • Heart disease Maternal Grandmother    • Diabetes Maternal Grandmother    • Diabetes Maternal Grandfather    • Lupus Paternal Grandmother    • Rheum arthritis Paternal Grandmother        SOCIAL HISTORY:  Social History     Tobacco Use   • Smoking status: Former Smoker     Packs/day: 0.00     Types: Electronic Cigarette   • Smokeless tobacco: Never Used   Substance Use Topics   • Alcohol use: Yes     Comment: socially       CURRENT MEDICATION:    Current Outpatient Medications:   •  atenolol (TENORMIN) 25 MG tablet, Take 1 & 1/2 tablets by mouth Daily., Disp: 120 tablet, Rfl: 2  •  atomoxetine (STRATTERA) 40 MG capsule, Take 40 mg by mouth Daily., Disp: , Rfl:   •  Brexpiprazole (Rexulti) 1 MG tablet, Take 1 tablet by mouth Daily., Disp: 90 tablet, Rfl: 2  •  DULoxetine (CYMBALTA) 60 MG capsule, Take 1 capsule by mouth Daily., Disp: 90 capsule, Rfl: 2  •  folic acid (FOLVITE) 1 MG tablet, Take 1 tablet by mouth  "Daily., Disp: 30 tablet, Rfl: 4  •  hydroxychloroquine (PLAQUENIL) 200 MG tablet, Take 1 tablet by mouth Daily., Disp: 30 tablet, Rfl: 4  •  ketorolac (TORADOL) 10 MG tablet, Take 1 tablet by mouth Every 6 (Six) Hours As Needed for Severe Pain ., Disp: 15 tablet, Rfl: 0  •  Lactobacillus (Acidophilus) 100 MG capsule, Take  by mouth Daily., Disp: , Rfl:   •  meloxicam (MOBIC) 15 MG tablet, Take 7.5 mg by mouth Daily., Disp: , Rfl:   •  nitrofurantoin, macrocrystal-monohydrate, (Macrobid) 100 MG capsule, Take 1 capsule by mouth Daily., Disp: 56 capsule, Rfl: 3  •  ondansetron (ZOFRAN) 4 MG tablet, Take 1 tablet every four hours, as needed, Disp: 30 tablet, Rfl: 1  •  phenazopyridine (Pyridium) 200 MG tablet, Take 1 tablet by mouth 3 (Three) Times a Day As Needed for Bladder Spasms., Disp: 20 tablet, Rfl: 1  •  polyethylene glycol (MiraLax Mix-In [place]) 17 g packet, Take 17 g by mouth Daily As Needed (constipation)., Disp: 30 each, Rfl: 3  •  promethazine (PHENERGAN) 25 MG tablet, Take 1 tablet by mouth Every 6 (Six) Hours As Needed for Vomiting., Disp: 20 tablet, Rfl: 0  •  tiZANidine (ZANAFLEX) 4 MG tablet, Take 1 tablet by mouth 2 (two) times a day., Disp: 60 tablet, Rfl: 4  •  linaclotide (LINZESS) 145 MCG capsule capsule, Take 1 tablet once daily on an empty stomach at least 30 minutes prior to the first meal of the day., Disp: 30 capsule, Rfl: 5  •  nystatin (MYCOSTATIN) 637036 UNIT/ML suspension, 4 (Four) Times a Day., Disp: , Rfl:   •  polyethylene glycol (MiraLax) 17 GM/SCOOP powder, Take 17 g by mouth Daily., Disp: 510 g, Rfl: 2    ALLERGIES:  Patient has no known allergies.    VISIT VITALS:  Blood Pressure 142/92   Pulse 88   Height 160 cm (63\")   Weight 91 kg (200 lb 9.6 oz)   Oxygen Saturation 96%   Body Mass Index 35.53 kg/m²   Physical Exam  Constitutional:       General: She is not in acute distress.     Appearance: She is well-developed. She is obese. She is not diaphoretic.   HENT:      Head: " Normocephalic and atraumatic.      Right Ear: External ear normal.      Left Ear: External ear normal.      Nose: Nose normal.      Mouth/Throat:      Pharynx: No oropharyngeal exudate.   Eyes:      General: No scleral icterus.        Right eye: No discharge.         Left eye: No discharge.      Conjunctiva/sclera: Conjunctivae normal.      Pupils: Pupils are equal, round, and reactive to light.   Neck:      Thyroid: No thyromegaly.      Vascular: No JVD.      Trachea: No tracheal deviation.   Cardiovascular:      Rate and Rhythm: Normal rate and regular rhythm.      Heart sounds: Normal heart sounds. No murmur heard.   No friction rub. No gallop.    Pulmonary:      Effort: Pulmonary effort is normal. No respiratory distress.      Breath sounds: Normal breath sounds. No stridor. No wheezing or rales.   Chest:      Chest wall: No tenderness.   Abdominal:      General: Bowel sounds are normal. There is no distension.      Palpations: Abdomen is soft. There is no mass.      Tenderness: There is abdominal tenderness (lower abdomen). There is no guarding or rebound.      Hernia: No hernia is present.   Genitourinary:     Rectum: Guaiac result negative.   Musculoskeletal:      Cervical back: Normal range of motion and neck supple.   Lymphadenopathy:      Cervical: No cervical adenopathy.   Skin:     General: Skin is warm and dry.      Coloration: Skin is not pale.      Findings: No erythema or rash.   Neurological:      Mental Status: She is alert and oriented to person, place, and time.      Cranial Nerves: No cranial nerve deficit.      Motor: No abnormal muscle tone.      Coordination: Coordination normal.      Deep Tendon Reflexes: Reflexes are normal and symmetric. Reflexes normal.   Psychiatric:         Behavior: Behavior normal.         Thought Content: Thought content normal.         Judgment: Judgment normal.         Assessment/Plan      Diagnosis Plan   1. Constipation, unspecified constipation type     2. Lower  abdominal pain     3. Urine frequency     4. Recurrent UTI     5. Chronic idiopathic constipation     6. Flank pain     7. Nausea without vomiting       The patient will be started on Linzess 145 mcg PO for constipation.  She will also be prescribed Miralax to use as needed.  Patient will start Linzess after one bottle mag citrate cleanout.  She was also counseled to wean from caffeine and double her water intake.   Return in about 8 weeks (around 8/12/2021) for Recheck.         Patient's Body mass index is 35.53 kg/m². indicating that she is obese (BMI >30). Obesity-related health conditions include the following: hypertension. Obesity is Lupus. BMI is is above average; BMI management plan is completed. We discussed portion control and increasing exercise..      AURA Tejeda

## 2021-06-17 NOTE — PROGRESS NOTES
"Chief Complaint  Urinary Tract Infection (follow up )    Subjective          Cheryl Mcnulty presents to [facility]  History of Present Illness  Ms Cheryl MERCER is a 57-year-old very pleasant Patient returns to clinic for evaluation today. She is a follow-up for recurrent urinary tract infections , She reports currently doing better on antibiotic prophylaxis with Macrobid.  She also reports  A significant  improvement in her urinary symptoms.     She denies having any more episodes of frequency, urgency, or nocturia. She denies dysuria, burning on urination, or gross hematuria.  Denies back pain, pelvic pain and pressure, she denies fever or chills, she does not have nausea, vomiting, or diarrhea.  Her Urine dipstick today show 1+ leukocyte esterace, negative nitrites, negative gross/microscopic hematuria,     Overall she reports doing better, she states she is currently taking her antibiotics and probiotics diligently.  She reports  increasing her p.o. fluid intake to at least 2 L daily, and has significantly reduced her intake of soda drinks. She drinks more water and cranberry drinks.    Objective   Vital Signs:   Ht 160 cm (63\")   BMI 35.53 kg/m²     Physical Exam  Constitutional:       General: She is not in acute distress.     Appearance: She is well-developed.   HENT:      Head: Normocephalic and atraumatic.   Eyes:      Pupils: Pupils are equal, round, and reactive to light.   Neck:      Thyroid: No thyromegaly.      Trachea: No tracheal deviation.   Cardiovascular:      Rate and Rhythm: Normal rate and regular rhythm.      Heart sounds: No murmur heard.     Pulmonary:      Effort: Pulmonary effort is normal. No respiratory distress.      Breath sounds: Normal breath sounds. No stridor. No wheezing.   Abdominal:      General: Bowel sounds are normal.      Palpations: Abdomen is soft.      Tenderness: There is no abdominal tenderness.   Genitourinary:     Labia:    "      Right: No tenderness.         Left: No tenderness.       Vagina: Normal. No vaginal discharge.   Musculoskeletal:         General: Tenderness present. No deformity. Normal range of motion.      Cervical back: Normal range of motion.   Skin:     General: Skin is warm and dry.      Coloration: Skin is not pale.      Findings: No erythema or rash.   Neurological:      Mental Status: She is alert and oriented to person, place, and time.      Cranial Nerves: No cranial nerve deficit.      Sensory: No sensory deficit.      Coordination: Coordination normal.   Psychiatric:         Behavior: Behavior normal.         Thought Content: Thought content normal.         Judgment: Judgment normal.        Result Review :                 Assessment and Plan    Diagnoses and all orders for this visit:    1. Recurrent UTI (Primary)  -     POC Urinalysis Dipstick, Automated  -     Urine Culture - Urine, Urine, Clean Catch      ASSESSMENT  Recurrent urinary tract infections/verActiveBladder: She is in no apparent distress and reports a remarkable improvement in her overall symptoms. She is happy to be feeling better she states.    She reports doing relatively better on her antibiotic prophylaxis, and would like to continue. Again,  We discussed the types of organisms that are found in the urinary tract indicating that the vast majority are results of the patient's own gastrointestinal patience.  We discussed how many of the antibiotics that are utilized can actually exacerbate these infections by creating resistant organisms and there is only a very few antibiotics that are concentrated in the urine and do not affect the rectal reservoir nor cause recurrent yeast vaginitis.      We discussed the risk factors for recurrent infections being intercourse in younger patients and atrophic changes in older patients.  We discussed the symptoms that are found including pain, pressure, burning, frequency, urgency suprapubic pain and painful  intercourse.  I discussed upper tract symptoms including fevers, chills, and indicated the workup would be much more aggressive if the patient were to present with recurrent infections in the face of upper tract symptomatology such as fever. I discussed the history of vesicoureteral reflux in young patients and finally chronic renal scarring as a result of such.         PLAN  We resent her urine for culture. I will call her with results if any bacteria growth not sensitive to her current therapy of Macrobid..    Will Continue Macrobid 100 mg PO Daily -Suppressive Therapy. She should increase her p.o. fluid intake to at least 1 to 2 L daily and avoid bladder irritants such as caffeine products, spicy foods, and citrusy foods.     I recommend concomitant probiotics with treatment with antibiotics to protect the rectal reservoir including over-the-counter yogurt preparations to rebecca oral pills containing the appropriate probiotics. Patient reports the diligent use of Probiotics.    She is to also continue other medications as prescribed above.    Will see her back in 6 weeks or sooner for Follow up in clinic and recheck her urinalysis at that time.    Patient is agreeable to plan of care.      Follow Up   Return in about 6 weeks (around 7/29/2021) for Next scheduled follow up Recheck Urine.  Patient was given instructions and counseling regarding her condition or for health maintenance advice. Please see specific information pulled into the AVS if appropriate.

## 2021-06-18 LAB — BACTERIA SPEC AEROBE CULT: NORMAL

## 2021-06-19 ENCOUNTER — APPOINTMENT (OUTPATIENT)
Dept: CT IMAGING | Facility: HOSPITAL | Age: 31
End: 2021-06-19

## 2021-06-19 ENCOUNTER — HOSPITAL ENCOUNTER (EMERGENCY)
Facility: HOSPITAL | Age: 31
Discharge: HOME | End: 2021-06-19
Attending: FAMILY MEDICINE | Admitting: EMERGENCY MEDICINE

## 2021-06-19 VITALS
SYSTOLIC BLOOD PRESSURE: 148 MMHG | RESPIRATION RATE: 18 BRPM | OXYGEN SATURATION: 99 % | TEMPERATURE: 98.5 F | HEIGHT: 63 IN | DIASTOLIC BLOOD PRESSURE: 89 MMHG | HEART RATE: 84 BPM | WEIGHT: 200 LBS | BODY MASS INDEX: 35.44 KG/M2

## 2021-06-19 DIAGNOSIS — N39.0 UTI (URINARY TRACT INFECTION), UNCOMPLICATED: Primary | ICD-10-CM

## 2021-06-19 LAB
ALBUMIN SERPL-MCNC: 4.54 G/DL (ref 3.5–5.2)
ALBUMIN/GLOB SERPL: 1.6 G/DL
ALP SERPL-CCNC: 114 U/L (ref 39–117)
ALT SERPL W P-5'-P-CCNC: 8 U/L (ref 1–33)
ANION GAP SERPL CALCULATED.3IONS-SCNC: 11.3 MMOL/L (ref 5–15)
AST SERPL-CCNC: 14 U/L (ref 1–32)
BACTERIA UR QL AUTO: ABNORMAL /HPF
BASOPHILS # BLD AUTO: 0.06 10*3/MM3 (ref 0–0.2)
BASOPHILS NFR BLD AUTO: 0.5 % (ref 0–1.5)
BILIRUB SERPL-MCNC: 0.3 MG/DL (ref 0–1.2)
BILIRUB UR QL STRIP: NEGATIVE
BUN SERPL-MCNC: 11 MG/DL (ref 6–20)
BUN/CREAT SERPL: 13.4 (ref 7–25)
CALCIUM SPEC-SCNC: 9.5 MG/DL (ref 8.6–10.5)
CHLORIDE SERPL-SCNC: 104 MMOL/L (ref 98–107)
CLARITY UR: ABNORMAL
CO2 SERPL-SCNC: 24.7 MMOL/L (ref 22–29)
COLOR UR: YELLOW
CREAT SERPL-MCNC: 0.82 MG/DL (ref 0.57–1)
CRP SERPL-MCNC: <0.3 MG/DL (ref 0–0.5)
DEPRECATED RDW RBC AUTO: 37.6 FL (ref 37–54)
EOSINOPHIL # BLD AUTO: 0.23 10*3/MM3 (ref 0–0.4)
EOSINOPHIL NFR BLD AUTO: 2 % (ref 0.3–6.2)
ERYTHROCYTE [DISTWIDTH] IN BLOOD BY AUTOMATED COUNT: 12.4 % (ref 12.3–15.4)
GFR SERPL CREATININE-BSD FRML MDRD: 81 ML/MIN/1.73
GLOBULIN UR ELPH-MCNC: 2.9 GM/DL
GLUCOSE SERPL-MCNC: 107 MG/DL (ref 65–99)
GLUCOSE UR STRIP-MCNC: NEGATIVE MG/DL
HCT VFR BLD AUTO: 41.3 % (ref 34–46.6)
HGB BLD-MCNC: 13.4 G/DL (ref 12–15.9)
HGB UR QL STRIP.AUTO: ABNORMAL
HOLD SPECIMEN: NORMAL
HOLD SPECIMEN: NORMAL
HYALINE CASTS UR QL AUTO: ABNORMAL /LPF
IMM GRANULOCYTES # BLD AUTO: 0.02 10*3/MM3 (ref 0–0.05)
IMM GRANULOCYTES NFR BLD AUTO: 0.2 % (ref 0–0.5)
KETONES UR QL STRIP: NEGATIVE
LEUKOCYTE ESTERASE UR QL STRIP.AUTO: ABNORMAL
LYMPHOCYTES # BLD AUTO: 4.32 10*3/MM3 (ref 0.7–3.1)
LYMPHOCYTES NFR BLD AUTO: 38.1 % (ref 19.6–45.3)
MCH RBC QN AUTO: 27.3 PG (ref 26.6–33)
MCHC RBC AUTO-ENTMCNC: 32.4 G/DL (ref 31.5–35.7)
MCV RBC AUTO: 84.1 FL (ref 79–97)
MONOCYTES # BLD AUTO: 0.89 10*3/MM3 (ref 0.1–0.9)
MONOCYTES NFR BLD AUTO: 7.9 % (ref 5–12)
NEUTROPHILS NFR BLD AUTO: 5.81 10*3/MM3 (ref 1.7–7)
NEUTROPHILS NFR BLD AUTO: 51.3 % (ref 42.7–76)
NITRITE UR QL STRIP: NEGATIVE
NRBC BLD AUTO-RTO: 0 /100 WBC (ref 0–0.2)
PH UR STRIP.AUTO: 5.5 [PH] (ref 5–8)
PLATELET # BLD AUTO: 295 10*3/MM3 (ref 140–450)
PMV BLD AUTO: 10.4 FL (ref 6–12)
POTASSIUM SERPL-SCNC: 4 MMOL/L (ref 3.5–5.2)
PROT SERPL-MCNC: 7.4 G/DL (ref 6–8.5)
PROT UR QL STRIP: NEGATIVE
RBC # BLD AUTO: 4.91 10*6/MM3 (ref 3.77–5.28)
RBC # UR: ABNORMAL /HPF
REF LAB TEST METHOD: ABNORMAL
SODIUM SERPL-SCNC: 140 MMOL/L (ref 136–145)
SP GR UR STRIP: 1.02 (ref 1–1.03)
SQUAMOUS #/AREA URNS HPF: ABNORMAL /HPF
UROBILINOGEN UR QL STRIP: ABNORMAL
WBC # BLD AUTO: 11.33 10*3/MM3 (ref 3.4–10.8)
WBC UR QL AUTO: ABNORMAL /HPF
WHOLE BLOOD HOLD SPECIMEN: NORMAL

## 2021-06-19 PROCEDURE — 80053 COMPREHEN METABOLIC PANEL: CPT | Performed by: PHYSICIAN ASSISTANT

## 2021-06-19 PROCEDURE — 86140 C-REACTIVE PROTEIN: CPT | Performed by: PHYSICIAN ASSISTANT

## 2021-06-19 PROCEDURE — 99283 EMERGENCY DEPT VISIT LOW MDM: CPT

## 2021-06-19 PROCEDURE — 25010000002 CEFTRIAXONE PER 250 MG: Performed by: PHYSICIAN ASSISTANT

## 2021-06-19 PROCEDURE — 74176 CT ABD & PELVIS W/O CONTRAST: CPT

## 2021-06-19 PROCEDURE — 36415 COLL VENOUS BLD VENIPUNCTURE: CPT

## 2021-06-19 PROCEDURE — 81001 URINALYSIS AUTO W/SCOPE: CPT | Performed by: PHYSICIAN ASSISTANT

## 2021-06-19 PROCEDURE — 85025 COMPLETE CBC W/AUTO DIFF WBC: CPT | Performed by: PHYSICIAN ASSISTANT

## 2021-06-19 PROCEDURE — 96372 THER/PROPH/DIAG INJ SC/IM: CPT

## 2021-06-19 RX ORDER — CEFDINIR 300 MG/1
300 CAPSULE ORAL 2 TIMES DAILY
Qty: 14 CAPSULE | Refills: 0 | Status: SHIPPED | OUTPATIENT
Start: 2021-06-19 | End: 2021-08-18

## 2021-06-19 RX ADMIN — LIDOCAINE HYDROCHLORIDE 1 G: 10 INJECTION, SOLUTION EPIDURAL; INFILTRATION; INTRACAUDAL; PERINEURAL at 22:39

## 2021-06-21 ENCOUNTER — TELEPHONE (OUTPATIENT)
Dept: GASTROENTEROLOGY | Facility: CLINIC | Age: 31
End: 2021-06-21

## 2021-06-21 ENCOUNTER — TELEPHONE (OUTPATIENT)
Dept: UROLOGY | Facility: CLINIC | Age: 31
End: 2021-06-21

## 2021-06-21 NOTE — TELEPHONE ENCOUNTER
I called the pt and let her know her urine culture was normal bacteria only she said that she went to the er on Saturday and they started her on an antibiotic. I told her to take whatever they gave her and go right back on supressive therapy.

## 2021-06-25 NOTE — PATIENT INSTRUCTIONS

## 2021-07-15 DIAGNOSIS — N39.0 URINARY TRACT INFECTION WITHOUT HEMATURIA, SITE UNSPECIFIED: ICD-10-CM

## 2021-07-15 DIAGNOSIS — N39.0 RECURRENT UTI: ICD-10-CM

## 2021-07-15 RX ORDER — NITROFURANTOIN 25; 75 MG/1; MG/1
100 CAPSULE ORAL DAILY
Qty: 56 CAPSULE | Refills: 3 | Status: SHIPPED | OUTPATIENT
Start: 2021-07-15 | End: 2022-02-04 | Stop reason: SDUPTHER

## 2021-07-15 NOTE — TELEPHONE ENCOUNTER
Pt is out of microbid that she takes as a preventive for uti.  She does not come back in until 7/29.

## 2021-08-18 ENCOUNTER — HOSPITAL ENCOUNTER (OUTPATIENT)
Dept: GENERAL RADIOLOGY | Facility: HOSPITAL | Age: 31
Discharge: HOME | End: 2021-08-18
Admitting: NURSE PRACTITIONER

## 2021-08-18 ENCOUNTER — OFFICE VISIT (OUTPATIENT)
Dept: UROLOGY | Facility: CLINIC | Age: 31
End: 2021-08-18

## 2021-08-18 VITALS — HEIGHT: 63 IN | BODY MASS INDEX: 37.21 KG/M2 | WEIGHT: 210 LBS

## 2021-08-18 DIAGNOSIS — R10.9 FLANK PAIN: ICD-10-CM

## 2021-08-18 DIAGNOSIS — R30.0 SPASTIC DYSURIA: ICD-10-CM

## 2021-08-18 DIAGNOSIS — R35.0 URINE FREQUENCY: ICD-10-CM

## 2021-08-18 DIAGNOSIS — K59.00 CONSTIPATION, UNSPECIFIED CONSTIPATION TYPE: ICD-10-CM

## 2021-08-18 DIAGNOSIS — N39.0 URINARY TRACT INFECTION WITHOUT HEMATURIA, SITE UNSPECIFIED: ICD-10-CM

## 2021-08-18 DIAGNOSIS — R30.0 DYSURIA: Primary | ICD-10-CM

## 2021-08-18 DIAGNOSIS — N39.0 RECURRENT UTI: ICD-10-CM

## 2021-08-18 LAB
BILIRUB BLD-MCNC: NEGATIVE MG/DL
CLARITY, POC: CLEAR
COLOR UR: YELLOW
GLUCOSE UR STRIP-MCNC: NEGATIVE MG/DL
KETONES UR QL: NEGATIVE
LEUKOCYTE EST, POC: NEGATIVE
NITRITE UR-MCNC: NEGATIVE MG/ML
PH UR: 5.5 [PH] (ref 5–8)
PROT UR STRIP-MCNC: NEGATIVE MG/DL
RBC # UR STRIP: ABNORMAL /UL
SP GR UR: 1.02 (ref 1–1.03)
UROBILINOGEN UR QL: NORMAL

## 2021-08-18 PROCEDURE — 87086 URINE CULTURE/COLONY COUNT: CPT | Performed by: NURSE PRACTITIONER

## 2021-08-18 PROCEDURE — 74018 RADEX ABDOMEN 1 VIEW: CPT

## 2021-08-18 PROCEDURE — 99214 OFFICE O/P EST MOD 30 MIN: CPT | Performed by: NURSE PRACTITIONER

## 2021-08-18 PROCEDURE — 74018 RADEX ABDOMEN 1 VIEW: CPT | Performed by: RADIOLOGY

## 2021-08-18 PROCEDURE — 96372 THER/PROPH/DIAG INJ SC/IM: CPT | Performed by: NURSE PRACTITIONER

## 2021-08-18 RX ORDER — MELOXICAM 7.5 MG/1
1 TABLET ORAL DAILY
COMMUNITY
Start: 2021-07-14 | End: 2022-08-25 | Stop reason: SDUPTHER

## 2021-08-18 RX ORDER — ATOMOXETINE 60 MG/1
60 CAPSULE ORAL DAILY
COMMUNITY
Start: 2021-07-31 | End: 2022-10-24 | Stop reason: SDUPTHER

## 2021-08-18 RX ORDER — GENTAMICIN SULFATE 40 MG/ML
80 INJECTION, SOLUTION INTRAMUSCULAR; INTRAVENOUS ONCE
Status: COMPLETED | OUTPATIENT
Start: 2021-08-18 | End: 2021-08-18

## 2021-08-18 RX ORDER — PHENAZOPYRIDINE HYDROCHLORIDE 200 MG/1
200 TABLET, FILM COATED ORAL 3 TIMES DAILY PRN
Qty: 20 TABLET | Refills: 1 | Status: SHIPPED | OUTPATIENT
Start: 2021-08-18 | End: 2022-02-04 | Stop reason: SDUPTHER

## 2021-08-18 RX ADMIN — GENTAMICIN SULFATE 80 MG: 40 INJECTION, SOLUTION INTRAMUSCULAR; INTRAVENOUS at 10:22

## 2021-08-18 NOTE — PROGRESS NOTES
"Chief Complaint  Difficulty Urinating/RECURRENT UTI/flank pain/IC (Follow-up)    Subjective          Cheryl Mcnulty presents to [facility]  History of Present Illness    Ms. Cheryl Mcnulty is a very pleasant 31-year-old female patient, with a significant history of recurrent UTIs, who returns to clinic today with her 8-year-old daughter for evaluation. This is a 3-month follow-up on recurrent UTI./Kidney stones. She reports doing relatively well on her antibiotic suppressive therapy with Macrobid, until 6 days ago she has had constant burning with urination which she reports has subsided today.  However, today, she reports she has been miserable with right sided flank pain.      She however denies back pain, but reports pelvic pain and pressure, suprapubic discomfort.  She denies fever or chills, she does  have nausea, denies vomiting, or diarrhea.  Her urine dipstick today complete negative for any leukocyte esterase, negative nitrites, she has 3+ microscopic hematuria.       KUB results is unremarkable, showed extensive stool volume on the right side.  Discussed bowel regiment and daily MiraLAX as tolerated.    Objective   Vital Signs:   Ht 160 cm (63\")   Wt 95.3 kg (210 lb)   BMI 37.20 kg/m²     Physical Exam  Constitutional:       General: She is in acute distress.      Appearance: She is well-developed. She is obese.   HENT:      Head: Normocephalic and atraumatic.   Eyes:      Pupils: Pupils are equal, round, and reactive to light.   Neck:      Thyroid: No thyromegaly.      Trachea: No tracheal deviation.   Cardiovascular:      Rate and Rhythm: Normal rate and regular rhythm.      Heart sounds: No murmur heard.     Pulmonary:      Effort: Pulmonary effort is normal. No respiratory distress.      Breath sounds: Normal breath sounds. No stridor. No wheezing.   Abdominal:      General: Bowel sounds are normal. There is distension.      Palpations: Abdomen is soft. "      Tenderness: There is abdominal tenderness. There is guarding.   Genitourinary:     Labia:         Right: No tenderness.         Left: No tenderness.       Vagina: Normal. No vaginal discharge.   Musculoskeletal:         General: No deformity. Normal range of motion.      Cervical back: Normal range of motion.   Skin:     General: Skin is warm and dry.      Capillary Refill: Capillary refill takes less than 2 seconds.      Coloration: Skin is not pale.      Findings: No erythema or rash.   Neurological:      Mental Status: She is alert and oriented to person, place, and time.      Cranial Nerves: No cranial nerve deficit.      Sensory: No sensory deficit.      Coordination: Coordination normal.   Psychiatric:         Behavior: Behavior normal.         Thought Content: Thought content normal.         Judgment: Judgment normal.        Result Review :                 Assessment and Plan    Diagnoses and all orders for this visit:    1. Dysuria (Primary)  -     POC Urinalysis Dipstick, Automated  -     Urine Culture - Urine, Urine, Random Void  -     phenazopyridine (Pyridium) 200 MG tablet; Take 1 tablet by mouth 3 (Three) Times a Day As Needed for Bladder Spasms.  Dispense: 20 tablet; Refill: 1  -     gentamicin (GARAMYCIN) injection 80 mg    2. Flank pain  -     XR abdomen kub; Future  -     phenazopyridine (Pyridium) 200 MG tablet; Take 1 tablet by mouth 3 (Three) Times a Day As Needed for Bladder Spasms.  Dispense: 20 tablet; Refill: 1  -     magnesium citrate solution; Take 296 mL by mouth 1 (One) Time for 1 dose.  Dispense: 296 mL; Refill: 1  -     polyethylene glycol (MiraLax) 17 GM/SCOOP powder; Take 17 g by mouth Daily for 30 days.  Dispense: 510 g; Refill: 2    3. Recurrent UTI  -     phenazopyridine (Pyridium) 200 MG tablet; Take 1 tablet by mouth 3 (Three) Times a Day As Needed for Bladder Spasms.  Dispense: 20 tablet; Refill: 1    4. Urinary tract infection without hematuria, site unspecified  -      phenazopyridine (Pyridium) 200 MG tablet; Take 1 tablet by mouth 3 (Three) Times a Day As Needed for Bladder Spasms.  Dispense: 20 tablet; Refill: 1  -     gentamicin (GARAMYCIN) injection 80 mg    5. Urine frequency  -     phenazopyridine (Pyridium) 200 MG tablet; Take 1 tablet by mouth 3 (Three) Times a Day As Needed for Bladder Spasms.  Dispense: 20 tablet; Refill: 1    6. Spastic dysuria  -     phenazopyridine (Pyridium) 200 MG tablet; Take 1 tablet by mouth 3 (Three) Times a Day As Needed for Bladder Spasms.  Dispense: 20 tablet; Refill: 1    7. Constipation, unspecified constipation type  -     magnesium citrate solution; Take 296 mL by mouth 1 (One) Time for 1 dose.  Dispense: 296 mL; Refill: 1  -     polyethylene glycol (MiraLax) 17 GM/SCOOP powder; Take 17 g by mouth Daily for 30 days.  Dispense: 510 g; Refill: 2                                       ASSESSMENT  Recurrent urinary tract infections/OverActiveBladder/flank pain/constipation: She is in  apparent distress and reports feeling unwell recently.  Nevertheless, she reports a remarkable improvement in her overall symptoms with dysuria.  She still has flank pain but her KUB has been negative. She however reports doing relatively better on her antibiotic prophylaxis, and would like to continue.     Again,  We discussed the types of organisms that are found in the urinary tract indicating that the vast majority are results of the patient's own gastrointestinal patience.  We discussed how many of the antibiotics that are utilized can actually exacerbate these infections by creating resistant organisms and there is only a very few antibiotics that are concentrated in the urine and do not affect the rectal reservoir nor cause recurrent yeast vaginitis.       We discussed the risk factors for recurrent infections being intercourse in younger patients and atrophic changes in older patients.  We discussed the symptoms that are found including pain, pressure,  burning, frequency, urgency suprapubic pain and painful intercourse.  I discussed upper tract symptoms including fevers, chills, and indicated the workup would be much more aggressive if the patient were to present with recurrent infections in the face of upper tract symptomatology such as fever. I discussed the history of vesicoureteral reflux in young patients and finally chronic renal scarring as a result of such.     Finally, we discussed IBS- Patient has significant irritable bowel syndrome, characterized by extreme amounts of constipation.  We spoke about the impact of this on bladder function.  We spoke about  its relationship to recurrent urinary tract infections.  We discussed the need for increasing p.o. fluid intake to at least 2 to 3 L of water daily, and discussed the physiology of colonic motility as well as use of MiraLAX as a bulk laxative versus the newer class of serotonin uptake blockers such as Linzess.  We stressed the need for a daily bowel movement and discussed the McCulloch stool scale at length. We also discussed a referral to the GI nurse practitioner                                                       PLAN  We resent her urine for culture. I will call her with results if any bacteria growth not sensitive to her current therapy of Macrobid..     Will Continue Macrobid 100 mg PO Daily -Suppressive Therapy. She should increase her p.o. fluid intake to at least 1 to 2 L daily and avoid bladder irritants such as caffeine products, spicy foods, and citrusy foods.      I recommend concomitant probiotics with treatment with antibiotics to protect the rectal reservoir including over-the-counter yogurt preparations to rebecca oral pills containing the appropriate probiotics. Patient reports the diligent use of Probiotics.     She is to also continue other medications for yeast vaginitis, nausea, flank pain as prescribed above.     Will see her back for Follow up in clinic and recheck her urinalysis  at that time.     Patient is agreeable to plan of care.     Patient reports that she is not currently experiencing any symptoms of urinary incontinence.     Patient's Body mass index is 35.84 kg/m². indicating that she is obese (BMI >30). Obesity-related health conditions include the following: none, osteoarthritis and urinary stress incontinence. Obesity is improving with lifestyle modifications. BMI is Pain progress. We discussed portion control and increasing exercise..     Smoking Cessation Counseling:  Former smoker.           Follow Up   Return in about 6 months (around 2/18/2022) for Next scheduled follow up/recheck UA/refills.  Patient was given instructions and counseling regarding her condition or for health maintenance advice. Please see specific information pulled into the AVS if appropriate.

## 2021-08-19 LAB — BACTERIA SPEC AEROBE CULT: NORMAL

## 2021-08-19 RX ORDER — POLYETHYLENE GLYCOL 3350 17 G/17G
17 POWDER, FOR SOLUTION ORAL DAILY
Qty: 510 G | Refills: 2 | Status: SHIPPED | OUTPATIENT
Start: 2021-08-19 | End: 2021-09-18

## 2021-08-19 NOTE — PATIENT INSTRUCTIONS
Dysuria  Dysuria is pain or discomfort while urinating. The pain or discomfort may be felt in the part of your body that drains urine from the bladder (urethra) or in the surrounding tissue of the genitals. The pain may also be felt in the groin area, lower abdomen, or lower back. You may have to urinate frequently or have the sudden feeling that you have to urinate (urgency). Dysuria can affect both men and women, but it is more common in women.  Dysuria can be caused by many different things, including:  · Urinary tract infection.  · Kidney stones or bladder stones.  · Certain sexually transmitted infections (STIs), such as chlamydia.  · Dehydration.  · Inflammation of the tissues of the vagina.  · Use of certain medicines.  · Use of certain soaps or scented products that cause irritation.  Follow these instructions at home:  General instructions  · Watch your condition for any changes.  · Urinate often. Avoid holding urine for long periods of time.  · After a bowel movement or urination, women should cleanse from front to back, using each tissue only once.  · Urinate after sexual intercourse.  · Keep all follow-up visits as told by your health care provider. This is important.  · If you had any tests done to find the cause of dysuria, it is up to you to get your test results. Ask your health care provider, or the department that is doing the test, when your results will be ready.  Eating and drinking    · Drink enough fluid to keep your urine pale yellow.  · Avoid caffeine, tea, and alcohol. They can irritate the bladder and make dysuria worse. In men, alcohol may irritate the prostate.  Medicines  · Take over-the-counter and prescription medicines only as told by your health care provider.  · If you were prescribed an antibiotic medicine, take it as told by your health care provider. Do not stop taking the antibiotic even if you start to feel better.  Contact a health care provider if:  · You have a  fever.  · You develop pain in your back or sides.  · You have nausea or vomiting.  · You have blood in your urine.  · You are not urinating as often as you usually do.  Get help right away if:  · Your pain is severe and not relieved with medicines.  · You cannot eat or drink without vomiting.  · You are confused.  · You have a rapid heartbeat while at rest.  · You have shaking or chills.  · You feel extremely weak.  Summary  · Dysuria is pain or discomfort while urinating. Many different conditions can lead to dysuria.  · If you have dysuria, you may have to urinate frequently or have the sudden feeling that you have to urinate (urgency).  · Watch your condition for any changes. Keep all follow-up visits as told by your health care provider.  · Make sure that you urinate often and drink enough fluid to keep your urine pale yellow.  This information is not intended to replace advice given to you by your health care provider. Make sure you discuss any questions you have with your health care provider.  Document Revised: 11/30/2018 Document Reviewed: 10/04/2018  [employer] Patient Education © 2021 [employer]

## 2021-08-20 ENCOUNTER — TELEPHONE (OUTPATIENT)
Dept: UROLOGY | Facility: CLINIC | Age: 31
End: 2021-08-20

## 2021-11-11 ENCOUNTER — APPOINTMENT (OUTPATIENT)
Dept: CT IMAGING | Facility: HOSPITAL | Age: 31
End: 2021-11-11

## 2021-11-11 ENCOUNTER — HOSPITAL ENCOUNTER (EMERGENCY)
Facility: HOSPITAL | Age: 31
Discharge: HOME | End: 2021-11-11
Attending: STUDENT IN AN ORGANIZED HEALTH CARE EDUCATION/TRAINING PROGRAM | Admitting: EMERGENCY MEDICINE

## 2021-11-11 VITALS
BODY MASS INDEX: 35.44 KG/M2 | RESPIRATION RATE: 18 BRPM | DIASTOLIC BLOOD PRESSURE: 96 MMHG | WEIGHT: 200 LBS | SYSTOLIC BLOOD PRESSURE: 134 MMHG | TEMPERATURE: 97.8 F | OXYGEN SATURATION: 100 % | HEIGHT: 63 IN | HEART RATE: 70 BPM

## 2021-11-11 DIAGNOSIS — M54.16 LUMBAR RADICULOPATHY: Primary | ICD-10-CM

## 2021-11-11 LAB
ALBUMIN SERPL-MCNC: 4.35 G/DL (ref 3.5–5.2)
ALBUMIN/GLOB SERPL: 1.9 G/DL
ALP SERPL-CCNC: 93 U/L (ref 39–117)
ALT SERPL W P-5'-P-CCNC: 12 U/L (ref 1–33)
ANION GAP SERPL CALCULATED.3IONS-SCNC: 9.5 MMOL/L (ref 5–15)
AST SERPL-CCNC: 17 U/L (ref 1–32)
B-HCG UR QL: NEGATIVE
BACTERIA UR QL AUTO: ABNORMAL /HPF
BASOPHILS # BLD AUTO: 0.04 10*3/MM3 (ref 0–0.2)
BASOPHILS NFR BLD AUTO: 0.5 % (ref 0–1.5)
BILIRUB SERPL-MCNC: 0.5 MG/DL (ref 0–1.2)
BILIRUB UR QL STRIP: ABNORMAL
BUN SERPL-MCNC: 9 MG/DL (ref 6–20)
BUN/CREAT SERPL: 11.7 (ref 7–25)
CALCIUM SPEC-SCNC: 8.6 MG/DL (ref 8.6–10.5)
CHLORIDE SERPL-SCNC: 107 MMOL/L (ref 98–107)
CLARITY UR: ABNORMAL
CO2 SERPL-SCNC: 23.5 MMOL/L (ref 22–29)
COLOR UR: ABNORMAL
CREAT SERPL-MCNC: 0.77 MG/DL (ref 0.57–1)
DEPRECATED RDW RBC AUTO: 37.7 FL (ref 37–54)
EOSINOPHIL # BLD AUTO: 0.14 10*3/MM3 (ref 0–0.4)
EOSINOPHIL NFR BLD AUTO: 1.9 % (ref 0.3–6.2)
ERYTHROCYTE [DISTWIDTH] IN BLOOD BY AUTOMATED COUNT: 12.3 % (ref 12.3–15.4)
GFR SERPL CREATININE-BSD FRML MDRD: 87 ML/MIN/1.73
GLOBULIN UR ELPH-MCNC: 2.3 GM/DL
GLUCOSE SERPL-MCNC: 103 MG/DL (ref 65–99)
GLUCOSE UR STRIP-MCNC: NEGATIVE MG/DL
HCT VFR BLD AUTO: 37.2 % (ref 34–46.6)
HGB BLD-MCNC: 12.1 G/DL (ref 12–15.9)
HGB UR QL STRIP.AUTO: ABNORMAL
HYALINE CASTS UR QL AUTO: ABNORMAL /LPF
IMM GRANULOCYTES # BLD AUTO: 0.02 10*3/MM3 (ref 0–0.05)
IMM GRANULOCYTES NFR BLD AUTO: 0.3 % (ref 0–0.5)
KETONES UR QL STRIP: ABNORMAL
LEUKOCYTE ESTERASE UR QL STRIP.AUTO: NEGATIVE
LYMPHOCYTES # BLD AUTO: 2.44 10*3/MM3 (ref 0.7–3.1)
LYMPHOCYTES NFR BLD AUTO: 32.5 % (ref 19.6–45.3)
MCH RBC QN AUTO: 27.7 PG (ref 26.6–33)
MCHC RBC AUTO-ENTMCNC: 32.5 G/DL (ref 31.5–35.7)
MCV RBC AUTO: 85.1 FL (ref 79–97)
MONOCYTES # BLD AUTO: 0.48 10*3/MM3 (ref 0.1–0.9)
MONOCYTES NFR BLD AUTO: 6.4 % (ref 5–12)
NEUTROPHILS NFR BLD AUTO: 4.38 10*3/MM3 (ref 1.7–7)
NEUTROPHILS NFR BLD AUTO: 58.4 % (ref 42.7–76)
NITRITE UR QL STRIP: NEGATIVE
NRBC BLD AUTO-RTO: 0 /100 WBC (ref 0–0.2)
PH UR STRIP.AUTO: 5.5 [PH] (ref 5–8)
PLATELET # BLD AUTO: 261 10*3/MM3 (ref 140–450)
PMV BLD AUTO: 10.9 FL (ref 6–12)
POTASSIUM SERPL-SCNC: 3.7 MMOL/L (ref 3.5–5.2)
PROT SERPL-MCNC: 6.6 G/DL (ref 6–8.5)
PROT UR QL STRIP: ABNORMAL
RBC # BLD AUTO: 4.37 10*6/MM3 (ref 3.77–5.28)
RBC # UR: ABNORMAL /HPF
REF LAB TEST METHOD: ABNORMAL
SODIUM SERPL-SCNC: 140 MMOL/L (ref 136–145)
SP GR UR STRIP: 1.03 (ref 1–1.03)
SQUAMOUS #/AREA URNS HPF: ABNORMAL /HPF
UROBILINOGEN UR QL STRIP: ABNORMAL
WBC # BLD AUTO: 7.5 10*3/MM3 (ref 3.4–10.8)
WBC UR QL AUTO: ABNORMAL /HPF
YEAST URNS QL MICRO: ABNORMAL /HPF

## 2021-11-11 PROCEDURE — 25010000002 KETOROLAC TROMETHAMINE PER 15 MG: Performed by: STUDENT IN AN ORGANIZED HEALTH CARE EDUCATION/TRAINING PROGRAM

## 2021-11-11 PROCEDURE — 81001 URINALYSIS AUTO W/SCOPE: CPT | Performed by: STUDENT IN AN ORGANIZED HEALTH CARE EDUCATION/TRAINING PROGRAM

## 2021-11-11 PROCEDURE — 99283 EMERGENCY DEPT VISIT LOW MDM: CPT

## 2021-11-11 PROCEDURE — 81025 URINE PREGNANCY TEST: CPT | Performed by: STUDENT IN AN ORGANIZED HEALTH CARE EDUCATION/TRAINING PROGRAM

## 2021-11-11 PROCEDURE — 74176 CT ABD & PELVIS W/O CONTRAST: CPT

## 2021-11-11 PROCEDURE — 25010000002 DEXAMETHASONE PER 1 MG: Performed by: STUDENT IN AN ORGANIZED HEALTH CARE EDUCATION/TRAINING PROGRAM

## 2021-11-11 PROCEDURE — 85025 COMPLETE CBC W/AUTO DIFF WBC: CPT | Performed by: STUDENT IN AN ORGANIZED HEALTH CARE EDUCATION/TRAINING PROGRAM

## 2021-11-11 PROCEDURE — 72131 CT LUMBAR SPINE W/O DYE: CPT

## 2021-11-11 PROCEDURE — 63710000001 DEXAMETHASONE PER 0.25 MG: Performed by: EMERGENCY MEDICINE

## 2021-11-11 PROCEDURE — 80053 COMPREHEN METABOLIC PANEL: CPT | Performed by: STUDENT IN AN ORGANIZED HEALTH CARE EDUCATION/TRAINING PROGRAM

## 2021-11-11 PROCEDURE — 96372 THER/PROPH/DIAG INJ SC/IM: CPT

## 2021-11-11 PROCEDURE — 36415 COLL VENOUS BLD VENIPUNCTURE: CPT

## 2021-11-11 RX ORDER — DEXAMETHASONE SODIUM PHOSPHATE 4 MG/ML
6 INJECTION, SOLUTION INTRA-ARTICULAR; INTRALESIONAL; INTRAMUSCULAR; INTRAVENOUS; SOFT TISSUE ONCE
Status: COMPLETED | OUTPATIENT
Start: 2021-11-11 | End: 2021-11-11

## 2021-11-11 RX ORDER — DEXAMETHASONE 4 MG/1
6 TABLET ORAL ONCE
Status: COMPLETED | OUTPATIENT
Start: 2021-11-11 | End: 2021-11-11

## 2021-11-11 RX ORDER — HYDROCODONE BITARTRATE AND ACETAMINOPHEN 7.5; 325 MG/1; MG/1
1 TABLET ORAL EVERY 8 HOURS PRN
Qty: 8 TABLET | Refills: 0 | Status: SHIPPED | OUTPATIENT
Start: 2021-11-11 | End: 2022-08-25

## 2021-11-11 RX ORDER — KETOROLAC TROMETHAMINE 30 MG/ML
30 INJECTION, SOLUTION INTRAMUSCULAR; INTRAVENOUS ONCE
Status: COMPLETED | OUTPATIENT
Start: 2021-11-11 | End: 2021-11-11

## 2021-11-11 RX ORDER — HYDROCODONE BITARTRATE AND ACETAMINOPHEN 7.5; 325 MG/1; MG/1
1 TABLET ORAL ONCE
Status: COMPLETED | OUTPATIENT
Start: 2021-11-11 | End: 2021-11-11

## 2021-11-11 RX ORDER — DEXAMETHASONE 6 MG/1
6 TABLET ORAL
Qty: 6 TABLET | Refills: 0 | Status: SHIPPED | OUTPATIENT
Start: 2021-11-11 | End: 2022-08-25

## 2021-11-11 RX ADMIN — KETOROLAC TROMETHAMINE 30 MG: 30 INJECTION, SOLUTION INTRAMUSCULAR; INTRAVENOUS at 05:34

## 2021-11-11 RX ADMIN — HYDROCODONE BITARTRATE AND ACETAMINOPHEN 1 TABLET: 7.5; 325 TABLET ORAL at 08:05

## 2021-11-11 RX ADMIN — DEXAMETHASONE SODIUM PHOSPHATE 6 MG: 4 INJECTION, SOLUTION INTRA-ARTICULAR; INTRALESIONAL; INTRAMUSCULAR; INTRAVENOUS; SOFT TISSUE at 05:34

## 2021-11-11 RX ADMIN — DEXAMETHASONE 6 MG: 4 TABLET ORAL at 08:05

## 2021-11-11 NOTE — ED PROVIDER NOTES
Subjective   31-year-old female presents to the ER with a primary complaint of lower back pain with bilateral radiculopathy.  Patient noted she fell onto her buttocks approximately 5 days prior to this presentation.  Patient notes she is attempted to treat her symptoms at home without significant improvement.  Patient noted radicular pains and paresthesia into the lateral aspect of both thighs.  Patient noted she has been able to walk at home.  Denied obvious signs of bowel or bladder incontinence.  Denied concerns for saddle anesthesia.  No obvious alleviating factors.  Aggravated with ambulation.  Vitals stable          Review of Systems   Musculoskeletal: Positive for back pain.   All other systems reviewed and are negative.      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 DRAINAGE OF OVARIAN CYST.;  Surgeon: Rachel Caruso DO;  Location: [facility];  Service: Obstetrics/Gynecology   • ORIF SCAPHOID W VASCULARIZED BONE GRAFT     • TUBAL ABDOMINAL LIGATION     • TUBAL COAGULATION LAPAROSCOPIC Bilateral 2018    Procedure: TUBAL FALLOPE FILSHIE CLIPPING LAPAROSCOPIC;  Surgeon: Rachel Caruso DO;  Location: [facility];  Service: Obstetrics/Gynecology       Family History   Problem Relation Age of Onset   • Heart disease Mother    • Thyroid disease Mother    • Anxiety disorder Mother    • Depression Mother    • Bipolar disorder Mother    • Heart disease Father    • Cancer  Father    • Heart disease Maternal Grandmother    • Diabetes Maternal Grandmother    • Diabetes Maternal Grandfather    • Lupus Paternal Grandmother    • Rheum arthritis Paternal Grandmother        Social History     Socioeconomic History   • Marital status:    Tobacco Use   • Smoking status: Former Smoker     Packs/day: 0.00     Types: Electronic Cigarette   • Smokeless tobacco: Never Used   Substance and Sexual Activity   • Alcohol use: Yes     Comment: socially   • Drug use: No   • Sexual activity: Yes     Partners: Male     Birth control/protection: Surgical           Objective   Physical Exam  Constitutional:       General: She is not in acute distress.     Appearance: Normal appearance. She is not ill-appearing.   HENT:      Head: Normocephalic and atraumatic.      Right Ear: External ear normal.      Left Ear: External ear normal.      Nose: Nose normal.      Mouth/Throat:      Mouth: Mucous membranes are moist.   Eyes:      Extraocular Movements: Extraocular movements intact.      Pupils: Pupils are equal, round, and reactive to light.   Cardiovascular:      Rate and Rhythm: Normal rate and regular rhythm.      Heart sounds: No murmur heard.      Pulmonary:      Effort: Pulmonary effort is normal. No respiratory distress.      Breath sounds: Normal breath sounds. No wheezing.   Abdominal:      General: Bowel sounds are normal.      Palpations: Abdomen is soft.      Tenderness: There is no abdominal tenderness.   Musculoskeletal:         General: No deformity or signs of injury. Normal range of motion.      Cervical back: Normal range of motion and neck supple.   Skin:     General: Skin is warm and dry.      Findings: No erythema.   Neurological:      General: No focal deficit present.      Mental Status: She is alert and oriented to person, place, and time. Mental status is at baseline.      Cranial Nerves: No cranial nerve deficit.   Psychiatric:         Mood and Affect: Mood normal.          Behavior: Behavior normal.         Thought Content: Thought content normal.         Procedures           ED Course  ED Course as of 11/11/21 0748   Thu Nov 11, 2021   0640 Care of this patient was transferred to the next attending physician at shift change.  Complete discussion of presentation, labs, imaging, care, and expected course occurred during transition of providers.  Vitals stable at transfer of care. [SF]   0640 Electronically signed by Hema Barajas DO, 11/11/21, 6:40 AM EST.   [SF]      ED Course User Index  [SF] Hema Barajas DO                                           [facility]    Final diagnoses:   Lumbar radiculopathy       ED Disposition  ED Disposition     ED Disposition Condition Comment    Discharge Stable           Maureen Gerardoee, APRN  [address] [place] [address] [phone]    Schedule an appointment as soon as possible for a visit   If symptoms worsen         Medication List      New Prescriptions    dexamethasone 6 MG tablet  Commonly known as: DECADRON  Take 1 tablet by mouth Daily With Breakfast.     HYDROcodone-acetaminophen 7.5-325 MG per tablet  Commonly known as: NORCO  Take 1 tablet by mouth Every 8 (Eight) Hours As Needed for Moderate Pain .           Where to Get Your Medications      These medications were sent to [id number] - [place], [place] - [address] [facility] [phone] [facility] [phone]   [address] [place] [address]    Phone: [phone]   · dexamethasone 6 MG tablet  · HYDROcodone-acetaminophen 7.5-325 MG per tablet          Juan Zuleta MD  11/11/21 0762       Juan Zuleta MD  11/11/21 6707

## 2022-02-04 ENCOUNTER — OFFICE VISIT (OUTPATIENT)
Dept: UROLOGY | Facility: CLINIC | Age: 32
End: 2022-02-04

## 2022-02-04 VITALS — BODY MASS INDEX: 35.43 KG/M2 | WEIGHT: 199.96 LBS | HEIGHT: 63 IN

## 2022-02-04 DIAGNOSIS — R30.0 DYSURIA: ICD-10-CM

## 2022-02-04 DIAGNOSIS — N39.0 RECURRENT UTI: Primary | ICD-10-CM

## 2022-02-04 DIAGNOSIS — N39.0 URINARY TRACT INFECTION WITHOUT HEMATURIA, SITE UNSPECIFIED: ICD-10-CM

## 2022-02-04 DIAGNOSIS — R30.0 SPASTIC DYSURIA: ICD-10-CM

## 2022-02-04 DIAGNOSIS — R10.9 FLANK PAIN: ICD-10-CM

## 2022-02-04 DIAGNOSIS — R35.0 URINE FREQUENCY: ICD-10-CM

## 2022-02-04 DIAGNOSIS — B37.31 YEAST VAGINITIS: ICD-10-CM

## 2022-02-04 LAB
BILIRUB BLD-MCNC: NEGATIVE MG/DL
CLARITY, POC: CLEAR
COLOR UR: YELLOW
EXPIRATION DATE: ABNORMAL
GLUCOSE UR STRIP-MCNC: NEGATIVE MG/DL
KETONES UR QL: NEGATIVE
LEUKOCYTE EST, POC: NEGATIVE
Lab: ABNORMAL
NITRITE UR-MCNC: NEGATIVE MG/ML
PH UR: 6.5 [PH] (ref 5–8)
PROT UR STRIP-MCNC: NEGATIVE MG/DL
RBC # UR STRIP: ABNORMAL /UL
SP GR UR: 1.01 (ref 1–1.03)
UROBILINOGEN UR QL: NORMAL

## 2022-02-04 PROCEDURE — 99214 OFFICE O/P EST MOD 30 MIN: CPT | Performed by: NURSE PRACTITIONER

## 2022-02-04 PROCEDURE — 87086 URINE CULTURE/COLONY COUNT: CPT | Performed by: NURSE PRACTITIONER

## 2022-02-04 RX ORDER — PHENAZOPYRIDINE HYDROCHLORIDE 200 MG/1
200 TABLET, FILM COATED ORAL 3 TIMES DAILY PRN
Qty: 20 TABLET | Refills: 1 | Status: SHIPPED | OUTPATIENT
Start: 2022-02-04 | End: 2022-08-25

## 2022-02-04 RX ORDER — NITROFURANTOIN 25; 75 MG/1; MG/1
100 CAPSULE ORAL DAILY
Qty: 56 CAPSULE | Refills: 3 | Status: SHIPPED | OUTPATIENT
Start: 2022-02-04 | End: 2022-04-05

## 2022-02-04 RX ORDER — FLUCONAZOLE 150 MG/1
150 TABLET ORAL ONCE
Qty: 1 TABLET | Refills: 0 | Status: SHIPPED | OUTPATIENT
Start: 2022-02-04 | End: 2022-02-04

## 2022-02-04 NOTE — PATIENT INSTRUCTIONS
Dysuria  Dysuria is pain or discomfort while urinating. The pain or discomfort may be felt in the part of your body that drains urine from the bladder (urethra) or in the surrounding tissue of the genitals. The pain may also be felt in the groin area, lower abdomen, or lower back. You may have to urinate frequently or have the sudden feeling that you have to urinate (urgency). Dysuria can affect both men and women, but it is more common in women.  Dysuria can be caused by many different things, including:  Urinary tract infection.  Kidney stones or bladder stones.  Certain sexually transmitted infections (STIs), such as chlamydia.  Dehydration.  Inflammation of the tissues of the vagina.  Use of certain medicines.  Use of certain soaps or scented products that cause irritation.  Follow these instructions at home:  General instructions  Watch your condition for any changes.  Urinate often. Avoid holding urine for long periods of time.  After a bowel movement or urination, women should cleanse from front to back, using each tissue only once.  Urinate after sexual intercourse.  Keep all follow-up visits as told by your health care provider. This is important.  If you had any tests done to find the cause of dysuria, it is up to you to get your test results. Ask your health care provider, or the department that is doing the test, when your results will be ready.  Eating and drinking    Drink enough fluid to keep your urine pale yellow.  Avoid caffeine, tea, and alcohol. They can irritate the bladder and make dysuria worse. In men, alcohol may irritate the prostate.    Medicines  Take over-the-counter and prescription medicines only as told by your health care provider.  If you were prescribed an antibiotic medicine, take it as told by your health care provider. Do not stop taking the antibiotic even if you start to feel better.  Contact a health care provider if:  You have a fever.  You develop pain in your back or  sides.  You have nausea or vomiting.  You have blood in your urine.  You are not urinating as often as you usually do.  Get help right away if:  Your pain is severe and not relieved with medicines.  You cannot eat or drink without vomiting.  You are confused.  You have a rapid heartbeat while at rest.  You have shaking or chills.  You feel extremely weak.  Summary  Dysuria is pain or discomfort while urinating. Many different conditions can lead to dysuria.  If you have dysuria, you may have to urinate frequently or have the sudden feeling that you have to urinate (urgency).  Watch your condition for any changes. Keep all follow-up visits as told by your health care provider.  Make sure that you urinate often and drink enough fluid to keep your urine pale yellow.  This information is not intended to replace advice given to you by your health care provider. Make sure you discuss any questions you have with your health care provider.  Document Revised: 11/30/2018 Document Reviewed: 10/04/2018  [employer] Patient Education © 2021 [employer]  Urinary Tract Infection, Adult  A urinary tract infection (UTI) is an infection of any part of the urinary tract. The urinary tract includes:  · The kidneys.  · The ureters.  · The bladder.  · The urethra.  These organs make, store, and get rid of pee (urine) in the body.  What are the causes?  This is caused by germs (bacteria) in your genital area. These germs grow and cause swelling (inflammation) of your urinary tract.  What increases the risk?  You are more likely to develop this condition if:  · You have a small, thin tube (catheter) to drain pee.  · You cannot control when you pee or poop (incontinence).  · You are female, and:  ? You use these methods to prevent pregnancy:  § A medicine that kills sperm (spermicide).  § A device that blocks sperm (diaphragm).  ? You have low levels of a female hormone (estrogen).  ? You are pregnant.  · You have genes that add to your  risk.  · You are sexually active.  · You take antibiotic medicines.  · You have trouble peeing because of:  ? A prostate that is bigger than normal, if you are male.  ? A blockage in the part of your body that drains pee from the bladder (urethra).  ? A kidney stone.  ? A nerve condition that affects your bladder (neurogenic bladder).  ? Not getting enough to drink.  ? Not peeing often enough.  · You have other conditions, such as:  ? Diabetes.  ? A weak disease-fighting system (immune system).  ? Sickle cell disease.  ? Gout.  ? Injury of the spine.  What are the signs or symptoms?  Symptoms of this condition include:  · Needing to pee right away (urgently).  · Peeing often.  · Peeing small amounts often.  · Pain or burning when peeing.  · Blood in the pee.  · Pee that smells bad or not like normal.  · Trouble peeing.  · Pee that is cloudy.  · Fluid coming from the vagina, if you are female.  · Pain in the belly or lower back.  Other symptoms include:  · Throwing up (vomiting).  · No urge to eat.  · Feeling mixed up (confused).  · Being tired and grouchy (irritable).  · A fever.  · Watery poop (diarrhea).  How is this treated?  This condition may be treated with:  · Antibiotic medicine.  · Other medicines.  · Drinking enough water.  Follow these instructions at home:    Medicines  · Take over-the-counter and prescription medicines only as told by your doctor.  · If you were prescribed an antibiotic medicine, take it as told by your doctor. Do not stop taking it even if you start to feel better.  General instructions  · Make sure you:  ? Pee until your bladder is empty.  ? Do not hold pee for a long time.  ? Empty your bladder after sex.  ? Wipe from front to back after pooping if you are a female. Use each tissue one time when you wipe.  · Drink enough fluid to keep your pee pale yellow.  · Keep all follow-up visits as told by your doctor. This is important.  Contact a doctor if:  · You do not get better after 1-2  days.  · Your symptoms go away and then come back.  Get help right away if:  · You have very bad back pain.  · You have very bad pain in your lower belly.  · You have a fever.  · You are sick to your stomach (nauseous).  · You are throwing up.  Summary  · A urinary tract infection (UTI) is an infection of any part of the urinary tract.  · This condition is caused by germs in your genital area.  · There are many risk factors for a UTI. These include having a small, thin tube to drain pee and not being able to control when you pee or poop.  · Treatment includes antibiotic medicines for germs.  · Drink enough fluid to keep your pee pale yellow.  This information is not intended to replace advice given to you by your health care provider. Make sure you discuss any questions you have with your health care provider.  Document Revised: 12/05/2019 Document Reviewed: 06/27/2019  [employer] Patient Education © 2021 [employer]

## 2022-02-04 NOTE — PROGRESS NOTES
Chief Complaint  Rcurrent UTI/acute cystitis with microhematuria (6-month follow-up)    Mukesh Mcnulty presents to [facility] for   History of Present Illness    Ms Cheryl MERCER is a 37-year-old very pleasant Patient, who returns to clinic accompanied by her daughter and significant other for evaluation today. She is a 6-month follow-up for recurrent urinary tract infections.  She is 4 weeks post COVID, She reports recent bladder flare for which she had to go to the ER and was treated with Rocephin and Keflex for UTI.  She is unsure about any positive culture results.  She however reports resuming her therapy with Bactrim.  She is currently doing better on antibiotic prophylaxis with Macrobid.  She also reports  A significant  improvement in her urinary symptoms.      Today, she denies having any more episodes of frequency, urgency, or nocturia. She denies dysuria, burning on urination, or gross hematuria.  She reports generalized malaise from the Covid, but Denies back pain, flank pain, she does not have any CVA tenderness.  She reports constant pelvic pain and pressure, which she attributes to her bowels recently.  She however denies fever or chills, she does not have nausea, vomiting, or diarrhea.  Her Urine dipstick today is completely negative for any leukocyte esterace, negative nitrites, negative gross/but show 1+ microscopic hematuria,  her PVR is 0.     Overall she reports doing better, she states she is currently taking her antibiotics and probiotics diligently.  She reports  increasing her p.o. fluid intake to at least 2 L daily, and has significantly reduced her intake of soda drinks. She drinks more water and cranberry drinks.  She is pending evaluation for hand surgery requiring bone reduction from a prior trauma.  Patient reports she is not looking forward..    The rest of her PMHx as listed below.     Objective   Vital Signs:   Ht 160 cm  "(62.99\")   Wt 90.7 kg (199 lb 15.3 oz)   BMI 35.43 kg/m²     Physical Exam  Constitutional:       General: She is in acute distress.      Appearance: She is well-developed. She is obese. She is ill-appearing.   HENT:      Head: Normocephalic and atraumatic.   Eyes:      Pupils: Pupils are equal, round, and reactive to light.   Neck:      Thyroid: No thyromegaly.      Trachea: No tracheal deviation.   Cardiovascular:      Rate and Rhythm: Normal rate and regular rhythm.      Heart sounds: No murmur heard.      Pulmonary:      Effort: Pulmonary effort is normal. No respiratory distress.      Breath sounds: Normal breath sounds. No stridor. No wheezing.   Abdominal:      General: Bowel sounds are normal. There is distension.      Palpations: Abdomen is soft.      Tenderness: There is abdominal tenderness.   Genitourinary:     Labia:         Right: No tenderness.         Left: No tenderness.       Vagina: Normal. No vaginal discharge.   Musculoskeletal:         General: Tenderness present. No deformity. Normal range of motion.      Cervical back: Normal range of motion.   Skin:     General: Skin is warm and dry.      Capillary Refill: Capillary refill takes less than 2 seconds.      Coloration: Skin is pale.      Findings: No erythema or rash.   Neurological:      Mental Status: She is alert and oriented to person, place, and time.      Cranial Nerves: No cranial nerve deficit.      Sensory: No sensory deficit.      Coordination: Coordination normal.   Psychiatric:         Behavior: Behavior normal.         Thought Content: Thought content normal.         Judgment: Judgment normal.        Result Review :     CMP    CMP 3/9/21 6/19/21 11/11/21   Glucose 86 107 (A) 103 (A)   BUN 8 11 9   Creatinine 0.77 0.82 0.77   eGFR Non African Am 87 81 87   Sodium 142 140 140   Potassium 4.1 4.0 3.7   Chloride 105 104 107   Calcium 9.3 9.5 8.6   Albumin 4.56 4.54 4.35   Total Bilirubin 0.4 0.3 0.5   Alkaline Phosphatase 104 114 93 "   AST (SGOT) 15 14 17   ALT (SGPT) 11 8 12   (A) Abnormal value            CBC w/diff    CBC w/Diff 3/9/21 6/19/21 11/11/21   WBC 8.53 11.33 (A) 7.50   RBC 4.55 4.91 4.37   Hemoglobin 12.8 13.4 12.1   Hematocrit 39.5 41.3 37.2   MCV 86.8 84.1 85.1   MCH 28.1 27.3 27.7   MCHC 32.4 32.4 32.5   RDW 12.1 (A) 12.4 12.3   Platelets 263 295 261   Neutrophil Rel % 61.1 51.3 58.4   Immature Granulocyte Rel % 0.1 0.2 0.3   Lymphocyte Rel % 30.2 38.1 32.5   Monocyte Rel % 6.4 7.9 6.4   Eosinophil Rel % 1.8 2.0 1.9   Basophil Rel % 0.4 0.5 0.5   (A) Abnormal value            Renal Profile    Renal Profile 3/9/21 6/19/21 11/11/21   BUN 8 11 9   Creatinine 0.77 0.82 0.77   eGFR Non  Am 87 81 87           UA    Urinalysis 8/18/21 11/11/21 11/11/21 2/4/22     0545 0545    Squamous Epithelial Cells, UA   13-20 (A)    Specific Gravity, UA  1.026     Ketones, UA Negative Trace (A)  Negative   Blood, UA  Large (3+) (A)     Leukocytes, UA Negative Negative  Negative   Nitrite, UA  Negative     RBC, UA   6-12 (A)    WBC, UA   0-2    Bacteria, UA   2+ (A)    (A) Abnormal value            Urine Culture    Urine Culture 6/17/21 8/18/21 2/4/22   Urine Culture 50,000 CFU/mL Mixed Nneka Isolated 50,000 CFU/mL Mixed Nneka Isolated No growth           Covid Tests    Common Labsle 3/9/21   COVID19 Not Detected                     Assessment and Plan    Problem List Items Addressed This Visit        Genitourinary and Reproductive     UTI (urinary tract infection)    Relevant Medications    phenazopyridine (Pyridium) 200 MG tablet    nitrofurantoin, macrocrystal-monohydrate, (Macrobid) 100 MG capsule      Other Visit Diagnoses     Recurrent UTI    -  Primary    Relevant Medications    phenazopyridine (Pyridium) 200 MG tablet    nitrofurantoin, macrocrystal-monohydrate, (Macrobid) 100 MG capsule    Other Relevant Orders    POC Urinalysis Dipstick, Automated (Completed)    Urine Culture - Urine, Urine, Clean Catch (Completed)    Yeast  vaginitis        Dysuria        Relevant Medications    phenazopyridine (Pyridium) 200 MG tablet    Flank pain        Relevant Medications    phenazopyridine (Pyridium) 200 MG tablet    Urine frequency        Relevant Medications    phenazopyridine (Pyridium) 200 MG tablet    Spastic dysuria        Relevant Medications    phenazopyridine (Pyridium) 200 MG tablet                        ASSESSMENT  Ms. Cheryl VO is a pleasant 32-year-old female with a significant history of recurrent UTIs, acute cystitis with microscopic hematuria, who returns to clinic today for her 6-month follow-up.  He is in no apparent distress today, reports feeling significantly better post her recent antibiotic therapy at the ER with Rocephin and Keflex.  Urine dipstick today is completely negative.    Recurrent urinary tract infections/verActiveBladder:  She reports doing relatively better on her antibiotic prophylaxis, and would like to continue. Again,  We discussed the types of organisms that are found in the urinary tract indicating that the vast majority are results of the patient's own gastrointestinal patience.  We discussed how many of the antibiotics that are utilized can actually exacerbate these infections by creating resistant organisms and there is only a very few antibiotics that are concentrated in the urine and do not affect the rectal reservoir nor cause recurrent yeast vaginitis.       We discussed the risk factors for recurrent infections being intercourse in younger patients and atrophic changes in older patients.  We discussed the symptoms that are found including pain, pressure, burning, frequency, urgency suprapubic pain and painful intercourse.  I discussed upper tract symptoms including fevers, chills, and indicated the workup would be much more aggressive if the patient were to present with recurrent infections in the face of upper tract symptomatology such as fever. I discussed the history of vesicoureteral reflux  in young patients and finally chronic renal scarring as a result of such.                                                      PLAN  We resent her urine for culture. I will call her with results if any bacteria growth not sensitive to her current therapy of Macrobid..     Will Continue Macrobid 100 mg PO Daily -Suppressive Therapy. She should increase her p.o. fluid intake to at least 1 to 2 L daily and avoid bladder irritants such as caffeine products, spicy foods, and citrusy foods.      I recommend concomitant probiotics with treatment with antibiotics to protect the rectal reservoir including over-the-counter yogurt preparations to rebecca oral pills containing the appropriate probiotics. Patient reports the diligent use of Probiotics.     She is to also continue other medications as prescribed above.     Will see her back for follow-up in clinic and reevaluate her symptoms at that time.  Discussed weaning her off antibiotic therapy if negative cultures.     She may return sooner for Follow up in clinic and recheck her urinalysis if she becomes symptomatic.     Patient is agreeable to plan of care.     Patient reports that she is not currently experiencing any symptoms of urinary incontinence.    Patient's Body mass index is 35.43 kg/m². indicating that she is obese (BMI >30). Obesity-related health conditions include the following: hypertension, dyslipidemias and GERD. Obesity is improving with lifestyle modifications. BMI is 35.43. We discussed portion control and increasing exercise..    Smoking Cessation Counseling:  Former smoker.  Patient does not currently use any tobacco products.     Follow Up   Return in about 6 months (around 8/4/2022) for Next scheduled follow up, RECURRENT UTI/DYSURIA/DETRUSSOR INSTABILITY.  Patient was given instructions and counseling regarding her condition or for health maintenance advice. Please see specific information pulled into the AVS if appropriate.

## 2022-02-05 LAB — BACTERIA SPEC AEROBE CULT: NO GROWTH

## 2022-02-08 ENCOUNTER — LAB (OUTPATIENT)
Dept: LAB | Facility: HOSPITAL | Age: 32
End: 2022-02-08

## 2022-02-08 ENCOUNTER — TRANSCRIBE ORDERS (OUTPATIENT)
Dept: ADMINISTRATIVE | Facility: HOSPITAL | Age: 32
End: 2022-02-08

## 2022-02-08 DIAGNOSIS — Z11.52 ENCOUNTER FOR SCREENING FOR COVID-19: Primary | ICD-10-CM

## 2022-02-08 DIAGNOSIS — Z11.52 ENCOUNTER FOR SCREENING FOR COVID-19: ICD-10-CM

## 2022-02-08 LAB
FLUAV SUBTYP SPEC NAA+PROBE: NOT DETECTED
FLUBV RNA ISLT QL NAA+PROBE: NOT DETECTED
SARS-COV-2 RNA PNL SPEC NAA+PROBE: NOT DETECTED

## 2022-02-08 PROCEDURE — C9803 HOPD COVID-19 SPEC COLLECT: HCPCS

## 2022-02-08 PROCEDURE — 87636 SARSCOV2 & INF A&B AMP PRB: CPT | Performed by: FAMILY MEDICINE

## 2022-02-23 ENCOUNTER — OFFICE VISIT (OUTPATIENT)
Dept: CARDIOLOGY | Facility: CLINIC | Age: 32
End: 2022-02-23

## 2022-02-23 VITALS
HEART RATE: 88 BPM | DIASTOLIC BLOOD PRESSURE: 91 MMHG | SYSTOLIC BLOOD PRESSURE: 135 MMHG | BODY MASS INDEX: 38.98 KG/M2 | WEIGHT: 220 LBS | TEMPERATURE: 96.8 F | HEIGHT: 63 IN | RESPIRATION RATE: 16 BRPM

## 2022-02-23 DIAGNOSIS — Z01.810 PREOPERATIVE CARDIOVASCULAR EXAMINATION: ICD-10-CM

## 2022-02-23 DIAGNOSIS — R00.2 PALPITATIONS: ICD-10-CM

## 2022-02-23 DIAGNOSIS — R06.09 DYSPNEA ON EXERTION: Primary | ICD-10-CM

## 2022-02-23 DIAGNOSIS — I10 ESSENTIAL HYPERTENSION: ICD-10-CM

## 2022-02-23 PROCEDURE — 99214 OFFICE O/P EST MOD 30 MIN: CPT | Performed by: INTERNAL MEDICINE

## 2022-02-23 PROCEDURE — 93000 ELECTROCARDIOGRAM COMPLETE: CPT | Performed by: INTERNAL MEDICINE

## 2022-02-23 RX ORDER — HYDROCHLOROTHIAZIDE 12.5 MG/1
12.5 CAPSULE, GELATIN COATED ORAL DAILY
Qty: 30 CAPSULE | Refills: 3 | Status: SHIPPED | OUTPATIENT
Start: 2022-02-23 | End: 2022-10-14 | Stop reason: SDUPTHER

## 2022-02-23 NOTE — PROGRESS NOTES
Maureen Gerardo APRN  Cheryl Mcnulty  1990 02/23/2022    Patient Active Problem List   Diagnosis   • UTI (urinary tract infection)   • Ureteral calculus       Dear Maureen Gerardo APRN:    Subjective     Cheryl Mcnulty is a 32 y.o. female with the problems as listed above, presents    Chief Complaint   Patient presents with   • Surgical Clearance     L ulnar shortening, Dr. Roberts, [place], [place]   • Palpitations     flutters   • Shortness of Breath     routine activity   • Edema     LE   • Med Management     verbal       History of Present Illness:  is a pleasant 32-year-old  female with no history of known heart disease, is here seeking preoperative cardiac evaluation cardiac clearance prior to undergoing left ulnar shortening surgery.  On further questioning she states that she has been having some increasing shortness of breath with bilateral leg edema recently.  She currently seem to get short of breath with mild exertion.  She denies any PND or orthopnea.  She has intermittent bilateral leg edema.  He has gained about 20 pounds since November 2021.  She denies any complaints of chest pains.  She has some intermittent palpitations on and off for a long time.  He has no documented cardiac arrhythmias.        No Known Allergies:      Current Outpatient Medications:   •  atenolol (TENORMIN) 25 MG tablet, Take 1 & 1/2 tablets by mouth Daily. (Patient taking differently: Take 25 mg by mouth Daily.), Disp: 120 tablet, Rfl: 2  •  DULoxetine (CYMBALTA) 60 MG capsule, Take 1 capsule by mouth Daily., Disp: 90 capsule, Rfl: 2  •  hydroxychloroquine (PLAQUENIL) 200 MG tablet, Take 1 tablet by mouth Daily., Disp: 30 tablet, Rfl: 4  •  nitrofurantoin, macrocrystal-monohydrate, (Macrobid) 100 MG capsule, Take 1 capsule by mouth Daily for 60 days., Disp: 56 capsule, Rfl: 3  •  ondansetron (ZOFRAN) 4 MG tablet, Take 1 tablet every four hours, as needed, Disp: 30 tablet, Rfl: 1  •   promethazine (PHENERGAN) 25 MG tablet, Take 1 tablet by mouth Every 6 (Six) Hours As Needed for Vomiting., Disp: 20 tablet, Rfl: 0  •  tiZANidine (ZANAFLEX) 4 MG tablet, Take 1 tablet by mouth 2 (two) times a day., Disp: 60 tablet, Rfl: 4  •  atomoxetine (STRATTERA) 60 MG capsule, Take 60 mg by mouth Daily., Disp: , Rfl:   •  dexamethasone (DECADRON) 6 MG tablet, Take 1 tablet by mouth Daily With Breakfast., Disp: 6 tablet, Rfl: 0  •  folic acid (FOLVITE) 1 MG tablet, Take 1 tablet by mouth Daily., Disp: 30 tablet, Rfl: 4  •  hydroCHLOROthiazide (MICROZIDE) 12.5 MG capsule, Take 1 capsule by mouth Daily., Disp: 30 capsule, Rfl: 3  •  HYDROcodone-acetaminophen (NORCO) 7.5-325 MG per tablet, Take 1 tablet by mouth Every 8 (Eight) Hours As Needed for Moderate Pain ., Disp: 8 tablet, Rfl: 0  •  ketorolac (TORADOL) 10 MG tablet, Take 1 tablet by mouth Every 6 (Six) Hours As Needed for Severe Pain ., Disp: 15 tablet, Rfl: 0  •  Lactobacillus (Acidophilus) 100 MG capsule, Take  by mouth Daily., Disp: , Rfl:   •  linaclotide (LINZESS) 145 MCG capsule capsule, Take 1 tablet once daily on an empty stomach at least 30 minutes prior to the first meal of the day., Disp: 30 capsule, Rfl: 5  •  meloxicam (MOBIC) 7.5 MG tablet, Take 1 tablet by mouth Daily., Disp: , Rfl:   •  nystatin (MYCOSTATIN) 361627 UNIT/ML suspension, 4 (Four) Times a Day., Disp: , Rfl:   •  phenazopyridine (Pyridium) 200 MG tablet, Take 1 tablet by mouth 3 (Three) Times a Day As Needed for Bladder Spasms., Disp: 20 tablet, Rfl: 1      The following portions of the patient's history were reviewed and updated as appropriate: allergies, current medications, past family history, past medical history, past social history, past surgical history and problem list.    Social History     Tobacco Use   • Smoking status: Former Smoker     Packs/day: 0.00     Types: Electronic Cigarette   • Smokeless tobacco: Never Used   Substance Use Topics   • Alcohol use: Yes      "Comment: socially   • Drug use: No       Review of Systems   Constitutional: Negative for chills and fever.   HENT: Negative for nosebleeds and sore throat.    Cardiovascular: Positive for leg swelling and palpitations. Negative for chest pain.   Respiratory: Positive for shortness of breath. Negative for cough, hemoptysis and wheezing.    Gastrointestinal: Negative for abdominal pain, hematemesis, hematochezia, melena, nausea and vomiting.   Genitourinary: Negative for dysuria and hematuria.   Neurological: Negative for headaches.       Objective   Vitals:    02/23/22 1430   BP: 135/91   Pulse: 88   Resp: 16   Temp: 96.8 °F (36 °C)   Weight: 99.8 kg (220 lb)   Height: 160 cm (63\")     Body mass index is 38.97 kg/m².      Constitutional:       Appearance: Well-developed.   Eyes:      Conjunctiva/sclera: Conjunctivae normal.   HENT:      Head: Normocephalic.   Neck:      Thyroid: No thyromegaly.      Vascular: No JVD.      Trachea: No tracheal deviation.   Pulmonary:      Effort: No respiratory distress.      Breath sounds: Normal breath sounds. No wheezing. No rales.   Cardiovascular:      PMI at left midclavicular line. Normal rate. Regular rhythm. Normal S1. Normal S2.      Murmurs: There is no murmur.      No gallop. No click. No rub.   Pulses:     Intact distal pulses.   Edema:     Pretibial: bilateral 2+ edema of the pretibial area.     Ankle: bilateral 2+ edema of the ankle.     Feet: bilateral 2+ edema of the feet.  Abdominal:      General: Bowel sounds are normal.      Palpations: Abdomen is soft. There is no abdominal mass.      Tenderness: There is no abdominal tenderness.   Musculoskeletal:      Cervical back: Normal range of motion and neck supple. Skin:     General: Skin is warm and dry.   Neurological:      Mental Status: Alert and oriented to person, place, and time.      Cranial Nerves: No cranial nerve deficit.         Lab Results   Component Value Date     11/11/2021    K 3.7 11/11/2021    CL " 107 11/11/2021    CO2 23.5 11/11/2021    BUN 9 11/11/2021    CREATININE 0.77 11/11/2021    GLUCOSE 103 (H) 11/11/2021    CALCIUM 8.6 11/11/2021    AST 17 11/11/2021    ALT 12 11/11/2021    ALKPHOS 93 11/11/2021    LABIL2 1.7 05/25/2016     Lab Results   Component Value Date    CKTOTAL 85 09/14/2018     Lab Results   Component Value Date    WBC 7.50 11/11/2021    HGB 12.1 11/11/2021    HCT 37.2 11/11/2021     11/11/2021     Lab Results   Component Value Date    INR 1.15 (H) 09/14/2018    INR 1.14 (H) 06/16/2018     Lab Results   Component Value Date    MG 2.3 03/09/2021     Lab Results   Component Value Date    TSH 2.060 09/03/2019    CHLPL 153 05/25/2016    TRIG 55 05/25/2016    HDL 61 05/25/2016    LDL 81 05/25/2016      Lab Results   Component Value Date    BNP <2.0 06/16/2018        ECG 12 Lead    Date/Time: 2/23/2022 2:32 PM  Performed by: Russell Leong MD  Authorized by: Russell Leong MD   Comparison: compared with previous ECG from 2/23/2021  Similar to previous ECG  Rhythm: sinus rhythm  BPM: 80  Conduction: conduction normal  ST Segments: ST segments normal  T Waves: T waves normal    Clinical impression: normal ECG                Assessment/Plan :   Diagnosis Plan   1. Dyspnea on exertion  Adult Transthoracic Echo Complete w/ Color, Spectral and Contrast if necessary per protocol   2. Preoperative cardiovascular examination     3. Essential hypertension     4. Palpitations  Basic Metabolic Panel       Recommendations:   1. For her increasing dyspnea, will evaluate her LV function with an echo Doppler study.  2. Event monitor in May 2021 revealed no significant cardiac arrhythmias.  3. Continue with atenolol at current dose and add HCTZ 12.5 mg daily for her mild elevated blood pressure and bilateral leg edema.  4. I told her to avoid salt rich foods such as canned vegetables, canned soups, all chips, pickles and processed meats such as sausages, hot dogs, hamburgers and cheeseburgers.  Patient  expressed understanding.    Return in about 4 weeks (around 3/23/2022).    As always, Maureen Gerardo APRN  I appreciate very much the opportunity to participate in the cardiovascular care of your patients. Please do not hesitate to call me with any questions with regards to Cheryl You evaluation and management.           With Best Regards,        Russell Leong MD, [facility]    Please note that portions of this note were completed with a voice recognition program.

## 2022-02-24 ENCOUNTER — HOSPITAL ENCOUNTER (OUTPATIENT)
Dept: CARDIOLOGY | Facility: HOSPITAL | Age: 32
Discharge: HOME | End: 2022-02-24
Admitting: INTERNAL MEDICINE

## 2022-02-24 DIAGNOSIS — R06.09 DYSPNEA ON EXERTION: ICD-10-CM

## 2022-02-24 PROCEDURE — 93306 TTE W/DOPPLER COMPLETE: CPT | Performed by: INTERNAL MEDICINE

## 2022-02-24 PROCEDURE — 93306 TTE W/DOPPLER COMPLETE: CPT

## 2022-02-27 LAB
BH CV ECHO MEAS - AO ROOT AREA (BSA CORRECTED): 1.2
BH CV ECHO MEAS - AO ROOT AREA: 4.9 CM^2
BH CV ECHO MEAS - AO ROOT DIAM: 2.5 CM
BH CV ECHO MEAS - BSA(HAYCOCK): 2.2 M^2
BH CV ECHO MEAS - BSA: 2 M^2
BH CV ECHO MEAS - BZI_BMI: 39 KILOGRAMS/M^2
BH CV ECHO MEAS - BZI_METRIC_HEIGHT: 160 CM
BH CV ECHO MEAS - BZI_METRIC_WEIGHT: 99.8 KG
BH CV ECHO MEAS - EDV(CUBED): 103.8 ML
BH CV ECHO MEAS - EDV(MOD-SP4): 63.4 ML
BH CV ECHO MEAS - EDV(TEICH): 102.4 ML
BH CV ECHO MEAS - EF(CUBED): 65.4 %
BH CV ECHO MEAS - EF(MOD-SP4): 68.5 %
BH CV ECHO MEAS - EF(TEICH): 56.9 %
BH CV ECHO MEAS - ESV(CUBED): 35.9 ML
BH CV ECHO MEAS - ESV(MOD-SP4): 20 ML
BH CV ECHO MEAS - ESV(TEICH): 44.1 ML
BH CV ECHO MEAS - FS: 29.8 %
BH CV ECHO MEAS - IVS/LVPW: 0.77
BH CV ECHO MEAS - IVSD: 1 CM
BH CV ECHO MEAS - LA DIMENSION: 4.1 CM
BH CV ECHO MEAS - LA/AO: 1.6
BH CV ECHO MEAS - LV DIASTOLIC VOL/BSA (35-75): 31.5 ML/M^2
BH CV ECHO MEAS - LV MASS(C)D: 199.6 GRAMS
BH CV ECHO MEAS - LV MASS(C)DI: 99.1 GRAMS/M^2
BH CV ECHO MEAS - LV SYSTOLIC VOL/BSA (12-30): 9.9 ML/M^2
BH CV ECHO MEAS - LVIDD: 4.7 CM
BH CV ECHO MEAS - LVIDS: 3.3 CM
BH CV ECHO MEAS - LVLD AP4: 7.1 CM
BH CV ECHO MEAS - LVLS AP4: 6 CM
BH CV ECHO MEAS - LVOT AREA (M): 2.5 CM^2
BH CV ECHO MEAS - LVOT AREA: 2.5 CM^2
BH CV ECHO MEAS - LVOT DIAM: 1.8 CM
BH CV ECHO MEAS - LVPWD: 1.3 CM
BH CV ECHO MEAS - MV A MAX VEL: 57.5 CM/SEC
BH CV ECHO MEAS - MV E MAX VEL: 89.4 CM/SEC
BH CV ECHO MEAS - MV E/A: 1.6
BH CV ECHO MEAS - PA ACC TIME: 0.13 SEC
BH CV ECHO MEAS - PA MAX PG: 16.5 MMHG
BH CV ECHO MEAS - PA MEAN PG: 9 MMHG
BH CV ECHO MEAS - PA PR(ACCEL): 20.5 MMHG
BH CV ECHO MEAS - PA V2 MAX: 203 CM/SEC
BH CV ECHO MEAS - PA V2 MEAN: 139.5 CM/SEC
BH CV ECHO MEAS - PA V2 VTI: 47.3 CM
BH CV ECHO MEAS - RAP SYSTOLE: 10 MMHG
BH CV ECHO MEAS - RVSP: 47.2 MMHG
BH CV ECHO MEAS - SI(CUBED): 33.7 ML/M^2
BH CV ECHO MEAS - SI(MOD-SP4): 21.6 ML/M^2
BH CV ECHO MEAS - SI(TEICH): 28.9 ML/M^2
BH CV ECHO MEAS - SV(CUBED): 67.9 ML
BH CV ECHO MEAS - SV(MOD-SP4): 43.4 ML
BH CV ECHO MEAS - SV(TEICH): 58.2 ML
BH CV ECHO MEAS - TR MAX VEL: 305 CM/SEC
MAXIMAL PREDICTED HEART RATE: 188 BPM
STRESS TARGET HR: 160 BPM

## 2022-03-01 ENCOUNTER — TELEPHONE (OUTPATIENT)
Dept: CARDIOLOGY | Facility: CLINIC | Age: 32
End: 2022-03-01

## 2022-03-01 NOTE — TELEPHONE ENCOUNTER
Jenni called regarding Cheryl surgery for tomorrow. I advised her that the clearance is on the providers desk and will send it when she gets cleared. She expressed understanding and stated that her surgery is scheduled for tomorrow.

## 2022-03-22 ENCOUNTER — HOSPITAL ENCOUNTER (EMERGENCY)
Facility: HOSPITAL | Age: 32
Discharge: HOME | End: 2022-03-23
Attending: EMERGENCY MEDICINE | Admitting: EMERGENCY MEDICINE

## 2022-03-22 DIAGNOSIS — R11.2 NAUSEA AND VOMITING, INTRACTABILITY OF VOMITING NOT SPECIFIED, UNSPECIFIED VOMITING TYPE: Primary | ICD-10-CM

## 2022-03-22 DIAGNOSIS — E86.0 DEHYDRATION: ICD-10-CM

## 2022-03-22 LAB
ALBUMIN SERPL-MCNC: 4.39 G/DL (ref 3.5–5.2)
ALBUMIN/GLOB SERPL: 1.5 G/DL
ALP SERPL-CCNC: 102 U/L (ref 39–117)
ALT SERPL W P-5'-P-CCNC: 10 U/L (ref 1–33)
ANION GAP SERPL CALCULATED.3IONS-SCNC: 13.5 MMOL/L (ref 5–15)
AST SERPL-CCNC: 12 U/L (ref 1–32)
BASOPHILS # BLD AUTO: 0.02 10*3/MM3 (ref 0–0.2)
BASOPHILS NFR BLD AUTO: 0.2 % (ref 0–1.5)
BILIRUB SERPL-MCNC: 0.5 MG/DL (ref 0–1.2)
BUN SERPL-MCNC: 14 MG/DL (ref 6–20)
BUN/CREAT SERPL: 15.9 (ref 7–25)
CALCIUM SPEC-SCNC: 9.3 MG/DL (ref 8.6–10.5)
CHLORIDE SERPL-SCNC: 104 MMOL/L (ref 98–107)
CO2 SERPL-SCNC: 21.5 MMOL/L (ref 22–29)
CREAT SERPL-MCNC: 0.88 MG/DL (ref 0.57–1)
CRP SERPL-MCNC: <0.3 MG/DL (ref 0–0.5)
DEPRECATED RDW RBC AUTO: 35.8 FL (ref 37–54)
EGFRCR SERPLBLD CKD-EPI 2021: 89.7 ML/MIN/1.73
EOSINOPHIL # BLD AUTO: 0.11 10*3/MM3 (ref 0–0.4)
EOSINOPHIL NFR BLD AUTO: 1.2 % (ref 0.3–6.2)
ERYTHROCYTE [DISTWIDTH] IN BLOOD BY AUTOMATED COUNT: 11.9 % (ref 12.3–15.4)
FLUAV RNA RESP QL NAA+PROBE: NOT DETECTED
FLUBV RNA RESP QL NAA+PROBE: NOT DETECTED
GLOBULIN UR ELPH-MCNC: 2.9 GM/DL
GLUCOSE SERPL-MCNC: 107 MG/DL (ref 65–99)
HCT VFR BLD AUTO: 40.6 % (ref 34–46.6)
HGB BLD-MCNC: 13.6 G/DL (ref 12–15.9)
IMM GRANULOCYTES # BLD AUTO: 0.02 10*3/MM3 (ref 0–0.05)
IMM GRANULOCYTES NFR BLD AUTO: 0.2 % (ref 0–0.5)
LIPASE SERPL-CCNC: 16 U/L (ref 13–60)
LYMPHOCYTES # BLD AUTO: 0.86 10*3/MM3 (ref 0.7–3.1)
LYMPHOCYTES NFR BLD AUTO: 9.6 % (ref 19.6–45.3)
MAGNESIUM SERPL-MCNC: 1.8 MG/DL (ref 1.6–2.6)
MCH RBC QN AUTO: 27.8 PG (ref 26.6–33)
MCHC RBC AUTO-ENTMCNC: 33.5 G/DL (ref 31.5–35.7)
MCV RBC AUTO: 83 FL (ref 79–97)
MONOCYTES # BLD AUTO: 0.47 10*3/MM3 (ref 0.1–0.9)
MONOCYTES NFR BLD AUTO: 5.3 % (ref 5–12)
NEUTROPHILS NFR BLD AUTO: 7.47 10*3/MM3 (ref 1.7–7)
NEUTROPHILS NFR BLD AUTO: 83.5 % (ref 42.7–76)
NRBC BLD AUTO-RTO: 0 /100 WBC (ref 0–0.2)
PLATELET # BLD AUTO: 259 10*3/MM3 (ref 140–450)
PMV BLD AUTO: 10.5 FL (ref 6–12)
POTASSIUM SERPL-SCNC: 3.9 MMOL/L (ref 3.5–5.2)
PROT SERPL-MCNC: 7.3 G/DL (ref 6–8.5)
RBC # BLD AUTO: 4.89 10*6/MM3 (ref 3.77–5.28)
SARS-COV-2 RNA RESP QL NAA+PROBE: NOT DETECTED
SODIUM SERPL-SCNC: 139 MMOL/L (ref 136–145)
WBC NRBC COR # BLD: 8.95 10*3/MM3 (ref 3.4–10.8)

## 2022-03-22 PROCEDURE — 80053 COMPREHEN METABOLIC PANEL: CPT | Performed by: PHYSICIAN ASSISTANT

## 2022-03-22 PROCEDURE — 25010000002 ONDANSETRON PER 1 MG: Performed by: PHYSICIAN ASSISTANT

## 2022-03-22 PROCEDURE — 83690 ASSAY OF LIPASE: CPT | Performed by: PHYSICIAN ASSISTANT

## 2022-03-22 PROCEDURE — 96374 THER/PROPH/DIAG INJ IV PUSH: CPT

## 2022-03-22 PROCEDURE — 96375 TX/PRO/DX INJ NEW DRUG ADDON: CPT

## 2022-03-22 PROCEDURE — 99284 EMERGENCY DEPT VISIT MOD MDM: CPT

## 2022-03-22 PROCEDURE — 83735 ASSAY OF MAGNESIUM: CPT | Performed by: PHYSICIAN ASSISTANT

## 2022-03-22 PROCEDURE — 25010000002 MORPHINE PER 10 MG: Performed by: EMERGENCY MEDICINE

## 2022-03-22 PROCEDURE — 85025 COMPLETE CBC W/AUTO DIFF WBC: CPT | Performed by: PHYSICIAN ASSISTANT

## 2022-03-22 PROCEDURE — 87636 SARSCOV2 & INF A&B AMP PRB: CPT | Performed by: EMERGENCY MEDICINE

## 2022-03-22 PROCEDURE — 86140 C-REACTIVE PROTEIN: CPT | Performed by: PHYSICIAN ASSISTANT

## 2022-03-22 RX ORDER — ONDANSETRON 2 MG/ML
4 INJECTION INTRAMUSCULAR; INTRAVENOUS ONCE
Status: COMPLETED | OUTPATIENT
Start: 2022-03-22 | End: 2022-03-22

## 2022-03-22 RX ORDER — ONDANSETRON 2 MG/ML
4 INJECTION INTRAMUSCULAR; INTRAVENOUS ONCE
Status: DISCONTINUED | OUTPATIENT
Start: 2022-03-22 | End: 2022-03-22 | Stop reason: SDUPTHER

## 2022-03-22 RX ORDER — SODIUM CHLORIDE 0.9 % (FLUSH) 0.9 %
10 SYRINGE (ML) INJECTION AS NEEDED
Status: DISCONTINUED | OUTPATIENT
Start: 2022-03-22 | End: 2022-03-23 | Stop reason: HOSPADM

## 2022-03-22 RX ADMIN — SODIUM CHLORIDE 1000 ML: 9 INJECTION, SOLUTION INTRAVENOUS at 22:46

## 2022-03-22 RX ADMIN — MORPHINE SULFATE 4 MG: 4 INJECTION, SOLUTION INTRAMUSCULAR; INTRAVENOUS at 22:51

## 2022-03-22 RX ADMIN — ONDANSETRON 4 MG: 2 INJECTION INTRAMUSCULAR; INTRAVENOUS at 22:51

## 2022-03-22 RX ADMIN — SODIUM CHLORIDE 1000 ML: 9 INJECTION, SOLUTION INTRAVENOUS at 22:55

## 2022-03-23 VITALS
HEIGHT: 63 IN | HEART RATE: 84 BPM | DIASTOLIC BLOOD PRESSURE: 69 MMHG | SYSTOLIC BLOOD PRESSURE: 122 MMHG | WEIGHT: 215 LBS | BODY MASS INDEX: 38.09 KG/M2 | RESPIRATION RATE: 17 BRPM | OXYGEN SATURATION: 98 % | TEMPERATURE: 98.1 F

## 2022-03-23 LAB
B-HCG UR QL: NEGATIVE
BILIRUB UR QL STRIP: NEGATIVE
CLARITY UR: ABNORMAL
COLOR UR: ABNORMAL
GLUCOSE UR STRIP-MCNC: NEGATIVE MG/DL
HGB UR QL STRIP.AUTO: NEGATIVE
KETONES UR QL STRIP: ABNORMAL
LEUKOCYTE ESTERASE UR QL STRIP.AUTO: NEGATIVE
NITRITE UR QL STRIP: NEGATIVE
PH UR STRIP.AUTO: <=5 [PH] (ref 5–8)
PROT UR QL STRIP: NEGATIVE
SP GR UR STRIP: 1.03 (ref 1–1.03)
UROBILINOGEN UR QL STRIP: ABNORMAL

## 2022-03-23 PROCEDURE — 81003 URINALYSIS AUTO W/O SCOPE: CPT | Performed by: PHYSICIAN ASSISTANT

## 2022-03-23 PROCEDURE — 81025 URINE PREGNANCY TEST: CPT | Performed by: PHYSICIAN ASSISTANT

## 2022-03-23 RX ORDER — ONDANSETRON 4 MG/1
4 TABLET, ORALLY DISINTEGRATING ORAL EVERY 8 HOURS PRN
Qty: 12 TABLET | Refills: 0 | Status: SHIPPED | OUTPATIENT
Start: 2022-03-23 | End: 2022-08-25

## 2022-05-17 ENCOUNTER — TRANSCRIBE ORDERS (OUTPATIENT)
Dept: OTHER | Facility: OTHER | Age: 32
End: 2022-05-17

## 2022-05-17 DIAGNOSIS — E03.9 MYXEDEMA HEART DISEASE: ICD-10-CM

## 2022-05-17 DIAGNOSIS — I10 ESSENTIAL HYPERTENSION, MALIGNANT: ICD-10-CM

## 2022-05-17 DIAGNOSIS — E53.8 BIOTIN-(PROPIONYL-COA-CARBOXYLASE) LIGASE DEFICIENCY: Primary | ICD-10-CM

## 2022-05-17 DIAGNOSIS — E55.9 AVITAMINOSIS D: ICD-10-CM

## 2022-05-17 DIAGNOSIS — I51.9 MYXEDEMA HEART DISEASE: ICD-10-CM

## 2022-05-27 ENCOUNTER — LAB (OUTPATIENT)
Dept: LAB | Facility: HOSPITAL | Age: 32
End: 2022-05-27

## 2022-05-27 DIAGNOSIS — I51.9 MYXEDEMA HEART DISEASE: ICD-10-CM

## 2022-05-27 DIAGNOSIS — E03.9 MYXEDEMA HEART DISEASE: ICD-10-CM

## 2022-05-27 DIAGNOSIS — I10 ESSENTIAL HYPERTENSION, MALIGNANT: ICD-10-CM

## 2022-05-27 DIAGNOSIS — E53.8 BIOTIN-(PROPIONYL-COA-CARBOXYLASE) LIGASE DEFICIENCY: ICD-10-CM

## 2022-05-27 DIAGNOSIS — E55.9 AVITAMINOSIS D: ICD-10-CM

## 2022-05-27 LAB
25(OH)D3 SERPL-MCNC: 19.5 NG/ML (ref 30–100)
ALBUMIN SERPL-MCNC: 4.4 G/DL (ref 3.5–5.2)
ALBUMIN/GLOB SERPL: 1.9 G/DL
ALP SERPL-CCNC: 107 U/L (ref 39–117)
ALT SERPL W P-5'-P-CCNC: 11 U/L (ref 1–33)
ANION GAP SERPL CALCULATED.3IONS-SCNC: 10.4 MMOL/L (ref 5–15)
AST SERPL-CCNC: 18 U/L (ref 1–32)
BASOPHILS # BLD AUTO: 0.03 10*3/MM3 (ref 0–0.2)
BASOPHILS NFR BLD AUTO: 0.3 % (ref 0–1.5)
BILIRUB SERPL-MCNC: 0.4 MG/DL (ref 0–1.2)
BUN SERPL-MCNC: 9 MG/DL (ref 6–20)
BUN/CREAT SERPL: 12.9 (ref 7–25)
CALCIUM SPEC-SCNC: 9.4 MG/DL (ref 8.6–10.5)
CHLORIDE SERPL-SCNC: 104 MMOL/L (ref 98–107)
CHOLEST SERPL-MCNC: 151 MG/DL (ref 0–200)
CO2 SERPL-SCNC: 22.6 MMOL/L (ref 22–29)
CREAT SERPL-MCNC: 0.7 MG/DL (ref 0.57–1)
DEPRECATED RDW RBC AUTO: 38.7 FL (ref 37–54)
EGFRCR SERPLBLD CKD-EPI 2021: 118 ML/MIN/1.73
EOSINOPHIL # BLD AUTO: 0.1 10*3/MM3 (ref 0–0.4)
EOSINOPHIL NFR BLD AUTO: 1.1 % (ref 0.3–6.2)
ERYTHROCYTE [DISTWIDTH] IN BLOOD BY AUTOMATED COUNT: 12.6 % (ref 12.3–15.4)
FOLATE SERPL-MCNC: 13.6 NG/ML (ref 4.78–24.2)
GLOBULIN UR ELPH-MCNC: 2.3 GM/DL
GLUCOSE SERPL-MCNC: 76 MG/DL (ref 65–99)
HCT VFR BLD AUTO: 38.1 % (ref 34–46.6)
HDLC SERPL-MCNC: 48 MG/DL (ref 40–60)
HGB BLD-MCNC: 12.6 G/DL (ref 12–15.9)
IMM GRANULOCYTES # BLD AUTO: 0.02 10*3/MM3 (ref 0–0.05)
IMM GRANULOCYTES NFR BLD AUTO: 0.2 % (ref 0–0.5)
LDLC SERPL CALC-MCNC: 88 MG/DL (ref 0–100)
LDLC/HDLC SERPL: 1.83 {RATIO}
LYMPHOCYTES # BLD AUTO: 1.82 10*3/MM3 (ref 0.7–3.1)
LYMPHOCYTES NFR BLD AUTO: 20.1 % (ref 19.6–45.3)
MCH RBC QN AUTO: 27.8 PG (ref 26.6–33)
MCHC RBC AUTO-ENTMCNC: 33.1 G/DL (ref 31.5–35.7)
MCV RBC AUTO: 83.9 FL (ref 79–97)
MONOCYTES # BLD AUTO: 0.56 10*3/MM3 (ref 0.1–0.9)
MONOCYTES NFR BLD AUTO: 6.2 % (ref 5–12)
NEUTROPHILS NFR BLD AUTO: 6.51 10*3/MM3 (ref 1.7–7)
NEUTROPHILS NFR BLD AUTO: 72.1 % (ref 42.7–76)
NRBC BLD AUTO-RTO: 0 /100 WBC (ref 0–0.2)
PLATELET # BLD AUTO: 260 10*3/MM3 (ref 140–450)
PMV BLD AUTO: 11.1 FL (ref 6–12)
POTASSIUM SERPL-SCNC: 4.1 MMOL/L (ref 3.5–5.2)
PROT SERPL-MCNC: 6.7 G/DL (ref 6–8.5)
RBC # BLD AUTO: 4.54 10*6/MM3 (ref 3.77–5.28)
SODIUM SERPL-SCNC: 137 MMOL/L (ref 136–145)
TRIGL SERPL-MCNC: 77 MG/DL (ref 0–150)
TSH SERPL DL<=0.05 MIU/L-ACNC: 1.58 UIU/ML (ref 0.27–4.2)
VIT B12 BLD-MCNC: 552 PG/ML (ref 211–946)
VLDLC SERPL-MCNC: 15 MG/DL (ref 5–40)
WBC NRBC COR # BLD: 9.04 10*3/MM3 (ref 3.4–10.8)

## 2022-05-27 PROCEDURE — 85025 COMPLETE CBC W/AUTO DIFF WBC: CPT

## 2022-05-27 PROCEDURE — 82306 VITAMIN D 25 HYDROXY: CPT

## 2022-05-27 PROCEDURE — 80053 COMPREHEN METABOLIC PANEL: CPT

## 2022-05-27 PROCEDURE — 80061 LIPID PANEL: CPT

## 2022-05-27 PROCEDURE — 82607 VITAMIN B-12: CPT

## 2022-05-27 PROCEDURE — 36415 COLL VENOUS BLD VENIPUNCTURE: CPT

## 2022-05-27 PROCEDURE — 84443 ASSAY THYROID STIM HORMONE: CPT

## 2022-05-27 PROCEDURE — 82746 ASSAY OF FOLIC ACID SERUM: CPT

## 2022-06-14 NOTE — TELEPHONE ENCOUNTER
----- Message from Suzanne Jackson MD sent at 9/26/2019  8:47 AM EDT -----  Can you call patient and let her know that her culture came back positive for an UTI - I sent in cipro BID x 7 days to her pharmacy. Thanks.      Attempted to contact patient,not available at this time.      Left a message to return call.    Patient returned call & verbalized understanding,requests you send something in for yeast as well.   [facility]ARD[facility]  [address], [address]  PHONE: [phone]        22        NAME:  Adolfo Geronimo  : 1931  MRN: [id number]       SUBJECTIVE:   Adolfo Geronimo is a 80 y.o. male seen for a follow up visit regarding the following: The patient has a hx of CAD with CABG and primary hypertension. He returns for scheduled follow up. Echo last months showed normal LV EF mild dilatation of aortic root and ascending thoracic aorta,and mild to moderate AS. I discussed echo results with the patient. Erich Denney reports doing well except occasional fatigue. Chief Complaint   Patient presents with    Hypertension     6 month follow up       HPI:    Hypertension  This is a chronic problem. The problem is unchanged. The problem is controlled. Pertinent negatives include no anxiety, blurred vision, chest pain, headaches, malaise/fatigue, neck pain, orthopnea, palpitations, peripheral edema, PND or shortness of breath. Past Medical History, Past Surgical History, Family history, Social History, and Medications were all reviewed with the patient today and updated as necessary.          Current Outpatient Medications:     famotidine (PEPCID) 40 MG tablet, 1 po q day 30 min before evening meal, Disp: , Rfl:     hydroCHLOROthiazide (HYDRODIURIL) 12.5 MG tablet, Take 12.5 mg by mouth daily , Disp: , Rfl:     Cinnamon 500 MG CAPS, Take by mouth daily, Disp: , Rfl:     omeprazole (PRILOSEC) 40 MG delayed release capsule, Take 40 mg by mouth daily, Disp: , Rfl:     albuterol sulfate  (90 Base) MCG/ACT inhaler, Inhale into the lungs, Disp: , Rfl:     amLODIPine (NORVASC) 5 MG tablet, Take 5 mg by mouth daily, Disp: , Rfl:     aspirin 81 MG EC tablet, Take by mouth daily, Disp: , Rfl:     benzonatate (TESSALON) 100 MG capsule, Take 100 mg by mouth 3 times daily as needed, Disp: , Rfl:     vitamin D3 (CHOLECALCIFEROL) 10 MCG (400 UNIT) TABS tablet, Take 400 Units by mouth dyspnea on exertion, irregular heartbeat, leg swelling, near-syncope, orthopnea, palpitations, paroxysmal nocturnal dyspnea and syncope. Respiratory: Negative for shortness of breath, sputum production and wheezing. Endocrine: Negative for polydipsia, polyphagia and polyuria. Skin: Negative for color change. Musculoskeletal: Negative for neck pain. Gastrointestinal: Negative for abdominal pain, bowel incontinence, diarrhea, hematemesis and hematochezia. Genitourinary: Negative for dysuria, frequency and hematuria. Neurological: Negative for focal weakness, headaches, light-headedness, loss of balance, numbness, sensory change and weakness. Psychiatric/Behavioral: Negative for altered mental status and memory loss. PHYSICAL EXAM:   /70   Pulse 72   Ht 5' 11\" (1.803 m)   Wt 200 lb (90.7 kg)   BMI 27.89 kg/m²      Physical Exam  Constitutional:       Appearance: Normal appearance. HENT:      Head: Normocephalic and atraumatic. Nose: Nose normal.   Eyes:      Extraocular Movements: Extraocular movements intact. Pupils: Pupils are equal, round, and reactive to light. Neck:      Vascular: No carotid bruit. Cardiovascular:      Rate and Rhythm: Regular rhythm. Pulses: Normal pulses. Heart sounds: Murmur (1/6 TREV) heard. Pulmonary:      Effort: Pulmonary effort is normal.      Breath sounds: Normal breath sounds. Abdominal:      General: Abdomen is flat. Bowel sounds are normal.      Palpations: Abdomen is soft. Musculoskeletal:         General: Normal range of motion. Cervical back: Normal range of motion and neck supple. Skin:     General: Skin is warm and dry. Neurological:      General: No focal deficit present. Mental Status: He is alert and oriented to person, place, and time. Psychiatric:         Mood and Affect: Mood normal.         Medical problems and test results were reviewed with the patient today.      Recent Results (from the past 672 hour(s))   Transthoracic echocardiogram (TTE) complete with contrast, bubble, strain, and 3D PRN    Collection Time: 05/18/22  2:07 PM   Result Value Ref Range    LV ESV A4C 34 mL    LV EDV A4C 100 mL    LV ESV A2C 40 mL    LV EDV A2C 84 mL    LVOT VTI 22.3 cm    LV E' Septal Velocity 7 cm/s    LVOT Diameter 2.4 cm    LVOT Mean Gradient 2 mmHg    LVOT Peak Velocity 0.8 m/s    LV E' Lateral Velocity 10 cm/s    LVPWd 1.1 (A) 0.6 - 1 cm    LVOT Peak Gradient 3 mmHg    IVSd 1.2 (A) 0.6 - 1 cm    LVIDs 3.2 cm    LVIDd 4.5 4.2 - 5.9 cm    EF BP 63 55 - 100 %    LVOT .8 ml    LV Ejection Fraction A2C 53 %    LV Ejection Fraction A4C 66 %    RVIDd 3.1 cm    LA Diameter 4.9 cm    AV Peak Gradient 27 mmHg    AV Peak Velocity 2.6 m/s    AV Mean Gradient 18 mmHg    AV VTI 72.1 cm    AV Mean Velocity 2.0 m/s    AV Area by VTI 1.4 Square Centimeter    AV Area by Peak Velocity 1.4 Square Centimeter    MV E Velocity 0.95 m/s    MV E Wave Deceleration Time 256.0 ms    MV A Velocity 0.82 m/s    Ascending Aorta 4.2 cm    Aortic Root 3.9 cm    Fractional Shortening 2D 29 28 - 44 %    LV ESV Index A4C 16 mL/m2    LV EDV Index A4C 47 mL/m2    LV ESV Index A2C 19 mL/m2    LV EDV Index A2C 39 mL/m2    LVIDd Index 2.11 cm/m2    LVIDs Index 1.50 cm/m2    LV RWT Ratio 0.49     LV Mass 2D 186.4 88 - 224 g    LV Mass 2D Index 87.5 49 - 115 g/m2    MV E/A 1.16     E/E' Ratio (Averaged) 11.54     E/E' Lateral 9.50     E/E' Septal 13.57     LVOT Stroke Volume Index 47.3 mL/m2    LVOT Area 4.5 cm2    LA Size Index 2.30 cm/m2    LA/AO Root Ratio 1.26     Ao Root Index 1.83 cm/m2    Ascending Aorta Index 1.97 cm/m2    AV Velocity Ratio 0.31     LVOT:AV VTI Index 0.31     LA Volume BP 93 (A) 18 - 58 mL    LA Volume Index BP 44 (A) 16 - 34 ml/m2    RV Basal Dimension 3.1 cm    RV Mid Dimension 2.1 cm    TAPSE 2.1 1.7 cm    AR .0 cm    AV Vena Contracta 0.2 cm    AV Vena Contracta Area 0.1 cm2    AV VC/LVOT Diameter 0.08     AV VC/LVOT area 0.01            ASSESSMENT and PLAN    Leelee Gill was seen today for hypertension. Diagnoses and all orders for this visit:    Atherosclerosis of native coronary artery of native heart without angina pectoris:Stable. Continue Imdur,Zocor, Zetia,and Losartan. Primary hypertension:Stable. Continue Losartan and HCTZ    Nonrheumatic aortic valve stenosis:Mild to moderate severity. Echo in 12 months. Disposition:    Return in about 6 months (around 12/14/2022).                 Miri Lowe MD  6/14/2022  2:05 PM

## 2022-08-23 ENCOUNTER — OFFICE VISIT (OUTPATIENT)
Dept: UROLOGY | Facility: CLINIC | Age: 32
End: 2022-08-23

## 2022-08-23 VITALS — WEIGHT: 215 LBS | BODY MASS INDEX: 38.09 KG/M2 | HEIGHT: 63 IN

## 2022-08-23 DIAGNOSIS — N39.0 RECURRENT UTI: Primary | ICD-10-CM

## 2022-08-23 DIAGNOSIS — N30.00 ACUTE CYSTITIS WITHOUT HEMATURIA: ICD-10-CM

## 2022-08-23 DIAGNOSIS — R35.0 FREQUENCY OF URINATION: ICD-10-CM

## 2022-08-23 DIAGNOSIS — R30.0 DYSURIA: ICD-10-CM

## 2022-08-23 LAB
BILIRUB BLD-MCNC: NEGATIVE MG/DL
CLARITY, POC: CLEAR
COLOR UR: YELLOW
EXPIRATION DATE: ABNORMAL
GLUCOSE UR STRIP-MCNC: NEGATIVE MG/DL
KETONES UR QL: NEGATIVE
LEUKOCYTE EST, POC: NEGATIVE
Lab: ABNORMAL
NITRITE UR-MCNC: NEGATIVE MG/ML
PH UR: 6 [PH] (ref 5–8)
PROT UR STRIP-MCNC: NEGATIVE MG/DL
RBC # UR STRIP: ABNORMAL /UL
SP GR UR: 1.03 (ref 1–1.03)
UROBILINOGEN UR QL: NORMAL

## 2022-08-23 PROCEDURE — 51798 US URINE CAPACITY MEASURE: CPT | Performed by: NURSE PRACTITIONER

## 2022-08-23 PROCEDURE — 99214 OFFICE O/P EST MOD 30 MIN: CPT | Performed by: NURSE PRACTITIONER

## 2022-08-23 PROCEDURE — 87086 URINE CULTURE/COLONY COUNT: CPT | Performed by: NURSE PRACTITIONER

## 2022-08-23 NOTE — PROGRESS NOTES
"Chief Complaint  Cystitis and Urinary Tract Infection (6 MONTHS FOLLOW UP FOR ACUTE CYSTITIS/RECURRENT UTIs)    Mukesh Vo presents to [facility] for ACUTE CYSTITIS/UTI/DYSURIA  History of Present Illness     Ms Cheryl VO. is a 32-year-old very pleasant Patient, who returns to clinic today for evaluation. She is a 6-month follow-up for acute cystitis, recurrent urinary tract infections-resolved she reports.  He is in apparent discomfort today and reports doing relatively well this last 6 months, she reports she has not been on any recent antibiotic therapy.    Today, she denies having any more episodes of frequency, urgency, or nocturia. She denies dysuria, burning on urination, or gross hematuria.  She reports generalized malaise, but Denies back pain, flank pain, she does not have any CVA tenderness.  She reports constant pelvic pain and pressure, which she attributes to her bowels recently.  She however denies fever or chills, she does not have nausea, vomiting, or diarrhea.  Her Urine dipstick today is completely negative for any leukocyte esterace, negative nitrites, negative gross/but show 1+ microscopic hematuria,  her PVR is 0.     Overall she reports doing better, she states she is currently taking her antibiotics and probiotics diligently.  She reports  increasing her p.o. fluid intake to at least 2 L daily, and has significantly reduced her intake of soda drinks. She drinks more water and cranberry drinks.      The rest of her PMHx as listed below.     Objective   Vital Signs:   Ht 160 cm (62.99\")   Wt 97.5 kg (215 lb)   BMI 38.10 kg/m²       ROS:   Review of Systems   Constitutional: Negative for chills, fatigue and fever.   Respiratory: Negative for cough, shortness of breath and wheezing.    Cardiovascular: Negative for leg swelling.   Gastrointestinal: Negative for abdominal pain, nausea and vomiting.   Genitourinary: Positive " for difficulty urinating, dysuria and pelvic pressure. Negative for frequency.   Musculoskeletal: Negative for back pain.   Neurological: Negative for dizziness, headache and confusion.        Physical Exam   Result Review :            Assessment and Plan    Problem List Items Addressed This Visit        Genitourinary and Reproductive     Recurrent UTI - Primary    Relevant Orders    POC Urinalysis Dipstick, Automated (Completed)    Urine Culture - Urine, Urine, Clean Catch    Acute cystitis without hematuria    Dysuria      Other Visit Diagnoses     Frequency of urination        Relevant Orders    Bladder Scan (Completed)          Patient reports that she is not currently experiencing any symptoms of urinary incontinence.    Class 2 Severe Obesity (BMI >=35 and <=39.9). Obesity-related health conditions include the following: hypertension, coronary heart disease, dyslipidemias and GERD. Obesity is improving with lifestyle modifications. BMI is 38.10 kg/M2. We discussed portion control and increasing exercise.    RADIOLOGY (CT AND/OR KUB):    CT Abdomen and Pelvis: No results found for this or any previous visit.     CT Stone Protocol: Results for orders placed during the hospital encounter of 06/19/21    CT Abdomen Pelvis Stone Protocol    Narrative  CT Abdomen Pelvis WO    INDICATION:  Bilateral abdominal pain beginning prior to arrival    TECHNIQUE:  CT of the abdomen and pelvis without IV contrast. Coronal and sagittal reconstructions were obtained.  Radiation dose reduction techniques included automated exposure control or exposure modulation based on body size. Count of known CT and cardiac nuc  med studies performed in previous 12 months: 1.    COMPARISON:  3/9/2021    FINDINGS:  Abdomen: The liver, spleen and pancreas are normal in size. The kidneys and adrenals have a normal appearance. No evidence of intrarenal stone or hydronephrosis. No ureteral stones. No evidence of retroperitoneal adenopathy. No  distended bowel loops.    Pelvis: Normal appendix. No evidence of adenopathy, mass or fluid collection.    Impression  Negative CT of the abdomen and pelvis.    Signer Name: Dl Vital MD  Signed: 6/19/2021 9:43 PM  Workstation Name: [id number]  [facility]     KUB: Results for orders placed during the hospital encounter of 08/18/21    XR Abdomen KUB    Narrative  EXAM:  XR Abdomen, 1 View    EXAM DATE:  8/18/2021 9:33 AM    CLINICAL HISTORY:  flank pain; R10.9-Unspecified abdominal pain    TECHNIQUE:  Frontal supine view of the abdomen/pelvis.    COMPARISON:  No relevant prior studies available.    FINDINGS:  GASTROINTESTINAL TRACT:  Unremarkable.  No dilation.  BONES/JOINTS:  Unremarkable.    Impression  Unremarkable abdominal x-ray.    This report was finalized on 8/18/2021 9:52 AM by Dr. Boyd Bailey MD.       LABS (3 MONTHS):    Office Visit on 08/23/2022   Component Date Value Ref Range Status   • Color 08/23/2022 Yellow  Yellow, Straw, Dark Yellow, Lizette Final   • Clarity, UA 08/23/2022 Clear  Clear Final   • Specific Gravity  08/23/2022 1.030  1.005 - 1.030 Final   • pH, Urine 08/23/2022 6.0  5.0 - 8.0 Final   • Leukocytes 08/23/2022 Negative  Negative Final   • Nitrite, UA 08/23/2022 Negative  Negative Final   • Protein, POC 08/23/2022 Negative  Negative mg/dL Final   • Glucose, UA 08/23/2022 Negative  Negative mg/dL Final   • Ketones, UA 08/23/2022 Negative  Negative Final   • Urobilinogen, UA 08/23/2022 Normal  Normal, 0.2 E.U./dL Final   • Bilirubin 08/23/2022 Negative  Negative Final   • Blood, UA 08/23/2022 1+ (A) Negative Final   • Lot Number 08/23/2022 [id number]   Final   • Expiration Date 08/23/2022 122,[id number]   Final   Lab on 05/27/2022   Component Date Value Ref Range Status   • Glucose 05/27/2022 76  65 - 99 mg/dL Final   • BUN 05/27/2022 9  6 - 20 mg/dL Final   • Creatinine 05/27/2022 0.70  0.57 - 1.00 mg/dL Final   • Sodium 05/27/2022 137  136 - 145 mmol/L Final    • Potassium 05/27/2022 4.1  3.5 - 5.2 mmol/L Final   • Chloride 05/27/2022 104  98 - 107 mmol/L Final   • CO2 05/27/2022 22.6  22.0 - 29.0 mmol/L Final   • Calcium 05/27/2022 9.4  8.6 - 10.5 mg/dL Final   • Total Protein 05/27/2022 6.7  6.0 - 8.5 g/dL Final   • Albumin 05/27/2022 4.40  3.50 - 5.20 g/dL Final   • ALT (SGPT) 05/27/2022 11  1 - 33 U/L Final   • AST (SGOT) 05/27/2022 18  1 - 32 U/L Final   • Alkaline Phosphatase 05/27/2022 107  39 - 117 U/L Final   • Total Bilirubin 05/27/2022 0.4  0.0 - 1.2 mg/dL Final   • Globulin 05/27/2022 2.3  gm/dL Final   • A/G Ratio 05/27/2022 1.9  g/dL Final   • BUN/Creatinine Ratio 05/27/2022 12.9  7.0 - 25.0 Final   • Anion Gap 05/27/2022 10.4  5.0 - 15.0 mmol/L Final   • eGFR 05/27/2022 118.0  >60.0 mL/min/1.73 Final    National Kidney Foundation and American Society of Nephrology (ASN) Task Force recommended calculation based on the Chronic Kidney Disease Epidemiology Collaboration (CKD-EPI) equation refit without adjustment for race.   • Total Cholesterol 05/27/2022 151  0 - 200 mg/dL Final   • Triglycerides 05/27/2022 77  0 - 150 mg/dL Final   • HDL Cholesterol 05/27/2022 48  40 - 60 mg/dL Final   • LDL Cholesterol  05/27/2022 88  0 - 100 mg/dL Final   • VLDL Cholesterol 05/27/2022 15  5 - 40 mg/dL Final   • LDL/HDL Ratio 05/27/2022 1.83   Final   • TSH 05/27/2022 1.580  0.270 - 4.200 uIU/mL Final   • Vitamin B-12 05/27/2022 552  211 - 946 pg/mL Final   • 25 Hydroxy, Vitamin D 05/27/2022 19.5 (A) 30.0 - 100.0 ng/ml Final   • Folate 05/27/2022 13.60  4.78 - 24.20 ng/mL Final   • WBC 05/27/2022 9.04  3.40 - 10.80 10*3/mm3 Final   • RBC 05/27/2022 4.54  3.77 - 5.28 10*6/mm3 Final   • Hemoglobin 05/27/2022 12.6  12.0 - 15.9 g/dL Final   • Hematocrit 05/27/2022 38.1  34.0 - 46.6 % Final   • MCV 05/27/2022 83.9  79.0 - 97.0 fL Final   • MCH 05/27/2022 27.8  26.6 - 33.0 pg Final   • MCHC 05/27/2022 33.1  31.5 - 35.7 g/dL Final   • RDW 05/27/2022 12.6  12.3 - 15.4 % Final   •  RDW-SD 05/27/2022 38.7  37.0 - 54.0 fl Final   • MPV 05/27/2022 11.1  6.0 - 12.0 fL Final   • Platelets 05/27/2022 260  140 - 450 10*3/mm3 Final   • Neutrophil % 05/27/2022 72.1  42.7 - 76.0 % Final   • Lymphocyte % 05/27/2022 20.1  19.6 - 45.3 % Final   • Monocyte % 05/27/2022 6.2  5.0 - 12.0 % Final   • Eosinophil % 05/27/2022 1.1  0.3 - 6.2 % Final   • Basophil % 05/27/2022 0.3  0.0 - 1.5 % Final   • Immature Grans % 05/27/2022 0.2  0.0 - 0.5 % Final   • Neutrophils, Absolute 05/27/2022 6.51  1.70 - 7.00 10*3/mm3 Final   • Lymphocytes, Absolute 05/27/2022 1.82  0.70 - 3.10 10*3/mm3 Final   • Monocytes, Absolute 05/27/2022 0.56  0.10 - 0.90 10*3/mm3 Final   • Eosinophils, Absolute 05/27/2022 0.10  0.00 - 0.40 10*3/mm3 Final   • Basophils, Absolute 05/27/2022 0.03  0.00 - 0.20 10*3/mm3 Final   • Immature Grans, Absolute 05/27/2022 0.02  0.00 - 0.05 10*3/mm3 Final   • nRBC 05/27/2022 0.0  0.0 - 0.2 /100 WBC Final                                                ASSESSMENT  Ms. Cheryl VO is a pleasant 32-year-old female with a significant history of recurrent UTIs, acute cystitis with microscopic hematuria, who returns to clinic today for her 6-month follow-up.  SHe is in no apparent distress today, reports feeling significantly better post her recent antibiotic therapy at the ER over 6 months ago with Rocephin and Keflex.  Patient reports she NOT currently taking any Macrobid for suppressive therapy, HER Urine dipstick today is completely negative.     Recurrent urinary tract infections/OverActiveBladder:  She reports doing relatively better on her antibiotic prophylaxis in the past, BUT STOPPED.  Again,  We discussed the types of organisms that are found in the urinary tract indicating that the vast majority are results of the patient's own gastrointestinal patience.  We discussed how many of the antibiotics that are utilized can actually exacerbate these infections by creating resistant organisms and there is only  a very few antibiotics that are concentrated in the urine and do not affect the rectal reservoir nor cause recurrent yeast vaginitis.       We discussed the risk factors for recurrent infections being intercourse in younger patients and atrophic changes in older patients.  We discussed the symptoms that are found including pain, pressure, burning, frequency, urgency suprapubic pain and painful intercourse.  I discussed upper tract symptoms including fevers, chills, and indicated the workup would be much more aggressive if the patient were to present with recurrent infections in the face of upper tract symptomatology such as fever. I discussed the history of vesicoureteral reflux in young patients and finally chronic renal scarring as a result of such.                                                      PLAN  We resent her urine for culture. I will call her with results if any bacteria growth      STOPPED Macrobid 100 mg PO Daily -Suppressive Therapy-NOT TAKING/NONCOMPLIANCE     She has been encouraged to increase her p.o. fluid intake to at least 1 to 2 L daily and avoid bladder irritants such as caffeine products, spicy foods, and citrusy foods.      I recommend concomitant probiotics with treatment with antibiotics to protect the rectal reservoir including over-the-counter yogurt preparations to rebecca oral pills containing the appropriate probiotics. Patient reports the diligent use of Probiotics.     She is to also continue other medications as prescribed above.     Will see her back for follow-up in clinic and reevaluate her symptoms at that time.  Discussed weaning her off antibiotic therapy if negative cultures.      She may return sooner for Follow up in clinic and recheck her urinalysis if she becomes symptomatic.     Patient is agreeable to plan of care.     Patient reports that she is not currently experiencing any symptoms of urinary incontinence.     Patient's Body mass index is 35.43 kg/m². indicating  that she is obese (BMI >30). Obesity-related health conditions include the following: hypertension, dyslipidemias and GERD. Obesity is improving with lifestyle modifications. BMI is 35.43. We discussed portion control and increasing exercise..    Smoking Cessation Counseling:  Former smoker.  Patient does not currently use any tobacco products.     Follow Up   Return in about 1 year (around 8/23/2023) for RECURRENT UTI/DYSURIA/DETRUSSOR INSTABILITY.    Patient was given instructions and counseling regarding her condition or for health maintenance advice. Please see specific information pulled into the AVS if appropriate.          This document has been electronically signed by Griselda Cheng-Akwa, APRN   August 23, 2022 11:44 EDT      Dictated Utilizing Dragon Dictation: Part of this note may be an electronic transcription/translation of spoken language to printed text using the Dragon Dictation System.

## 2022-08-24 LAB — BACTERIA SPEC AEROBE CULT: NO GROWTH

## 2022-08-25 ENCOUNTER — OFFICE VISIT (OUTPATIENT)
Dept: FAMILY MEDICINE CLINIC | Facility: CLINIC | Age: 32
End: 2022-08-25

## 2022-08-25 VITALS
RESPIRATION RATE: 16 BRPM | HEART RATE: 99 BPM | BODY MASS INDEX: 38.41 KG/M2 | WEIGHT: 216.8 LBS | SYSTOLIC BLOOD PRESSURE: 116 MMHG | HEIGHT: 63 IN | DIASTOLIC BLOOD PRESSURE: 64 MMHG | OXYGEN SATURATION: 99 % | TEMPERATURE: 96.9 F

## 2022-08-25 DIAGNOSIS — E55.9 VITAMIN D DEFICIENCY: ICD-10-CM

## 2022-08-25 DIAGNOSIS — E66.9 OBESITY (BMI 30-39.9): ICD-10-CM

## 2022-08-25 DIAGNOSIS — M17.0 OSTEOARTHRITIS OF BOTH KNEES, UNSPECIFIED OSTEOARTHRITIS TYPE: ICD-10-CM

## 2022-08-25 DIAGNOSIS — M32.9 SLE (SYSTEMIC LUPUS ERYTHEMATOSUS RELATED SYNDROME): Primary | ICD-10-CM

## 2022-08-25 DIAGNOSIS — I10 PRIMARY HYPERTENSION: ICD-10-CM

## 2022-08-25 DIAGNOSIS — Z13.220 SCREENING CHOLESTEROL LEVEL: ICD-10-CM

## 2022-08-25 PROCEDURE — 99214 OFFICE O/P EST MOD 30 MIN: CPT | Performed by: NURSE PRACTITIONER

## 2022-08-25 RX ORDER — LANOLIN ALCOHOL/MO/W.PET/CERES
1000 CREAM (GRAM) TOPICAL DAILY
COMMUNITY

## 2022-08-25 RX ORDER — MELOXICAM 7.5 MG/1
7.5 TABLET ORAL DAILY
Qty: 90 TABLET | Refills: 0 | Status: SHIPPED | OUTPATIENT
Start: 2022-08-25 | End: 2023-03-07 | Stop reason: SDDI

## 2022-08-25 RX ORDER — MULTIPLE VITAMINS W/ MINERALS TAB 9MG-400MCG
1 TAB ORAL DAILY
COMMUNITY

## 2022-08-25 RX ORDER — MELATONIN
1000 DAILY
COMMUNITY
End: 2022-09-13 | Stop reason: SDUPTHER

## 2022-08-25 RX ORDER — DULOXETIN HYDROCHLORIDE 30 MG/1
30 CAPSULE, DELAYED RELEASE ORAL DAILY
COMMUNITY
End: 2022-10-14 | Stop reason: SDUPTHER

## 2022-08-25 RX ORDER — NITROFURANTOIN MACROCRYSTALS 100 MG/1
100 CAPSULE ORAL DAILY
COMMUNITY
End: 2022-10-18 | Stop reason: SDUPTHER

## 2022-08-25 RX ORDER — HYDROXYCHLOROQUINE SULFATE 200 MG/1
200 TABLET, FILM COATED ORAL DAILY
Qty: 90 TABLET | Refills: 1 | Status: CANCELLED | OUTPATIENT
Start: 2022-08-25 | End: 2023-08-25

## 2022-08-25 NOTE — PROGRESS NOTES
Chief Complaint  Establish Care (Would like a Rheumatology Referral), Leg Swelling, Joint Pain, and Weight Gain    Subjective          Cheryl Mcnulty is a 32 y.o. female who presents today to [facility] for initial evaluation     HPI:   History of Present Illness    Presents to clinic to establish care. Reports history of OA in knees and lupus. Needs referral for a new rheumatologist as she missed too many appointments at her prior provider. Last saw them around 1/2022. Associated symptoms: swelling and pain in left knee. Needs refill on Mobic.  No other issues or concerns at this time.       The following portions of the patient's history were reviewed and updated as appropriate: allergies, current medications, past family history, past medical history, past social history, past surgical history and problem list.    Objective     Allergy:   No Known Allergies     Current Medications:   Current Outpatient Medications   Medication Sig Dispense Refill   • atenolol (TENORMIN) 25 MG tablet Take 1 & 1/2 tablets by mouth Daily. (Patient taking differently: Take 25 mg by mouth Daily.) 120 tablet 2   • cholecalciferol (VITAMIN D3) 25 MCG (1000 UT) tablet Take 1,000 Units by mouth Daily.     • diphenhydrAMINE HCl, Sleep, (UNISOM SLEEPGELS PO) Take 1 tablet by mouth As Needed.     • diphenhydrAMINE-APAP, sleep,  MG tablet Take 1 tablet by mouth As Needed.     • DULoxetine (CYMBALTA) 30 MG capsule Take 30 mg by mouth Daily.     • DULoxetine (CYMBALTA) 60 MG capsule Take 1 capsule by mouth Daily. 90 capsule 2   • hydroCHLOROthiazide (MICROZIDE) 12.5 MG capsule Take 1 capsule by mouth Daily. 30 capsule 3   • hydroxychloroquine (PLAQUENIL) 200 MG tablet Take 1 tablet by mouth Daily. 30 tablet 4   • meloxicam (MOBIC) 7.5 MG tablet Take 1 tablet by mouth Daily. 90 tablet 0   • multivitamin with minerals (MULTIVITAMIN WOMEN PO) Take 1 tablet by mouth Daily.     • nitrofurantoin (MACRODANTIN)  100 MG capsule Take 100 mg by mouth Daily.     • tiZANidine (ZANAFLEX) 4 MG tablet Take 1 tablet by mouth 2 (two) times a day. 60 tablet 4   • vitamin B-12 (CYANOCOBALAMIN) 1000 MCG tablet Take 1,000 mcg by mouth Daily.     • atomoxetine (STRATTERA) 60 MG capsule Take 60 mg by mouth Daily.       No current facility-administered medications for this visit.       Past Medical History:  Past Medical History:   Diagnosis Date   • Congestive heart failure (HCC)    • Fibromyalgia    • GERD (gastroesophageal reflux disease)    • History of ear infections    • History of heart disease    • History of lupus    • History of migraine    • Hypertension    • Lown Ganong Parker syndrome    • Murmur    • Nephritis    • Osteoarthritis    • Pulmonary stenosis        Past Surgical History:  Past Surgical History:   Procedure Laterality Date   •  SECTION     • DIAGNOSTIC LAPAROSCOPY Right 2018    Procedure: LAPAROSCOPIC EXTENSIVE LYSIS OF AHESIONS. DRAINAGE OF OVARIAN CYST.;  Surgeon: Rachel Caruso DO;  Location: [facility];  Service: Obstetrics/Gynecology   • ORIF SCAPHOID W VASCULARIZED BONE GRAFT     • TUBAL ABDOMINAL LIGATION     • TUBAL COAGULATION LAPAROSCOPIC Bilateral 2018    Procedure: TUBAL FALLOPE FILSHIE CLIPPING LAPAROSCOPIC;  Surgeon: Rachel Caruso DO;  Location: [facility];  Service: Obstetrics/Gynecology   • ULNA/RADIUS CLOSED REDUCTION         Social History:  Social History     Socioeconomic History   • Marital status:    Tobacco Use   • Smoking status: Never Smoker   • Smokeless tobacco: Never Used   Vaping Use   • Vaping Use: Every day   • Substances: Nicotine   • Devices: Disposable   Substance and Sexual Activity   • Alcohol use: Yes     Comment: socially   • Drug use: No   • Sexual activity: Yes     Partners: Male     Birth control/protection: Surgical, Tubal ligation       Family History:  Family History   Problem Relation Age of Onset   • Heart disease Mother   "  • Thyroid disease Mother    • Anxiety disorder Mother    • Depression Mother    • Bipolar disorder Mother    • Heart disease Father    • Cancer Father    • Heart disease Maternal Grandmother    • Diabetes Maternal Grandmother    • Diabetes Maternal Grandfather    • Lupus Paternal Grandmother    • Rheum arthritis Paternal Grandmother        Review of Systems:  Review of Systems   Constitutional: Negative for chills and fever.   Respiratory: Negative for shortness of breath.    Cardiovascular: Negative for chest pain, palpitations and leg swelling.   Musculoskeletal: Positive for arthralgias and joint swelling.   Skin: Negative for color change.       Vital Signs:   /64 (BP Location: Left arm, Patient Position: Sitting, Cuff Size: Large Adult)   Pulse 99   Temp 96.9 °F (36.1 °C) (Temporal)   Resp 16   Ht 160 cm (63\")   Wt 98.3 kg (216 lb 12.8 oz)   SpO2 99%   BMI 38.40 kg/m²      Physical Exam:  Physical Exam  Vitals and nursing note reviewed.   Constitutional:       General: She is not in acute distress.     Appearance: Normal appearance. She is not ill-appearing or toxic-appearing.   HENT:      Head: Normocephalic.   Cardiovascular:      Rate and Rhythm: Normal rate and regular rhythm.      Pulses: Normal pulses.      Heart sounds: Normal heart sounds.   Pulmonary:      Effort: Pulmonary effort is normal. No respiratory distress.      Breath sounds: Normal breath sounds.   Musculoskeletal:         General: No swelling.   Skin:     General: Skin is warm and dry.      Capillary Refill: Capillary refill takes less than 2 seconds.      Coloration: Skin is not pale.   Neurological:      General: No focal deficit present.      Mental Status: She is alert and oriented to person, place, and time.   Psychiatric:         Mood and Affect: Mood normal.         Behavior: Behavior normal.         Thought Content: Thought content normal.         Judgment: Judgment normal.                Assessment and Plan "   Diagnoses and all orders for this visit:    1. SLE (systemic lupus erythematosus related syndrome) (HCC) (Primary)  -     CBC & Differential  -     Comprehensive Metabolic Panel  -     Hemoglobin A1c  -     Lipid Panel  -     MicroAlbumin, Urine, Random - Urine, Clean Catch  -     TSH Rfx On Abnormal To Free T4  -     Vitamin D 25 Hydroxy  -     Ambulatory Referral to Rheumatology    2. Vitamin D deficiency  -     CBC & Differential  -     Comprehensive Metabolic Panel  -     Hemoglobin A1c  -     Lipid Panel  -     MicroAlbumin, Urine, Random - Urine, Clean Catch  -     TSH Rfx On Abnormal To Free T4  -     Vitamin D 25 Hydroxy    3. Obesity (BMI 30-39.9)  -     CBC & Differential  -     Comprehensive Metabolic Panel  -     Hemoglobin A1c  -     Lipid Panel  -     MicroAlbumin, Urine, Random - Urine, Clean Catch  -     TSH Rfx On Abnormal To Free T4    4. Osteoarthritis of both knees, unspecified osteoarthritis type  -     meloxicam (MOBIC) 7.5 MG tablet; Take 1 tablet by mouth Daily.  Dispense: 90 tablet; Refill: 0  -     CBC & Differential  -     Comprehensive Metabolic Panel  -     Hemoglobin A1c  -     Lipid Panel  -     MicroAlbumin, Urine, Random - Urine, Clean Catch  -     TSH Rfx On Abnormal To Free T4    5. Primary hypertension  -     CBC & Differential  -     Comprehensive Metabolic Panel  -     Hemoglobin A1c  -     Lipid Panel  -     MicroAlbumin, Urine, Random - Urine, Clean Catch  -     TSH Rfx On Abnormal To Free T4    6. Screening cholesterol level  -     Lipid Panel    1. Referral placed   2.  Recheck level  3.  Diet and lifestyle modifications to promote weight loss  4.  Medication refilled.  Nonpharmacological interventions as discussed.  5.  Diet and lifestyle occasions to control condition.  6.  Agreeable to return for fasting labs.    Discussed possible differential diagnoses, testing, treatment, recommended non-pharmacological interventions, risks, warning signs to monitor for that would  indicate need for follow-up in clinic or ER. If no improvement with these regimens or you have new or worsening symptoms follow-up. Patient verbalizes understanding and agreement with plan of care. Denies further needs or concerns.     Patient was given instructions and counseling regarding her condition and for health maintenance advised.    Class 2 Severe Obesity (BMI >=35 and <=39.9). Obesity-related health conditions include the following: obstructive sleep apnea, hypertension, coronary heart disease, diabetes mellitus, dyslipidemias, GERD and peripheral vascular disease. Obesity is unchanged. BMI is is above average; BMI management plan is completed. We discussed portion control and increasing exercise.       I spent 30 minutes caring for patient on this date of service. This time includes time spent by me in the following activities: preparing for the visit, reviewing tests, obtaining and/or reviewing a separately obtained history, performing a medically appropriate examination and/or evaluation, counseling and educating the patient/family/caregiver, ordering medications, tests, or procedures and documenting information in the medical record    Meds ordered during this visit:  New Medications Ordered This Visit   Medications   • meloxicam (MOBIC) 7.5 MG tablet     Sig: Take 1 tablet by mouth Daily.     Dispense:  90 tablet     Refill:  0       Patient Instructions:  Patient instructions given for the following visit diagnosis:    ICD-10-CM ICD-9-CM   1. SLE (systemic lupus erythematosus related syndrome) (HCC)  M32.9 710.0   2. Vitamin D deficiency  E55.9 268.9   3. Obesity (BMI 30-39.9)  E66.9 278.00   4. Osteoarthritis of both knees, unspecified osteoarthritis type  M17.0 715.96   5. Primary hypertension  I10 401.9   6. Screening cholesterol level  Z13.220 V77.91       Follow Up   Return for Recheck 1-2 weeks , Sooner if needed.        This document has been electronically signed by EMELIA Aguilar  25, 2022 09:11 EDT    Patient was given instructions and counseling regarding her condition or for health maintenance advice. Please see specific information pulled into the AVS if appropriate.     Part of this note may be an electronic transcription/translation of spoken language to printed text using the Dragon Dictation System.

## 2022-09-08 ENCOUNTER — TELEPHONE (OUTPATIENT)
Dept: FAMILY MEDICINE CLINIC | Facility: CLINIC | Age: 32
End: 2022-09-08

## 2022-09-08 NOTE — TELEPHONE ENCOUNTER
Contacted patient regarding overdue fasting labs, patient stated she was going to try to remember to get these done tomorrow.

## 2022-09-09 ENCOUNTER — LAB (OUTPATIENT)
Dept: LAB | Facility: HOSPITAL | Age: 32
End: 2022-09-09

## 2022-09-09 DIAGNOSIS — R00.2 PALPITATIONS: ICD-10-CM

## 2022-09-09 LAB
25(OH)D3 SERPL-MCNC: 20.9 NG/ML (ref 30–100)
ALBUMIN SERPL-MCNC: 4.4 G/DL (ref 3.5–5.2)
ALBUMIN/GLOB SERPL: 2 G/DL
ALP SERPL-CCNC: 101 U/L (ref 39–117)
ALT SERPL W P-5'-P-CCNC: 14 U/L (ref 1–33)
ANION GAP SERPL CALCULATED.3IONS-SCNC: 10 MMOL/L (ref 5–15)
AST SERPL-CCNC: 17 U/L (ref 1–32)
BASOPHILS # BLD AUTO: 0.04 10*3/MM3 (ref 0–0.2)
BASOPHILS NFR BLD AUTO: 0.5 % (ref 0–1.5)
BILIRUB SERPL-MCNC: 0.5 MG/DL (ref 0–1.2)
BUN SERPL-MCNC: 10 MG/DL (ref 6–20)
BUN/CREAT SERPL: 12.7 (ref 7–25)
CALCIUM SPEC-SCNC: 9.3 MG/DL (ref 8.6–10.5)
CHLORIDE SERPL-SCNC: 106 MMOL/L (ref 98–107)
CHOLEST SERPL-MCNC: 168 MG/DL (ref 0–200)
CO2 SERPL-SCNC: 24 MMOL/L (ref 22–29)
CREAT SERPL-MCNC: 0.79 MG/DL (ref 0.57–1)
DEPRECATED RDW RBC AUTO: 39.2 FL (ref 37–54)
EGFRCR SERPLBLD CKD-EPI 2021: 102.1 ML/MIN/1.73
EOSINOPHIL # BLD AUTO: 0.14 10*3/MM3 (ref 0–0.4)
EOSINOPHIL NFR BLD AUTO: 1.7 % (ref 0.3–6.2)
ERYTHROCYTE [DISTWIDTH] IN BLOOD BY AUTOMATED COUNT: 12.8 % (ref 12.3–15.4)
GLOBULIN UR ELPH-MCNC: 2.2 GM/DL
GLUCOSE SERPL-MCNC: 93 MG/DL (ref 65–99)
HBA1C MFR BLD: 5 % (ref 4.8–5.6)
HCT VFR BLD AUTO: 40.9 % (ref 34–46.6)
HDLC SERPL-MCNC: 53 MG/DL (ref 40–60)
HGB BLD-MCNC: 13.2 G/DL (ref 12–15.9)
IMM GRANULOCYTES # BLD AUTO: 0.02 10*3/MM3 (ref 0–0.05)
IMM GRANULOCYTES NFR BLD AUTO: 0.2 % (ref 0–0.5)
LDLC SERPL CALC-MCNC: 99 MG/DL (ref 0–100)
LDLC/HDLC SERPL: 1.83 {RATIO}
LYMPHOCYTES # BLD AUTO: 2.48 10*3/MM3 (ref 0.7–3.1)
LYMPHOCYTES NFR BLD AUTO: 30.1 % (ref 19.6–45.3)
MCH RBC QN AUTO: 27.4 PG (ref 26.6–33)
MCHC RBC AUTO-ENTMCNC: 32.3 G/DL (ref 31.5–35.7)
MCV RBC AUTO: 85 FL (ref 79–97)
MONOCYTES # BLD AUTO: 0.51 10*3/MM3 (ref 0.1–0.9)
MONOCYTES NFR BLD AUTO: 6.2 % (ref 5–12)
NEUTROPHILS NFR BLD AUTO: 5.06 10*3/MM3 (ref 1.7–7)
NEUTROPHILS NFR BLD AUTO: 61.3 % (ref 42.7–76)
NRBC BLD AUTO-RTO: 0 /100 WBC (ref 0–0.2)
PLATELET # BLD AUTO: 281 10*3/MM3 (ref 140–450)
PMV BLD AUTO: 11.1 FL (ref 6–12)
POTASSIUM SERPL-SCNC: 4.2 MMOL/L (ref 3.5–5.2)
PROT SERPL-MCNC: 6.6 G/DL (ref 6–8.5)
RBC # BLD AUTO: 4.81 10*6/MM3 (ref 3.77–5.28)
SODIUM SERPL-SCNC: 140 MMOL/L (ref 136–145)
TRIGL SERPL-MCNC: 89 MG/DL (ref 0–150)
TSH SERPL DL<=0.05 MIU/L-ACNC: 1.83 UIU/ML (ref 0.27–4.2)
VLDLC SERPL-MCNC: 16 MG/DL (ref 5–40)
WBC NRBC COR # BLD: 8.25 10*3/MM3 (ref 3.4–10.8)

## 2022-09-09 PROCEDURE — 80061 LIPID PANEL: CPT | Performed by: NURSE PRACTITIONER

## 2022-09-09 PROCEDURE — 84443 ASSAY THYROID STIM HORMONE: CPT | Performed by: NURSE PRACTITIONER

## 2022-09-09 PROCEDURE — 36415 COLL VENOUS BLD VENIPUNCTURE: CPT | Performed by: NURSE PRACTITIONER

## 2022-09-09 PROCEDURE — 80053 COMPREHEN METABOLIC PANEL: CPT | Performed by: NURSE PRACTITIONER

## 2022-09-09 PROCEDURE — 83036 HEMOGLOBIN GLYCOSYLATED A1C: CPT | Performed by: NURSE PRACTITIONER

## 2022-09-09 PROCEDURE — 82306 VITAMIN D 25 HYDROXY: CPT | Performed by: NURSE PRACTITIONER

## 2022-09-09 PROCEDURE — 85025 COMPLETE CBC W/AUTO DIFF WBC: CPT | Performed by: NURSE PRACTITIONER

## 2022-09-13 ENCOUNTER — TELEPHONE (OUTPATIENT)
Dept: FAMILY MEDICINE CLINIC | Facility: CLINIC | Age: 32
End: 2022-09-13

## 2022-09-13 ENCOUNTER — OFFICE VISIT (OUTPATIENT)
Dept: FAMILY MEDICINE CLINIC | Facility: CLINIC | Age: 32
End: 2022-09-13

## 2022-09-13 VITALS
HEART RATE: 71 BPM | SYSTOLIC BLOOD PRESSURE: 118 MMHG | BODY MASS INDEX: 38.41 KG/M2 | WEIGHT: 216.8 LBS | DIASTOLIC BLOOD PRESSURE: 80 MMHG | RESPIRATION RATE: 16 BRPM | HEIGHT: 63 IN | OXYGEN SATURATION: 99 % | TEMPERATURE: 97.1 F

## 2022-09-13 DIAGNOSIS — E66.9 OBESITY (BMI 30-39.9): Primary | ICD-10-CM

## 2022-09-13 DIAGNOSIS — Z71.3 WEIGHT LOSS COUNSELING, ENCOUNTER FOR: ICD-10-CM

## 2022-09-13 DIAGNOSIS — M32.9 SLE (SYSTEMIC LUPUS ERYTHEMATOSUS RELATED SYNDROME): ICD-10-CM

## 2022-09-13 DIAGNOSIS — E55.9 VITAMIN D DEFICIENCY: ICD-10-CM

## 2022-09-13 PROCEDURE — 99214 OFFICE O/P EST MOD 30 MIN: CPT | Performed by: NURSE PRACTITIONER

## 2022-09-13 RX ORDER — ATOMOXETINE 60 MG/1
60 CAPSULE ORAL DAILY
Qty: 30 CAPSULE | Refills: 2 | Status: CANCELLED | OUTPATIENT
Start: 2022-09-13

## 2022-09-13 RX ORDER — TIRZEPATIDE 2.5 MG/.5ML
2.5 INJECTION, SOLUTION SUBCUTANEOUS
Qty: 2 ML | Refills: 0 | Status: SHIPPED | OUTPATIENT
Start: 2022-09-13 | End: 2022-10-14

## 2022-09-13 RX ORDER — MELATONIN
1000 DAILY
Qty: 90 TABLET | Refills: 1 | Status: SHIPPED | OUTPATIENT
Start: 2022-09-13

## 2022-09-13 NOTE — PROGRESS NOTES
Chief Complaint  Lupus (Follow-up)    Subjective          Cheryl Mcnulty is a 32 y.o. female who presents today to [facility] for follow up    HPI:   History of Present Illness    Presents to clinic for follow up to discuss labs and weight loss.  Reports has tried Adipex in the past for weight loss.  Has tried dieting and exercise.    Has history of lupus. Has been referred to rheumatology but has not been contacted about an appointment yet.     No other issues or concerns at this time.    The following portions of the patient's history were reviewed and updated as appropriate: allergies, current medications, past family history, past medical history, past social history, past surgical history and problem list.    Objective     Allergy:   No Known Allergies     Current Medications:   Current Outpatient Medications   Medication Sig Dispense Refill   • cholecalciferol (VITAMIN D3) 25 MCG (1000 UT) tablet Take 1 tablet by mouth Daily. 90 tablet 1   • diphenhydrAMINE HCl, Sleep, (UNISOM SLEEPGELS PO) Take 1 tablet by mouth As Needed.     • DULoxetine (CYMBALTA) 30 MG capsule Take 30 mg by mouth Daily.     • DULoxetine (CYMBALTA) 60 MG capsule Take 1 capsule by mouth Daily. 90 capsule 2   • hydroCHLOROthiazide (MICROZIDE) 12.5 MG capsule Take 1 capsule by mouth Daily. 30 capsule 3   • meloxicam (MOBIC) 7.5 MG tablet Take 1 tablet by mouth Daily. 90 tablet 0   • multivitamin with minerals tablet tablet Take 1 tablet by mouth Daily.     • nitrofurantoin (MACRODANTIN) 100 MG capsule Take 100 mg by mouth Daily.     • tiZANidine (ZANAFLEX) 4 MG tablet Take 1 tablet by mouth 2 (two) times a day. 60 tablet 4   • vitamin B-12 (CYANOCOBALAMIN) 1000 MCG tablet Take 1,000 mcg by mouth Daily.     • atomoxetine (STRATTERA) 60 MG capsule Take 60 mg by mouth Daily.     • Tirzepatide (Mounjaro) 2.5 MG/0.5ML solution pen-injector Inject 2.5 mg under the skin into the appropriate area as directed Every 7  (Seven) Days. 2 mL 0     No current facility-administered medications for this visit.       Past Medical History:  Past Medical History:   Diagnosis Date   • Congestive heart failure (HCC)    • Fibromyalgia    • GERD (gastroesophageal reflux disease)    • History of ear infections    • History of heart disease    • History of lupus    • History of migraine    • Hypertension    • Lown Ganong Parker syndrome    • Murmur    • Nephritis    • Osteoarthritis    • Pulmonary stenosis        Past Surgical History:  Past Surgical History:   Procedure Laterality Date   •  SECTION     • DIAGNOSTIC LAPAROSCOPY Right 2018    Procedure: LAPAROSCOPIC EXTENSIVE LYSIS OF AHESIONS. DRAINAGE OF OVARIAN CYST.;  Surgeon: Rachel Caruso DO;  Location: [facility];  Service: Obstetrics/Gynecology   • ORIF SCAPHOID W VASCULARIZED BONE GRAFT     • TUBAL ABDOMINAL LIGATION     • TUBAL COAGULATION LAPAROSCOPIC Bilateral 2018    Procedure: TUBAL FALLOPE FILSHIE CLIPPING LAPAROSCOPIC;  Surgeon: Rachel Caruso DO;  Location: [facility];  Service: Obstetrics/Gynecology   • ULNA/RADIUS CLOSED REDUCTION         Social History:  Social History     Socioeconomic History   • Marital status:    Tobacco Use   • Smoking status: Never Smoker   • Smokeless tobacco: Never Used   Vaping Use   • Vaping Use: Every day   • Substances: Nicotine   • Devices: Disposable   Substance and Sexual Activity   • Alcohol use: Yes     Comment: socially   • Drug use: No   • Sexual activity: Yes     Partners: Male     Birth control/protection: Surgical, Tubal ligation       Family History:  Family History   Problem Relation Age of Onset   • Heart disease Mother    • Thyroid disease Mother    • Anxiety disorder Mother    • Depression Mother    • Bipolar disorder Mother    • Heart disease Father    • Cancer Father    • Heart disease Maternal Grandmother    • Diabetes Maternal Grandmother    • Diabetes Maternal Grandfather    • Lupus  "Paternal Grandmother    • Rheum arthritis Paternal Grandmother        Review of Systems:  Review of Systems   Constitutional: Negative for unexpected weight change.       Vital Signs:   /80 (BP Location: Right arm, Patient Position: Sitting, Cuff Size: Large Adult)   Pulse 71   Temp 97.1 °F (36.2 °C) (Temporal)   Resp 16   Ht 160 cm (63\")   Wt 98.3 kg (216 lb 12.8 oz)   SpO2 99%   BMI 38.40 kg/m²      Physical Exam:  Physical Exam  Vitals and nursing note reviewed.   Constitutional:       General: She is not in acute distress.     Appearance: Normal appearance. She is obese. She is not ill-appearing or toxic-appearing.   HENT:      Head: Normocephalic.   Cardiovascular:      Rate and Rhythm: Normal rate and regular rhythm.      Heart sounds: Normal heart sounds.   Pulmonary:      Effort: Pulmonary effort is normal. No respiratory distress.      Breath sounds: Normal breath sounds.   Abdominal:      General: Bowel sounds are normal.      Palpations: Abdomen is soft.   Skin:     General: Skin is warm and dry.      Coloration: Skin is not pale.   Neurological:      Mental Status: She is alert and oriented to person, place, and time.   Psychiatric:         Mood and Affect: Mood normal.         Behavior: Behavior normal.         Thought Content: Thought content normal.         Judgment: Judgment normal.                  Assessment and Plan   Diagnoses and all orders for this visit:    1. Obesity (BMI 30-39.9) (Primary)  -     Tirzepatide (Mounjaro) 2.5 MG/0.5ML solution pen-injector; Inject 2.5 mg under the skin into the appropriate area as directed Every 7 (Seven) Days.  Dispense: 2 mL; Refill: 0    2. Vitamin D deficiency  -     cholecalciferol (VITAMIN D3) 25 MCG (1000 UT) tablet; Take 1 tablet by mouth Daily.  Dispense: 90 tablet; Refill: 1    3. Weight loss counseling, encounter for  -     Tirzepatide (Mounjaro) 2.5 MG/0.5ML solution pen-injector; Inject 2.5 mg under the skin into the appropriate area " as directed Every 7 (Seven) Days.  Dispense: 2 mL; Refill: 0    4. SLE (systemic lupus erythematosus related syndrome) (HCC)      1,3.  Initiate regimen as above. Not trying to get pregnant.  Denies personal or family history of thyroid cancer.  Denies history of pancreatitis.  Diet and lifestyle modifications to promote weight loss  2.  Medication refilled  4. Staff to follow-up on patient's referral to rheumatology.    Discussed possible differential diagnoses, testing, treatment, recommended non-pharmacological interventions, risks, warning signs to monitor for that would indicate need for follow-up in clinic or ER. If no improvement with these regimens or you have new or worsening symptoms follow-up. Patient verbalizes understanding and agreement with plan of care. Denies further needs or concerns.     Patient was given instructions and counseling regarding her condition and for health maintenance advised.    Class 2 Severe Obesity (BMI >=35 and <=39.9). Obesity-related health conditions include the following: obstructive sleep apnea, hypertension, coronary heart disease, diabetes mellitus, dyslipidemias, GERD and peripheral vascular disease. Obesity is improving with lifestyle modifications. BMI is is above average; BMI management plan is completed. We discussed portion control and increasing exercise.       I spent 30 minutes caring for patient on this date of service. This time includes time spent by me in the following activities: preparing for the visit, reviewing tests, obtaining and/or reviewing a separately obtained history, performing a medically appropriate examination and/or evaluation, counseling and educating the patient/family/caregiver, ordering medications, tests, or procedures and documenting information in the medical record    Meds ordered during this visit:  New Medications Ordered This Visit   Medications   • cholecalciferol (VITAMIN D3) 25 MCG (1000 UT) tablet     Sig: Take 1 tablet by  mouth Daily.     Dispense:  90 tablet     Refill:  1   • Tirzepatide (Mounjaro) 2.5 MG/0.5ML solution pen-injector     Sig: Inject 2.5 mg under the skin into the appropriate area as directed Every 7 (Seven) Days.     Dispense:  2 mL     Refill:  0     With manufactures coupon       Patient Instructions:  Patient instructions given for the following visit diagnosis:    ICD-10-CM ICD-9-CM   1. Obesity (BMI 30-39.9)  E66.9 278.00   2. Vitamin D deficiency  E55.9 268.9   3. Weight loss counseling, encounter for  Z71.3 V65.3   4. SLE (systemic lupus erythematosus related syndrome) ([facility])  M32.9 710.0       Follow Up   Return in about 1 month (around 10/13/2022) for Recheck, Sooner if needed.        This document has been electronically signed by EMELIA Aguilar  September 13, 2022 14:52 EDT    Patient was given instructions and counseling regarding her condition or for health maintenance advice. Please see specific information pulled into the AVS if appropriate.     Part of this note may be an electronic transcription/translation of spoken language to printed text using the Dragon Dictation System.

## 2022-09-13 NOTE — TELEPHONE ENCOUNTER
Caller: Cheryl Mcnulty    Relationship: Self    Best call back number: [phone]    What is the best time to reach you: ANY    Who are you requesting to speak with (clinical staff, provider,  specific staff member):CLINICAL       What was the call regarding: IS THERE ANOTHER WEIGHT LOSS MEDICATION THAT COULD BE PRESCRIBED THE ONE EMELIA WINSTON CALLED IN WAS NOT COVERED UNDER HER INSURANCE AND THE COUPON ONLY WORKS WITH INSURANCE     Do you require a callback: PLEASE CALL- OK TO LEAVE DETAILED MESSAGE IF NO ANSWER

## 2022-09-13 NOTE — TELEPHONE ENCOUNTER
----- Message from EMELIA Aguilar sent at 9/13/2022  8:07 AM EDT -----  Please call patient regarding results.     Vitamin D is low.  Continue vitamin D supplement.        Patient notified & verbalized understanding.

## 2022-09-14 NOTE — TELEPHONE ENCOUNTER
Her insurance isn't going to cover the other weight loss medications we discussed today either.        Patient notified & verbalized understanding.

## 2022-09-27 ENCOUNTER — TELEPHONE (OUTPATIENT)
Dept: FAMILY MEDICINE CLINIC | Facility: CLINIC | Age: 32
End: 2022-09-27

## 2022-09-29 ENCOUNTER — IMMUNIZATION (OUTPATIENT)
Dept: VACCINE CLINIC | Facility: HOSPITAL | Age: 32
End: 2022-09-29

## 2022-09-29 DIAGNOSIS — Z23 NEED FOR VACCINATION: Primary | ICD-10-CM

## 2022-09-29 PROCEDURE — 0124A: CPT | Performed by: INTERNAL MEDICINE

## 2022-09-29 PROCEDURE — 91312 HC SARSCOV2 VAC 30MCG/0.3ML IM BIVALENT BOOSTER 12 YRS AND OLDER: CPT | Performed by: INTERNAL MEDICINE

## 2022-10-14 ENCOUNTER — OFFICE VISIT (OUTPATIENT)
Dept: FAMILY MEDICINE CLINIC | Facility: CLINIC | Age: 32
End: 2022-10-14

## 2022-10-14 VITALS
DIASTOLIC BLOOD PRESSURE: 70 MMHG | OXYGEN SATURATION: 100 % | HEIGHT: 63 IN | RESPIRATION RATE: 16 BRPM | SYSTOLIC BLOOD PRESSURE: 138 MMHG | BODY MASS INDEX: 38.77 KG/M2 | TEMPERATURE: 98.4 F | WEIGHT: 218.8 LBS | HEART RATE: 103 BPM

## 2022-10-14 DIAGNOSIS — E66.9 OBESITY (BMI 30-39.9): ICD-10-CM

## 2022-10-14 DIAGNOSIS — M79.7 FIBROMYALGIA: ICD-10-CM

## 2022-10-14 DIAGNOSIS — I10 PRIMARY HYPERTENSION: ICD-10-CM

## 2022-10-14 DIAGNOSIS — N30.00 ACUTE CYSTITIS WITHOUT HEMATURIA: Primary | ICD-10-CM

## 2022-10-14 PROCEDURE — 99214 OFFICE O/P EST MOD 30 MIN: CPT | Performed by: NURSE PRACTITIONER

## 2022-10-14 PROCEDURE — 87086 URINE CULTURE/COLONY COUNT: CPT | Performed by: NURSE PRACTITIONER

## 2022-10-14 RX ORDER — DULOXETIN HYDROCHLORIDE 30 MG/1
30 CAPSULE, DELAYED RELEASE ORAL DAILY
Qty: 30 CAPSULE | Refills: 2 | Status: CANCELLED | OUTPATIENT
Start: 2022-10-14

## 2022-10-14 RX ORDER — NITROFURANTOIN MACROCRYSTALS 100 MG/1
100 CAPSULE ORAL DAILY
Qty: 30 CAPSULE | Refills: 2 | Status: CANCELLED | OUTPATIENT
Start: 2022-10-14

## 2022-10-14 RX ORDER — DULOXETIN HYDROCHLORIDE 60 MG/1
60 CAPSULE, DELAYED RELEASE ORAL DAILY
Qty: 90 CAPSULE | Refills: 0 | Status: SHIPPED | OUTPATIENT
Start: 2022-10-14 | End: 2023-03-07 | Stop reason: SDDI

## 2022-10-14 RX ORDER — TIZANIDINE 4 MG/1
4 TABLET ORAL 2 TIMES DAILY
Qty: 60 TABLET | Refills: 4 | Status: CANCELLED | OUTPATIENT
Start: 2022-10-14

## 2022-10-14 RX ORDER — DULOXETIN HYDROCHLORIDE 30 MG/1
30 CAPSULE, DELAYED RELEASE ORAL DAILY
Qty: 90 CAPSULE | Refills: 0 | Status: SHIPPED | OUTPATIENT
Start: 2022-10-14 | End: 2023-02-07

## 2022-10-14 RX ORDER — DULOXETIN HYDROCHLORIDE 60 MG/1
60 CAPSULE, DELAYED RELEASE ORAL DAILY
Qty: 90 CAPSULE | Refills: 2 | Status: CANCELLED | OUTPATIENT
Start: 2022-10-14

## 2022-10-14 RX ORDER — ATOMOXETINE 60 MG/1
60 CAPSULE ORAL DAILY
Qty: 30 CAPSULE | Refills: 2 | Status: CANCELLED | OUTPATIENT
Start: 2022-10-14

## 2022-10-14 RX ORDER — ATENOLOL 25 MG/1
25 TABLET ORAL DAILY
Qty: 90 TABLET | Refills: 0 | Status: SHIPPED | OUTPATIENT
Start: 2022-10-14 | End: 2023-03-07 | Stop reason: SDUPTHER

## 2022-10-14 RX ORDER — SULFAMETHOXAZOLE AND TRIMETHOPRIM 800; 160 MG/1; MG/1
1 TABLET ORAL 2 TIMES DAILY
COMMUNITY
Start: 2022-10-08 | End: 2022-12-20

## 2022-10-14 RX ORDER — HYDROCHLOROTHIAZIDE 12.5 MG/1
12.5 CAPSULE, GELATIN COATED ORAL DAILY
Qty: 90 CAPSULE | Refills: 0 | Status: SHIPPED | OUTPATIENT
Start: 2022-10-14 | End: 2023-03-07 | Stop reason: SDUPTHER

## 2022-10-14 NOTE — PROGRESS NOTES
Chief Complaint  Hypertension and Urinary Tract Infection (Burning, Dark Urine)    Subjective          Cheryl Mcnulty is a 32 y.o. female who presents today to [facility] for follow up    HPI:   History of Present Illness    Presents to clinic for follow-up and medication refill.  Reports she has been out of hydrochlorothiazide, atenolol, Cymbaltax 1 month. Reports she thought she had refills at pharmacy but did not and decided she would rather wait until today's appointment to get refills.  No associated symptoms.  Reports she is supposed to take 1.5 tablets of atenolol daily but she only takes 1.  Reports she tolerates medications well with no issues or side effects.    Reports she was treated for UTI at [facility] 6 days ago.  Has been taking Bactrim but is still having suprapubic pain and burning with urination.     No other issues or concerns at this time.    The following portions of the patient's history were reviewed and updated as appropriate: allergies, current medications, past family history, past medical history, past social history, past surgical history and problem list.    Objective     Allergy:   No Known Allergies     Current Medications:   Current Outpatient Medications   Medication Sig Dispense Refill   • cholecalciferol (VITAMIN D3) 25 MCG (1000 UT) tablet Take 1 tablet by mouth Daily. 90 tablet 1   • diphenhydrAMINE HCl, Sleep, (UNISOM SLEEPGELS PO) Take 1 tablet by mouth As Needed.     • DULoxetine (CYMBALTA) 30 MG capsule Take 1 capsule by mouth Daily. 90 capsule 0   • DULoxetine (CYMBALTA) 60 MG capsule Take 1 capsule by mouth Daily. 90 capsule 0   • hydroCHLOROthiazide (MICROZIDE) 12.5 MG capsule Take 1 capsule by mouth Daily. 90 capsule 0   • meloxicam (MOBIC) 7.5 MG tablet Take 1 tablet by mouth Daily. 90 tablet 0   • multivitamin with minerals tablet tablet Take 1 tablet by mouth Daily.     • sulfamethoxazole-trimethoprim (BACTRIM DS,SEPTRA DS) 800-160 MG per  tablet Take 1 tablet by mouth 2 (Two) Times a Day.     • tiZANidine (ZANAFLEX) 4 MG tablet Take 1 tablet by mouth 2 (two) times a day. 60 tablet 4   • vitamin B-12 (CYANOCOBALAMIN) 1000 MCG tablet Take 1,000 mcg by mouth Daily.     • atenolol (Tenormin) 25 MG tablet Take 1 tablet by mouth Daily. 90 tablet 0   • atomoxetine (STRATTERA) 60 MG capsule Take 60 mg by mouth Daily.     • nitrofurantoin (MACRODANTIN) 100 MG capsule Take 100 mg by mouth Daily.       No current facility-administered medications for this visit.       Past Medical History:  Past Medical History:   Diagnosis Date   • Congestive heart failure (HCC)    • Fibromyalgia    • GERD (gastroesophageal reflux disease)    • History of ear infections    • History of heart disease    • History of lupus    • History of migraine    • Hypertension    • Lown Ganong Parker syndrome    • Murmur    • Nephritis    • Osteoarthritis    • Pulmonary stenosis    • Urinary tract infection        Past Surgical History:  Past Surgical History:   Procedure Laterality Date   •  SECTION     • DIAGNOSTIC LAPAROSCOPY Right 2018    Procedure: LAPAROSCOPIC EXTENSIVE LYSIS OF AHESIONS. DRAINAGE OF OVARIAN CYST.;  Surgeon: Rachel Caruso DO;  Location: [facility];  Service: Obstetrics/Gynecology   • ORIF SCAPHOID W VASCULARIZED BONE GRAFT     • TUBAL ABDOMINAL LIGATION     • TUBAL COAGULATION LAPAROSCOPIC Bilateral 2018    Procedure: TUBAL FALLOPE FILSHIE CLIPPING LAPAROSCOPIC;  Surgeon: Rachel Caruso DO;  Location: [facility];  Service: Obstetrics/Gynecology   • ULNA/RADIUS CLOSED REDUCTION         Social History:  Social History     Socioeconomic History   • Marital status:    Tobacco Use   • Smoking status: Never   • Smokeless tobacco: Never   Vaping Use   • Vaping Use: Every day   • Substances: Nicotine   • Devices: Disposable   Substance and Sexual Activity   • Alcohol use: Yes     Comment: socially   • Drug use: No   • Sexual  "activity: Yes     Partners: Male     Birth control/protection: Surgical, Tubal ligation       Family History:  Family History   Problem Relation Age of Onset   • Heart disease Mother    • Thyroid disease Mother    • Anxiety disorder Mother    • Depression Mother    • Bipolar disorder Mother    • Heart disease Father    • Cancer Father    • Heart disease Maternal Grandmother    • Diabetes Maternal Grandmother    • Diabetes Maternal Grandfather    • Lupus Paternal Grandmother    • Rheum arthritis Paternal Grandmother        Review of Systems:  Review of Systems   Constitutional: Negative for chills and fever.   Respiratory: Negative for shortness of breath.    Cardiovascular: Negative for chest pain, palpitations and leg swelling.   Gastrointestinal: Negative for nausea and vomiting.   Genitourinary: Positive for dysuria. Negative for hematuria.       Vital Signs:   /70 (BP Location: Right arm, Patient Position: Sitting, Cuff Size: Large Adult)   Pulse 103   Temp 98.4 °F (36.9 °C) (Temporal)   Resp 16   Ht 160 cm (63\")   Wt 99.2 kg (218 lb 12.8 oz)   SpO2 100%   BMI 38.76 kg/m²      Physical Exam:  Physical Exam  Vitals and nursing note reviewed.   Constitutional:       General: She is not in acute distress.     Appearance: Normal appearance. She is obese. She is not ill-appearing or toxic-appearing.   HENT:      Head: Normocephalic.   Eyes:      Pupils: Pupils are equal, round, and reactive to light.   Cardiovascular:      Rate and Rhythm: Normal rate and regular rhythm.      Heart sounds: Normal heart sounds.   Pulmonary:      Effort: Pulmonary effort is normal. No respiratory distress.      Breath sounds: Normal breath sounds.   Abdominal:      General: Bowel sounds are normal.      Palpations: Abdomen is soft.   Musculoskeletal:         General: No swelling.   Skin:     General: Skin is warm and dry.      Coloration: Skin is not pale.   Neurological:      General: No focal deficit present.      Mental " Status: She is alert and oriented to person, place, and time.   Psychiatric:         Mood and Affect: Mood normal.         Behavior: Behavior normal.         Thought Content: Thought content normal.         Judgment: Judgment normal.                  Assessment and Plan   Diagnoses and all orders for this visit:    1. Acute cystitis without hematuria (Primary)  -     Urine Culture - Urine, Urine, Clean Catch; Future    2. Fibromyalgia  -     DULoxetine (CYMBALTA) 60 MG capsule; Take 1 capsule by mouth Daily.  Dispense: 90 capsule; Refill: 0  -     DULoxetine (CYMBALTA) 30 MG capsule; Take 1 capsule by mouth Daily.  Dispense: 90 capsule; Refill: 0    3. Primary hypertension  -     hydroCHLOROthiazide (MICROZIDE) 12.5 MG capsule; Take 1 capsule by mouth Daily.  Dispense: 90 capsule; Refill: 0  -     atenolol (Tenormin) 25 MG tablet; Take 1 tablet by mouth Daily.  Dispense: 90 tablet; Refill: 0    4. Obesity (BMI 30-39.9)    1.  Urine culture ordered.  Push water intake.  2.  Medications refilled.  Reports she has an appointment to follow-up with her rheumatologist but does not have enough medication to last until then.  3.  Medications refilled.  Monitor blood pressure, keep log, bring log to follow-up appointment.  Follow-up sooner for readings outside of established parameters.    3,4.  Diet and lifestyle modifications to control condition.  Labs done 9/9/22    Discussed possible differential diagnoses, testing, treatment, recommended non-pharmacological interventions, risks, warning signs to monitor for that would indicate need for follow-up in clinic or ER. If no improvement with these regimens or you have new or worsening symptoms follow-up. Patient verbalizes understanding and agreement with plan of care. Denies further needs or concerns.     Patient was given instructions and counseling regarding her condition and for health maintenance advised.    Class 2 Severe Obesity (BMI >=35 and <=39.9). Obesity-related  health conditions include the following: obstructive sleep apnea, hypertension, coronary heart disease, diabetes mellitus, dyslipidemias, GERD and peripheral vascular disease. Obesity is worsening. BMI is is above average; BMI management plan is completed. We discussed portion control and increasing exercise.       I spent 30 minutes caring for patient on this date of service. This time includes time spent by me in the following activities: preparing for the visit, reviewing tests, obtaining and/or reviewing a separately obtained history, performing a medically appropriate examination and/or evaluation, counseling and educating the patient/family/caregiver, ordering medications, tests, or procedures and documenting information in the medical record    Meds ordered during this visit:  New Medications Ordered This Visit   Medications   • hydroCHLOROthiazide (MICROZIDE) 12.5 MG capsule     Sig: Take 1 capsule by mouth Daily.     Dispense:  90 capsule     Refill:  0   • atenolol (Tenormin) 25 MG tablet     Sig: Take 1 tablet by mouth Daily.     Dispense:  90 tablet     Refill:  0   • DULoxetine (CYMBALTA) 60 MG capsule     Sig: Take 1 capsule by mouth Daily.     Dispense:  90 capsule     Refill:  0     PATIENT CHOSEN ADJUDICATION OUTSIDE OF INSURANCE: $[24.02]; ADJUDICATE WITH: BIN [[id number]] PCN [[id number]] GRP [[id number]] MBR [[id number]]   • DULoxetine (CYMBALTA) 30 MG capsule     Sig: Take 1 capsule by mouth Daily.     Dispense:  90 capsule     Refill:  0       Patient Instructions:  Patient instructions given for the following visit diagnosis:    ICD-10-CM ICD-9-CM   1. Acute cystitis without hematuria  N30.00 595.0   2. Fibromyalgia  M79.7 729.1   3. Primary hypertension  I10 401.9   4. Obesity (BMI 30-39.9)  E66.9 278.00       Follow Up   Return in about 3 months (around 1/14/2023) for Recheck, Sooner if needed.        This document has been electronically signed by EMELIA Aguilar  October 14, 2022 12:36 EDT    Patient  was given instructions and counseling regarding her condition or for health maintenance advice. Please see specific information pulled into the AVS if appropriate.     Part of this note may be an electronic transcription/translation of spoken language to printed text using the Dragon Dictation System.

## 2022-10-15 LAB — BACTERIA SPEC AEROBE CULT: NORMAL

## 2022-10-17 ENCOUNTER — TELEPHONE (OUTPATIENT)
Dept: FAMILY MEDICINE CLINIC | Facility: CLINIC | Age: 32
End: 2022-10-17

## 2022-10-17 NOTE — TELEPHONE ENCOUNTER
----- Message from EMELIA Aguilar sent at 10/17/2022  8:37 AM EDT -----  Please call patient regarding results.     Negative for UTI. Push water intake and follow up if symptoms persist.       Patient notified & Verbalized understanding.

## 2022-10-18 ENCOUNTER — TELEPHONE (OUTPATIENT)
Dept: UROLOGY | Facility: CLINIC | Age: 32
End: 2022-10-18

## 2022-10-18 DIAGNOSIS — N39.0 RECURRENT UTI: Primary | ICD-10-CM

## 2022-10-18 RX ORDER — NITROFURANTOIN MACROCRYSTALS 100 MG/1
100 CAPSULE ORAL DAILY
Qty: 30 CAPSULE | Refills: 11 | Status: SHIPPED | OUTPATIENT
Start: 2022-10-18 | End: 2022-12-20 | Stop reason: SDUPTHER

## 2022-10-24 ENCOUNTER — OFFICE VISIT (OUTPATIENT)
Dept: FAMILY MEDICINE CLINIC | Facility: CLINIC | Age: 32
End: 2022-10-24

## 2022-10-24 VITALS
BODY MASS INDEX: 38.27 KG/M2 | RESPIRATION RATE: 19 BRPM | WEIGHT: 216 LBS | OXYGEN SATURATION: 98 % | HEART RATE: 93 BPM | SYSTOLIC BLOOD PRESSURE: 120 MMHG | DIASTOLIC BLOOD PRESSURE: 80 MMHG | HEIGHT: 63 IN | TEMPERATURE: 98.7 F

## 2022-10-24 DIAGNOSIS — M32.9 SLE (SYSTEMIC LUPUS ERYTHEMATOSUS RELATED SYNDROME): ICD-10-CM

## 2022-10-24 DIAGNOSIS — R51.9 ACUTE NONINTRACTABLE HEADACHE, UNSPECIFIED HEADACHE TYPE: Primary | ICD-10-CM

## 2022-10-24 PROCEDURE — 99214 OFFICE O/P EST MOD 30 MIN: CPT | Performed by: FAMILY MEDICINE

## 2022-10-24 PROCEDURE — 96372 THER/PROPH/DIAG INJ SC/IM: CPT | Performed by: FAMILY MEDICINE

## 2022-10-24 RX ORDER — HYDROXYCHLOROQUINE SULFATE 200 MG/1
200 TABLET, FILM COATED ORAL DAILY
Qty: 30 TABLET | Refills: 0 | Status: SHIPPED | OUTPATIENT
Start: 2022-10-24 | End: 2022-11-23

## 2022-10-24 RX ORDER — ATOMOXETINE 60 MG/1
60 CAPSULE ORAL DAILY
Qty: 30 CAPSULE | Refills: 2 | Status: SHIPPED | OUTPATIENT
Start: 2022-10-24 | End: 2023-03-07 | Stop reason: SDDI

## 2022-10-24 RX ORDER — KETOROLAC TROMETHAMINE 30 MG/ML
60 INJECTION, SOLUTION INTRAMUSCULAR; INTRAVENOUS ONCE
Status: COMPLETED | OUTPATIENT
Start: 2022-10-24 | End: 2022-10-24

## 2022-10-24 RX ADMIN — KETOROLAC TROMETHAMINE 60 MG: 30 INJECTION, SOLUTION INTRAMUSCULAR; INTRAVENOUS at 14:03

## 2022-10-24 NOTE — PROGRESS NOTES
"Chief Complaint  Migraine (fatigue)    Subjective          Cheryl Mcnulty presents to [facility]  History of Present Illness  Pt states she had had a migraine for the past couple days that she can't get to ease. States she has been having increased joint pain since being out of her plaquenil, she is waiting for the rheumatologist however due to [employer] being down she states she is unable to get in to them. States she is having to increasingly call into work due to her increased joint pain.       Review of Systems      Objective   Vital Signs:   /80 (BP Location: Right arm, Patient Position: Sitting)   Pulse 93   Temp 98.7 °F (37.1 °C)   Resp 19   Ht 160 cm (62.99\")   Wt 98 kg (216 lb)   SpO2 98%   BMI 38.27 kg/m²     Physical Exam  Constitutional:       General: She is not in acute distress.     Appearance: Normal appearance. She is well-developed and well-groomed. She is not ill-appearing, toxic-appearing or diaphoretic.   HENT:      Head: Normocephalic.      Nose: Nose normal. No congestion or rhinorrhea.      Mouth/Throat:      Mouth: Mucous membranes are moist.      Pharynx: Oropharynx is clear. No oropharyngeal exudate or posterior oropharyngeal erythema.   Eyes:      General: Lids are normal.         Right eye: No discharge.         Left eye: No discharge.      Extraocular Movements: Extraocular movements intact.      Pupils: Pupils are equal, round, and reactive to light.   Neck:      Vascular: No carotid bruit.   Cardiovascular:      Rate and Rhythm: Normal rate and regular rhythm.      Pulses: Normal pulses.      Heart sounds: Normal heart sounds. No murmur heard.    No friction rub. No gallop.   Pulmonary:      Effort: Pulmonary effort is normal. No respiratory distress.      Breath sounds: Normal breath sounds. No stridor. No wheezing, rhonchi or rales.   Chest:      Chest wall: No tenderness.   Abdominal:      General: Bowel sounds are " normal. There is no distension.      Palpations: Abdomen is soft. There is no mass.      Tenderness: There is no abdominal tenderness. There is no right CVA tenderness, left CVA tenderness, guarding or rebound.      Hernia: No hernia is present.   Musculoskeletal:         General: No swelling or tenderness. Normal range of motion.      Cervical back: Normal range of motion and neck supple. No rigidity or tenderness.      Right lower leg: No edema.      Left lower leg: No edema.   Lymphadenopathy:      Cervical: No cervical adenopathy.   Skin:     General: Skin is warm.      Capillary Refill: Capillary refill takes less than 2 seconds.      Coloration: Skin is not jaundiced.      Findings: No bruising, erythema or rash.   Neurological:      General: No focal deficit present.      Mental Status: She is alert and oriented to person, place, and time.      Motor: Motor function is intact. No weakness.      Coordination: Coordination is intact.      Gait: Gait is intact. Gait normal.   Psychiatric:         Attention and Perception: Attention normal.         Mood and Affect: Mood normal.         Speech: Speech normal.         Behavior: Behavior normal.         Cognition and Memory: Cognition normal.         Judgment: Judgment normal.        Result Review :                 Assessment and Plan    Diagnoses and all orders for this visit:    1. Acute nonintractable headache, unspecified headache type (Primary)  -     ketorolac (TORADOL) injection 60 mg    2. SLE (systemic lupus erythematosus related syndrome) ([facility])  Comments:  will send in month refill for plaquenil until pt can schedule with rheumatology    Orders:  -     hydroxychloroquine (PLAQUENIL) 200 MG tablet; Take 1 tablet by mouth Daily for 30 days. Indications: Systemic Lupus Erythematosus  Dispense: 30 tablet; Refill: 0    Other orders  -     atomoxetine (STRATTERA) 60 MG capsule; Take 1 capsule by mouth Daily.  Dispense: 30 capsule; Refill: 2      Patient's Body  mass index is 38.27 kg/m². indicating that she is obese (BMI >30). Obesity-related health conditions include the following: hypertension. Obesity is unchanged. BMI is is above average; BMI management plan is completed. We discussed low calorie, low carb based diet program, portion control and increasing exercise..    Follow Up   Return if symptoms worsen or fail to improve, for Next scheduled follow up.  Patient was given instructions and counseling regarding her condition or for health maintenance advice. Please see specific information pulled into the AVS if appropriate.     This document has been electronically signed by EMELIA Blake  October 26, 2022 14:29 EDT

## 2022-10-25 ENCOUNTER — TELEPHONE (OUTPATIENT)
Dept: FAMILY MEDICINE CLINIC | Facility: CLINIC | Age: 32
End: 2022-10-25

## 2022-10-26 ENCOUNTER — TELEPHONE (OUTPATIENT)
Dept: FAMILY MEDICINE CLINIC | Facility: CLINIC | Age: 32
End: 2022-10-26

## 2022-10-26 NOTE — TELEPHONE ENCOUNTER
10/26/2022 PA for Atomoxetine (Strattera) 60 MG capsule was approved, patient and [facility] [phone] - ZENAIDA LORA notified

## 2022-10-26 NOTE — TELEPHONE ENCOUNTER
PATIENT HAS CALLED TO PROVIDE FAX NUMBER TO OBTAIN HER  MEDICAL RECORDS FROM DR. BRYAN JAMES.   FAX NUMBER [phone]    CALL BACK NUMBER [phone]

## 2022-11-02 DIAGNOSIS — M79.7 FIBROMYALGIA: Primary | ICD-10-CM

## 2022-11-02 DIAGNOSIS — M17.0 OSTEOARTHRITIS OF BOTH KNEES, UNSPECIFIED OSTEOARTHRITIS TYPE: ICD-10-CM

## 2022-12-07 ENCOUNTER — LAB (OUTPATIENT)
Dept: UROLOGY | Facility: CLINIC | Age: 32
End: 2022-12-07

## 2022-12-07 DIAGNOSIS — N39.0 RECURRENT UTI: Primary | ICD-10-CM

## 2022-12-07 PROCEDURE — 87186 SC STD MICRODIL/AGAR DIL: CPT | Performed by: NURSE PRACTITIONER

## 2022-12-07 PROCEDURE — 87086 URINE CULTURE/COLONY COUNT: CPT | Performed by: NURSE PRACTITIONER

## 2022-12-07 PROCEDURE — 87088 URINE BACTERIA CULTURE: CPT | Performed by: NURSE PRACTITIONER

## 2022-12-08 ENCOUNTER — HOSPITAL ENCOUNTER (EMERGENCY)
Facility: HOSPITAL | Age: 32
Discharge: HOME | End: 2022-12-08
Attending: EMERGENCY MEDICINE | Admitting: STUDENT IN AN ORGANIZED HEALTH CARE EDUCATION/TRAINING PROGRAM

## 2022-12-08 ENCOUNTER — APPOINTMENT (OUTPATIENT)
Dept: CT IMAGING | Facility: HOSPITAL | Age: 32
End: 2022-12-08

## 2022-12-08 VITALS
TEMPERATURE: 97.8 F | DIASTOLIC BLOOD PRESSURE: 101 MMHG | RESPIRATION RATE: 18 BRPM | HEART RATE: 85 BPM | BODY MASS INDEX: 35.44 KG/M2 | HEIGHT: 63 IN | OXYGEN SATURATION: 97 % | SYSTOLIC BLOOD PRESSURE: 146 MMHG | WEIGHT: 200 LBS

## 2022-12-08 DIAGNOSIS — R30.0 DYSURIA: Primary | ICD-10-CM

## 2022-12-08 LAB
ALBUMIN SERPL-MCNC: 4.23 G/DL (ref 3.5–5.2)
ALBUMIN/GLOB SERPL: 1.5 G/DL
ALP SERPL-CCNC: 105 U/L (ref 39–117)
ALT SERPL W P-5'-P-CCNC: 11 U/L (ref 1–33)
ANION GAP SERPL CALCULATED.3IONS-SCNC: 12.1 MMOL/L (ref 5–15)
AST SERPL-CCNC: 18 U/L (ref 1–32)
BACTERIA UR QL AUTO: NORMAL /HPF
BASOPHILS # BLD AUTO: 0.07 10*3/MM3 (ref 0–0.2)
BASOPHILS NFR BLD AUTO: 0.6 % (ref 0–1.5)
BILIRUB SERPL-MCNC: 0.5 MG/DL (ref 0–1.2)
BILIRUB UR QL STRIP: NEGATIVE
BUN SERPL-MCNC: 11 MG/DL (ref 6–20)
BUN/CREAT SERPL: 13.9 (ref 7–25)
CALCIUM SPEC-SCNC: 9.7 MG/DL (ref 8.6–10.5)
CHLORIDE SERPL-SCNC: 100 MMOL/L (ref 98–107)
CLARITY UR: CLEAR
CO2 SERPL-SCNC: 26.9 MMOL/L (ref 22–29)
COLOR UR: ABNORMAL
CREAT SERPL-MCNC: 0.79 MG/DL (ref 0.57–1)
CRP SERPL-MCNC: <0.3 MG/DL (ref 0–0.5)
DEPRECATED RDW RBC AUTO: 39.8 FL (ref 37–54)
EGFRCR SERPLBLD CKD-EPI 2021: 102.1 ML/MIN/1.73
EOSINOPHIL # BLD AUTO: 0.14 10*3/MM3 (ref 0–0.4)
EOSINOPHIL NFR BLD AUTO: 1.3 % (ref 0.3–6.2)
ERYTHROCYTE [DISTWIDTH] IN BLOOD BY AUTOMATED COUNT: 12.7 % (ref 12.3–15.4)
ERYTHROCYTE [SEDIMENTATION RATE] IN BLOOD: 5 MM/HR (ref 0–20)
GLOBULIN UR ELPH-MCNC: 2.9 GM/DL
GLUCOSE SERPL-MCNC: 99 MG/DL (ref 65–99)
GLUCOSE UR STRIP-MCNC: NEGATIVE MG/DL
HCG SERPL QL: NEGATIVE
HCT VFR BLD AUTO: 40.8 % (ref 34–46.6)
HGB BLD-MCNC: 13.5 G/DL (ref 12–15.9)
HGB UR QL STRIP.AUTO: NEGATIVE
HOLD SPECIMEN: NORMAL
HYALINE CASTS UR QL AUTO: NORMAL /LPF
IMM GRANULOCYTES # BLD AUTO: 0.04 10*3/MM3 (ref 0–0.05)
IMM GRANULOCYTES NFR BLD AUTO: 0.4 % (ref 0–0.5)
KETONES UR QL STRIP: NEGATIVE
LEUKOCYTE ESTERASE UR QL STRIP.AUTO: NEGATIVE
LYMPHOCYTES # BLD AUTO: 3.31 10*3/MM3 (ref 0.7–3.1)
LYMPHOCYTES NFR BLD AUTO: 30.4 % (ref 19.6–45.3)
MCH RBC QN AUTO: 28.7 PG (ref 26.6–33)
MCHC RBC AUTO-ENTMCNC: 33.1 G/DL (ref 31.5–35.7)
MCV RBC AUTO: 86.6 FL (ref 79–97)
MONOCYTES # BLD AUTO: 0.71 10*3/MM3 (ref 0.1–0.9)
MONOCYTES NFR BLD AUTO: 6.5 % (ref 5–12)
NEUTROPHILS NFR BLD AUTO: 6.61 10*3/MM3 (ref 1.7–7)
NEUTROPHILS NFR BLD AUTO: 60.8 % (ref 42.7–76)
NITRITE UR QL STRIP: POSITIVE
NRBC BLD AUTO-RTO: 0 /100 WBC (ref 0–0.2)
PH UR STRIP.AUTO: 6.5 [PH] (ref 5–8)
PLATELET # BLD AUTO: 317 10*3/MM3 (ref 140–450)
PMV BLD AUTO: 10.6 FL (ref 6–12)
POTASSIUM SERPL-SCNC: 4.2 MMOL/L (ref 3.5–5.2)
PROT SERPL-MCNC: 7.1 G/DL (ref 6–8.5)
PROT UR QL STRIP: NEGATIVE
RBC # BLD AUTO: 4.71 10*6/MM3 (ref 3.77–5.28)
RBC # UR STRIP: NORMAL /HPF
REF LAB TEST METHOD: NORMAL
SODIUM SERPL-SCNC: 139 MMOL/L (ref 136–145)
SP GR UR STRIP: 1.02 (ref 1–1.03)
SQUAMOUS #/AREA URNS HPF: NORMAL /HPF
UROBILINOGEN UR QL STRIP: ABNORMAL
WBC # UR STRIP: NORMAL /HPF
WBC NRBC COR # BLD: 10.88 10*3/MM3 (ref 3.4–10.8)
WHOLE BLOOD HOLD COAG: NORMAL
WHOLE BLOOD HOLD SPECIMEN: NORMAL

## 2022-12-08 PROCEDURE — 85025 COMPLETE CBC W/AUTO DIFF WBC: CPT | Performed by: PHYSICIAN ASSISTANT

## 2022-12-08 PROCEDURE — 84703 CHORIONIC GONADOTROPIN ASSAY: CPT | Performed by: PHYSICIAN ASSISTANT

## 2022-12-08 PROCEDURE — 81001 URINALYSIS AUTO W/SCOPE: CPT | Performed by: PHYSICIAN ASSISTANT

## 2022-12-08 PROCEDURE — 36415 COLL VENOUS BLD VENIPUNCTURE: CPT

## 2022-12-08 PROCEDURE — 86140 C-REACTIVE PROTEIN: CPT | Performed by: PHYSICIAN ASSISTANT

## 2022-12-08 PROCEDURE — 85652 RBC SED RATE AUTOMATED: CPT | Performed by: PHYSICIAN ASSISTANT

## 2022-12-08 PROCEDURE — 25010000002 ONDANSETRON PER 1 MG: Performed by: PHYSICIAN ASSISTANT

## 2022-12-08 PROCEDURE — 74176 CT ABD & PELVIS W/O CONTRAST: CPT

## 2022-12-08 PROCEDURE — 99283 EMERGENCY DEPT VISIT LOW MDM: CPT

## 2022-12-08 PROCEDURE — 96374 THER/PROPH/DIAG INJ IV PUSH: CPT

## 2022-12-08 PROCEDURE — 80053 COMPREHEN METABOLIC PANEL: CPT | Performed by: PHYSICIAN ASSISTANT

## 2022-12-08 RX ORDER — ONDANSETRON 2 MG/ML
4 INJECTION INTRAMUSCULAR; INTRAVENOUS ONCE
Status: COMPLETED | OUTPATIENT
Start: 2022-12-08 | End: 2022-12-08

## 2022-12-08 RX ORDER — SODIUM CHLORIDE 0.9 % (FLUSH) 0.9 %
10 SYRINGE (ML) INJECTION AS NEEDED
Status: DISCONTINUED | OUTPATIENT
Start: 2022-12-08 | End: 2022-12-09 | Stop reason: HOSPADM

## 2022-12-08 RX ADMIN — ONDANSETRON 4 MG: 2 INJECTION INTRAMUSCULAR; INTRAVENOUS at 19:34

## 2022-12-08 RX ADMIN — SODIUM CHLORIDE 1000 ML: 9 INJECTION, SOLUTION INTRAVENOUS at 19:34

## 2022-12-08 NOTE — ED NOTES
MEDICAL SCREENING:    Reason for Visit: Flank pain, dysuria. H/o recurrent UTI and Pyleo.     Patient initially seen in triage.  The patient was advised further evaluation and diagnostic testing will be needed, some of the treatment and testing will be initiated in the lobby in order to begin the process.  The patient will be returned to the waiting area for the time being and possibly be re-assessed by a subsequent ED provider.  The patient will be brought back to the treatment area in as timely manner as possible.         Jefferson Wong PA  12/08/22 1847

## 2022-12-09 LAB — BACTERIA SPEC AEROBE CULT: ABNORMAL

## 2022-12-09 NOTE — ED PROVIDER NOTES
Subjective   History of Present Illness  This is a 32 year old female patient who presents to the ER with chief complaint of dysuria. PMH significant for pyelonephritis. She has had bilateral low back pain, urinary pressure, dysuria and nausea without vomiting for several days. No fever.         Review of Systems   Constitutional: Negative.  Negative for fever.   HENT: Negative.    Respiratory: Negative.    Cardiovascular: Negative.  Negative for chest pain.   Gastrointestinal: Positive for abdominal pain, nausea and vomiting. Negative for abdominal distention, anal bleeding, blood in stool, constipation, diarrhea and rectal pain.   Endocrine: Negative.    Genitourinary: Positive for dysuria and frequency. Negative for decreased urine volume, difficulty urinating, dyspareunia, enuresis, flank pain, genital sores, hematuria, menstrual problem, pelvic pain, urgency, vaginal bleeding, vaginal discharge and vaginal pain.   Musculoskeletal: Positive for back pain. Negative for arthralgias, gait problem, joint swelling, myalgias, neck pain and neck stiffness.   Skin: Negative.    Neurological: Negative.    Psychiatric/Behavioral: Negative.    All other systems reviewed and are negative.      Past Medical History:   Diagnosis Date   • Congestive heart failure (HCC)    • Fibromyalgia    • GERD (gastroesophageal reflux disease)    • History of ear infections    • History of heart disease    • History of lupus    • History of migraine    • Hypertension    • Lown Ganong Parker syndrome    • Murmur    • Nephritis    • Osteoarthritis    • Pulmonary stenosis    • Urinary tract infection        No Known Allergies    Past Surgical History:   Procedure Laterality Date   •  SECTION     •  SECTION     • DIAGNOSTIC LAPAROSCOPY Right 2018    Procedure: LAPAROSCOPIC EXTENSIVE LYSIS OF AHESIONS. DRAINAGE OF OVARIAN CYST.;  Surgeon: Rachel Caruso DO;  [facility];  Service:  Obstetrics/Gynecology   • ORIF SCAPHOID W VASCULARIZED BONE GRAFT     • TUBAL COAGULATION LAPAROSCOPIC Bilateral 2018    Procedure: TUBAL FALLOPE FILSHIE CLIPPING LAPAROSCOPIC;  Surgeon: Rachel Caruso DO;  Location: [facility];  Service: Obstetrics/Gynecology   • ULNA/RADIUS CLOSED REDUCTION         Family History   Problem Relation Age of Onset   • Heart disease Mother    • Thyroid disease Mother    • Anxiety disorder Mother    • Depression Mother    • Bipolar disorder Mother    • Heart disease Father    • Cancer Father    • Heart disease Maternal Grandmother    • Diabetes Maternal Grandmother    • Diabetes Maternal Grandfather    • Lupus Paternal Grandmother    • Rheum arthritis Paternal Grandmother        Social History     Socioeconomic History   • Marital status:    Tobacco Use   • Smoking status: Former     Types: Cigarettes     Quit date: 2022     Years since quittin.1   • Smokeless tobacco: Never   Vaping Use   • Vaping Use: Every day   • Substances: Nicotine   • Devices: Disposable   Substance and Sexual Activity   • Alcohol use: Yes     Comment: socially   • Drug use: No   • Sexual activity: Yes     Partners: Male     Birth control/protection: Surgical, Tubal ligation           Objective   Physical Exam  Vitals and nursing note reviewed.   Constitutional:       General: She is not in acute distress.     Appearance: She is well-developed. She is not diaphoretic.   HENT:      Head: Normocephalic and atraumatic.      Right Ear: External ear normal.      Left Ear: External ear normal.      Nose: Nose normal.   Eyes:      Conjunctiva/sclera: Conjunctivae normal.      Pupils: Pupils are equal, round, and reactive to light.   Neck:      Vascular: No JVD.      Trachea: No tracheal deviation.   Cardiovascular:      Rate and Rhythm: Normal rate and regular rhythm.      Heart sounds: Normal heart sounds. No murmur heard.  Pulmonary:      Effort: Pulmonary effort is normal. No  respiratory distress.      Breath sounds: Normal breath sounds. No wheezing.   Abdominal:      General: Bowel sounds are normal.      Palpations: Abdomen is soft.      Tenderness: There is no abdominal tenderness. There is no right CVA tenderness, left CVA tenderness, guarding or rebound.   Musculoskeletal:         General: No deformity. Normal range of motion.      Cervical back: Normal range of motion and neck supple.   Skin:     General: Skin is warm and dry.      Coloration: Skin is not pale.      Findings: No erythema or rash.   Neurological:      Mental Status: She is alert and oriented to person, place, and time.      Cranial Nerves: No cranial nerve deficit.   Psychiatric:         Behavior: Behavior normal.         Thought Content: Thought content normal.         Procedures           ED Course  ED Course as of 12/08/22 2234   Thu Dec 08, 2022   2204 CT Abdomen Pelvis Stone Protocol  IMPRESSION:     1. No evidence for urinary tract calculus disease. No hydronephrosis.  2. Appendix unremarkable. No bowel obstruction.  3. There is a surgical clip like device in the right hemipelvis. Please correlate with procedure. It could be a tubal ligation clip but there is no left-sided clip.           Signer Name: Janice Marquez MD   Signed: 12/8/2022 10:00 PM   Work[facility]: [email or web address]    [facility] [MM]   2232 Patient has normal UA and CT. She mentioned that she had started taking macrobid so possibly she treated her UTI with that based on her symptoms. Will be d/c home to f/u with PCP in 2 days or return to ER if symptoms worsen.  [MM]      ED Course User Index  [MM] Jil Lopez PA                                           MDM  Number of Diagnoses or Management Options     Amount and/or Complexity of Data Reviewed  Clinical lab tests: ordered and reviewed  Tests in the radiology section of CPT®: ordered and reviewed  Discuss the patient with other providers: yes        Final diagnoses:    Dysuria       ED Disposition  ED Disposition     ED Disposition   Discharge    Condition   Stable    Comment   --             Pao Colon, APRN  [address] [id number] [phone]    In 2 days           Medication List      No changes were made to your prescriptions during this visit.          Jil Lopez, PA  12/08/22 3498

## 2022-12-12 ENCOUNTER — TELEPHONE (OUTPATIENT)
Dept: UROLOGY | Facility: CLINIC | Age: 32
End: 2022-12-12

## 2022-12-12 DIAGNOSIS — N39.0 RECURRENT UTI: Primary | ICD-10-CM

## 2022-12-12 RX ORDER — NITROFURANTOIN 25; 75 MG/1; MG/1
100 CAPSULE ORAL 2 TIMES DAILY
Qty: 20 CAPSULE | Refills: 0 | Status: SHIPPED | OUTPATIENT
Start: 2022-12-12 | End: 2022-12-22

## 2022-12-12 NOTE — TELEPHONE ENCOUNTER
I called the pt and let her know and since she is only written one per day and I sent in a new script for the the next 10 days    ----- Message from Griselda Cheng-Akwa, APRN sent at 12/9/2022  2:05 PM EST -----  Silvio BAILEY please call Cheryl with urine culture results positive at this time.SS, tell her to increase her Macrobid to twice daily for 10 days, and then continues 1 tablet nightly for suppressive therapy.  She should drop off another urine sample in 2 weeks for 1 month, iron follow-up in clinic as discussed.

## 2022-12-20 ENCOUNTER — OFFICE VISIT (OUTPATIENT)
Dept: FAMILY MEDICINE CLINIC | Facility: CLINIC | Age: 32
End: 2022-12-20

## 2022-12-20 VITALS
OXYGEN SATURATION: 97 % | RESPIRATION RATE: 16 BRPM | SYSTOLIC BLOOD PRESSURE: 128 MMHG | TEMPERATURE: 98.2 F | WEIGHT: 218.2 LBS | BODY MASS INDEX: 38.66 KG/M2 | HEART RATE: 93 BPM | DIASTOLIC BLOOD PRESSURE: 80 MMHG | HEIGHT: 63 IN

## 2022-12-20 DIAGNOSIS — E66.9 OBESITY (BMI 30-39.9): ICD-10-CM

## 2022-12-20 DIAGNOSIS — M79.7 FIBROMYALGIA: Primary | ICD-10-CM

## 2022-12-20 DIAGNOSIS — F41.1 GENERALIZED ANXIETY DISORDER: ICD-10-CM

## 2022-12-20 PROCEDURE — 99214 OFFICE O/P EST MOD 30 MIN: CPT | Performed by: NURSE PRACTITIONER

## 2022-12-20 RX ORDER — BUSPIRONE HYDROCHLORIDE 7.5 MG/1
7.5 TABLET ORAL 3 TIMES DAILY
Qty: 90 TABLET | Refills: 0 | Status: SHIPPED | OUTPATIENT
Start: 2022-12-20 | End: 2023-03-07 | Stop reason: SDDI

## 2022-12-20 RX ORDER — TIZANIDINE 4 MG/1
4 TABLET ORAL 2 TIMES DAILY
Qty: 60 TABLET | Refills: 4 | Status: SHIPPED | OUTPATIENT
Start: 2022-12-20

## 2022-12-20 RX ORDER — HYDROXYCHLOROQUINE SULFATE 200 MG/1
200 TABLET, FILM COATED ORAL DAILY
COMMUNITY
Start: 2022-12-12 | End: 2023-03-07 | Stop reason: SDDI

## 2022-12-20 NOTE — PROGRESS NOTES
Chief Complaint  Fatigue (Follow up)    Subjective          Cheryl Mcnulty is a 32 y.o. female who presents today to [facility] for follow up    HPI:   History of Present Illness    Presents to clinic for follow up for chronic fatigue, fibromyalgia, polyarthritis.  Current treatments include meloxicam, tizanidine, Cymbalta.  Was restarted on Plaquenil 10/24/2022.  Patient reports she is tolerating medications well with no issues or side effects.  Reports treatments helped some but still has a lot of issues with pain. Was evaluated by endocrinology on 11/9/22 where plan of care was continued.  Today patient reports she feels her fibromyalgia pain is worsened by anxiety. Associated symptoms: Feeling anxious, worrying about things that have not happened.  Denies prior treatment for anxiety but would like to try something.  Denies SI/HI.  No other issues or concerns at this time.    The following portions of the patient's history were reviewed and updated as appropriate: allergies, current medications, past family history, past medical history, past social history, past surgical history and problem list.    Objective     Allergy:   No Known Allergies     Current Medications:   Current Outpatient Medications   Medication Sig Dispense Refill   • atenolol (Tenormin) 25 MG tablet Take 1 tablet by mouth Daily. 90 tablet 0   • atomoxetine (STRATTERA) 60 MG capsule Take 1 capsule by mouth Daily. 30 capsule 2   • cholecalciferol (VITAMIN D3) 25 MCG (1000 UT) tablet Take 1 tablet by mouth Daily. 90 tablet 1   • diphenhydrAMINE HCl, Sleep, (UNISOM SLEEPGELS PO) Take 1 tablet by mouth As Needed.     • DULoxetine (CYMBALTA) 30 MG capsule Take 1 capsule by mouth Daily. 90 capsule 0   • DULoxetine (CYMBALTA) 60 MG capsule Take 1 capsule by mouth Daily. 90 capsule 0   • hydroCHLOROthiazide (MICROZIDE) 12.5 MG capsule Take 1 capsule by mouth Daily. 90 capsule 0   • hydroxychloroquine (PLAQUENIL) 200 MG  tablet Take 200 mg by mouth Daily.     • meloxicam (MOBIC) 7.5 MG tablet Take 1 tablet by mouth Daily. 90 tablet 0   • multivitamin with minerals tablet tablet Take 1 tablet by mouth Daily.     • nitrofurantoin, macrocrystal-monohydrate, (Macrobid) 100 MG capsule Take 1 capsule by mouth 2 (Two) Times a Day for 10 days. 20 capsule 0   • tiZANidine (ZANAFLEX) 4 MG tablet Take 1 tablet by mouth 2 (Two) Times a Day. 60 tablet 4   • vitamin B-12 (CYANOCOBALAMIN) 1000 MCG tablet Take 1,000 mcg by mouth Daily.     • busPIRone (BUSPAR) 7.5 MG tablet Take 1 tablet by mouth 3 (Three) Times a Day for 30 days. 90 tablet 0     No current facility-administered medications for this visit.       Past Medical History:  Past Medical History:   Diagnosis Date   • Congestive heart failure (HCC)    • Fibromyalgia    • GERD (gastroesophageal reflux disease)    • History of ear infections    • History of heart disease    • History of lupus    • History of migraine    • Hypertension    • Lown Ganong Parker syndrome    • Murmur    • Nephritis    • Osteoarthritis    • Pulmonary stenosis    • Urinary tract infection        Past Surgical History:  Past Surgical History:   Procedure Laterality Date   •  SECTION     •  SECTION  2016   • DIAGNOSTIC LAPAROSCOPY Right 2018    Procedure: LAPAROSCOPIC EXTENSIVE LYSIS OF AHESIONS. DRAINAGE OF OVARIAN CYST.;  Surgeon: Rachel Caruso DO;  Location: [facility];  Service: Obstetrics/Gynecology   • ORIF SCAPHOID W VASCULARIZED BONE GRAFT     • TUBAL COAGULATION LAPAROSCOPIC Bilateral 2018    Procedure: TUBAL FALLOPE FILSHIE CLIPPING LAPAROSCOPIC;  Surgeon: Rachel Caruso DO;  Location: [facility];  Service: Obstetrics/Gynecology   • ULNA/RADIUS CLOSED REDUCTION         Social History:  Social History     Socioeconomic History   • Marital status:    Tobacco Use   • Smoking status: Never   • Smokeless tobacco: Never   Vaping Use   • Vaping Use:  "Every day   • Substances: Nicotine   • Devices: Disposable   Substance and Sexual Activity   • Alcohol use: Yes     Comment: socially   • Drug use: No   • Sexual activity: Yes     Partners: Male     Birth control/protection: Surgical, Tubal ligation       Family History:  Family History   Problem Relation Age of Onset   • Heart disease Mother    • Thyroid disease Mother    • Anxiety disorder Mother    • Depression Mother    • Bipolar disorder Mother    • Heart disease Father    • Cancer Father    • Heart disease Maternal Grandmother    • Diabetes Maternal Grandmother    • Diabetes Maternal Grandfather    • Lupus Paternal Grandmother    • Rheum arthritis Paternal Grandmother        Review of Systems:  Review of Systems   Musculoskeletal: Positive for arthralgias.   Psychiatric/Behavioral: Negative for self-injury and suicidal ideas. The patient is nervous/anxious.        Vital Signs:   /80 (BP Location: Right arm, Patient Position: Sitting, Cuff Size: Large Adult)   Pulse 93   Temp 98.2 °F (36.8 °C) (Temporal)   Resp 16   Ht 160 cm (63\")   Wt 99 kg (218 lb 3.2 oz)   SpO2 97%   BMI 38.65 kg/m²      Physical Exam:  Physical Exam  Vitals and nursing note reviewed.   Constitutional:       General: She is not in acute distress.     Appearance: Normal appearance. She is not ill-appearing or toxic-appearing.   HENT:      Head: Normocephalic.   Cardiovascular:      Rate and Rhythm: Normal rate and regular rhythm.      Heart sounds: Normal heart sounds.   Pulmonary:      Effort: Pulmonary effort is normal. No respiratory distress.      Breath sounds: Normal breath sounds.   Musculoskeletal:         General: Normal range of motion.   Skin:     General: Skin is warm and dry.      Capillary Refill: Capillary refill takes less than 2 seconds.      Coloration: Skin is not pale.   Neurological:      General: No focal deficit present.      Mental Status: She is alert and oriented to person, place, and time. "   Psychiatric:         Mood and Affect: Mood normal.         Behavior: Behavior normal.         Thought Content: Thought content normal.         Judgment: Judgment normal.                  Assessment and Plan   Diagnoses and all orders for this visit:    1. Fibromyalgia (Primary)  -     tiZANidine (ZANAFLEX) 4 MG tablet; Take 1 tablet by mouth 2 (Two) Times a Day.  Dispense: 60 tablet; Refill: 4    2. Generalized anxiety disorder  -     busPIRone (BUSPAR) 7.5 MG tablet; Take 1 tablet by mouth 3 (Three) Times a Day for 30 days.  Dispense: 90 tablet; Refill: 0    3. Obesity (BMI 30-39.9)    1.  Continue to follow with rheumatology as scheduled.  Symptom management as discussed.  2.  Initiate regimen as above.  3.  Diet and lifestyle modifications to control condition.    Discussed possible differential diagnoses, testing, treatment, recommended non-pharmacological interventions, risks, warning signs to monitor for that would indicate need for follow-up in clinic or ER. If no improvement with these regimens or you have new or worsening symptoms follow-up. Patient verbalizes understanding and agreement with plan of care. Denies further needs or concerns.     Patient was given instructions and counseling regarding her condition and for health maintenance advised.    Class 2 Severe Obesity (BMI >=35 and <=39.9). Obesity-related health conditions include the following: obstructive sleep apnea, hypertension, coronary heart disease, diabetes mellitus, dyslipidemias, GERD and peripheral vascular disease. Obesity is worsening. BMI is is above average; BMI management plan is completed. We discussed portion control and increasing exercise.       I spent 30 minutes caring for patient on this date of service. This time includes time spent by me in the following activities: preparing for the visit, reviewing tests, obtaining and/or reviewing a separately obtained history, performing a medically appropriate examination and/or  evaluation, counseling and educating the patient/family/caregiver, ordering medications, tests, or procedures and documenting information in the medical record    Meds ordered during this visit:  New Medications Ordered This Visit   Medications   • tiZANidine (ZANAFLEX) 4 MG tablet     Sig: Take 1 tablet by mouth 2 (Two) Times a Day.     Dispense:  60 tablet     Refill:  4     [SAVINGS FOR UNINSURED PATIENTS -- BIN:[id number], PCN: [id number], Group: [id number], ID# [id number], Process claim through [employer], for questions: [phone]. THIS IS NOT INSURANCE.]   • busPIRone (BUSPAR) 7.5 MG tablet     Sig: Take 1 tablet by mouth 3 (Three) Times a Day for 30 days.     Dispense:  90 tablet     Refill:  0       Patient Instructions:  Patient instructions given for the following visit diagnosis:    ICD-10-CM ICD-9-CM   1. Fibromyalgia  M79.7 729.1   2. Generalized anxiety disorder  F41.1 300.02   3. Obesity (BMI 30-39.9)  E66.9 278.00       Follow Up   Return in about 1 month (around 1/20/2023) for Recheck, Sooner if needed.        This document has been electronically signed by EMELIA Aguialr  December 20, 2022 13:54 EST    Patient was given instructions and counseling regarding her condition or for health maintenance advice. Please see specific information pulled into the AVS if appropriate.     Part of this note may be an electronic transcription/translation of spoken language to printed text using the Dragon Dictation System.

## 2023-01-20 ENCOUNTER — TELEPHONE (OUTPATIENT)
Dept: FAMILY MEDICINE CLINIC | Facility: CLINIC | Age: 33
End: 2023-01-20
Payer: MEDICAID

## 2023-01-20 NOTE — TELEPHONE ENCOUNTER
Caller: Cheryl Mcnulty    Relationship to patient: Self    Best call back number:[phone]     Date of positive COVID19 test: 1-19-23    COVID19 symptoms: SORE THROAT, CONGESTION, COUGH, NAUSEA, VOMITING, FEVER    Additional information or concerns:PATIENT CALLED TO ASKED FOR SOMETHING TO HELP WITH THE SYMPTOMS.  PATIENT ASKED FOR AN ANTIVIRAL MEDICATION.    What is the patients preferred pharmacy: [facility] [id number] [address] AT [address] [place] [address] [phone] [facility] [phone]

## 2023-01-23 NOTE — TELEPHONE ENCOUNTER
Attempted to reach by phone, no answer and no voicemail option available.       Left a message to return call.      Spoke with patient she reports she is much better now & does not need to be seen.

## 2023-01-31 ENCOUNTER — OFFICE VISIT (OUTPATIENT)
Dept: BEHAVIORAL HEALTH | Facility: CLINIC | Age: 33
End: 2023-01-31
Payer: MEDICAID

## 2023-01-31 ENCOUNTER — OFFICE VISIT (OUTPATIENT)
Dept: BARIATRICS/WEIGHT MGMT | Facility: CLINIC | Age: 33
End: 2023-01-31
Payer: MEDICAID

## 2023-01-31 ENCOUNTER — DOCUMENTATION (OUTPATIENT)
Dept: BARIATRICS/WEIGHT MGMT | Facility: CLINIC | Age: 33
End: 2023-01-31
Payer: MEDICAID

## 2023-01-31 VITALS
RESPIRATION RATE: 16 BRPM | SYSTOLIC BLOOD PRESSURE: 134 MMHG | DIASTOLIC BLOOD PRESSURE: 82 MMHG | TEMPERATURE: 98.6 F | WEIGHT: 219.5 LBS | HEART RATE: 95 BPM | OXYGEN SATURATION: 97 % | BODY MASS INDEX: 38.89 KG/M2 | HEIGHT: 63 IN

## 2023-01-31 DIAGNOSIS — Z72.89 CURRENT EVERY DAY VAPOR PRODUCT USER: ICD-10-CM

## 2023-01-31 DIAGNOSIS — M32.9 LUPUS: ICD-10-CM

## 2023-01-31 DIAGNOSIS — I37.0 PULMONARY VALVE STENOSIS, UNSPECIFIED ETIOLOGY: ICD-10-CM

## 2023-01-31 DIAGNOSIS — F90.2 ATTENTION DEFICIT HYPERACTIVITY DISORDER (ADHD), COMBINED TYPE: ICD-10-CM

## 2023-01-31 DIAGNOSIS — Z86.59 HISTORY OF DEPRESSION: ICD-10-CM

## 2023-01-31 DIAGNOSIS — R06.00 DYSPNEA, UNSPECIFIED TYPE: ICD-10-CM

## 2023-01-31 DIAGNOSIS — E66.9 OBESITY (BMI 30-39.9): Primary | ICD-10-CM

## 2023-01-31 DIAGNOSIS — E66.9 OBESITY, CLASS II, BMI 35-39.9: Primary | ICD-10-CM

## 2023-01-31 DIAGNOSIS — R53.83 FATIGUE, UNSPECIFIED TYPE: ICD-10-CM

## 2023-01-31 DIAGNOSIS — N39.0 RECURRENT UTI: ICD-10-CM

## 2023-01-31 DIAGNOSIS — N39.0 URINARY TRACT INFECTION WITHOUT HEMATURIA, SITE UNSPECIFIED: ICD-10-CM

## 2023-01-31 DIAGNOSIS — I10 HYPERTENSION, UNSPECIFIED TYPE: ICD-10-CM

## 2023-01-31 DIAGNOSIS — Z71.89 ENCOUNTER FOR PSYCHOLOGICAL ASSESSMENT PRIOR TO BARIATRIC SURGERY: ICD-10-CM

## 2023-01-31 PROBLEM — IMO0002 LUPUS: Status: ACTIVE | Noted: 2023-01-31

## 2023-01-31 PROCEDURE — 90791 PSYCH DIAGNOSTIC EVALUATION: CPT | Performed by: PSYCHOLOGIST

## 2023-01-31 PROCEDURE — 99214 OFFICE O/P EST MOD 30 MIN: CPT | Performed by: PHYSICIAN ASSISTANT

## 2023-01-31 RX ORDER — NITROFURANTOIN 25; 75 MG/1; MG/1
100 CAPSULE ORAL DAILY
COMMUNITY

## 2023-01-31 NOTE — PROGRESS NOTES
"Weight Loss Surgery  Presurgical Nutrition Assessment     Cheryl Mcnulty  2023  [id number]  [id number]  1990   female    Surgery desired: Sleeve Gastrectomy    Height: 158.8 cm (62.5\")  Weight: 99.6 kg (219.5 #)  BMI: 39.51    Past Medical History:   Diagnosis Date   • Congestive heart failure (HCC)     in pregnancy   • Fibromyalgia    • Frequent UTI     on daily abx per urology   • History of depression    • History of ear infections    • History of heart disease    • History of lupus     skin rashes, joint pain, fatigue.   followed by rhuematology, on plaquenil and mobic   • History of migraine    • Hypertension    • Lown Ganong Parker syndrome    • Murmur    • Nephritis    • Osteoarthritis    • Pulmonary stenosis    • Urinary tract infection      Past Surgical History:   Procedure Laterality Date   •  SECTION     •  SECTION     • DIAGNOSTIC LAPAROSCOPY Right 2018    Procedure: LAPAROSCOPIC EXTENSIVE LYSIS OF AHESIONS. DRAINAGE OF OVARIAN CYST.;  Surgeon: Rachel Caruso DO;  Location: [facility];  Service: Obstetrics/Gynecology   • ORIF SCAPHOID W VASCULARIZED BONE GRAFT     • TUBAL COAGULATION LAPAROSCOPIC Bilateral 2018    Procedure: TUBAL FALLOPE FILSHIE CLIPPING LAPAROSCOPIC;  Surgeon: Rachel Caruso DO;  Location: [facility] OR;  Service: Obstetrics/Gynecology   • ULNA/RADIUS CLOSED REDUCTION       No Known Allergies    Current Outpatient Medications:   •  atenolol (Tenormin) 25 MG tablet, Take 1 tablet by mouth Daily., Disp: 90 tablet, Rfl: 0  •  atomoxetine (STRATTERA) 60 MG capsule, Take 1 capsule by mouth Daily., Disp: 30 capsule, Rfl: 2  •  busPIRone (BUSPAR) 7.5 MG tablet, Take 1 tablet by mouth 3 (Three) Times a Day for 30 days., Disp: 90 tablet, Rfl: 0  •  cholecalciferol (VITAMIN D3) 25 MCG (1000 UT) tablet, Take 1 tablet by mouth Daily., Disp: 90 tablet, Rfl: 1  •  diphenhydrAMINE HCl, Sleep, (UNISOM SLEEPGELS PO), Take 1 tablet by " mouth As Needed., Disp: , Rfl:   •  DULoxetine (CYMBALTA) 30 MG capsule, Take 1 capsule by mouth Daily., Disp: 90 capsule, Rfl: 0  •  DULoxetine (CYMBALTA) 60 MG capsule, Take 1 capsule by mouth Daily., Disp: 90 capsule, Rfl: 0  •  hydroCHLOROthiazide (MICROZIDE) 12.5 MG capsule, Take 1 capsule by mouth Daily., Disp: 90 capsule, Rfl: 0  •  hydroxychloroquine (PLAQUENIL) 200 MG tablet, Take 200 mg by mouth Daily., Disp: , Rfl:   •  meloxicam (MOBIC) 7.5 MG tablet, Take 1 tablet by mouth Daily., Disp: 90 tablet, Rfl: 0  •  multivitamin with minerals tablet tablet, Take 1 tablet by mouth Daily., Disp: , Rfl:   •  nitrofurantoin, macrocrystal-monohydrate, (MACROBID) 100 MG capsule, Take 100 mg by mouth Daily., Disp: , Rfl:   •  tiZANidine (ZANAFLEX) 4 MG tablet, Take 1 tablet by mouth 2 (Two) Times a Day., Disp: 60 tablet, Rfl: 4  •  vitamin B-12 (CYANOCOBALAMIN) 1000 MCG tablet, Take 1,000 mcg by mouth Daily., Disp: , Rfl:       Assessment of Caloric needs    Estimated Current energy needs:  1660 kcal    Estimated calories for weight loss:  1400 kcal    IBW (Pounds):  140 #      Excess body weight (Pounds):  80 #       Nutrition Recall:  Example of Usual 24 hour intake: Met with patient and her friend for nutrition consult.     Breakfast: at 8:30 am = 1 sausage, egg, and cheese croissant with a twelve oz can Mt Dew or Coke regular sodas    Lunch: at 12:30 pm = 3 chicken tenders with a small order of fries and a fountain sweet tea    Dinner: at 8 pm = 1 cheeseburger with twelve oz can Coke Zero    Snacks: an orange with a twelve oz can Mt Dew Zero, a banana and 2 bottles of water    Beverages of Choice: various sodas, 2-3 cans per day; sweet tea.  No coffee    Food Allergies or Intolerances: none stated or recorded          Exercise: Patient as tried several exercise options: tried the gym, stationary bike, skating, walking.  No exercise regimen at this time.       Assessment of Nutritional Adequacy, Excessive Intake or  Deficiencies: Diet is low in protein food, high in processed food, especially carbohydrate, and lacking in fruits and vegetables        Education    Provided Nutrition Guidelines for Bariatric and Metabolic Surgery   1. Reviewed guidelines for higher protein, limited carbohydrate diet to promote weight loss.  Encouraged patient to incorporate these principles of healthy eating    2. Encouraged patient to choose an acceptable protein supplement beverage for the post-surgery liquid diet.  Provided product guidelines and examples.    3. Explained importance of goal setting to help in changing eating behaviors that are not conducive to weight loss.  Specific macronutrient goals as below.   4. Provided follow-up options for support, including contact information for dietitians here.   Web-based support information and apps for smart phones and computers given.        Nutrition Goals   Protein goal:  grams per day in three regular balanced meals and two to three high protein snacks each day, to ensure desired weight loss.   Carbohydrate goal:  100-140 grams per day  Beverage goal: Appropriate non-carbonated beverage intake. Patient will wean herself off of all sodas, sweet tea, and any other sweet beverages.    Exercise Goals  1. Continue current exercise routine, adding 15-30 minutes of activity per day as tolerated and per medical advice.   2. Start activity plan per medical advice if not currently exercising.     Recommend that team proceed with surgery and follow per protocol.   Kourtney Hunt RD  01/31/2023  12:50 EST

## 2023-01-31 NOTE — PROGRESS NOTES
[facility] GROUP BARIATRIC SURGERY  [address]  [address]  [facility] [id number] [phone]      Patient  Name:  Cheryl Mcnulty  :  1990        Chief Complaint:  weight gain; unable to maintain weight loss    History of Present Illness:  Cheryl Mcnulty is a 33 y.o. female who presents today for evaluation, education and consultation regarding bariatric and metabolic surgery. The patient is interested in sleeve gastrectomy with Dr. Valadez.     Cheryl has been overweight for at least 9 years, has been 35 pounds or more overweight for at least 9 years, has been 100 pounds or more overweight for 18 or more years and started dieting at age 22.      Previous diet attempts include: High Protein, Low Carbohydrate, Fasting and Slim Fast; Nutri-System, Diet Center and Weight Watchers; Wellbutrin, Ionamin/Adipex and Phenteramine.  The most weight Cheryl lost was 10 pounds on adipex but was only able to maintain that weight loss for 3 months.  Her maximum lifetime weight is 220 pounds.    As above, patient has been overweight for many years, with numerous failed dietary/weight loss attempts.  She now has obesity related comorbidities and as such has decided to pursue weight loss surgery.    History of hypertension,  P ulmonary stenosis., congestive heart failure in both pregnancies, followed by cardiology.  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  Followed by rheumatology for lupus.  Treated with Plaquenil and Mobic.  Also uses steroids for flares, typically 3 times a year.  Main symptoms include skin rashes, joint pain, fatigue.  Denies other known organ system involvement.  History of depression.  Sees urology for frequent UTIs on daily Macrobid prophylactically.    Denies all GI complaints of nausea, vomiting, abdominal pain, trouble swallowing.  Denies past EGD or known H. pylori.  Gallbladder is present.  Tolerates food well.    All  other past medical, surgical, social and family history have been obtained and discussed as pertinent to bariatric surgery as below.     Pre-Procedure COVID-19 Questionnaire:    1.  Have you previously been tested for COVID-19?    []  No  [x]  Yes    2.  Were you ever positive for COVID-19?    []  No  [x]  Yes    3.  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    []  No  [x]  Yes    4.  Are you symptomatic for COVID-19 as defined by the CDC (fever, cough)?  If so, when did symptoms begin?    [x]  No    []  Yes, symptoms began **    5.  Have you been hospitalized for COVID-19?  If so, were you in the ICU?  [x]  No    []  Yes, but not in the ICU    []  Yes, and I was in the ICU    6.  Are you a resident in a congregate (group care setting?)    [x]  No  []  Yes    7.  Are you pregnant?  [x]  No  []  Yes    8.  Are you vaccinated?    []  No  []  Yes, but only partially   [x]  Yes, fully         Past Medical History:   Diagnosis Date   • Congestive heart failure (HCC)     in pregnancy   • Fibromyalgia    • Frequent UTI     on daily abx per urology   • History of depression    • History of ear infections    • History of heart disease    • History of lupus     skin rashes, joint pain, fatigue.   followed by rhuematology, on plaquenil and mobic   • History of migraine    • Hypertension    • Lown Ganong Parker syndrome    • Murmur    • Nephritis    • Osteoarthritis    • Pulmonary stenosis    • Urinary tract infection      Past Surgical History:   Procedure Laterality Date   •  SECTION     •  SECTION     • DIAGNOSTIC LAPAROSCOPY Right 2018    Procedure: LAPAROSCOPIC EXTENSIVE LYSIS OF AHESIONS. DRAINAGE OF OVARIAN CYST.;  Surgeon: Rachel Caruso DO;  Location: [facility];  Service: Obstetrics/Gynecology   • ORIF SCAPHOID W VASCULARIZED BONE GRAFT     • TUBAL COAGULATION LAPAROSCOPIC Bilateral 2018    Procedure: TUBAL FALLOPE FILSHIE CLIPPING LAPAROSCOPIC;  Surgeon: Rachel Caruso  DO Brooke;  Location: [facility];  Service: Obstetrics/Gynecology   • ULNA/RADIUS CLOSED REDUCTION  2006       No Known Allergies    Current Outpatient Medications:   •  atenolol (Tenormin) 25 MG tablet, Take 1 tablet by mouth Daily., Disp: 90 tablet, Rfl: 0  •  atomoxetine (STRATTERA) 60 MG capsule, Take 1 capsule by mouth Daily., Disp: 30 capsule, Rfl: 2  •  busPIRone (BUSPAR) 7.5 MG tablet, Take 1 tablet by mouth 3 (Three) Times a Day for 30 days., Disp: 90 tablet, Rfl: 0  •  cholecalciferol (VITAMIN D3) 25 MCG (1000 UT) tablet, Take 1 tablet by mouth Daily., Disp: 90 tablet, Rfl: 1  •  diphenhydrAMINE HCl, Sleep, (UNISOM SLEEPGELS PO), Take 1 tablet by mouth As Needed., Disp: , Rfl:   •  DULoxetine (CYMBALTA) 30 MG capsule, Take 1 capsule by mouth Daily., Disp: 90 capsule, Rfl: 0  •  DULoxetine (CYMBALTA) 60 MG capsule, Take 1 capsule by mouth Daily., Disp: 90 capsule, Rfl: 0  •  hydroCHLOROthiazide (MICROZIDE) 12.5 MG capsule, Take 1 capsule by mouth Daily., Disp: 90 capsule, Rfl: 0  •  hydroxychloroquine (PLAQUENIL) 200 MG tablet, Take 200 mg by mouth Daily., Disp: , Rfl:   •  meloxicam (MOBIC) 7.5 MG tablet, Take 1 tablet by mouth Daily., Disp: 90 tablet, Rfl: 0  •  multivitamin with minerals tablet tablet, Take 1 tablet by mouth Daily., Disp: , Rfl:   •  nitrofurantoin, macrocrystal-monohydrate, (MACROBID) 100 MG capsule, Take 100 mg by mouth Daily., Disp: , Rfl:   •  tiZANidine (ZANAFLEX) 4 MG tablet, Take 1 tablet by mouth 2 (Two) Times a Day., Disp: 60 tablet, Rfl: 4  •  vitamin B-12 (CYANOCOBALAMIN) 1000 MCG tablet, Take 1,000 mcg by mouth Daily., Disp: , Rfl:     Social History     Socioeconomic History   • Marital status:    Tobacco Use   • Smoking status: Never   • Smokeless tobacco: Never   Vaping Use   • Vaping Use: Every day   • Substances: Nicotine   • Devices: Disposable   Substance and Sexual Activity   • Alcohol use: Yes     Comment: socially   • Drug use: No   • Sexual activity: Yes      Partners: Male     Birth control/protection: Surgical, Tubal ligation     Family History   Problem Relation Age of Onset   • Heart disease Mother    • Thyroid disease Mother    • Anxiety disorder Mother    • Depression Mother    • Bipolar disorder Mother    • Heart disease Father    • Cancer Father    • Heart disease Maternal Grandmother    • Diabetes Maternal Grandmother    • Diabetes Maternal Grandfather    • Lupus Paternal Grandmother    • Rheum arthritis Paternal Grandmother        Review of Systems:  Constitutional:  Reports fatigue, weight gain and denies fevers, chills.  HEENT:  denies headache, ear pain or loss of hearing, blurred or double vision, nasal discharge or sore throat.  Cardiovascular:  Reports HTN, edema and denies HLD, CAD, Atrial Fib, hx heart disease, heart murmur, hx MI, chest pain, palpitations, hx DVT.  Respiratory:  Reports dyspnea on exertion and denies cough , wheezing, sleep apnea, asthma, COPD, hx PE.  Gastrointestinal:  Reports none and denies dysphagia, heartburn, nausea, vomiting, abdominal pain, IBS, diarrhea, constipation, melena, blood in stool, gallbladder issues, liver disease, hx pancreatitis.  Genitourinary:  Reports history of  frequent UTI and denies incontinence, hematuria, dysuria, polyuria, polydipsia, renal insufficiency, renal failure.    Musculoskeletal:  Reports joint pain, back pain, fibromyalgia, autoimmune disease and denies arthritis.  Neurological:  Reports none and denies headaches, migraines, numbness /tingling, dizziness, confusion, seizure, stroke.  Psychiatric:  Reports hx depression and denies hx anxiety, bipolar disorder, suicidal ideation, hx suicide attempt, hx self injury, hx substance abuse, eating disorder.  Endocrine:  Reports none and denies PCOS, endometriosis, glucose intolerance, diabetes, thyroid disease, gout.  Hematologic:  Reports none and denies bruising, bleeding disorder, hx anemia, hx blood transfusion.  Skin:  Reports none and  denies rashes, hx MRSA.      Physical Exam:  Vital Signs:  Weight: 99.6 kg (219 lb 8 oz)   Body mass index is 39.51 kg/m².  Temp: 98.6 °F (37 °C)   Heart Rate: 95   BP: 134/82     Physical Exam  Vitals reviewed.   Constitutional:       Appearance: She is well-developed. She is obese.   HENT:      Head: Normocephalic.   Neck:      Thyroid: No thyromegaly.   Cardiovascular:      Rate and Rhythm: Normal rate and regular rhythm.      Heart sounds: Normal heart sounds.   Pulmonary:      Effort: Pulmonary effort is normal. No respiratory distress.      Breath sounds: Normal breath sounds. No wheezing.   Abdominal:      General: Bowel sounds are normal. There is no distension.      Palpations: Abdomen is soft.      Tenderness: There is no abdominal tenderness.      Comments: Lower lap scars  Low transverse   Musculoskeletal:         General: Normal range of motion.      Cervical back: Normal range of motion and neck supple.   Skin:     General: Skin is warm and dry.   Neurological:      Mental Status: She is alert and oriented to person, place, and time.   Psychiatric:         Behavior: Behavior normal.         Patient Active Problem List   Diagnosis   • UTI (urinary tract infection)   • Ureteral calculus   • Frequent UTI   • Acute cystitis without hematuria   • Dysuria   • Hypertension   • Lupus (HCC)   • History of depression   • Pulmonary stenosis       Assessment:    Cheryl Mcnulty is a 33 y.o. year old female with medically complicated obesity pursuing sleeve gastrectomy.    Weight loss surgery is deemed medically necessary given the following obesity related comorbidities including hypertension, back pain, knee pain, depression and tobacco use with current Weight: 99.6 kg (219 lb 8 oz) and Body mass index is 39.51 kg/m²..    Plan:  The consultation plan and program requirements were reviewed with the patient.  The patient has been advised that a letter of medical support must be obtained from her primary care  physician or referring provider. A psychological evaluation will be arranged.  A nutritional evaluation will be performed.  The patient was advised to start a high protein and low carbohydrate diet.  Necessary lifestyle modifications were discussed.  Instructions on how to access DARWIN was given to the patient.  DARWIN is an internet based educational video that explains the surgical procedure chosen and answers basic questions regarding that procedure.     Class 2 Severe Obesity (BMI >=35 and <=39.9). Obesity-related health conditions include the following: hypertension. Obesity is worsening. BMI is is above average; BMI management plan is completed. We discussed consulting a Bariatric surgeon.        Preoperative testing will include: CBC, CMP, Lipids, TSH, HgA1C, H.Pylori serum, EKG, CXR, Pulmonary Function Testing and Nicotine Testing. Will evaluate with EGD if h pylori is positive (on chronic macrobid- serology testing first), would need cardiac clearance prior.     Additional preop clearances required prior to surgery: Cardiology.      The patient has been educated on expected postoperative lifestyle changes, including commitment to high protein diet, vitamin regimen, and exercise program.  They are aware that support groups are encouraged for optimal weight loss results. Patient understands that bariatric surgery is not cosmetic surgery but rather a tool to help make a lifelong commitment to lifestyle changes including diet, exercise, behavior modifications, and healthy habits. The procedure was discussed with the patient and all questions were answered. The importance of avoiding ASA/ NSAIDS/ steroids/ tobacco/ hormones/ immunomodulators perioperatively was discussed.         Atiya Giron PA-C

## 2023-01-31 NOTE — PROGRESS NOTES
PROGRESS NOTE    Data:    Cheryl Mcnulty is a 33 y.o. female who met with the undersigned for a scheduled psychological evaluation from 11:15am - 12:00pm.      Clinical Maneuvering/Intervention:      Chief complaint and history of presenting illness/Problems: struggling with obesity for several years. Despite trying different weight loss plans and diets, the pt reported being unsuccessful in losing weight. A psychological evaluation was conducted in order to assess past and current level of functioning. Areas assessed included, but were not limited to: perception of social support, perception of ability to face and deal with challenges in life (positive functioning), anxiety symptoms, depressive symptoms, perspective on beliefs/belief system, coping skills for stress, intelligence level, addiction issues, etc. Therapeutic rapport was established. Interventions conducted today were geared towards assessing the pt's readiness for weight loss surgery and identifying and psychological contraindications for undergoing such a major life change. Social support was deemed strong (specific to weight loss surgery/weight loss in this manner and in a general sense): parents who adopted her, sisters, friends, co-workers. Current psychological struggles were described as low, but included ADHD (impaired concentrations, fidgety, racing thoughts, etc, going back prior to the age of 15) and frustrations with being overweight. Coping skills for distress and related to undergoing a major life change such as weight loss surgery/weight loss were deemed strong and included being friendly/outgoing, good sense of humor, follows directions well, open to learning from health experts on how to improve her life, responsible person, maintains quality relationships with others, and believes in herself that she will be successful with weight loss surgery. The pt endorsed having characteristics of readiness to undergo major life changes inherent  in the journey of weight loss surgery. She could speak to having 'suffered enough,' and the decision to have weight loss surgery has 'clicked' for her. The pt expressed gratitude for today's visit.     Past Family and Social History:      History of family mental health problems: biological mother (substance abuse)    Psychosocial history: treatment of psychiatric care in the past (N/A), alcohol/substance abuse treatment in the past (N/A) , alcohol/substance abuse problems (N/A), inpatient psychiatric care (N/A).    Mental Status Exam (MSE):  Hygiene:  good  Dress: normal  Attitude:  cooperative and proactive  Motor Activity: normal  Speech: fast, but within normal limits  Mood:   nervous and excited  Affect:  congruent  Thought Processes: normal  Thought Content:  normal  Suicidal Thoughts:  not endorsed  Homicidal Thoughts:  not endorsed  Crisis Safety Plan: not needed   Hallucinations:  none      Patient's Support Network Includes:  family, friends      Progress toward goal: there is evidence to suggest that she is taking measures to improve the quality of her life including seeking weight loss surgery.       Functional Status: moderate to high      Prognosis: good with weight loss surgery    Evaluation, Diagnoses, and Ability/Capacity to Respond to Treatment:      The pt presented to be struggling with ADHD, at least moderately well managed with medication and obesity (BMI = 39.51, obesity). Results of MSE demonstrated a functional status of moderate to high. Strengths: belief in self that she will be successful with weight loss surgery, etc (see detailed list of coping skills above). Needed for growth (CPT code requirement for Weaknesses): weight loss.      From a psychological standpoint, the pt presents as a good candidate for bariatric surgery. She is motivated for the surgery, has showed readiness for the lifestyle change in terms of starting to adjust her eating habits, and seems to have appropriate  expectations of how to prepare and how to live after surgery in order to lose weight successfully.    Treatment Plan:      Short term goals: Continue with ADHD medication. Start improving her health by following up with her bariatric surgeon in order to receive weight loss surgery as soon as feasible/appropriate and demonstrate success with compliance to adhering to the recommended diet. Long term goals: reach a healthy weight and feel more confident in herself via taking control over her health. Continue with ADHD medication.    Hazel Alonso, PhD, LP

## 2023-02-01 LAB
ALBUMIN SERPL-MCNC: 4.5 G/DL (ref 3.8–4.8)
ALBUMIN/GLOB SERPL: 2 {RATIO} (ref 1.2–2.2)
ALP SERPL-CCNC: 110 IU/L (ref 44–121)
ALT SERPL-CCNC: 16 IU/L (ref 0–32)
AST SERPL-CCNC: 15 IU/L (ref 0–40)
BASOPHILS # BLD AUTO: 0 X10E3/UL (ref 0–0.2)
BASOPHILS NFR BLD AUTO: 0 %
BILIRUB SERPL-MCNC: 0.4 MG/DL (ref 0–1.2)
BUN SERPL-MCNC: 11 MG/DL (ref 6–20)
BUN/CREAT SERPL: 14 (ref 9–23)
CALCIUM SERPL-MCNC: 9.5 MG/DL (ref 8.7–10.2)
CHLORIDE SERPL-SCNC: 101 MMOL/L (ref 96–106)
CHOLEST SERPL-MCNC: 188 MG/DL (ref 100–199)
CO2 SERPL-SCNC: 25 MMOL/L (ref 20–29)
CREAT SERPL-MCNC: 0.81 MG/DL (ref 0.57–1)
EGFRCR SERPLBLD CKD-EPI 2021: 98 ML/MIN/1.73
EOSINOPHIL # BLD AUTO: 0.1 X10E3/UL (ref 0–0.4)
EOSINOPHIL NFR BLD AUTO: 1 %
ERYTHROCYTE [DISTWIDTH] IN BLOOD BY AUTOMATED COUNT: 12.2 % (ref 11.7–15.4)
GLOBULIN SER CALC-MCNC: 2.2 G/DL (ref 1.5–4.5)
GLUCOSE SERPL-MCNC: 87 MG/DL (ref 70–99)
H PYLORI IGA SER-ACNC: <9 UNITS (ref 0–8.9)
H PYLORI IGG SER IA-ACNC: 0.16 INDEX VALUE (ref 0–0.79)
H PYLORI IGM SER-ACNC: <9 UNITS (ref 0–8.9)
HBA1C MFR BLD: 5.1 % (ref 4.8–5.6)
HCT VFR BLD AUTO: 38 % (ref 34–46.6)
HDLC SERPL-MCNC: 57 MG/DL
HGB BLD-MCNC: 12.6 G/DL (ref 11.1–15.9)
IMM GRANULOCYTES # BLD AUTO: 0 X10E3/UL (ref 0–0.1)
IMM GRANULOCYTES NFR BLD AUTO: 0 %
LDLC SERPL CALC-MCNC: 115 MG/DL (ref 0–99)
LYMPHOCYTES # BLD AUTO: 1.8 X10E3/UL (ref 0.7–3.1)
LYMPHOCYTES NFR BLD AUTO: 14 %
MCH RBC QN AUTO: 27.3 PG (ref 26.6–33)
MCHC RBC AUTO-ENTMCNC: 33.2 G/DL (ref 31.5–35.7)
MCV RBC AUTO: 82 FL (ref 79–97)
MONOCYTES # BLD AUTO: 0.6 X10E3/UL (ref 0.1–0.9)
MONOCYTES NFR BLD AUTO: 5 %
NEUTROPHILS # BLD AUTO: 10.8 X10E3/UL (ref 1.4–7)
NEUTROPHILS NFR BLD AUTO: 80 %
PLATELET # BLD AUTO: 276 X10E3/UL (ref 150–450)
POTASSIUM SERPL-SCNC: 4.4 MMOL/L (ref 3.5–5.2)
PROT SERPL-MCNC: 6.7 G/DL (ref 6–8.5)
RBC # BLD AUTO: 4.61 X10E6/UL (ref 3.77–5.28)
SODIUM SERPL-SCNC: 139 MMOL/L (ref 134–144)
TRIGL SERPL-MCNC: 86 MG/DL (ref 0–149)
TSH SERPL DL<=0.005 MIU/L-ACNC: 1.49 UIU/ML (ref 0.45–4.5)
VLDLC SERPL CALC-MCNC: 16 MG/DL (ref 5–40)
WBC # BLD AUTO: 13.5 X10E3/UL (ref 3.4–10.8)

## 2023-02-07 ENCOUNTER — OFFICE VISIT (OUTPATIENT)
Dept: FAMILY MEDICINE CLINIC | Facility: CLINIC | Age: 33
End: 2023-02-07
Payer: MEDICAID

## 2023-02-07 VITALS
SYSTOLIC BLOOD PRESSURE: 128 MMHG | BODY MASS INDEX: 39.48 KG/M2 | DIASTOLIC BLOOD PRESSURE: 84 MMHG | HEART RATE: 114 BPM | HEIGHT: 63 IN | WEIGHT: 222.8 LBS | TEMPERATURE: 97.6 F | OXYGEN SATURATION: 99 %

## 2023-02-07 DIAGNOSIS — Z71.3 WEIGHT LOSS COUNSELING, ENCOUNTER FOR: ICD-10-CM

## 2023-02-07 DIAGNOSIS — E66.9 OBESITY (BMI 30-39.9): Primary | ICD-10-CM

## 2023-02-07 PROCEDURE — 85025 COMPLETE CBC W/AUTO DIFF WBC: CPT | Performed by: NURSE PRACTITIONER

## 2023-02-07 PROCEDURE — 99213 OFFICE O/P EST LOW 20 MIN: CPT | Performed by: NURSE PRACTITIONER

## 2023-02-07 PROCEDURE — 36415 COLL VENOUS BLD VENIPUNCTURE: CPT | Performed by: NURSE PRACTITIONER

## 2023-02-07 NOTE — PROGRESS NOTES
Chief Complaint  Weight Check    Subjective          Cheryl Mcnulty is a 33 y.o. female who presents today to [facility] for follow up    HPI:   History of Present Illness    Presents for weight check.  Is following with bariatrics in pursuit of sleeve gastrectomy.  Reports she is currently working with a dietitian.  Working on improving her diet and increasing exercise.  Patient does report she was diagnosed with COVID right after her last visit and is concerned about her WBC lab which was elevated at that time.  Would like it rechecked.  Denies any associated symptoms.  No other issues or concerns at this time.            The following portions of the patient's history were reviewed and updated as appropriate: allergies, current medications, past family history, past medical history, past social history, past surgical history and problem list.    Objective     Allergy:   No Known Allergies     Current Medications:   Current Outpatient Medications   Medication Sig Dispense Refill   • atenolol (Tenormin) 25 MG tablet Take 1 tablet by mouth Daily. 90 tablet 0   • atomoxetine (STRATTERA) 60 MG capsule Take 1 capsule by mouth Daily. 30 capsule 2   • busPIRone (BUSPAR) 7.5 MG tablet Take 1 tablet by mouth 3 (Three) Times a Day for 30 days. 90 tablet 0   • cholecalciferol (VITAMIN D3) 25 MCG (1000 UT) tablet Take 1 tablet by mouth Daily. 90 tablet 1   • diphenhydrAMINE HCl, Sleep, (UNISOM SLEEPGELS PO) Take 1 tablet by mouth As Needed.     • DULoxetine (CYMBALTA) 60 MG capsule Take 1 capsule by mouth Daily. 90 capsule 0   • hydroCHLOROthiazide (MICROZIDE) 12.5 MG capsule Take 1 capsule by mouth Daily. 90 capsule 0   • hydroxychloroquine (PLAQUENIL) 200 MG tablet Take 200 mg by mouth Daily.     • meloxicam (MOBIC) 7.5 MG tablet Take 1 tablet by mouth Daily. 90 tablet 0   • multivitamin with minerals tablet tablet Take 1 tablet by mouth Daily.     • nitrofurantoin,  macrocrystal-monohydrate, (MACROBID) 100 MG capsule Take 100 mg by mouth Daily.     • tiZANidine (ZANAFLEX) 4 MG tablet Take 1 tablet by mouth 2 (Two) Times a Day. 60 tablet 4   • vitamin B-12 (CYANOCOBALAMIN) 1000 MCG tablet Take 1,000 mcg by mouth Daily.       No current facility-administered medications for this visit.       Past Medical History:  Past Medical History:   Diagnosis Date   • Congestive heart failure (HCC)     in pregnancy   • Fibromyalgia    • Frequent UTI     on daily abx per urology   • History of depression    • History of ear infections    • History of heart disease    • History of lupus     skin rashes, joint pain, fatigue.   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 DRAINAGE OF OVARIAN CYST.;  Surgeon: Rachel Caruso DO;  Location: [facility];  Service: Obstetrics/Gynecology   • ORIF SCAPHOID W VASCULARIZED BONE GRAFT     • TUBAL COAGULATION LAPAROSCOPIC Bilateral 2018    Procedure: TUBAL FALLOPE FILSHIE CLIPPING LAPAROSCOPIC;  Surgeon: Rachel Caruso DO;  Location: [facility];  Service: Obstetrics/Gynecology   • ULNA/RADIUS CLOSED REDUCTION         Social History:  Social History     Socioeconomic History   • Marital status:    Tobacco Use   • Smoking status: Never   • Smokeless tobacco: Never   Vaping Use   • Vaping Use: Some days   • Substances: Nicotine   • Devices: Disposable   Substance and Sexual Activity   • Alcohol use: Yes     Comment: socially   • Drug use: No   • Sexual activity: Yes     Partners: Male     Birth control/protection:  "Surgical, Tubal ligation       Family History:  Family History   Problem Relation Age of Onset   • Heart disease Mother    • Thyroid disease Mother    • Anxiety disorder Mother    • Depression Mother    • Bipolar disorder Mother    • Heart disease Father    • Cancer Father    • Heart disease Maternal Grandmother    • Diabetes Maternal Grandmother    • Diabetes Maternal Grandfather    • Lupus Paternal Grandmother    • Rheum arthritis Paternal Grandmother        Review of Systems:  Review of Systems   Constitutional: Negative for fever and unexpected weight change.       Vital Signs:   /84 (BP Location: Right arm, Patient Position: Sitting)   Pulse 114   Temp 97.6 °F (36.4 °C)   Ht 160 cm (63\")   Wt 101 kg (222 lb 12.8 oz)   SpO2 99%   BMI 39.47 kg/m²      Physical Exam:  Physical Exam  Vitals and nursing note reviewed.   Constitutional:       General: She is not in acute distress.     Appearance: Normal appearance. She is obese. She is not ill-appearing or toxic-appearing.   HENT:      Head: Normocephalic.   Cardiovascular:      Rate and Rhythm: Normal rate and regular rhythm.      Heart sounds: Normal heart sounds.   Pulmonary:      Effort: Pulmonary effort is normal. No respiratory distress.      Breath sounds: Normal breath sounds.   Abdominal:      General: Bowel sounds are normal.      Palpations: Abdomen is soft.   Skin:     General: Skin is warm and dry.      Coloration: Skin is not pale.   Neurological:      Mental Status: She is alert and oriented to person, place, and time.   Psychiatric:         Mood and Affect: Mood normal.         Behavior: Behavior normal.         Thought Content: Thought content normal.         Judgment: Judgment normal.                  Assessment and Plan   Diagnoses and all orders for this visit:    1. Obesity (BMI 30-39.9) (Primary)  -     CBC & Differential    2. Weight loss counseling, encounter for  -     CBC & Differential    Continue diet and lifestyle " modifications to promote healthy weight.    Discussed possible differential diagnoses, testing, treatment, recommended non-pharmacological interventions, risks, warning signs to monitor for that would indicate need for follow-up in clinic or ER. If no improvement with these regimens or you have new or worsening symptoms follow-up. Patient verbalizes understanding and agreement with plan of care. Denies further needs or concerns.     Patient was given instructions and counseling regarding her condition and for health maintenance advised.    Class 2 Severe Obesity (BMI >=35 and <=39.9). Obesity-related health conditions include the following: obstructive sleep apnea, hypertension, coronary heart disease, diabetes mellitus, dyslipidemias, GERD and peripheral vascular disease. Obesity is worsening. BMI is is above average; BMI management plan is completed. We discussed portion control and increasing exercise.       I spent 20 minutes caring for patient on this date of service. This time includes time spent by me in the following activities: preparing for the visit, reviewing tests, obtaining and/or reviewing a separately obtained history, performing a medically appropriate examination and/or evaluation, counseling and educating the patient/family/caregiver, ordering medications, tests, or procedures and documenting information in the medical record    Meds ordered during this visit:  No orders of the defined types were placed in this encounter.      Patient Instructions:  Patient instructions given for the following visit diagnosis:    ICD-10-CM ICD-9-CM   1. Obesity (BMI 30-39.9)  E66.9 278.00   2. Weight loss counseling, encounter for  Z71.3 V65.3       Follow Up   Return in about 1 month (around 3/7/2023) for Recheck, Sooner if needed.        This document has been electronically signed by EMELIA Aguilar  February 7, 2023 15:17 EST    Patient was given instructions and counseling regarding her condition or for  health maintenance advice. Please see specific information pulled into the AVS if appropriate.     Part of this note may be an electronic transcription/translation of spoken language to printed text using the Dragon Dictation System.

## 2023-02-08 ENCOUNTER — TELEPHONE (OUTPATIENT)
Dept: FAMILY MEDICINE CLINIC | Facility: CLINIC | Age: 33
End: 2023-02-08
Payer: MEDICAID

## 2023-02-08 LAB
BASOPHILS # BLD AUTO: 0.04 10*3/MM3 (ref 0–0.2)
BASOPHILS NFR BLD AUTO: 0.4 % (ref 0–1.5)
DEPRECATED RDW RBC AUTO: 37.6 FL (ref 37–54)
EOSINOPHIL # BLD AUTO: 0.14 10*3/MM3 (ref 0–0.4)
EOSINOPHIL NFR BLD AUTO: 1.4 % (ref 0.3–6.2)
ERYTHROCYTE [DISTWIDTH] IN BLOOD BY AUTOMATED COUNT: 12.4 % (ref 12.3–15.4)
HCT VFR BLD AUTO: 37.4 % (ref 34–46.6)
HGB BLD-MCNC: 12.4 G/DL (ref 12–15.9)
IMM GRANULOCYTES # BLD AUTO: 0.03 10*3/MM3 (ref 0–0.05)
IMM GRANULOCYTES NFR BLD AUTO: 0.3 % (ref 0–0.5)
LYMPHOCYTES # BLD AUTO: 3.25 10*3/MM3 (ref 0.7–3.1)
LYMPHOCYTES NFR BLD AUTO: 33.3 % (ref 19.6–45.3)
MCH RBC QN AUTO: 27.8 PG (ref 26.6–33)
MCHC RBC AUTO-ENTMCNC: 33.2 G/DL (ref 31.5–35.7)
MCV RBC AUTO: 83.9 FL (ref 79–97)
MONOCYTES # BLD AUTO: 0.63 10*3/MM3 (ref 0.1–0.9)
MONOCYTES NFR BLD AUTO: 6.4 % (ref 5–12)
NEUTROPHILS NFR BLD AUTO: 5.68 10*3/MM3 (ref 1.7–7)
NEUTROPHILS NFR BLD AUTO: 58.2 % (ref 42.7–76)
NRBC BLD AUTO-RTO: 0 /100 WBC (ref 0–0.2)
PLATELET # BLD AUTO: 296 10*3/MM3 (ref 140–450)
PMV BLD AUTO: 11.6 FL (ref 6–12)
RBC # BLD AUTO: 4.46 10*6/MM3 (ref 3.77–5.28)
WBC NRBC COR # BLD: 9.77 10*3/MM3 (ref 3.4–10.8)

## 2023-02-08 NOTE — TELEPHONE ENCOUNTER
----- Message from EMELIA Aguilar sent at 2/8/2023  7:23 AM EST -----  Please call patient regarding results.     Labs okay      Patient notified.

## 2023-02-14 ENCOUNTER — TELEPHONE (OUTPATIENT)
Dept: FAMILY MEDICINE CLINIC | Facility: CLINIC | Age: 33
End: 2023-02-14
Payer: MEDICAID

## 2023-02-14 NOTE — TELEPHONE ENCOUNTER
Called the pt per Pao DA SILVA's request and informed her that she would need to go through her Rheumatologist for her FMLA. The pt stated that she had tried that and they informed her that they do not do FMLA and it had to be done through her PCP.

## 2023-02-28 ENCOUNTER — PATIENT ROUNDING (BHMG ONLY) (OUTPATIENT)
Dept: BARIATRICS/WEIGHT MGMT | Facility: CLINIC | Age: 33
End: 2023-02-28
Payer: MEDICAID

## 2023-02-28 NOTE — PROGRESS NOTES
A [employer]ida[employer] message has been sent to the patient for PATIENT ROUNDING for [facility] [place] - Bariatric Surgery/[facility].

## 2023-03-07 ENCOUNTER — OFFICE VISIT (OUTPATIENT)
Dept: FAMILY MEDICINE CLINIC | Facility: CLINIC | Age: 33
End: 2023-03-07
Payer: MEDICAID

## 2023-03-07 VITALS
HEIGHT: 63 IN | SYSTOLIC BLOOD PRESSURE: 124 MMHG | DIASTOLIC BLOOD PRESSURE: 72 MMHG | HEART RATE: 75 BPM | WEIGHT: 223 LBS | TEMPERATURE: 98.9 F | OXYGEN SATURATION: 100 % | BODY MASS INDEX: 39.51 KG/M2 | RESPIRATION RATE: 16 BRPM

## 2023-03-07 DIAGNOSIS — Z01.810 PREOPERATIVE CARDIOVASCULAR EXAMINATION: ICD-10-CM

## 2023-03-07 DIAGNOSIS — R30.0 DYSURIA: ICD-10-CM

## 2023-03-07 DIAGNOSIS — E66.9 OBESITY (BMI 30-39.9): Primary | ICD-10-CM

## 2023-03-07 DIAGNOSIS — I10 PRIMARY HYPERTENSION: ICD-10-CM

## 2023-03-07 LAB
BILIRUB BLD-MCNC: NEGATIVE MG/DL
CLARITY, POC: CLEAR
COLOR UR: YELLOW
GLUCOSE UR STRIP-MCNC: NEGATIVE MG/DL
KETONES UR QL: NEGATIVE
LEUKOCYTE EST, POC: NEGATIVE
NITRITE UR-MCNC: NEGATIVE MG/ML
PH UR: 6 [PH] (ref 5–8)
PROT UR STRIP-MCNC: ABNORMAL MG/DL
RBC # UR STRIP: NEGATIVE /UL
SP GR UR: 1.01 (ref 1–1.03)
UROBILINOGEN UR QL: NORMAL

## 2023-03-07 PROCEDURE — 99214 OFFICE O/P EST MOD 30 MIN: CPT | Performed by: NURSE PRACTITIONER

## 2023-03-07 PROCEDURE — 87086 URINE CULTURE/COLONY COUNT: CPT | Performed by: NURSE PRACTITIONER

## 2023-03-07 RX ORDER — HYDROCHLOROTHIAZIDE 12.5 MG/1
12.5 CAPSULE, GELATIN COATED ORAL DAILY
Qty: 90 CAPSULE | Refills: 0 | Status: SHIPPED | OUTPATIENT
Start: 2023-03-07

## 2023-03-07 RX ORDER — ATENOLOL 25 MG/1
25 TABLET ORAL DAILY
Qty: 90 TABLET | Refills: 0 | Status: SHIPPED | OUTPATIENT
Start: 2023-03-07

## 2023-03-07 NOTE — PROGRESS NOTES
Chief Complaint  Obesity and Urinary Tract Infection    Subjective          Cheryl Mcnulty is a 33 y.o. female who presents today to [facility] for follow up    HPI:   History of Present Illness      Presents for weight check.  Is following with bariatrics for weight loss surgery. Reports she is currently working with a dietitian and has been working on improving her diet and increasing exercise.  Needs cardiac clearance for surgery.    Also has complaint of discomfort with urination starting today.  Reports she normally takes Macrobid preventatively but has not had any for the past month.  Denies any associated symptoms.    Requesting medication refills.  Reports she tolerates medications well with no issues or side effects.  No other issues or concerns at this time.        The following portions of the patient's history were reviewed and updated as appropriate: allergies, current medications, past family history, past medical history, past social history, past surgical history and problem list.    Objective     Allergy:   No Known Allergies     Current Medications:   Current Outpatient Medications   Medication Sig Dispense Refill   • atenolol (Tenormin) 25 MG tablet Take 1 tablet by mouth Daily. 90 tablet 0   • cholecalciferol (VITAMIN D3) 25 MCG (1000 UT) tablet Take 1 tablet by mouth Daily. 90 tablet 1   • diphenhydrAMINE HCl, Sleep, (UNISOM SLEEPGELS PO) Take 1 tablet by mouth As Needed.     • hydroCHLOROthiazide (MICROZIDE) 12.5 MG capsule Take 1 capsule by mouth Daily. 90 capsule 0   • multivitamin with minerals tablet tablet Take 1 tablet by mouth Daily.     • nitrofurantoin, macrocrystal-monohydrate, (MACROBID) 100 MG capsule Take 1 capsule by mouth Daily.     • tiZANidine (ZANAFLEX) 4 MG tablet Take 1 tablet by mouth 2 (Two) Times a Day. 60 tablet 4   • vitamin B-12 (CYANOCOBALAMIN) 1000 MCG tablet Take 1 tablet by mouth Daily.       No current facility-administered  medications for this visit.       Past Medical History:  Past Medical History:   Diagnosis Date   • Congestive heart failure (HCC)     in pregnancy   • Fibromyalgia    • Frequent UTI     on daily abx per urology   • History of depression    • History of ear infections    • History of heart disease    • History of lupus     skin rashes, joint pain, fatigue.   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 DRAINAGE OF OVARIAN CYST.;  Surgeon: Rachel Caruso DO;  Location: [facility] OR;  Service: Obstetrics/Gynecology   • ORIF SCAPHOID W VASCULARIZED BONE GRAFT     • TUBAL COAGULATION LAPAROSCOPIC Bilateral 2018    Procedure: TUBAL FALLOPE FILSHIE CLIPPING LAPAROSCOPIC;  Surgeon: Rachel Caruso DO;  Location: [facility] OR;  Service: Obstetrics/Gynecology   • ULNA/RADIUS CLOSED REDUCTION         Social History:  Social History     Socioeconomic History   • Marital status:    Tobacco Use   • Smoking status: Never   • Smokeless tobacco: Never   Vaping Use   • Vaping Use: Some days   • Substances: Nicotine   • Devices: Disposable   Substance and Sexual Activity   • Alcohol use: Yes     Comment: socially   • Drug use: No   • Sexual activity: Yes     Partners: Male     Birth control/protection: Surgical, Tubal ligation       Family History:  Family History   Problem Relation Age of Onset   • Heart disease Mother    • Thyroid disease Mother    • Anxiety disorder Mother    • Depression Mother    • Bipolar disorder Mother    • Heart disease Father    • Cancer Father    • Heart disease  "Maternal Grandmother    • Diabetes Maternal Grandmother    • Diabetes Maternal Grandfather    • Lupus Paternal Grandmother    • Rheum arthritis Paternal Grandmother        Review of Systems:  Review of Systems   Constitutional: Negative for fever.   Respiratory: Negative for shortness of breath.    Cardiovascular: Negative for chest pain, palpitations and leg swelling.   Gastrointestinal: Negative for nausea and vomiting.   Genitourinary: Negative for hematuria and pelvic pain.       Vital Signs:   /72 (BP Location: Right arm, Patient Position: Sitting, Cuff Size: Large Adult)   Pulse 75   Temp 98.9 °F (37.2 °C) (Temporal)   Resp 16   Ht 160 cm (63\")   Wt 101 kg (223 lb)   SpO2 100%   BMI 39.50 kg/m²      Physical Exam:  Physical Exam  Vitals and nursing note reviewed.   Constitutional:       General: She is not in acute distress.     Appearance: Normal appearance. She is not ill-appearing or toxic-appearing.   HENT:      Head: Normocephalic.   Cardiovascular:      Rate and Rhythm: Normal rate and regular rhythm.      Heart sounds: Normal heart sounds.   Pulmonary:      Effort: Pulmonary effort is normal. No respiratory distress.      Breath sounds: Normal breath sounds.   Abdominal:      General: Bowel sounds are normal.      Palpations: Abdomen is soft.      Tenderness: There is no right CVA tenderness or left CVA tenderness.   Skin:     General: Skin is warm and dry.      Coloration: Skin is not pale.   Neurological:      Mental Status: She is alert and oriented to person, place, and time.   Psychiatric:         Mood and Affect: Mood normal.         Behavior: Behavior normal.         Thought Content: Thought content normal.         Judgment: Judgment normal.                  Assessment and Plan   Diagnoses and all orders for this visit:    1. Obesity (BMI 30-39.9) (Primary)    2. Primary hypertension  -     atenolol (Tenormin) 25 MG tablet; Take 1 tablet by mouth Daily.  Dispense: 90 tablet; Refill: " 0  -     hydroCHLOROthiazide (MICROZIDE) 12.5 MG capsule; Take 1 capsule by mouth Daily.  Dispense: 90 capsule; Refill: 0    3. Dysuria  -     Urine Culture - Urine, Urine, Clean Catch; Future    4. Preoperative cardiovascular examination  -     Ambulatory Referral to Cardiology      Continue diet and lifestyle modifications to control conditions  Push water intake.  Urine culture to verify absence of infection.  Restart preventative Macrobid as directed per your urologist.  Follow-up with urology for further management.    Discussed possible differential diagnoses, testing, treatment, recommended non-pharmacological interventions, risks, warning signs to monitor for that would indicate need for follow-up in clinic or ER. If no improvement with these regimens or you have new or worsening symptoms follow-up. Patient verbalizes understanding and agreement with plan of care. Denies further needs or concerns.     Patient was given instructions and counseling regarding her condition and for health maintenance advised.    Class 2 Severe Obesity (BMI >=35 and <=39.9). Obesity-related health conditions include the following: obstructive sleep apnea, hypertension, coronary heart disease, diabetes mellitus, dyslipidemias, GERD and peripheral vascular disease. Obesity is unchanged. BMI is is above average; BMI management plan is completed. We discussed portion control and increasing exercise.       I spent 30 minutes caring for patient on this date of service. This time includes time spent by me in the following activities: preparing for the visit, reviewing tests, obtaining and/or reviewing a separately obtained history, performing a medically appropriate examination and/or evaluation, counseling and educating the patient/family/caregiver, ordering medications, tests, or procedures and documenting information in the medical record    Meds ordered during this visit:  New Medications Ordered This Visit   Medications   •  atenolol (Tenormin) 25 MG tablet     Sig: Take 1 tablet by mouth Daily.     Dispense:  90 tablet     Refill:  0   • hydroCHLOROthiazide (MICROZIDE) 12.5 MG capsule     Sig: Take 1 capsule by mouth Daily.     Dispense:  90 capsule     Refill:  0       Patient Instructions:  Patient instructions given for the following visit diagnosis:    ICD-10-CM ICD-9-CM   1. Obesity (BMI 30-39.9)  E66.9 278.00   2. Primary hypertension  I10 401.9   3. Dysuria  R30.0 788.1   4. Preoperative cardiovascular examination  Z01.810 V72.81       Follow Up   Return for Recheck in 1 week, sooner if needed.        This document has been electronically signed by EMELIA Aguilar  March 7, 2023 14:46 EST    Patient was given instructions and counseling regarding her condition or for health maintenance advice. Please see specific information pulled into the AVS if appropriate.     Part of this note may be an electronic transcription/translation of spoken language to printed text using the Dragon Dictation System.

## 2023-03-09 ENCOUNTER — TELEPHONE (OUTPATIENT)
Dept: FAMILY MEDICINE CLINIC | Facility: CLINIC | Age: 33
End: 2023-03-09
Payer: MEDICAID

## 2023-03-09 LAB — BACTERIA SPEC AEROBE CULT: NORMAL

## 2023-03-09 NOTE — TELEPHONE ENCOUNTER
----- Message from EMELIA Aguilar sent at 3/9/2023  2:31 PM EST -----  Please call patient regarding results.     Urine culture negative for UTI      Patient notified.

## 2023-03-14 ENCOUNTER — OFFICE VISIT (OUTPATIENT)
Dept: UROLOGY | Facility: CLINIC | Age: 33
End: 2023-03-14
Payer: MEDICAID

## 2023-03-14 VITALS — HEIGHT: 63 IN | BODY MASS INDEX: 39.51 KG/M2 | WEIGHT: 223 LBS

## 2023-03-14 DIAGNOSIS — N30.00 ACUTE CYSTITIS WITHOUT HEMATURIA: Primary | ICD-10-CM

## 2023-03-14 DIAGNOSIS — Z71.1 CONCERN ABOUT STD IN FEMALE WITHOUT DIAGNOSIS: ICD-10-CM

## 2023-03-14 DIAGNOSIS — N39.0 FREQUENT UTI: ICD-10-CM

## 2023-03-14 DIAGNOSIS — Z11.3 SCREENING EXAMINATION FOR STD (SEXUALLY TRANSMITTED DISEASE): ICD-10-CM

## 2023-03-14 DIAGNOSIS — N34.3 DYSURIA-FREQUENCY SYNDROME: ICD-10-CM

## 2023-03-14 PROCEDURE — 1159F MED LIST DOCD IN RCRD: CPT | Performed by: NURSE PRACTITIONER

## 2023-03-14 PROCEDURE — 51798 US URINE CAPACITY MEASURE: CPT | Performed by: NURSE PRACTITIONER

## 2023-03-14 PROCEDURE — 99214 OFFICE O/P EST MOD 30 MIN: CPT | Performed by: NURSE PRACTITIONER

## 2023-03-14 PROCEDURE — 87086 URINE CULTURE/COLONY COUNT: CPT | Performed by: NURSE PRACTITIONER

## 2023-03-14 PROCEDURE — 1160F RVW MEDS BY RX/DR IN RCRD: CPT | Performed by: NURSE PRACTITIONER

## 2023-03-14 RX ORDER — PHENAZOPYRIDINE HYDROCHLORIDE 200 MG/1
200 TABLET, FILM COATED ORAL 3 TIMES DAILY PRN
Qty: 20 TABLET | Refills: 0 | Status: SHIPPED | OUTPATIENT
Start: 2023-03-14 | End: 2023-03-21

## 2023-03-14 NOTE — PROGRESS NOTES
Chief Complaint  Urinary Tract Infection (6 MONTH FOLLOW UP FOR UTI/ACUTE CYSTITIS/DYSURIA/HEMATURIA/STI TESTING)    Mukesh Mcnulty presents to [facility] for ACUTE CYSTITIS/DYSURIA    History of Present Illness     MISS Cheryl Mcnulty is a pleasant 33-year-old female who returns to clinic today for evaluation. This is a 6-month follow-up with prior history of acute cystitis with hematuria, recurrent urinary tract infections that had resolved, pelvic pain, and suprapubic discomfort. Patient was last evaluated in clinic over 6 months ago. At that time, she was in no apparent discomfort and reported doing relatively well. She had been on antibiotic suppressive therapy with Macrobid- Since stopped/noncompliance. She has had negative urine culture since then.     She also presents to clinic today with concerns of STD exposure, patient would like to be tested for STIs.  States she found out her last boyfriend was cheating on her.  States they both  over 7 months ago.      On initial evaluation in clinic, she reports dysuria with constant burning on urination.  She however denies any known exposure to STDs.  However her urine dipstick is complete negative for any infection, it is negative for gross/microscopic hematuria.  Her PVR is 0 mL.    The patient reports that on Thursday, 03/09/2023, she woke up and had dysuria. She states she went to her primary care physician and she started Macrobid 100 mg daily on  03/09/2022. She stopped taking Macrobid for 2 months prior to this. Her primary care sent a culture which was negative. She denies having any problems since she started Macrobid. She has slight dysuria. Her last Pap smear was 1 year ago and her last sexual partner had been unfaithful. She ended the relationship with her partner 7 months ago. She denies any vaginal discharge, foul odor, or hematuria. She states she has a small amount of pressure.  "She denies any back pain, nausea, or emesis.     Objective   Vital Signs:   Ht 160 cm (62.99\")   Wt 101 kg (223 lb)   BMI 39.51 kg/m²       ROS:   Review of Systems   Constitutional: Positive for activity change, unexpected weight gain and unexpected weight loss. Negative for appetite change, chills, diaphoresis, fatigue and fever.   HENT: Negative.  Negative for congestion, ear discharge, ear pain, nosebleeds, rhinorrhea, sinus pressure and sore throat.    Eyes: Negative.  Negative for blurred vision, double vision, photophobia, pain, redness and visual disturbance.   Respiratory: Negative.  Negative for apnea, cough, chest tightness, shortness of breath, wheezing and stridor.    Cardiovascular: Negative.  Negative for chest pain and palpitations.   Gastrointestinal: Positive for nausea. Negative for abdominal distention, abdominal pain, constipation, diarrhea and vomiting.   Endocrine: Negative.  Negative for polydipsia, polyphagia and polyuria.   Genitourinary: Positive for difficulty urinating, dysuria, frequency, pelvic pressure and urgency. Negative for decreased urine volume, dyspareunia, flank pain, genital sores, hematuria, pelvic pain, urinary incontinence and vaginal discharge.   Musculoskeletal: Positive for back pain and myalgias. Negative for arthralgias and joint swelling.   Skin: Positive for pallor. Negative for rash and wound.   Allergic/Immunologic: Negative.    Neurological: Positive for weakness. Negative for dizziness, tremors, syncope, light-headedness, headache, memory problem and confusion.   Hematological: Negative.    Psychiatric/Behavioral: Positive for sleep disturbance and stress. Negative for behavioral problems and decreased concentration. The patient is nervous/anxious.         Physical Exam  Constitutional:       General: She is in acute distress.      Appearance: She is well-developed. She is obese. She is ill-appearing.   HENT:      Head: Normocephalic and atraumatic.   Eyes:    "   Pupils: Pupils are equal, round, and reactive to light.   Neck:      Thyroid: No thyromegaly.      Trachea: No tracheal deviation.   Cardiovascular:      Rate and Rhythm: Normal rate and regular rhythm.      Heart sounds: No murmur heard.  Pulmonary:      Effort: Pulmonary effort is normal. No respiratory distress.      Breath sounds: Normal breath sounds. No stridor. No wheezing.   Abdominal:      General: Bowel sounds are normal. There is distension.      Palpations: Abdomen is soft.      Tenderness: There is abdominal tenderness.   Genitourinary:     Labia:         Right: No tenderness.         Left: No tenderness.       Vagina: Normal. No vaginal discharge.      Comments: DYSURIA, FREQUENCY  Musculoskeletal:         General: Tenderness present. No deformity. Normal range of motion.      Cervical back: Normal range of motion.   Skin:     General: Skin is warm and dry.      Capillary Refill: Capillary refill takes less than 2 seconds.      Coloration: Skin is pale.      Findings: No erythema or rash.   Neurological:      Mental Status: She is alert and oriented to person, place, and time.      Cranial Nerves: No cranial nerve deficit.      Sensory: No sensory deficit.      Motor: Weakness present.      Coordination: Coordination normal.   Psychiatric:         Behavior: Behavior normal.         Thought Content: Thought content normal.         Judgment: Judgment normal.        Result Review :     UA    Urinalysis 12/8/22 12/8/22 3/7/23 3/14/23    1901 1901     Squamous Epithelial Cells, UA  0-2     Specific Gravity, UA 1.016      Ketones, UA Negative  Negative Negative   Blood, UA Negative      Leukocytes, UA Negative  Negative Negative   Nitrite, UA Positive (A)      RBC, UA  0-2     WBC, UA  0-2     Bacteria, UA  None Seen     (A) Abnormal value       Comments are available for some flowsheets but are not being displayed.           Urine Culture    Urine Culture 10/14/22 12/7/22 3/7/23   Urine Culture <25,000  CFU/mL Mixed Nneka Isolated >100,000 CFU/mL Escherichia coli (A) 50,000 CFU/mL Normal Urogenital Nneka   (A) Abnormal value                 Her urine dipstick, however, is completely negative for any leukocyte esterase. It is negative for nitrites. It is negative for gross, but showed trace microscopic hematuria. Her PVR is 0 mL.    Assessment and Plan    Problem List Items Addressed This Visit        Genitourinary and Reproductive     Frequent UTI    Overview     on daily abx per urology         Relevant Orders    POC Urinalysis Dipstick, Automated (Completed)    Urine Culture - Urine, Urine, Clean Catch    Acute cystitis without hematuria - Primary    Relevant Medications    phenazopyridine (Pyridium) 200 MG tablet    Dysuria-frequency syndrome    Relevant Medications    phenazopyridine (Pyridium) 200 MG tablet       Other    Screening examination for STD (sexually transmitted disease)    Concern about STD in female without diagnosis                                                ASSESSMENT     RECURRENT UTI/ACUTE CYSTITIS/DYSURIA/STD TESTING  Ms. Cheryl VO is a pleasant 33 year-old female with a significant history of recurrent UTIs, acute cystitis WITHOUT microscopic hematuria, who returns to clinic today for her 6-month follow-up.  SHe is in no apparent distress today BESIDES DYSURIA episodes.  Patient would like STD testing today, reports last exposure was over 7 months ago.  Nevertheless, she  reports feeling significantly better since resuming therapy with Macrobid prophylaxis.  Patient was evaluated by her PCP last week with a negative urine culture.  HER Urine dipstick today is completely negative for any leukocyte esterase, negative nitrites, she has trace microscopic hematuria, PVR 0 mm..    Interstitial cystitis-we discussed the diagnosis and management of this condition.  I indicated that it was a multifactorial condition with multifactorial symptomatology, Including frequency, urgency, the  sensation of urinary tract infection in the absence of a positive culture, sexual symptomatology including significant dyspareunia.  We discussed treatment options including the importance of making a clinical diagnosis and the cystoscopic findings including Hunner's ulcers etc.  We also discussed medical management including pharmacologic treatment such as amitriptyline, naturopathic treatments such as pumpkin oil and which more aggressive options of Botox injections as well as the relative risks and merits of this.  We also discussed the use of dietary manipulation including the over-the-counter product relief which basically decreases the acid in the urine and avoidance of ascitic-containing foods such as citrus is etc.    Recurrent urinary tract infections/OverActiveBladder:  She reports doing relatively better on her antibiotic prophylaxis in the past, BUT STOPPED.  Again,  We discussed the types of organisms that are found in the urinary tract indicating that the vast majority are results of the patient's own gastrointestinal patience.  We discussed how many of the antibiotics that are utilized can actually exacerbate these infections by creating resistant organisms and there is only a very few antibiotics that are concentrated in the urine and do not affect the rectal reservoir nor cause recurrent yeast vaginitis.       We discussed the risk factors for recurrent infections being intercourse in younger patients and atrophic changes in older patients.  We discussed the symptoms that are found including pain, pressure, burning, frequency, urgency suprapubic pain and painful intercourse.  I discussed upper tract symptoms including fevers, chills, and indicated the workup would be much more aggressive if the patient were to present with recurrent infections in the face of upper tract symptomatology such as fever. I discussed the history of vesicoureteral reflux in young patients and finally chronic renal scarring as  a result of such.                                                      PLAN  We resent her urine for culture. I will call her with results if any bacteria growth     Urine also sent her urine for MDX STI testing-I will call patient with results    Continue Macrobid 100 mg P.o. daily for suppressive therapy-acute cystitis    Continue Pyridium 200 mg as needed-dysuria      She has been encouraged to increase her p.o. fluid intake to at least 1 to 2 L daily and avoid bladder irritants such as caffeine products, spicy foods, and citrusy foods.      I recommend concomitant probiotics with treatment with antibiotics to protect the rectal reservoir including over-the-counter yogurt preparations to rebecca oral pills containing the appropriate probiotics. Patient reports the diligent use of Probiotics.     She is to also continue other medications FOR DYSURIA  as prescribed above.     Will see her back for follow-up in clinic and reevaluate her symptoms at that time.  Discussed weaning her off antibiotic therapy if negative cultures.      She may return sooner for Follow up in clinic and recheck her urinalysis if she becomes symptomatic.     Patient is agreeable to plan of care.     Patient reports that she is not currently experiencing any symptoms of urinary incontinence.    Class 2 Severe Obesity (BMI >=35 and <=39.9). Obesity-related health conditions include the following: lower extremity venous stasis disease and 39.51KG/M2. Obesity is improving with lifestyle modifications. BMI is 39.51KG/M2. We discussed portion control and increasing exercise.    RADIOLOGY (CT AND/OR KUB):    CT Abdomen and Pelvis: No results found for this or any previous visit.     CT Stone Protocol: Results for orders placed during the hospital encounter of 12/08/22    CT Abdomen Pelvis Stone Protocol    Narrative  INDICATION:  Bilateral flank pain and dysuria. Kidney stones suspected.    TECHNIQUE:  CT of the abdomen and pelvis without contrast.  Coronal and sagittal reconstructions were obtained.  Radiation dose reduction techniques included automated exposure control or exposure modulation based on body size. Radiation audit for number of CT and  nuclear cardiology exams performed in the last year: 0.    COMPARISON:  Abdomen pelvis CT from 11/11/2021.    FINDINGS:  Lung bases: Clear    Abdomen: Study is limited by lack of IV contrast media for evaluation for pathology other than urinary tract calculus disease.    The noncontrast liver, spleen, gallbladder, pancreas, adrenal glands, and kidneys are unremarkable. No intrarenal calculi and no hydronephrosis.    There is no evidence for bowel obstruction. The appendix is unremarkable. There is moderate well-formed stool in the distal colon to rectum. There is no free air.    Pelvis: Urinary bladder is unremarkable. No bladder calculus. No ureteral calculus is suspected.    There is a metallic device associated with the right side of the uterus and fallopian tube that was also present previously. Please correlate with GYN procedure. Otherwise ovaries unremarkable for age group. Previously noted [place]'s gland cysts are  considerably smaller/less apparent on comparison to prior.    Impression  1. No evidence for urinary tract calculus disease. No hydronephrosis.  2. Appendix unremarkable. No bowel obstruction.  3. There is a surgical clip like device in the right hemipelvis. Please correlate with procedure. It could be a tubal ligation clip but there is no left-sided clip.    Signer Name: Janice Marquez MD  Signed: 12/8/2022 10:00 PM  Workstation Name: [email or web address]  [facility]     KUB: Results for orders placed during the hospital encounter of 08/18/21    XR Abdomen KUB    Narrative  EXAM:  XR Abdomen, 1 View    EXAM DATE:  8/18/2021 9:33 AM    CLINICAL HISTORY:  flank pain; R10.9-Unspecified abdominal pain    TECHNIQUE:  Frontal supine view of the abdomen/pelvis.    COMPARISON:  No relevant  prior studies available.    FINDINGS:  GASTROINTESTINAL TRACT:  Unremarkable.  No dilation.  BONES/JOINTS:  Unremarkable.    Impression  Unremarkable abdominal x-ray.    This report was finalized on 8/18/2021 9:52 AM by Dr. Boyd Bailey MD.       LABS (3 MONTHS):    Office Visit on 03/14/2023   Component Date Value Ref Range Status   • Color 03/14/2023 Yellow  Yellow, Straw, Dark Yellow, Lizette Final   • Clarity, UA 03/14/2023 Clear  Clear Final   • Specific Gravity  03/14/2023 1.030  1.005 - 1.030 Final   • pH, Urine 03/14/2023 6.0  5.0 - 8.0 Final   • Leukocytes 03/14/2023 Negative  Negative Final   • Nitrite, UA 03/14/2023 Negative  Negative Final   • Protein, POC 03/14/2023 Negative  Negative mg/dL Final   • Glucose, UA 03/14/2023 Negative  Negative mg/dL Final   • Ketones, UA 03/14/2023 Negative  Negative Final   • Urobilinogen, UA 03/14/2023 Normal  Normal, 0.2 E.U./dL Final   • Bilirubin 03/14/2023 Negative  Negative Final   • Blood, UA 03/14/2023 Trace (A)  Negative Final   • Lot Number 03/14/2023 [id number]   Final   • Expiration Date 03/14/2023 2/8/24   Final   Office Visit on 03/07/2023   Component Date Value Ref Range Status   • Urine Culture 03/07/2023 50,000 CFU/mL Normal Urogenital Nneka   Final   • Color 03/07/2023 Yellow  Yellow, Straw, Dark Yellow, Lizette Final   • Clarity, UA 03/07/2023 Clear  Clear Final   • Glucose, UA 03/07/2023 Negative  Negative mg/dL Final   • Bilirubin 03/07/2023 Negative  Negative Final   • Ketones, UA 03/07/2023 Negative  Negative Final   • Specific Gravity  03/07/2023 1.015  1.005 - 1.030 Final   • Blood, UA 03/07/2023 Negative  Negative Final   • pH, Urine 03/07/2023 6.0  5.0 - 8.0 Final   • Protein, POC 03/07/2023 Trace (A)  Negative mg/dL Final   • Urobilinogen, UA 03/07/2023 Normal  Normal, 0.2 E.U./dL Final   • Leukocytes 03/07/2023 Negative  Negative Final   • Nitrite, UA 03/07/2023 Negative  Negative Final   Office Visit on 02/07/2023   Component Date Value Ref  Range Status   • WBC 02/07/2023 9.77  3.40 - 10.80 10*3/mm3 Final   • RBC 02/07/2023 4.46  3.77 - 5.28 10*6/mm3 Final   • Hemoglobin 02/07/2023 12.4  12.0 - 15.9 g/dL Final   • Hematocrit 02/07/2023 37.4  34.0 - 46.6 % Final   • MCV 02/07/2023 83.9  79.0 - 97.0 fL Final   • MCH 02/07/2023 27.8  26.6 - 33.0 pg Final   • MCHC 02/07/2023 33.2  31.5 - 35.7 g/dL Final   • RDW 02/07/2023 12.4  12.3 - 15.4 % Final   • RDW-SD 02/07/2023 37.6  37.0 - 54.0 fl Final   • MPV 02/07/2023 11.6  6.0 - 12.0 fL Final   • Platelets 02/07/2023 296  140 - 450 10*3/mm3 Final   • Neutrophil % 02/07/2023 58.2  42.7 - 76.0 % Final   • Lymphocyte % 02/07/2023 33.3  19.6 - 45.3 % Final   • Monocyte % 02/07/2023 6.4  5.0 - 12.0 % Final   • Eosinophil % 02/07/2023 1.4  0.3 - 6.2 % Final   • Basophil % 02/07/2023 0.4  0.0 - 1.5 % Final   • Immature Grans % 02/07/2023 0.3  0.0 - 0.5 % Final   • Neutrophils, Absolute 02/07/2023 5.68  1.70 - 7.00 10*3/mm3 Final   • Lymphocytes, Absolute 02/07/2023 3.25 (H)  0.70 - 3.10 10*3/mm3 Final   • Monocytes, Absolute 02/07/2023 0.63  0.10 - 0.90 10*3/mm3 Final   • Eosinophils, Absolute 02/07/2023 0.14  0.00 - 0.40 10*3/mm3 Final   • Basophils, Absolute 02/07/2023 0.04  0.00 - 0.20 10*3/mm3 Final   • Immature Grans, Absolute 02/07/2023 0.03  0.00 - 0.05 10*3/mm3 Final   • nRBC 02/07/2023 0.0  0.0 - 0.2 /100 WBC Final   Office Visit on 01/31/2023   Component Date Value Ref Range Status   • WBC 01/31/2023 13.5 (H)  3.4 - 10.8 x10E3/uL Final   • RBC 01/31/2023 4.61  3.77 - 5.28 x10E6/uL Final   • Hemoglobin 01/31/2023 12.6  11.1 - 15.9 g/dL Final   • Hematocrit 01/31/2023 38.0  34.0 - 46.6 % Final   • MCV 01/31/2023 82  79 - 97 fL Final   • MCH 01/31/2023 27.3  26.6 - 33.0 pg Final   • MCHC 01/31/2023 33.2  31.5 - 35.7 g/dL Final   • RDW 01/31/2023 12.2  11.7 - 15.4 % Final   • Platelets 01/31/2023 276  150 - 450 x10E3/uL Final   • Neutrophil Rel % 01/31/2023 80  Not Estab. % Final   • Lymphocyte Rel %  01/31/2023 14  Not Estab. % Final   • Monocyte Rel % 01/31/2023 5  Not Estab. % Final   • Eosinophil Rel % 01/31/2023 1  Not Estab. % Final   • Basophil Rel % 01/31/2023 0  Not Estab. % Final   • Neutrophils Absolute 01/31/2023 10.8 (H)  1.4 - 7.0 x10E3/uL Final   • Lymphocytes Absolute 01/31/2023 1.8  0.7 - 3.1 x10E3/uL Final   • Monocytes Absolute 01/31/2023 0.6  0.1 - 0.9 x10E3/uL Final   • Eosinophils Absolute 01/31/2023 0.1  0.0 - 0.4 x10E3/uL Final   • Basophils Absolute 01/31/2023 0.0  0.0 - 0.2 x10E3/uL Final   • Immature Granulocyte Rel % 01/31/2023 0  Not Estab. % Final   • Immature Grans Absolute 01/31/2023 0.0  0.0 - 0.1 x10E3/uL Final   • Glucose 01/31/2023 87  70 - 99 mg/dL Final   • BUN 01/31/2023 11  6 - 20 mg/dL Final   • Creatinine 01/31/2023 0.81  0.57 - 1.00 mg/dL Final   • EGFR Result 01/31/2023 98  >59 mL/min/1.73 Final   • BUN/Creatinine Ratio 01/31/2023 14  9 - 23 Final   • Sodium 01/31/2023 139  134 - 144 mmol/L Final   • Potassium 01/31/2023 4.4  3.5 - 5.2 mmol/L Final   • Chloride 01/31/2023 101  96 - 106 mmol/L Final   • Total CO2 01/31/2023 25  20 - 29 mmol/L Final   • Calcium 01/31/2023 9.5  8.7 - 10.2 mg/dL Final   • Total Protein 01/31/2023 6.7  6.0 - 8.5 g/dL Final   • Albumin 01/31/2023 4.5  3.8 - 4.8 g/dL Final   • Globulin 01/31/2023 2.2  1.5 - 4.5 g/dL Final   • A/G Ratio 01/31/2023 2.0  1.2 - 2.2 Final   • Total Bilirubin 01/31/2023 0.4  0.0 - 1.2 mg/dL Final   • Alkaline Phosphatase 01/31/2023 110  44 - 121 IU/L Final   • AST (SGOT) 01/31/2023 15  0 - 40 IU/L Final   • ALT (SGPT) 01/31/2023 16  0 - 32 IU/L Final   • Hemoglobin A1C 01/31/2023 5.1  4.8 - 5.6 % Final    Comment:          Prediabetes: 5.7 - 6.4           Diabetes: >6.4           Glycemic control for adults with diabetes: <7.0     • Total Cholesterol 01/31/2023 188  100 - 199 mg/dL Final   • Triglycerides 01/31/2023 86  0 - 149 mg/dL Final   • HDL Cholesterol 01/31/2023 57  >39 mg/dL Final   • VLDL Cholesterol [place]  01/31/2023 16  5 - 40 mg/dL Final   • LDL Chol Calc (NIH) 01/31/2023 115 (H)  0 - 99 mg/dL Final   • TSH 01/31/2023 1.490  0.450 - 4.500 uIU/mL Final   • H. pylori IgG 01/31/2023 0.16  0.00 - 0.79 Index Value Final    Comment:                              Negative           <0.80                               Equivocal    0.80 - 0.89                               Positive           >0.89     • H. pylori, IgA ABS 01/31/2023 <9.0  0.0 - 8.9 units Final    Comment:                                 Negative          <9.0                                  Equivocal   9.0 - 11.0                                  Positive         >11.0     • H. Pylori, IgM 01/31/2023 <9.0  0.0 - 8.9 units Final    Comment:                                 Negative          <9.0                                  Equivocal   9.0 - 11.0                                  Positive         >11.0  This test was developed and its performance characteristics  determined by [facility]. It has not been cleared or approved  by the Food and Drug Administration.         Smoking Cessation Counseling:  Never a smoker.  Patient does not currently use any tobacco products.     Follow Up   Return in about 3 months (around 6/15/2023) for Next scheduled follow up, RECURRENT UTI/DYSURIA/DETRUSSOR INSTABILITY.    Patient was given instructions and counseling regarding her condition or for health maintenance advice. Please see specific information pulled into the AVS if appropriate.          This document has been electronically signed by Griselda Cheng-Akwa, APRN   March 14, 2023 18:24 EDT      Dictated Utilizing Dragon Dictation: Part of this note may be an electronic transcription/translation of spoken language to printed text using the Dragon Dictation System.     Transcribed from ambient dictation for Griselda Cheng-Akwa, APRN by Maliha Mann.  03/14/23   14:10 EDT    Patient or patient representative verbalized consent to the visit recording.  I have personally  performed the services described in this document as transcribed by the above individual, and it is both accurate and complete.  Griselda Cheng-Akwa, APRN  3/14/2023  18:24 EDT

## 2023-03-15 LAB — BACTERIA SPEC AEROBE CULT: NO GROWTH

## 2023-03-16 ENCOUNTER — OFFICE VISIT (OUTPATIENT)
Dept: FAMILY MEDICINE CLINIC | Facility: CLINIC | Age: 33
End: 2023-03-16
Payer: MEDICAID

## 2023-03-16 ENCOUNTER — TELEPHONE (OUTPATIENT)
Dept: FAMILY MEDICINE CLINIC | Facility: CLINIC | Age: 33
End: 2023-03-16

## 2023-03-16 VITALS
DIASTOLIC BLOOD PRESSURE: 72 MMHG | BODY MASS INDEX: 39.48 KG/M2 | WEIGHT: 222.8 LBS | OXYGEN SATURATION: 100 % | RESPIRATION RATE: 16 BRPM | SYSTOLIC BLOOD PRESSURE: 116 MMHG | HEIGHT: 63 IN | TEMPERATURE: 98.2 F | HEART RATE: 71 BPM

## 2023-03-16 DIAGNOSIS — M54.41 ACUTE BILATERAL LOW BACK PAIN WITH BILATERAL SCIATICA: ICD-10-CM

## 2023-03-16 DIAGNOSIS — M54.42 ACUTE BILATERAL LOW BACK PAIN WITH BILATERAL SCIATICA: ICD-10-CM

## 2023-03-16 DIAGNOSIS — Z01.419 ENCOUNTER FOR ANNUAL ROUTINE GYNECOLOGICAL EXAMINATION: Primary | ICD-10-CM

## 2023-03-16 DIAGNOSIS — J30.9 ALLERGIC RHINITIS, UNSPECIFIED SEASONALITY, UNSPECIFIED TRIGGER: ICD-10-CM

## 2023-03-16 DIAGNOSIS — N63.24 MASS OF LOWER INNER QUADRANT OF LEFT BREAST: ICD-10-CM

## 2023-03-16 DIAGNOSIS — B37.31 VAGINAL YEAST INFECTION: ICD-10-CM

## 2023-03-16 PROCEDURE — 1159F MED LIST DOCD IN RCRD: CPT | Performed by: NURSE PRACTITIONER

## 2023-03-16 PROCEDURE — 3078F DIAST BP <80 MM HG: CPT | Performed by: NURSE PRACTITIONER

## 2023-03-16 PROCEDURE — 99395 PREV VISIT EST AGE 18-39: CPT | Performed by: NURSE PRACTITIONER

## 2023-03-16 PROCEDURE — 2014F MENTAL STATUS ASSESS: CPT | Performed by: NURSE PRACTITIONER

## 2023-03-16 PROCEDURE — 1160F RVW MEDS BY RX/DR IN RCRD: CPT | Performed by: NURSE PRACTITIONER

## 2023-03-16 PROCEDURE — 3008F BODY MASS INDEX DOCD: CPT | Performed by: NURSE PRACTITIONER

## 2023-03-16 PROCEDURE — 3074F SYST BP LT 130 MM HG: CPT | Performed by: NURSE PRACTITIONER

## 2023-03-16 RX ORDER — FLUCONAZOLE 150 MG/1
150 TABLET ORAL
Qty: 2 TABLET | Refills: 0 | Status: SHIPPED | OUTPATIENT
Start: 2023-03-16 | End: 2023-03-21

## 2023-03-16 RX ORDER — NAPROXEN 500 MG/1
500 TABLET ORAL 2 TIMES DAILY WITH MEALS
Qty: 28 TABLET | Refills: 0 | Status: SHIPPED | OUTPATIENT
Start: 2023-03-16

## 2023-03-16 RX ORDER — LORATADINE 10 MG/1
10 TABLET ORAL DAILY
COMMUNITY
End: 2023-03-16 | Stop reason: SDUPTHER

## 2023-03-16 RX ORDER — LORATADINE 10 MG/1
10 TABLET ORAL DAILY
Qty: 90 TABLET | Refills: 1 | Status: SHIPPED | OUTPATIENT
Start: 2023-03-16

## 2023-03-16 NOTE — TELEPHONE ENCOUNTER
----- Message from EMELIA Aguilar sent at 3/16/2023  3:13 PM EDT -----  Please call patient regarding results.     Back x-ray okay.  Will order PT.      Spoke with patient & she verbalized understanding.

## 2023-03-16 NOTE — PROGRESS NOTES
"Subjective     Chief Complaint   Patient presents with   • Gynecologic Exam       Cheryl Mcnulty is a 33 y.o. female who presents for an annual exam. The patient is sexually active. GYN screening history: last pap: was normal. The patient reports that there is not domestic violence in her life.       Also has complaint yeast infection.  Reports white vaginal discharge and itching.  Also has complaint of bilateral low back pain.  Reports this is a chronic issue for about 1 year but it flared up Saturday.  Reports that today it is feeling a little better but would like x-ray.  Reports pain is worse if she \"bends over the wrong way or rolls over the wrong way in bed \".  Reports when this happens, she feels a pinch in her lower back and has tingling down her legs. Reports Zanaflex helps. Denies loss of bowel or bladder.  Denies any other associated symptoms.          Menstrual History:  OB History    No obstetric history on file.        Menarche age: age 13 years old     LMP: started 2/19/2023          The following portions of the patient's history were reviewed and updated as appropriate:vital signs, allergies, current medications, past medical history, past social history, past surgical history and problem list    Review of Systems   Review of Systems   Constitutional: Negative for fever.   Gastrointestinal: Negative for nausea and vomiting.   Genitourinary: Negative for difficulty urinating, menstrual problem and pelvic pain.       Objective     /72 (BP Location: Right arm, Patient Position: Sitting, Cuff Size: Large Adult)   Pulse 71   Temp 98.2 °F (36.8 °C) (Temporal)   Resp 16   Ht 160 cm (63\")   Wt 101 kg (222 lb 12.8 oz)   SpO2 100%   BMI 39.47 kg/m²       Physical Exam  Vitals and nursing note reviewed.   Cardiovascular:      Pulses: Normal pulses.   Abdominal:      Tenderness: There is no right CVA tenderness or left CVA tenderness.   Genitourinary:     Comments: - Suprapubic " tenderness  Musculoskeletal:         General: No swelling or tenderness. Normal range of motion.   Skin:     General: Skin is warm.      Capillary Refill: Capillary refill takes less than 2 seconds.   Neurological:      General: No focal deficit present.   Psychiatric:         Mood and Affect: Mood normal.         Behavior: Behavior normal.         Thought Content: Thought content normal.         Judgment: Judgment normal.         General:   WNL   Heart: regular rate and rhythm, S1, S2 normal, no murmur, click, rub or gallop   Lungs: clear to auscultation bilaterally   Breast: positive findings: fibrocystic changes and small nodule located on the left lower inner quadrant   Abdomen: {soft, non-tender. Bowel sounds normal. No masses,  no organomegaly   Vulva: normal   Vagina: Thick white discharge   Cervix: WNL   Uterus: normal size   Adnexa: normal adnexa   Rectal: External exam normal          Assessment & Plan     Diagnoses and all orders for this visit:    1. Encounter for annual routine gynecological examination (Primary)  -     Pap IG, Ct-Ng, Rfx HPV ASCU  -     NuSwab VG+ - Swab, Vagina    2. Acute bilateral low back pain with bilateral sciatica  -     naproxen (Naprosyn) 500 MG tablet; Take 1 tablet by mouth 2 (Two) Times a Day With Meals.  Dispense: 28 tablet; Refill: 0  -     XR Spine Lumbar 2 or 3 View (In Office)  -     Ambulatory Referral to Physical Therapy Evaluate and treat    3. Vaginal yeast infection  -     NuSwab VG+ - Swab, Vagina  -     fluconazole (Diflucan) 150 MG tablet; Take 1 tablet by mouth Every 72 (Seventy-Two) Hours for 2 doses. Take 1 tablet now. May repeat a second dose after 72 hours if symptoms persist  Dispense: 2 tablet; Refill: 0    4. Allergic rhinitis, unspecified seasonality, unspecified trigger  -     loratadine (Claritin) 10 MG tablet; Take 1 tablet by mouth Daily.  Dispense: 90 tablet; Refill: 1    5. Mass of lower inner quadrant of left breast  -     Mammo diagnostic  digital tomosynthesis bilateral w CAD; Future  -     US breast left complete; Future      Follow-up annually for physical  RICE therapy. Agreeable to PT.   Initiate regimen as above  Medication refilled.  Avoid irritants.  Patient did not keep follow-up for evaluation of breast nodule.  Is agreeable to further evaluation at this time.    Discussed possible differential diagnoses, testing, treatment, recommended non-pharmacological interventions and/or lifestyle modifications, risks, warning signs to monitor for that would indicate need for follow-up in clinic or ER. If no improvement with this plan of care or you develop new or worsening symptoms, follow-up. Patient and/or guardian verbalizes understanding and agreement with plan of care. Denies further needs or concerns.    I spent 45 minutes caring for patient on this date of service. This time includes time spent by me in the following activities: preparing for the visit, reviewing tests, obtaining and/or reviewing a separately obtained history, performing a medically appropriate examination and/or evaluation, counseling and educating the patient/family/caregiver, ordering medications, tests, or procedures and documenting information in the medical record.     Patient/guardian was given instructions and counseling regarding her condition and for health maintenance advised.    Return for Recheck in 1-2 weeks, sooner if needed.    Pao Colon, APRN  3/16/2023

## 2023-03-18 LAB
A VAGINAE DNA VAG QL NAA+PROBE: ABNORMAL SCORE
BVAB2 DNA VAG QL NAA+PROBE: ABNORMAL SCORE
C ALBICANS DNA VAG QL NAA+PROBE: NEGATIVE
C GLABRATA DNA VAG QL NAA+PROBE: NEGATIVE
C TRACH DNA VAG QL NAA+PROBE: NEGATIVE
MEGA1 DNA VAG QL NAA+PROBE: ABNORMAL SCORE
N GONORRHOEA DNA VAG QL NAA+PROBE: NEGATIVE
T VAGINALIS DNA VAG QL NAA+PROBE: POSITIVE

## 2023-03-21 ENCOUNTER — TELEPHONE (OUTPATIENT)
Dept: FAMILY MEDICINE CLINIC | Facility: CLINIC | Age: 33
End: 2023-03-21

## 2023-03-21 ENCOUNTER — OFFICE VISIT (OUTPATIENT)
Dept: FAMILY MEDICINE CLINIC | Facility: CLINIC | Age: 33
End: 2023-03-21
Payer: MEDICAID

## 2023-03-21 DIAGNOSIS — Z71.3 WEIGHT LOSS COUNSELING, ENCOUNTER FOR: ICD-10-CM

## 2023-03-21 DIAGNOSIS — E66.9 OBESITY (BMI 30-39.9): Primary | ICD-10-CM

## 2023-03-21 DIAGNOSIS — Z71.6 ENCOUNTER FOR SMOKING CESSATION COUNSELING: ICD-10-CM

## 2023-03-21 DIAGNOSIS — A59.9 TRICHOMONAS INFECTION: ICD-10-CM

## 2023-03-21 DIAGNOSIS — Z72.0 VAPES NICOTINE CONTAINING SUBSTANCE: ICD-10-CM

## 2023-03-21 RX ORDER — METRONIDAZOLE 500 MG/1
500 TABLET ORAL 2 TIMES DAILY
Qty: 14 TABLET | Refills: 0 | Status: SHIPPED | OUTPATIENT
Start: 2023-03-21 | End: 2023-03-28

## 2023-03-21 NOTE — PROGRESS NOTES
Chief Complaint  Nicotine Dependence and Obesity    Subjective          Cheryl Mcnulty is a 33 y.o. female who presents today to [facility] for follow up    HPI:   History of Present Illness      Presents for weight check.  Is following with bariatrics for weight loss surgery. Reports she is currently working with a dietitian and has been working on improving her diet and increasing exercise.    Nuswab from 3/16/2023 was positive for trichomonas. Antibiotics sent to pharmacy today but she has not picked it up yet.     Also requesting to start Chantix for smoking cessation.  Patient reports she vapes all day long.  Reports the longest she goes without vaping is about 8 hours.  Reports she has used Chantix in the past, about 6 years ago.  Reports she tolerated this well with no issues or side effects and was able to stop smoking within 3 weeks.  Denies any other issues or concerns at this time.      The following portions of the patient's history were reviewed and updated as appropriate: allergies, current medications, past family history, past medical history, past social history, past surgical history and problem list.    Objective     Allergy:   No Known Allergies     Current Medications:   Current Outpatient Medications   Medication Sig Dispense Refill   • atenolol (Tenormin) 25 MG tablet Take 1 tablet by mouth Daily. 90 tablet 0   • cholecalciferol (VITAMIN D3) 25 MCG (1000 UT) tablet Take 1 tablet by mouth Daily. 90 tablet 1   • diphenhydrAMINE HCl, Sleep, (UNISOM SLEEPGELS PO) Take 1 tablet by mouth As Needed.     • hydroCHLOROthiazide (MICROZIDE) 12.5 MG capsule Take 1 capsule by mouth Daily. 90 capsule 0   • loratadine (Claritin) 10 MG tablet Take 1 tablet by mouth Daily. 90 tablet 1   • metroNIDAZOLE (FLAGYL) 500 MG tablet Take 1 tablet by mouth 2 (Two) Times a Day for 7 days. 14 tablet 0   • multivitamin with minerals tablet tablet Take 1 tablet by mouth Daily.     •  naproxen (Naprosyn) 500 MG tablet Take 1 tablet by mouth 2 (Two) Times a Day With Meals. 28 tablet 0   • nitrofurantoin, macrocrystal-monohydrate, (MACROBID) 100 MG capsule Take 1 capsule by mouth Daily.     • tiZANidine (ZANAFLEX) 4 MG tablet Take 1 tablet by mouth 2 (Two) Times a Day. 60 tablet 4   • vitamin B-12 (CYANOCOBALAMIN) 1000 MCG tablet Take 1 tablet by mouth Daily.     • Varenicline Tartrate, Starter, 0.5 MG X 11 & 1 MG X 42 tablet therapy pack Take 1 each by mouth Daily. 53 each 0     No current facility-administered medications for this visit.       Past Medical History:  Past Medical History:   Diagnosis Date   • Congestive heart failure (HCC)     in pregnancy   • Fibromyalgia    • Frequent UTI     on daily abx per urology   • History of depression    • History of ear infections    • History of heart disease    • History of lupus     skin rashes, joint pain, fatigue.   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 DRAINAGE OF OVARIAN CYST.;  Surgeon: Rachel Caruso DO;  Location: [facility];  Service: Obstetrics/Gynecology   • ORIF SCAPHOID W VASCULARIZED BONE GRAFT     • TUBAL COAGULATION LAPAROSCOPIC Bilateral 2018    Procedure: TUBAL FALLOPE FILSHIE CLIPPING LAPAROSCOPIC;  Surgeon: Rachel Caruso DO;  Location: [facility];  Service: Obstetrics/Gynecology   • ULNA/RADIUS CLOSED REDUCTION         Social History:  Social History     Socioeconomic History   • Marital status: Single   Tobacco Use   • Smoking status: Never   • Smokeless tobacco: Never   Vaping Use   •  "Vaping Use: Some days   • Substances: Nicotine   • Devices: Disposable   Substance and Sexual Activity   • Alcohol use: Yes     Comment: socially   • Drug use: No   • Sexual activity: Yes     Partners: Male     Birth control/protection: Surgical, Tubal ligation       Family History:  Family History   Problem Relation Age of Onset   • Heart disease Mother    • Thyroid disease Mother    • Anxiety disorder Mother    • Depression Mother    • Bipolar disorder Mother    • Heart disease Father    • Cancer Father    • Heart disease Maternal Grandmother    • Diabetes Maternal Grandmother    • Diabetes Maternal Grandfather    • Lupus Paternal Grandmother    • Rheum arthritis Paternal Grandmother        Review of Systems:  Review of Systems   Constitutional: Negative for unexpected weight change.   Respiratory: Negative for shortness of breath and wheezing.    Genitourinary: Negative for pelvic pain.       Vital Signs:   /78 (BP Location: Right arm, Patient Position: Sitting, Cuff Size: Large Adult)   Pulse 92   Temp 98.2 °F (36.8 °C) (Temporal)   Resp 16   Ht 160 cm (63\")   Wt 101 kg (222 lb 9.6 oz)   SpO2 99%   BMI 39.43 kg/m²      Physical Exam:  Physical Exam  Vitals and nursing note reviewed.   Constitutional:       General: She is not in acute distress.     Appearance: Normal appearance. She is obese. She is not ill-appearing or toxic-appearing.   HENT:      Head: Normocephalic.   Cardiovascular:      Rate and Rhythm: Normal rate and regular rhythm.      Heart sounds: Normal heart sounds.   Pulmonary:      Effort: Pulmonary effort is normal. No respiratory distress.      Breath sounds: Normal breath sounds.   Abdominal:      General: Bowel sounds are normal.      Palpations: Abdomen is soft.   Skin:     General: Skin is warm and dry.      Coloration: Skin is not pale.   Neurological:      Mental Status: She is alert and oriented to person, place, and time.   Psychiatric:         Mood and Affect: Mood normal. "         Behavior: Behavior normal.         Thought Content: Thought content normal.         Judgment: Judgment normal.                  Assessment and Plan   Diagnoses and all orders for this visit:    1. Obesity (BMI 30-39.9) (Primary)    2. Vapes nicotine containing substance  -     Varenicline Tartrate, Starter, 0.5 MG X 11 & 1 MG X 42 tablet therapy pack; Take 1 each by mouth Daily.  Dispense: 53 each; Refill: 0    3. Encounter for smoking cessation counseling  -     Varenicline Tartrate, Starter, 0.5 MG X 11 & 1 MG X 42 tablet therapy pack; Take 1 each by mouth Daily.  Dispense: 53 each; Refill: 0    4. Trichomonas infection    5. Weight loss counseling, encounter for      Continue diet and lifestyle modifications to promote healthy weight.    Initiate Chantix starter pack.  Discussed goal stop date.  Throat and extensive discussion had with patient regarding treatment and risks associated.     antibiotics and take as prescribed.  Follow-up after 3 weeks after finishing antibiotics to have repeat testing to ensure resolution of infection.      Discussed possible differential diagnoses, testing, treatment, recommended non-pharmacological interventions, risks, warning signs to monitor for that would indicate need for follow-up in clinic or ER. If no improvement with these regimens or you have new or worsening symptoms follow-up. Patient verbalizes understanding and agreement with plan of care. Denies further needs or concerns.     Patient was given instructions and counseling regarding her condition and for health maintenance advised.    Class 2 Severe Obesity (BMI >=35 and <=39.9). Obesity-related health conditions include the following: obstructive sleep apnea, hypertension, coronary heart disease, diabetes mellitus, dyslipidemias, GERD and peripheral vascular disease. Obesity is improving with lifestyle modifications. BMI is is above average; BMI management plan is completed. We discussed portion  control and increasing exercise.       I spent 30 minutes caring for patient on this date of service. This time includes time spent by me in the following activities: preparing for the visit, reviewing tests, obtaining and/or reviewing a separately obtained history, performing a medically appropriate examination and/or evaluation, counseling and educating the patient/family/caregiver, ordering medications, tests, or procedures and documenting information in the medical record    Meds ordered during this visit:  New Medications Ordered This Visit   Medications   • Varenicline Tartrate, Starter, 0.5 MG X 11 & 1 MG X 42 tablet therapy pack     Sig: Take 1 each by mouth Daily.     Dispense:  53 each     Refill:  0       Patient Instructions:  Patient instructions given for the following visit diagnosis:    ICD-10-CM ICD-9-CM   1. Obesity (BMI 30-39.9)  E66.9 278.00   2. Vapes nicotine containing substance  Z72.0 305.1   3. Encounter for smoking cessation counseling  Z71.6 V65.42     305.1   4. Trichomonas infection  A59.9 131.9   5. Weight loss counseling, encounter for  Z71.3 V65.3       Follow Up   Return for Recheck in 2-3 weeks, sooner if needed.        This document has been electronically signed by EMELIA Aguilar  March 22, 2023 11:03 EDT    Patient was given instructions and counseling regarding her condition or for health maintenance advice. Please see specific information pulled into the AVS if appropriate.     Part of this note may be an electronic transcription/translation of spoken language to printed text using the Dragon Dictation System.

## 2023-03-21 NOTE — TELEPHONE ENCOUNTER
----- Message from EMELIA Aguilar sent at 3/21/2023  7:53 AM EDT -----  Please call patient regarding results.     Pap was positive for trichomonas. This is treated with antibiotics- Metronidazole sent to pharmacy. Her sex partners should be tested and treated for this also. Please educate patient that her and sex partners should abstain from intercourse until all partners have completed antibiotic therapy and symptoms have resolved. It is recommended that she follow-up to be retested in 3 weeks to ensure resolution of infection.      Left a message to return call.    Patient notified & verbalized understanding.

## 2023-03-22 VITALS
OXYGEN SATURATION: 99 % | SYSTOLIC BLOOD PRESSURE: 124 MMHG | BODY MASS INDEX: 39.44 KG/M2 | WEIGHT: 222.6 LBS | HEIGHT: 63 IN | HEART RATE: 92 BPM | RESPIRATION RATE: 16 BRPM | DIASTOLIC BLOOD PRESSURE: 78 MMHG | TEMPERATURE: 98.2 F

## 2023-03-23 ENCOUNTER — TELEPHONE (OUTPATIENT)
Dept: FAMILY MEDICINE CLINIC | Facility: CLINIC | Age: 33
End: 2023-03-23
Payer: MEDICAID

## 2023-03-23 LAB
C TRACH RRNA CVX QL NAA+PROBE: NEGATIVE
CONV .: NORMAL
CYTOLOGIST CVX/VAG CYTO: NORMAL
CYTOLOGY CVX/VAG DOC CYTO: NORMAL
CYTOLOGY CVX/VAG DOC THIN PREP: NORMAL
DX ICD CODE: NORMAL
HIV 1 & 2 AB SER-IMP: NORMAL
N GONORRHOEA RRNA CVX QL NAA+PROBE: NEGATIVE
OTHER STN SPEC: NORMAL
STAT OF ADQ CVX/VAG CYTO-IMP: NORMAL

## 2023-03-23 NOTE — TELEPHONE ENCOUNTER
----- Message from EMELIA Aguilar sent at 3/23/2023 11:06 AM EDT -----  Please call patient regarding results.     Pap okay      Patient notified.

## 2023-03-28 ENCOUNTER — OFFICE VISIT (OUTPATIENT)
Dept: CARDIOLOGY | Facility: CLINIC | Age: 33
End: 2023-03-28
Payer: MEDICAID

## 2023-03-28 VITALS
HEART RATE: 78 BPM | OXYGEN SATURATION: 99 % | BODY MASS INDEX: 39.69 KG/M2 | HEIGHT: 63 IN | DIASTOLIC BLOOD PRESSURE: 92 MMHG | SYSTOLIC BLOOD PRESSURE: 141 MMHG | WEIGHT: 224 LBS

## 2023-03-28 DIAGNOSIS — Z01.810 PREOP CARDIOVASCULAR EXAM: Primary | ICD-10-CM

## 2023-03-28 DIAGNOSIS — I10 ESSENTIAL HYPERTENSION: ICD-10-CM

## 2023-03-28 DIAGNOSIS — R07.2 PRECORDIAL PAIN: ICD-10-CM

## 2023-03-28 DIAGNOSIS — Z01.810 PREOPERATIVE CARDIOVASCULAR EXAMINATION: ICD-10-CM

## 2023-03-28 PROCEDURE — 3077F SYST BP >= 140 MM HG: CPT | Performed by: NURSE PRACTITIONER

## 2023-03-28 PROCEDURE — 1160F RVW MEDS BY RX/DR IN RCRD: CPT | Performed by: NURSE PRACTITIONER

## 2023-03-28 PROCEDURE — 1159F MED LIST DOCD IN RCRD: CPT | Performed by: NURSE PRACTITIONER

## 2023-03-28 PROCEDURE — 3080F DIAST BP >= 90 MM HG: CPT | Performed by: NURSE PRACTITIONER

## 2023-03-28 PROCEDURE — 99214 OFFICE O/P EST MOD 30 MIN: CPT | Performed by: NURSE PRACTITIONER

## 2023-03-28 NOTE — PROGRESS NOTES
Subjective     Cheryl Mcnulty is a 33 y.o. female who presents to [facility] for Surgical Clearance, Establish Care, and Med Management.    CHIEF COMPLIANT  Chief Complaint   Patient presents with   • Surgical Clearance   • Establish Care   • Med Management       Active Problems:  Problem List Items Addressed This Visit    None      HPI  HPI    PRIOR MEDS  Current Outpatient Medications on File Prior to Visit   Medication Sig Dispense Refill   • atenolol (Tenormin) 25 MG tablet Take 1 tablet by mouth Daily. 90 tablet 0   • cholecalciferol (VITAMIN D3) 25 MCG (1000 UT) tablet Take 1 tablet by mouth Daily. 90 tablet 1   • diphenhydrAMINE HCl, Sleep, (UNISOM SLEEPGELS PO) Take 1 tablet by mouth As Needed.     • hydroCHLOROthiazide (MICROZIDE) 12.5 MG capsule Take 1 capsule by mouth Daily. 90 capsule 0   • loratadine (Claritin) 10 MG tablet Take 1 tablet by mouth Daily. 90 tablet 1   • metroNIDAZOLE (FLAGYL) 500 MG tablet Take 1 tablet by mouth 2 (Two) Times a Day for 7 days. 14 tablet 0   • multivitamin with minerals tablet tablet Take 1 tablet by mouth Daily.     • naproxen (Naprosyn) 500 MG tablet Take 1 tablet by mouth 2 (Two) Times a Day With Meals. 28 tablet 0   • nitrofurantoin, macrocrystal-monohydrate, (MACROBID) 100 MG capsule Take 1 capsule by mouth Daily.     • tiZANidine (ZANAFLEX) 4 MG tablet Take 1 tablet by mouth 2 (Two) Times a Day. 60 tablet 4   • Varenicline Tartrate, Starter, 0.5 MG X 11 & 1 MG X 42 tablet therapy pack Take 1 each by mouth Daily. 53 each 0   • vitamin B-12 (CYANOCOBALAMIN) 1000 MCG tablet Take 1 tablet by mouth Daily.       No current facility-administered medications on file prior to visit.       ALLERGIES  Patient has no known allergies.    HISTORY  Past Medical History:   Diagnosis Date   • Congestive heart failure (HCC)     in pregnancy   • Fibromyalgia    • Frequent UTI     on daily abx per urology   • History of depression    • History of ear infections    • History of heart disease     • History of lupus     skin rashes, joint pain, fatigue.   followed by rhuematology, on plaquenil and mobic   • History of migraine    • Hypertension    • Lown Ganong Parker syndrome    • Murmur    • Nephritis    • Osteoarthritis    • Pulmonary stenosis    • Urinary tract infection        Social History     Socioeconomic History   • Marital status: Single   Tobacco Use   • Smoking status: Never   • Smokeless tobacco: Never   Vaping Use   • Vaping Use: Some days   • Substances: Nicotine   • Devices: Disposable   Substance and Sexual Activity   • Alcohol use: Yes     Comment: socially   • Drug use: No   • Sexual activity: Yes     Partners: Male     Birth control/protection: Surgical, Tubal ligation       Family History   Problem Relation Age of Onset   • Heart disease Mother    • Thyroid disease Mother    • Anxiety disorder Mother    • Depression Mother    • Bipolar disorder Mother    • Heart disease Father    • Cancer Father    • Heart disease Maternal Grandmother    • Diabetes Maternal Grandmother    • Diabetes Maternal Grandfather    • Lupus Paternal Grandmother    • Rheum arthritis Paternal Grandmother        Review of Systems    Objective     VITALS: There were no vitals taken for this visit.    LABS:   Lab Results (most recent)     None          IMAGING:   XR Spine Lumbar 2 or 3 View (In Office)    Result Date: 3/16/2023    No acute findings in the lumbar spine.  This report was finalized on 3/16/2023 12:52 PM by Dr. Marvel Hay MD.      CT Abdomen Pelvis Stone Protocol    Result Date: 12/8/2022  1. No evidence for urinary tract calculus disease. No hydronephrosis. 2. Appendix unremarkable. No bowel obstruction. 3. There is a surgical clip like device in the right hemipelvis. Please correlate with procedure. It could be a tubal ligation clip but there is no left-sided clip. Signer Name: Janice Marquez MD  Signed: 12/8/2022 10:00 PM  Workstation Name: [id number]      EXAM:  Physical Exam    Procedure     ECG 12 Lead    Date/Time: 3/28/2023 11:14 AM  Performed by: Dre Cronin MD  Authorized by: Dre Cronin MD                  Assessment & Plan     There are no diagnoses linked to this encounter.    No follow-ups on file.      Advance Care Planning   {Advance Directive Status:42193}             MEDS ORDERED DURING VISIT:  No orders of the defined types were placed in this encounter.      As always, Pao Colon APRN  I appreciate very much the opportunity to participate in the cardiovascular care of your patients. Please do not hesitate to call me with any questions with regards to Cheryl Mcnulty evaluation and management.         This document has been electronically signed by Dre Cronin MD  March 28, 2023 11:14 EDT    This note is dictated utilizing voice recognition software.

## 2023-03-28 NOTE — PROGRESS NOTES
[facility]             MariahEMELIA Gutierrez Lori, APRN  Cheryl Mcnulty  1990 03/28/2023    Patient Active Problem List   Diagnosis   • UTI (urinary tract infection)   • Ureteral calculus   • Frequent UTI   • Acute cystitis without hematuria   • Dysuria   • Essential hypertension   • Lupus (HCC)   • History of depression   • Pulmonary stenosis   • Preoperative cardiovascular examination   • Concern about STD in female without diagnosis   • Dysuria-frequency syndrome   • Precordial pain       Dear Pao Colon APRN:    Subjective     Chief Complaint   Patient presents with   • Surgical Clearance     Gastric sleeve   • Med Management     MyChart       HPI:     This is a 33 y.o. female with known past medical history of essential hypertension and lupus.    Cheryl Mcnulty presents today for routine cardiology follow up.  Patient presents today seeking preop clearance for gastric sleeve.  States she has been doing overall well.  Blood pressure elevated in the office today however she did not take her blood pressure medication this morning.  Does report some intermittent sharp left-sided chest pain that occurs a couple times a month.  Denies any shortness of breath.  Echocardiogram in February 2022 showed normal LV function.    • Diagnostic Testing  1. Echocardiogram 2/2018: EF 61 to 65%  2. Treadmill stress test 2/2018: Findings consistent with a normal ECG stress test  3. Cardiac event monitor 2/2021: Normal sinus rhythm with no arrhythmias noted  4. Echocardiogram 2/2022: EF 61 to 65%     All other systems were reviewed and were negative.    Patient Active Problem List   Diagnosis   • UTI (urinary tract infection)   • Ureteral calculus   • Frequent UTI   • Acute cystitis without hematuria   • Dysuria   • Essential hypertension   • Lupus (HCC)   • History of depression   • Pulmonary stenosis   • Preoperative cardiovascular examination   • Concern about STD in  female without diagnosis   • Dysuria-frequency syndrome   • Precordial pain       family history includes Anxiety disorder in her mother; Bipolar disorder in her mother; Cancer in her father; Depression in her mother; Diabetes in her maternal grandfather and maternal grandmother; Heart disease in her father, maternal grandmother, and mother; Lupus in her paternal grandmother; Rheum arthritis in her paternal grandmother; Thyroid disease in her mother.     reports that she has never smoked. She has never used smokeless tobacco. She reports current alcohol use. She reports that she does not use drugs.    No Known Allergies      Current Outpatient Medications:   •  atenolol (Tenormin) 25 MG tablet, Take 1 tablet by mouth Daily., Disp: 90 tablet, Rfl: 0  •  cholecalciferol (VITAMIN D3) 25 MCG (1000 UT) tablet, Take 1 tablet by mouth Daily., Disp: 90 tablet, Rfl: 1  •  diphenhydrAMINE HCl, Sleep, (UNISOM SLEEPGELS PO), Take 1 tablet by mouth As Needed., Disp: , Rfl:   •  hydroCHLOROthiazide (MICROZIDE) 12.5 MG capsule, Take 1 capsule by mouth Daily., Disp: 90 capsule, Rfl: 0  •  loratadine (Claritin) 10 MG tablet, Take 1 tablet by mouth Daily., Disp: 90 tablet, Rfl: 1  •  metroNIDAZOLE (FLAGYL) 500 MG tablet, Take 1 tablet by mouth 2 (Two) Times a Day for 7 days., Disp: 14 tablet, Rfl: 0  •  multivitamin with minerals tablet tablet, Take 1 tablet by mouth Daily., Disp: , Rfl:   •  naproxen (Naprosyn) 500 MG tablet, Take 1 tablet by mouth 2 (Two) Times a Day With Meals., Disp: 28 tablet, Rfl: 0  •  nitrofurantoin, macrocrystal-monohydrate, (MACROBID) 100 MG capsule, Take 1 capsule by mouth Daily., Disp: , Rfl:   •  tiZANidine (ZANAFLEX) 4 MG tablet, Take 1 tablet by mouth 2 (Two) Times a Day., Disp: 60 tablet, Rfl: 4  •  Varenicline Tartrate, Starter, 0.5 MG X 11 & 1 MG X 42 tablet therapy pack, Take 1 each by mouth Daily., Disp: 53 each, Rfl: 0  •  vitamin B-12 (CYANOCOBALAMIN) 1000 MCG tablet, Take 1 tablet by mouth  Daily., Disp: , Rfl:       Physical Exam:  I have reviewed the patient's current vital signs as documented in the patient's EMR.   Vitals:    03/28/23 1142   BP: 141/92   Pulse: 78   SpO2: 99%     Body mass index is 39.68 kg/m².       03/28/23  1142   Weight: 102 kg (224 lb)      Physical Exam  Constitutional:       General: She is not in acute distress.     Appearance: Normal appearance. She is well-developed and normal weight.   HENT:      Head: Normocephalic and atraumatic.   Eyes:      General: Lids are normal.      Conjunctiva/sclera: Conjunctivae normal.      Pupils: Pupils are equal, round, and reactive to light.   Neck:      Vascular: No carotid bruit or JVD.   Cardiovascular:      Rate and Rhythm: Normal rate and regular rhythm.      Pulses: Normal pulses.      Heart sounds: Normal heart sounds, S1 normal and S2 normal. No murmur heard.  Pulmonary:      Effort: Pulmonary effort is normal. No respiratory distress.      Breath sounds: Normal breath sounds. No wheezing.   Abdominal:      General: Bowel sounds are normal. There is no distension.      Palpations: Abdomen is soft. There is no hepatomegaly or splenomegaly.      Tenderness: There is no abdominal tenderness.   Musculoskeletal:         General: No swelling. Normal range of motion.      Cervical back: Normal range of motion and neck supple.      Right lower leg: No edema.      Left lower leg: No edema.   Skin:     General: Skin is warm and dry.      Coloration: Skin is not jaundiced.      Findings: No rash.   Neurological:      General: No focal deficit present.      Mental Status: She is alert and oriented to person, place, and time. Mental status is at baseline.   Psychiatric:         Mood and Affect: Mood normal.         Speech: Speech normal.         Behavior: Behavior normal.         Thought Content: Thought content normal.         Judgment: Judgment normal.          DATA REVIEWED:     TTE/GASPER:  Results for orders placed during the hospital  encounter of 02/24/22    Adult Transthoracic Echo Complete w/ Color, Spectral and Contrast if necessary per protocol    Interpretation Summary  · Normal left ventricular cavity size and wall thickness noted. All left ventricular wall segments contract normally.  · Left ventricular ejection fraction appears to be 61 - 65%.  · The aortic valve is structurally normal with no stenosis present. Trace aortic valve regurgitation is present.  · The mitral valve is structurally normal with no significant stenosis present. Trace mitral valve regurgitation is present.  · Mild tricuspid valve regurgitation is present. Estimated right ventricular systolic pressure from tricuspid regurgitation is mildly elevated (35-45 mmHg).  · There is no evidence of pericardial effusion. .      Laboratory evaluations:    Lab Results   Component Value Date    GLUCOSE 87 01/31/2023    BUN 11 01/31/2023    CREATININE 0.81 01/31/2023    EGFRIFNONA 87 11/11/2021    BCR 14 01/31/2023    K 4.4 01/31/2023    CO2 25 01/31/2023    CALCIUM 9.5 01/31/2023    PROTENTOTREF 6.7 01/31/2023    ALBUMIN 4.5 01/31/2023    LABIL2 2.0 01/31/2023    AST 15 01/31/2023    ALT 16 01/31/2023     Lab Results   Component Value Date    WBC 9.77 02/07/2023    HGB 12.4 02/07/2023    HCT 37.4 02/07/2023    MCV 83.9 02/07/2023     02/07/2023     Lab Results   Component Value Date    CHOL 168 09/09/2022    CHLPL 188 01/31/2023    TRIG 86 01/31/2023    HDL 57 01/31/2023     (H) 01/31/2023     Lab Results   Component Value Date    TSH 1.490 01/31/2023    THYROIDAB 6 11/22/2016     Lab Results   Component Value Date    HGBA1C 5.1 01/31/2023     Lab Results   Component Value Date    ALT 16 01/31/2023     Lab Results   Component Value Date    HGBA1C 5.1 01/31/2023    HGBA1C 5.00 09/09/2022     Lab Results   Component Value Date    CREATININE 0.81 01/31/2023     Lab Results   Component Value Date    IRON 41 (L) 05/25/2016    TIBC 397 05/25/2016    FERRITIN 49.29  03/09/2021     Lab Results   Component Value Date    INR 1.15 (H) 09/14/2018    INR 1.14 (H) 06/16/2018    PROTIME 14.9 09/14/2018    PROTIME 14.8 06/16/2018             ECG 12 Lead    Date/Time: 3/28/2023 12:11 PM  Performed by: Mariah Orta APRN  Authorized by: Mariah Orta APRN   Comparison: compared with previous ECG   Rhythm: sinus rhythm and sinus arrhythmia    Clinical impression: non-specific ECG          --------------------------------------------------------------------------------------------------------------------------    ASSESSMENT/PLAN:      Diagnosis Plan   1. Preop cardiovascular exam  Treadmill Stress Test      2. Preoperative cardiovascular examination        3. Essential hypertension        4. Precordial pain            1. Preoperative cardiovascular examination  2. Chest pain  • Patient is seeking preoperative evaluation for gastric sleeve.  Does report some intermittent sharp chest pain therefore we will proceed with treadmill stress testing for further restratification.  Echocardiogram in 2022 showed normal LV function.    3. Essential hypertension  • Blood pressure elevated in the office today however patient did not take her home medications prior to coming to the office.  Instructed her to take her medications as prescribed.  Continue atenolol and HCTZ.      This document has been @Electronically signed by EMELIA Collins, 03/28/23, 11:52 AM EDT.       Dictated Utilizing Dragon Dictation: Part of this note may be an electronic transcription/translation of spoken language to printed text using the Dragon Dictation System.    Follow-up appointment and medication changes provided in hand delivered After Visit Summary as well as reviewed in the room.

## 2023-03-29 ENCOUNTER — HOSPITAL ENCOUNTER (OUTPATIENT)
Dept: CARDIOLOGY | Facility: HOSPITAL | Age: 33
Discharge: HOME | End: 2023-03-29
Admitting: NURSE PRACTITIONER
Payer: MEDICAID

## 2023-03-29 DIAGNOSIS — Z01.810 PREOP CARDIOVASCULAR EXAM: ICD-10-CM

## 2023-03-29 LAB
BH CV STRESS BP STAGE 1: NORMAL
BH CV STRESS BP STAGE 2: NORMAL
BH CV STRESS DURATION MIN STAGE 1: 3
BH CV STRESS DURATION MIN STAGE 2: 3
BH CV STRESS DURATION SEC STAGE 1: 0
BH CV STRESS DURATION SEC STAGE 2: 0
BH CV STRESS DURATION SEC STAGE 3: 29
BH CV STRESS GRADE STAGE 1: 10
BH CV STRESS GRADE STAGE 2: 12
BH CV STRESS GRADE STAGE 3: 14
BH CV STRESS HR STAGE 1: 118
BH CV STRESS HR STAGE 2: 155
BH CV STRESS HR STAGE 3: 166
BH CV STRESS METS STAGE 1: 5
BH CV STRESS METS STAGE 2: 7.5
BH CV STRESS METS STAGE 3: 10
BH CV STRESS PROTOCOL 1: NORMAL
BH CV STRESS RECOVERY BP: NORMAL MMHG
BH CV STRESS RECOVERY HR: 95 BPM
BH CV STRESS SPEED STAGE 1: 1.7
BH CV STRESS SPEED STAGE 2: 2.5
BH CV STRESS SPEED STAGE 3: 3.4
BH CV STRESS STAGE 1: 1
BH CV STRESS STAGE 2: 2
BH CV STRESS STAGE 3: 3
MAXIMAL PREDICTED HEART RATE: 187 BPM
PERCENT MAX PREDICTED HR: 88.77 %
STRESS BASELINE BP: NORMAL MMHG
STRESS BASELINE HR: 81 BPM
STRESS PERCENT HR: 104 %
STRESS POST ESTIMATED WORKLOAD: 8.4 METS
STRESS POST EXERCISE DUR MIN: 6 MIN
STRESS POST EXERCISE DUR SEC: 29 SEC
STRESS POST PEAK BP: NORMAL MMHG
STRESS POST PEAK HR: 166 BPM
STRESS TARGET HR: 159 BPM

## 2023-03-29 PROCEDURE — 93018 CV STRESS TEST I&R ONLY: CPT | Performed by: SPECIALIST

## 2023-03-29 PROCEDURE — 93017 CV STRESS TEST TRACING ONLY: CPT

## 2023-04-04 ENCOUNTER — OFFICE VISIT (OUTPATIENT)
Dept: FAMILY MEDICINE CLINIC | Facility: CLINIC | Age: 33
End: 2023-04-04
Payer: MEDICAID

## 2023-04-04 VITALS
OXYGEN SATURATION: 99 % | WEIGHT: 223.6 LBS | HEIGHT: 63 IN | DIASTOLIC BLOOD PRESSURE: 68 MMHG | RESPIRATION RATE: 16 BRPM | TEMPERATURE: 98.4 F | HEART RATE: 77 BPM | BODY MASS INDEX: 39.62 KG/M2 | SYSTOLIC BLOOD PRESSURE: 116 MMHG

## 2023-04-04 DIAGNOSIS — Z71.6 ENCOUNTER FOR SMOKING CESSATION COUNSELING: ICD-10-CM

## 2023-04-04 DIAGNOSIS — E66.9 OBESITY (BMI 30-39.9): ICD-10-CM

## 2023-04-04 DIAGNOSIS — A59.9 TRICHOMONAS INFECTION: ICD-10-CM

## 2023-04-04 DIAGNOSIS — Z71.3 WEIGHT LOSS COUNSELING, ENCOUNTER FOR: Primary | ICD-10-CM

## 2023-04-04 PROCEDURE — 1159F MED LIST DOCD IN RCRD: CPT | Performed by: NURSE PRACTITIONER

## 2023-04-04 PROCEDURE — 1160F RVW MEDS BY RX/DR IN RCRD: CPT | Performed by: NURSE PRACTITIONER

## 2023-04-04 PROCEDURE — 3074F SYST BP LT 130 MM HG: CPT | Performed by: NURSE PRACTITIONER

## 2023-04-04 PROCEDURE — 3078F DIAST BP <80 MM HG: CPT | Performed by: NURSE PRACTITIONER

## 2023-04-04 PROCEDURE — 99214 OFFICE O/P EST MOD 30 MIN: CPT | Performed by: NURSE PRACTITIONER

## 2023-04-04 NOTE — PROGRESS NOTES
Chief Complaint  Obesity    Subjective          Cheryl Mcnulty is a 33 y.o. female who presents today to [facility] for follow up    HPI:   History of Present Illness    Presents to clinic for follow up for weight check. Is following with bariatrics for weight loss surgery. Reports she is currently working with a dietitian and has been working on improving her diet and increasing exercise.     Started chantix 3/21/23 for smoking cessation. Today patient reports she is doing well with this and is tolerating medication well with no issues or side effects.  Reports she is is now only puffing her vape about 4 times a day.     Was treated for trichomonas on 3/21/2023.  Patient reports she finished antibiotics.  Denies any symptoms.  Denies any other issues or concerns at this time.      The following portions of the patient's history were reviewed and updated as appropriate: allergies, current medications, past family history, past medical history, past social history, past surgical history and problem list.    Objective     Allergy:   No Known Allergies     Current Medications:   Current Outpatient Medications   Medication Sig Dispense Refill   • atenolol (Tenormin) 25 MG tablet Take 1 tablet by mouth Daily. 90 tablet 0   • cholecalciferol (VITAMIN D3) 25 MCG (1000 UT) tablet Take 1 tablet by mouth Daily. 90 tablet 1   • diphenhydrAMINE HCl, Sleep, (UNISOM SLEEPGELS PO) Take 1 tablet by mouth As Needed.     • hydroCHLOROthiazide (MICROZIDE) 12.5 MG capsule Take 1 capsule by mouth Daily. 90 capsule 0   • loratadine (Claritin) 10 MG tablet Take 1 tablet by mouth Daily. 90 tablet 1   • multivitamin with minerals tablet tablet Take 1 tablet by mouth Daily.     • naproxen (Naprosyn) 500 MG tablet Take 1 tablet by mouth 2 (Two) Times a Day With Meals. 28 tablet 0   • nitrofurantoin, macrocrystal-monohydrate, (MACROBID) 100 MG capsule Take 1 capsule by mouth Daily.     • tiZANidine (ZANAFLEX)  4 MG tablet Take 1 tablet by mouth 2 (Two) Times a Day. 60 tablet 4   • Varenicline Tartrate, Starter, 0.5 MG X 11 & 1 MG X 42 tablet therapy pack Take 1 each by mouth Daily. 53 each 0   • vitamin B-12 (CYANOCOBALAMIN) 1000 MCG tablet Take 1 tablet by mouth Daily.       No current facility-administered medications for this visit.       Past Medical History:  Past Medical History:   Diagnosis Date   • Congestive heart failure     in pregnancy   • Fibromyalgia    • Frequent UTI     on daily abx per urology   • History of depression    • History of ear infections    • History of heart disease    • History of lupus     skin rashes, joint pain, fatigue.   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 DRAINAGE OF OVARIAN CYST.;  Surgeon: Rachel Caruso DO;  Location: [facility];  Service: Obstetrics/Gynecology   • ORIF SCAPHOID W VASCULARIZED BONE GRAFT     • TUBAL COAGULATION LAPAROSCOPIC Bilateral 2018    Procedure: TUBAL FALLOPE FILSHIE CLIPPING LAPAROSCOPIC;  Surgeon: Rachel Caruso DO;  Location: [facility];  Service: Obstetrics/Gynecology   • ULNA/RADIUS CLOSED REDUCTION         Social History:  Social History     Socioeconomic History   • Marital status: Single   Tobacco Use   • Smoking status: Never   • Smokeless tobacco: Never   Vaping Use   • Vaping Use: Some days   • Substances: Nicotine   • Devices: Disposable   Substance and Sexual Activity   • Alcohol use: Yes     Comment: socially   • Drug use: No   • Sexual activity: Yes     Partners: Male     Birth control/protection:  "Surgical, Tubal ligation       Family History:  Family History   Problem Relation Age of Onset   • Heart disease Mother    • Thyroid disease Mother    • Anxiety disorder Mother    • Depression Mother    • Bipolar disorder Mother    • Heart disease Father    • Cancer Father    • Heart disease Maternal Grandmother    • Diabetes Maternal Grandmother    • Diabetes Maternal Grandfather    • Lupus Paternal Grandmother    • Rheum arthritis Paternal Grandmother        Review of Systems:  Review of Systems   Constitutional: Negative for unexpected weight change.   Genitourinary: Negative for vaginal discharge and vaginal pain.       Vital Signs:   /68 (BP Location: Right arm, Patient Position: Sitting, Cuff Size: Adult)   Pulse 77   Temp 98.4 °F (36.9 °C) (Temporal)   Resp 16   Ht 160 cm (63\")   Wt 101 kg (223 lb 9.6 oz)   SpO2 99%   BMI 39.61 kg/m²      Physical Exam:  Physical Exam  Vitals and nursing note reviewed.   Constitutional:       General: She is not in acute distress.     Appearance: Normal appearance. She is obese. She is not ill-appearing or toxic-appearing.   HENT:      Head: Normocephalic.   Cardiovascular:      Rate and Rhythm: Normal rate and regular rhythm.      Heart sounds: Normal heart sounds.   Pulmonary:      Effort: Pulmonary effort is normal. No respiratory distress.      Breath sounds: Normal breath sounds.   Abdominal:      General: Bowel sounds are normal.      Palpations: Abdomen is soft.   Skin:     General: Skin is warm and dry.      Coloration: Skin is not pale.   Neurological:      Mental Status: She is alert and oriented to person, place, and time.   Psychiatric:         Mood and Affect: Mood normal.         Behavior: Behavior normal.         Thought Content: Thought content normal.         Judgment: Judgment normal.                  Assessment and Plan   Diagnoses and all orders for this visit:    1. Weight loss counseling, encounter for (Primary)    2. Trichomonas " infection  -     NuSwab VG+ - Swab, Vagina; Future    3. Encounter for smoking cessation counseling    4. Obesity (BMI 30-39.9)      Continue diet and lifestyle modifications to emote healthy weight.    Recheck to confirm resolution of infection no sooner than 3 weeks after completing treatment.    Continue Chantix and follow-up in 1 month for reevaluation.  Smoking cessation resources as discussed.    Discussed possible differential diagnoses, testing, treatment, recommended non-pharmacological interventions, risks, warning signs to monitor for that would indicate need for follow-up in clinic or ER. If no improvement with these regimens or you have new or worsening symptoms follow-up. Patient verbalizes understanding and agreement with plan of care. Denies further needs or concerns.     Patient was given instructions and counseling regarding her condition and for health maintenance advised.    Class 2 Severe Obesity (BMI >=35 and <=39.9). Obesity-related health conditions include the following: obstructive sleep apnea, hypertension, coronary heart disease, diabetes mellitus, dyslipidemias, GERD and peripheral vascular disease. Obesity is improving with lifestyle modifications. BMI is is above average; BMI management plan is completed. We discussed portion control and increasing exercise.       I spent 30 minutes caring for patient on this date of service. This time includes time spent by me in the following activities: preparing for the visit, reviewing tests, obtaining and/or reviewing a separately obtained history, performing a medically appropriate examination and/or evaluation, counseling and educating the patient/family/caregiver, ordering medications, tests, or procedures and documenting information in the medical record    Meds ordered during this visit:  No orders of the defined types were placed in this encounter.      Patient Instructions:  Patient instructions given for the following visit diagnosis:     ICD-10-CM ICD-9-CM   1. Weight loss counseling, encounter for  Z71.3 V65.3   2. Trichomonas infection  A59.9 131.9   3. Encounter for smoking cessation counseling  Z71.6 V65.42     305.1   4. Obesity (BMI 30-39.9)  E66.9 278.00       Follow Up   Return in about 1 month (around 5/4/2023) for Recheck, Sooner if needed.        This document has been electronically signed by EMELIA Aguilar  April 4, 2023 09:10 EDT    Patient was given instructions and counseling regarding her condition or for health maintenance advice. Please see specific information pulled into the AVS if appropriate.     Part of this note may be an electronic transcription/translation of spoken language to printed text using the Dragon Dictation System.

## 2023-04-10 DIAGNOSIS — F41.1 GENERALIZED ANXIETY DISORDER: ICD-10-CM

## 2023-04-10 DIAGNOSIS — N30.00 ACUTE CYSTITIS WITHOUT HEMATURIA: ICD-10-CM

## 2023-04-10 DIAGNOSIS — N34.3 DYSURIA-FREQUENCY SYNDROME: ICD-10-CM

## 2023-04-10 RX ORDER — BUSPIRONE HYDROCHLORIDE 7.5 MG/1
TABLET ORAL
Qty: 90 TABLET | Refills: 0 | OUTPATIENT
Start: 2023-04-10

## 2023-04-10 RX ORDER — PHENAZOPYRIDINE HYDROCHLORIDE 200 MG/1
TABLET, FILM COATED ORAL
Qty: 19 TABLET | Refills: 0 | Status: SHIPPED | OUTPATIENT
Start: 2023-04-10

## 2023-04-27 ENCOUNTER — TELEPHONE (OUTPATIENT)
Dept: FAMILY MEDICINE CLINIC | Facility: CLINIC | Age: 33
End: 2023-04-27
Payer: MEDICAID

## 2023-04-27 NOTE — TELEPHONE ENCOUNTER
Spoke with patient in regards to her missed appointment on her mammo. Pt plans on having it rescheduled for a later date.

## 2023-06-11 ENCOUNTER — HOSPITAL ENCOUNTER (EMERGENCY)
Facility: HOSPITAL | Age: 33
Discharge: HOME | End: 2023-06-11
Attending: STUDENT IN AN ORGANIZED HEALTH CARE EDUCATION/TRAINING PROGRAM | Admitting: STUDENT IN AN ORGANIZED HEALTH CARE EDUCATION/TRAINING PROGRAM
Payer: MEDICAID

## 2023-06-11 ENCOUNTER — APPOINTMENT (OUTPATIENT)
Dept: CT IMAGING | Facility: HOSPITAL | Age: 33
End: 2023-06-11
Payer: MEDICAID

## 2023-06-11 VITALS
RESPIRATION RATE: 18 BRPM | HEART RATE: 65 BPM | SYSTOLIC BLOOD PRESSURE: 130 MMHG | HEIGHT: 63 IN | TEMPERATURE: 98.5 F | OXYGEN SATURATION: 99 % | WEIGHT: 210 LBS | BODY MASS INDEX: 37.21 KG/M2 | DIASTOLIC BLOOD PRESSURE: 93 MMHG

## 2023-06-11 DIAGNOSIS — D73.89 LESION OF SPLEEN: ICD-10-CM

## 2023-06-11 DIAGNOSIS — L72.0 EPIDERMAL INCLUSION CYST: ICD-10-CM

## 2023-06-11 DIAGNOSIS — M79.18 MUSCULOSKELETAL PAIN: Primary | ICD-10-CM

## 2023-06-11 DIAGNOSIS — R10.9 RIGHT FLANK PAIN: ICD-10-CM

## 2023-06-11 DIAGNOSIS — N99.89: ICD-10-CM

## 2023-06-11 LAB
ALBUMIN SERPL-MCNC: 4.2 G/DL (ref 3.5–5.2)
ALBUMIN/GLOB SERPL: 1.6 G/DL
ALP SERPL-CCNC: 104 U/L (ref 39–117)
ALT SERPL W P-5'-P-CCNC: 11 U/L (ref 1–33)
AMORPH URATE CRY URNS QL MICRO: ABNORMAL /HPF
ANION GAP SERPL CALCULATED.3IONS-SCNC: 11.4 MMOL/L (ref 5–15)
AST SERPL-CCNC: 13 U/L (ref 1–32)
B-HCG UR QL: NEGATIVE
BACTERIA UR QL AUTO: ABNORMAL /HPF
BASOPHILS # BLD AUTO: 0.05 10*3/MM3 (ref 0–0.2)
BASOPHILS NFR BLD AUTO: 0.5 % (ref 0–1.5)
BILIRUB SERPL-MCNC: 0.3 MG/DL (ref 0–1.2)
BILIRUB UR QL STRIP: NEGATIVE
BUN SERPL-MCNC: 9 MG/DL (ref 6–20)
BUN/CREAT SERPL: 11.5 (ref 7–25)
CALCIUM SPEC-SCNC: 9.4 MG/DL (ref 8.6–10.5)
CHLORIDE SERPL-SCNC: 104 MMOL/L (ref 98–107)
CLARITY UR: ABNORMAL
CO2 SERPL-SCNC: 24.6 MMOL/L (ref 22–29)
COLOR UR: ABNORMAL
CREAT SERPL-MCNC: 0.78 MG/DL (ref 0.57–1)
DEPRECATED RDW RBC AUTO: 39.1 FL (ref 37–54)
EGFRCR SERPLBLD CKD-EPI 2021: 103 ML/MIN/1.73
EOSINOPHIL # BLD AUTO: 0.2 10*3/MM3 (ref 0–0.4)
EOSINOPHIL NFR BLD AUTO: 2.1 % (ref 0.3–6.2)
ERYTHROCYTE [DISTWIDTH] IN BLOOD BY AUTOMATED COUNT: 12.6 % (ref 12.3–15.4)
GLOBULIN UR ELPH-MCNC: 2.6 GM/DL
GLUCOSE SERPL-MCNC: 91 MG/DL (ref 65–99)
GLUCOSE UR STRIP-MCNC: NEGATIVE MG/DL
HCT VFR BLD AUTO: 40.6 % (ref 34–46.6)
HGB BLD-MCNC: 13 G/DL (ref 12–15.9)
HGB UR QL STRIP.AUTO: ABNORMAL
HOLD SPECIMEN: NORMAL
HOLD SPECIMEN: NORMAL
HYALINE CASTS UR QL AUTO: ABNORMAL /LPF
IMM GRANULOCYTES # BLD AUTO: 0.03 10*3/MM3 (ref 0–0.05)
IMM GRANULOCYTES NFR BLD AUTO: 0.3 % (ref 0–0.5)
KETONES UR QL STRIP: NEGATIVE
LEUKOCYTE ESTERASE UR QL STRIP.AUTO: ABNORMAL
LIPASE SERPL-CCNC: 19 U/L (ref 13–60)
LYMPHOCYTES # BLD AUTO: 3.58 10*3/MM3 (ref 0.7–3.1)
LYMPHOCYTES NFR BLD AUTO: 37.2 % (ref 19.6–45.3)
MCH RBC QN AUTO: 27.7 PG (ref 26.6–33)
MCHC RBC AUTO-ENTMCNC: 32 G/DL (ref 31.5–35.7)
MCV RBC AUTO: 86.6 FL (ref 79–97)
MONOCYTES # BLD AUTO: 0.75 10*3/MM3 (ref 0.1–0.9)
MONOCYTES NFR BLD AUTO: 7.8 % (ref 5–12)
NEUTROPHILS NFR BLD AUTO: 5.02 10*3/MM3 (ref 1.7–7)
NEUTROPHILS NFR BLD AUTO: 52.1 % (ref 42.7–76)
NITRITE UR QL STRIP: NEGATIVE
NRBC BLD AUTO-RTO: 0 /100 WBC (ref 0–0.2)
PH UR STRIP.AUTO: 7.5 [PH] (ref 5–8)
PLATELET # BLD AUTO: 323 10*3/MM3 (ref 140–450)
PMV BLD AUTO: 10.2 FL (ref 6–12)
POTASSIUM SERPL-SCNC: 3.5 MMOL/L (ref 3.5–5.2)
PROT SERPL-MCNC: 6.8 G/DL (ref 6–8.5)
PROT UR QL STRIP: NEGATIVE
RBC # BLD AUTO: 4.69 10*6/MM3 (ref 3.77–5.28)
RBC # UR STRIP: ABNORMAL /HPF
REF LAB TEST METHOD: ABNORMAL
SODIUM SERPL-SCNC: 140 MMOL/L (ref 136–145)
SP GR UR STRIP: 1.02 (ref 1–1.03)
SQUAMOUS #/AREA URNS HPF: ABNORMAL /HPF
UROBILINOGEN UR QL STRIP: ABNORMAL
WBC # UR STRIP: ABNORMAL /HPF
WBC NRBC COR # BLD: 9.63 10*3/MM3 (ref 3.4–10.8)
WHOLE BLOOD HOLD COAG: NORMAL
WHOLE BLOOD HOLD SPECIMEN: NORMAL

## 2023-06-11 PROCEDURE — 36415 COLL VENOUS BLD VENIPUNCTURE: CPT

## 2023-06-11 PROCEDURE — 74177 CT ABD & PELVIS W/CONTRAST: CPT

## 2023-06-11 PROCEDURE — 25510000001 IOPAMIDOL 61 % SOLUTION: Performed by: STUDENT IN AN ORGANIZED HEALTH CARE EDUCATION/TRAINING PROGRAM

## 2023-06-11 PROCEDURE — 81001 URINALYSIS AUTO W/SCOPE: CPT | Performed by: STUDENT IN AN ORGANIZED HEALTH CARE EDUCATION/TRAINING PROGRAM

## 2023-06-11 PROCEDURE — 83690 ASSAY OF LIPASE: CPT | Performed by: STUDENT IN AN ORGANIZED HEALTH CARE EDUCATION/TRAINING PROGRAM

## 2023-06-11 PROCEDURE — 85025 COMPLETE CBC W/AUTO DIFF WBC: CPT | Performed by: STUDENT IN AN ORGANIZED HEALTH CARE EDUCATION/TRAINING PROGRAM

## 2023-06-11 PROCEDURE — 25010000002 KETOROLAC TROMETHAMINE PER 15 MG: Performed by: STUDENT IN AN ORGANIZED HEALTH CARE EDUCATION/TRAINING PROGRAM

## 2023-06-11 PROCEDURE — 81025 URINE PREGNANCY TEST: CPT | Performed by: STUDENT IN AN ORGANIZED HEALTH CARE EDUCATION/TRAINING PROGRAM

## 2023-06-11 PROCEDURE — 80053 COMPREHEN METABOLIC PANEL: CPT | Performed by: STUDENT IN AN ORGANIZED HEALTH CARE EDUCATION/TRAINING PROGRAM

## 2023-06-11 PROCEDURE — 99283 EMERGENCY DEPT VISIT LOW MDM: CPT

## 2023-06-11 PROCEDURE — 96374 THER/PROPH/DIAG INJ IV PUSH: CPT

## 2023-06-11 RX ORDER — ACETAMINOPHEN 500 MG
1000 TABLET ORAL ONCE
Status: COMPLETED | OUTPATIENT
Start: 2023-06-11 | End: 2023-06-11

## 2023-06-11 RX ORDER — KETOROLAC TROMETHAMINE 30 MG/ML
15 INJECTION, SOLUTION INTRAMUSCULAR; INTRAVENOUS ONCE
Status: COMPLETED | OUTPATIENT
Start: 2023-06-11 | End: 2023-06-11

## 2023-06-11 RX ADMIN — IOPAMIDOL 70 ML: 612 INJECTION, SOLUTION INTRAVENOUS at 21:09

## 2023-06-11 RX ADMIN — ACETAMINOPHEN 1000 MG: 500 TABLET ORAL at 20:37

## 2023-06-11 RX ADMIN — SODIUM CHLORIDE 1000 ML: 9 INJECTION, SOLUTION INTRAVENOUS at 20:37

## 2023-06-11 RX ADMIN — KETOROLAC TROMETHAMINE 15 MG: 30 INJECTION, SOLUTION INTRAMUSCULAR; INTRAVENOUS at 20:37

## 2023-06-11 NOTE — ED PROVIDER NOTES
[facility]  emergency department encounter        Pt Name: Cheryl Mcnulty  MRN: [id number]  Birthdate 1990  Date of evaluation: 2023    Chief Complaint   Patient presents with   • Flank Pain             HISTORY OF PRESENT ILLNESS:   Cehryl Mcnulty is a 33 y.o. female PMH lupus, nephrolithiasis, recurrent UTIs on prophylactic Macrobid.    Patient presents for right flank pain new onset last night.  Pain constant, no radiation, denies abdominal pain nausea vomiting fever dysuria and hematuria.                PCP: Pao Colon APRN          REVIEW OF SYSTEMS:     Review of Systems   Constitutional: Negative for fever.   HENT: Negative for congestion and rhinorrhea.    Eyes: Negative for visual disturbance.   Respiratory: Negative for cough and shortness of breath.    Cardiovascular: Negative for chest pain.   Gastrointestinal: Negative for abdominal pain, nausea and vomiting.   Genitourinary: Positive for flank pain. Negative for dysuria.   Musculoskeletal: Negative for myalgias.   Skin: Negative for rash.   Neurological: Negative for headaches.   Psychiatric/Behavioral: Negative for confusion.       Review of systems otherwise as per HPI.          PREVIOUS HISTORY:         Past Medical History:   Diagnosis Date   • Congestive heart failure     in pregnancy   • Fibromyalgia    • Frequent UTI     on daily abx per urology   • History of depression    • History of ear infections    • History of heart disease    • History of lupus     skin rashes, joint pain, fatigue.   followed by rhuematology, on plaquenil and mobic   • History of migraine    • Hypertension    • Lown Ganong Parker syndrome    • Murmur    • Nephritis    • Osteoarthritis    • Pulmonary stenosis    • Urinary tract infection            Past Surgical History:   Procedure Laterality Date   •  SECTION     •  SECTION  2016   • DIAGNOSTIC LAPAROSCOPY Right 2018    Procedure: LAPAROSCOPIC EXTENSIVE LYSIS OF  MARINA. DRAINAGE OF OVARIAN CYST.;  Surgeon: Rachel Caruso DO;  Location:  [facility] OR;  Service: Obstetrics/Gynecology   • ORIF SCAPHOID W VASCULARIZED BONE GRAFT     • TUBAL COAGULATION LAPAROSCOPIC Bilateral 08/13/2018    Procedure: TUBAL FALLOPE FILSHIE CLIPPING LAPAROSCOPIC;  Surgeon: Rachel Caruso DO;  Location:  [facility] OR;  Service: Obstetrics/Gynecology   • ULNA/RADIUS CLOSED REDUCTION  2006           Social History     Socioeconomic History   • Marital status: Single   Tobacco Use   • Smoking status: Never   • Smokeless tobacco: Never   Vaping Use   • Vaping Use: Some days   • Substances: Nicotine   • Devices: Disposable   Substance and Sexual Activity   • Alcohol use: Yes     Comment: socially   • Drug use: No   • Sexual activity: Yes     Partners: Male     Birth control/protection: Surgical, Tubal ligation           Family History   Problem Relation Age of Onset   • Heart disease Mother    • Thyroid disease Mother    • Anxiety disorder Mother    • Depression Mother    • Bipolar disorder Mother    • Heart disease Father    • Cancer Father    • Heart disease Maternal Grandmother    • Diabetes Maternal Grandmother    • Diabetes Maternal Grandfather    • Lupus Paternal Grandmother    • Rheum arthritis Paternal Grandmother          Current Outpatient Medications   Medication Instructions   • atenolol (TENORMIN) 25 mg, Oral, Daily   • cholecalciferol (VITAMIN D3) 1,000 Units, Oral, Daily   • diphenhydrAMINE HCl, Sleep, (UNISOM SLEEPGELS PO) 1 tablet, Oral, As Needed   • hydroCHLOROthiazide (MICROZIDE) 12.5 mg, Oral, Daily   • loratadine (CLARITIN) 10 mg, Oral, Daily   • multivitamin with minerals tablet tablet 1 tablet, Oral, Daily   • naproxen (NAPROSYN) 500 mg, Oral, 2 Times Daily With Meals   • nitrofurantoin (macrocrystal-monohydrate) (MACROBID) 100 mg, Oral, Daily   • phenazopyridine (PYRIDIUM) 200 MG tablet TAKE ONE TABLET BY MOUTH THREE TIMES A DAY AS NEEDED FOR BLADDER SPASMS   •  "tiZANidine (ZANAFLEX) 4 mg, Oral, 2 Times Daily   • Varenicline Tartrate, Starter, 0.5 MG X 11 & 1 MG X 42 tablet therapy pack 1 each, Oral, Daily   • vitamin B-12 (CYANOCOBALAMIN) 1,000 mcg, Oral, Daily         Allergies:  Patient has no known allergies.          PHYSICAL EXAM:     Patient Vitals for the past 24 hrs:   BP Temp Temp src Pulse Resp SpO2 Height Weight   06/11/23 1942 -- 98.5 °F (36.9 °C) Oral -- -- -- -- --   06/11/23 1934 125/76 97.7 °F (36.5 °C) Infrared 90 18 99 % 160 cm (63\") 95.3 kg (210 lb)       Physical Exam  Vitals and nursing note reviewed.   Constitutional:       General: She is not in acute distress.  HENT:      Head: Normocephalic and atraumatic.   Eyes:      Extraocular Movements: Extraocular movements intact.      Conjunctiva/sclera: Conjunctivae normal.   Pulmonary:      Effort: Pulmonary effort is normal. No respiratory distress.   Abdominal:      General: Abdomen is flat. There is no distension.      Palpations: Abdomen is soft.      Tenderness: There is no abdominal tenderness. There is right CVA tenderness. There is no guarding or rebound.   Musculoskeletal:         General: No deformity. Normal range of motion.      Cervical back: Normal range of motion. No rigidity.   Skin:     Coloration: Skin is not jaundiced.      Findings: No rash.   Neurological:      General: No focal deficit present.      Mental Status: She is alert and oriented to person, place, and time.   Psychiatric:         Mood and Affect: Mood normal.         Behavior: Behavior normal.             COMPLETED DIAGNOSTIC STUDIES AND INTERVENTIONS:     Lab Results (last 24 hours)     Procedure Component Value Units Date/Time    CBC & Differential [[id number]]  (Abnormal) Collected: 06/11/23 1949    Specimen: Blood Updated: 06/11/23 1954    Narrative:      The following orders were created for panel order CBC & Differential.  Procedure                               Abnormality         Status                     ---------  "                              -----------         ------                     CBC Auto Differential[[id number]]        Abnormal            Final result                 Please view results for these tests on the individual orders.    Comprehensive Metabolic Panel [[id number]] Collected: 06/11/23 1949    Specimen: Blood Updated: 06/11/23 2015     Glucose 91 mg/dL      BUN 9 mg/dL      Creatinine 0.78 mg/dL      Sodium 140 mmol/L      Potassium 3.5 mmol/L      Chloride 104 mmol/L      CO2 24.6 mmol/L      Calcium 9.4 mg/dL      Total Protein 6.8 g/dL      Albumin 4.2 g/dL      ALT (SGPT) 11 U/L      AST (SGOT) 13 U/L      Alkaline Phosphatase 104 U/L      Total Bilirubin 0.3 mg/dL      Globulin 2.6 gm/dL      A/G Ratio 1.6 g/dL      BUN/Creatinine Ratio 11.5     Anion Gap 11.4 mmol/L      eGFR 103.0 mL/min/1.73     Narrative:      GFR Normal >60  Chronic Kidney Disease <60  Kidney Failure <15      Lipase [[id number]]  (Normal) Collected: 06/11/23 1949    Specimen: Blood Updated: 06/11/23 2015     Lipase 19 U/L     CBC Auto Differential [[id number]]  (Abnormal) Collected: 06/11/23 1949    Specimen: Blood Updated: 06/11/23 1954     WBC 9.63 10*3/mm3      RBC 4.69 10*6/mm3      Hemoglobin 13.0 g/dL      Hematocrit 40.6 %      MCV 86.6 fL      MCH 27.7 pg      MCHC 32.0 g/dL      RDW 12.6 %      RDW-SD 39.1 fl      MPV 10.2 fL      Platelets 323 10*3/mm3      Neutrophil % 52.1 %      Lymphocyte % 37.2 %      Monocyte % 7.8 %      Eosinophil % 2.1 %      Basophil % 0.5 %      Immature Grans % 0.3 %      Neutrophils, Absolute 5.02 10*3/mm3      Lymphocytes, Absolute 3.58 10*3/mm3      Monocytes, Absolute 0.75 10*3/mm3      Eosinophils, Absolute 0.20 10*3/mm3      Basophils, Absolute 0.05 10*3/mm3      Immature Grans, Absolute 0.03 10*3/mm3      nRBC 0.0 /100 WBC     Urinalysis With Microscopic If Indicated (No Culture) - Urine, Clean Catch [[id number]]  (Abnormal) Collected: 06/11/23 1954    Specimen: Urine, Clean Catch Updated:  06/11/23 2023     Color, UA Dark Yellow     Appearance, UA Turbid     pH, UA 7.5     Specific Gravity, UA 1.019     Glucose, UA Negative     Ketones, UA Negative     Bilirubin, UA Negative     Blood, UA Large (3+)     Protein, UA Negative     Leuk Esterase, UA Trace     Nitrite, UA Negative     Urobilinogen, UA 1.0 E.U./dL    Pregnancy, Urine - Urine, Clean Catch [[id number]]  (Normal) Collected: 06/11/23 1954    Specimen: Urine, Clean Catch Updated: 06/11/23 2003     HCG, Urine QL Negative    Narrative:      Diluted specimens may cause false negative results.    Urinalysis, Microscopic Only - Urine, Clean Catch [[id number]]  (Abnormal) Collected: 06/11/23 1954    Specimen: Urine, Clean Catch Updated: 06/11/23 2028     RBC, UA 3-5 /HPF      WBC, UA 3-5 /HPF      Bacteria, UA None Seen /HPF      Squamous Epithelial Cells, UA 3-6 /HPF      Hyaline Casts, UA None Seen /LPF      Amorphous Crystals, UA Small/1+ /HPF      Methodology Manual Light Microscopy            CT Abdomen Pelvis With Contrast   Final Result   1.  No acute abnormality of the abdomen or pelvis.  There is no   nephrolithiasis or ureterolithiasis.   2.  Probable new epidermal inclusion cyst overlying the xiphoid, to the   left of midline.  Correlation with physical examination is recommended.   3.  8 mm low-attenuation lesion within the periphery of the spleen.    Most incidentally-detected splenic lesions are benign.   4.  3 tubal ligation clips to the right of midline with no tubal   ligation clips identified on the left.  GYN consultation is recommended.       This report was finalized on 6/11/2023 10:49 PM by Atiya Melchor MD.                New Medications Ordered This Visit   Medications   • ketorolac (TORADOL) injection 15 mg   • sodium chloride 0.9 % bolus 1,000 mL   • acetaminophen (TYLENOL) tablet 1,000 mg   • iopamidol (ISOVUE-300) 61 % injection 100 mL         Procedures            MEDICAL DECISION-MAKING AND ED COURSE:     ED Course as of  06/11/23 2307   [place] Jun 11, 2023 2015 33-year-old female presents with 1 day of right flank pain.  History includes nephrolithiasis and chronic recurrent UTIs Macrobid.  Tylenol and Toradol for pain, 1 L NS IVF to be administered as well. [KP]   2015 WBC: 9.63 [KP]   2015 Hemoglobin: 13.0 [KP]   2015 HCG, Urine QL: Negative [KP]   2101 Leukocytes, UA(!): Trace [KP]   2101 Nitrite, UA: Negative [KP]   2101 WBC, UA(!): 3-5 [KP]   2101 Bacteria, UA: None Seen [KP]   2101 Lipase: 19 [KP]   2101 Creatinine: 0.78 [KP]   2256 CT Abdomen Pelvis With Contrast  IMPRESSION:  1.  No acute abnormality of the abdomen or pelvis.  There is no  nephrolithiasis or ureterolithiasis.  2.  Probable new epidermal inclusion cyst overlying the xiphoid, to the  left of midline.  Correlation with physical examination is recommended.  3.  8 mm low-attenuation lesion within the periphery of the spleen.   Most incidentally-detected splenic lesions are benign.  4.  3 tubal ligation clips to the right of midline with no tubal  ligation clips identified on the left.  GYN consultation is recommended. [KP]   2302 Discussed findings with patient including incidental findings, noted to patient splenic lesions, epidermal inclusion cyst and abnormal post tubal CT findings, missing clips.  Recommended patient follow-up with her gynecology team at [facility].  Patient reports feeling somewhat better at this time.  On reevaluation, patient's right flank pain is right lower flank, exacerbated with any movement, associated tenderness to the touch.  Nonpleuritic.  Recommend patient take muscle relaxers, nonsteroidal anti-inflammatory such as Aleve or ibuprofen, patient already has muscle relaxers at home, follow-up with her primary care provider or return here for any new onset or worsening symptoms.  Patient agreeable to plan, all questions answered. [KP]      ED Course User Index  [KP] Didier Sousa MD       ?      FINAL IMPRESSION:       1.  Musculoskeletal pain    2. Right flank pain    3. Postoperative surgical complication involving genitourinary system associated with non-genitourinary procedure, unspecified complication    4. Lesion of spleen    5. Epidermal inclusion cyst         The complaints listed here are new problems to this examiner.       Medication List      No changes were made to your prescriptions during this visit.         FOLLOW-UP  Pao Colon, APRN  [address] [facility] [address]  [phone]    Schedule an appointment as soon as possible for a visit       [facility]  [address] [facility] [id number] [phone]    If symptoms worsen    [facility]  [address] [address]  [phone]  Schedule an appointment as soon as possible for a visit         DISPOSITION  ED Disposition     ED Disposition   Discharge    Condition   Stable    Comment   --                 Please note that portions of this note were completed with a voice recognition program.      Didier Sousa MD  23:07 EDT  6/11/2023             Didier Sousa MD  06/11/23 3051

## 2023-06-12 NOTE — DISCHARGE INSTRUCTIONS
Recommend rest, icing 35 times daily, Aleve 500 mg twice daily for 1 to 2 weeks, muscle relaxers as needed for additional pain control and follow-up with primary care provider at the next available appointment.    Please follow-up with [facility] at the next appointment to discuss incidental finding of abnormal post tubal clip placements.

## 2023-06-14 VITALS
RESPIRATION RATE: 19 BRPM | HEIGHT: 62 IN | HEART RATE: 84 BPM | SYSTOLIC BLOOD PRESSURE: 147 MMHG | TEMPERATURE: 98.2 F | DIASTOLIC BLOOD PRESSURE: 77 MMHG | OXYGEN SATURATION: 98 % | WEIGHT: 210 LBS | BODY MASS INDEX: 38.64 KG/M2

## 2023-06-15 ENCOUNTER — APPOINTMENT (OUTPATIENT)
Dept: CT IMAGING | Facility: HOSPITAL | Age: 33
End: 2023-06-15
Payer: MEDICAID

## 2023-06-15 ENCOUNTER — HOSPITAL ENCOUNTER (EMERGENCY)
Facility: HOSPITAL | Age: 33
Discharge: HOME | End: 2023-06-15
Attending: STUDENT IN AN ORGANIZED HEALTH CARE EDUCATION/TRAINING PROGRAM
Payer: MEDICAID

## 2023-06-15 DIAGNOSIS — R10.84 GENERALIZED ABDOMINAL PAIN: Primary | ICD-10-CM

## 2023-06-15 LAB
ALBUMIN SERPL-MCNC: 4.5 G/DL (ref 3.5–5.2)
ALBUMIN/GLOB SERPL: 1.7 G/DL
ALP SERPL-CCNC: 106 U/L (ref 39–117)
ALT SERPL W P-5'-P-CCNC: 10 U/L (ref 1–33)
ANION GAP SERPL CALCULATED.3IONS-SCNC: 9.8 MMOL/L (ref 5–15)
AST SERPL-CCNC: 17 U/L (ref 1–32)
BACTERIA UR QL AUTO: ABNORMAL /HPF
BASOPHILS # BLD AUTO: 0.06 10*3/MM3 (ref 0–0.2)
BASOPHILS NFR BLD AUTO: 0.4 % (ref 0–1.5)
BILIRUB SERPL-MCNC: 0.4 MG/DL (ref 0–1.2)
BILIRUB UR QL STRIP: NEGATIVE
BUN SERPL-MCNC: 7 MG/DL (ref 6–20)
BUN/CREAT SERPL: 8.9 (ref 7–25)
CALCIUM SPEC-SCNC: 9.1 MG/DL (ref 8.6–10.5)
CHLORIDE SERPL-SCNC: 104 MMOL/L (ref 98–107)
CLARITY UR: ABNORMAL
CO2 SERPL-SCNC: 27.2 MMOL/L (ref 22–29)
COLOR UR: YELLOW
CREAT SERPL-MCNC: 0.79 MG/DL (ref 0.57–1)
CRP SERPL-MCNC: <0.3 MG/DL (ref 0–0.5)
DEPRECATED RDW RBC AUTO: 39.3 FL (ref 37–54)
EGFRCR SERPLBLD CKD-EPI 2021: 101.4 ML/MIN/1.73
EOSINOPHIL # BLD AUTO: 0.15 10*3/MM3 (ref 0–0.4)
EOSINOPHIL NFR BLD AUTO: 1.1 % (ref 0.3–6.2)
ERYTHROCYTE [DISTWIDTH] IN BLOOD BY AUTOMATED COUNT: 12.7 % (ref 12.3–15.4)
GLOBULIN UR ELPH-MCNC: 2.6 GM/DL
GLUCOSE SERPL-MCNC: 107 MG/DL (ref 65–99)
GLUCOSE UR STRIP-MCNC: NEGATIVE MG/DL
HCT VFR BLD AUTO: 40.1 % (ref 34–46.6)
HGB BLD-MCNC: 13.3 G/DL (ref 12–15.9)
HGB UR QL STRIP.AUTO: ABNORMAL
HOLD SPECIMEN: NORMAL
HOLD SPECIMEN: NORMAL
HYALINE CASTS UR QL AUTO: ABNORMAL /LPF
IMM GRANULOCYTES # BLD AUTO: 0.03 10*3/MM3 (ref 0–0.05)
IMM GRANULOCYTES NFR BLD AUTO: 0.2 % (ref 0–0.5)
KETONES UR QL STRIP: NEGATIVE
LEUKOCYTE ESTERASE UR QL STRIP.AUTO: NEGATIVE
LIPASE SERPL-CCNC: 18 U/L (ref 13–60)
LYMPHOCYTES # BLD AUTO: 3.66 10*3/MM3 (ref 0.7–3.1)
LYMPHOCYTES NFR BLD AUTO: 26.6 % (ref 19.6–45.3)
MCH RBC QN AUTO: 28.5 PG (ref 26.6–33)
MCHC RBC AUTO-ENTMCNC: 33.2 G/DL (ref 31.5–35.7)
MCV RBC AUTO: 86.1 FL (ref 79–97)
MONOCYTES # BLD AUTO: 0.88 10*3/MM3 (ref 0.1–0.9)
MONOCYTES NFR BLD AUTO: 6.4 % (ref 5–12)
MUCOUS THREADS URNS QL MICRO: ABNORMAL /HPF
NEUTROPHILS NFR BLD AUTO: 65.3 % (ref 42.7–76)
NEUTROPHILS NFR BLD AUTO: 8.98 10*3/MM3 (ref 1.7–7)
NITRITE UR QL STRIP: NEGATIVE
NRBC BLD AUTO-RTO: 0 /100 WBC (ref 0–0.2)
PH UR STRIP.AUTO: 7.5 [PH] (ref 5–8)
PLATELET # BLD AUTO: 335 10*3/MM3 (ref 140–450)
PMV BLD AUTO: 10.1 FL (ref 6–12)
POTASSIUM SERPL-SCNC: 3.6 MMOL/L (ref 3.5–5.2)
PROT SERPL-MCNC: 7.1 G/DL (ref 6–8.5)
PROT UR QL STRIP: NEGATIVE
RBC # BLD AUTO: 4.66 10*6/MM3 (ref 3.77–5.28)
RBC # UR STRIP: ABNORMAL /HPF
REF LAB TEST METHOD: ABNORMAL
SODIUM SERPL-SCNC: 141 MMOL/L (ref 136–145)
SP GR UR STRIP: 1.01 (ref 1–1.03)
SQUAMOUS #/AREA URNS HPF: ABNORMAL /HPF
UROBILINOGEN UR QL STRIP: ABNORMAL
WBC # UR STRIP: ABNORMAL /HPF
WBC NRBC COR # BLD: 13.76 10*3/MM3 (ref 3.4–10.8)
WHOLE BLOOD HOLD COAG: NORMAL
WHOLE BLOOD HOLD SPECIMEN: NORMAL

## 2023-06-15 PROCEDURE — 83690 ASSAY OF LIPASE: CPT | Performed by: PHYSICIAN ASSISTANT

## 2023-06-15 PROCEDURE — 25010000002 ONDANSETRON PER 1 MG: Performed by: STUDENT IN AN ORGANIZED HEALTH CARE EDUCATION/TRAINING PROGRAM

## 2023-06-15 PROCEDURE — 25010000002 KETOROLAC TROMETHAMINE PER 15 MG: Performed by: STUDENT IN AN ORGANIZED HEALTH CARE EDUCATION/TRAINING PROGRAM

## 2023-06-15 PROCEDURE — 80053 COMPREHEN METABOLIC PANEL: CPT | Performed by: PHYSICIAN ASSISTANT

## 2023-06-15 PROCEDURE — 81001 URINALYSIS AUTO W/SCOPE: CPT | Performed by: PHYSICIAN ASSISTANT

## 2023-06-15 PROCEDURE — 74176 CT ABD & PELVIS W/O CONTRAST: CPT

## 2023-06-15 PROCEDURE — 86140 C-REACTIVE PROTEIN: CPT | Performed by: PHYSICIAN ASSISTANT

## 2023-06-15 PROCEDURE — 85025 COMPLETE CBC W/AUTO DIFF WBC: CPT | Performed by: PHYSICIAN ASSISTANT

## 2023-06-15 RX ORDER — LACTULOSE 10 G/15ML
20 SOLUTION ORAL ONCE
Status: COMPLETED | OUTPATIENT
Start: 2023-06-15 | End: 2023-06-15

## 2023-06-15 RX ORDER — SODIUM CHLORIDE 0.9 % (FLUSH) 0.9 %
10 SYRINGE (ML) INJECTION AS NEEDED
Status: DISCONTINUED | OUTPATIENT
Start: 2023-06-15 | End: 2023-06-15 | Stop reason: HOSPADM

## 2023-06-15 RX ORDER — KETOROLAC TROMETHAMINE 30 MG/ML
30 INJECTION, SOLUTION INTRAMUSCULAR; INTRAVENOUS ONCE
Status: COMPLETED | OUTPATIENT
Start: 2023-06-15 | End: 2023-06-15

## 2023-06-15 RX ORDER — DICYCLOMINE HCL 20 MG
20 TABLET ORAL EVERY 6 HOURS PRN
Qty: 20 TABLET | Refills: 0 | Status: SHIPPED | OUTPATIENT
Start: 2023-06-15

## 2023-06-15 RX ORDER — ONDANSETRON 2 MG/ML
4 INJECTION INTRAMUSCULAR; INTRAVENOUS ONCE
Status: COMPLETED | OUTPATIENT
Start: 2023-06-15 | End: 2023-06-15

## 2023-06-15 RX ORDER — ONDANSETRON 4 MG/1
4 TABLET, ORALLY DISINTEGRATING ORAL EVERY 8 HOURS PRN
Qty: 20 TABLET | Refills: 0 | Status: SHIPPED | OUTPATIENT
Start: 2023-06-15

## 2023-06-15 RX ORDER — DICYCLOMINE HYDROCHLORIDE 10 MG/1
20 CAPSULE ORAL ONCE
Status: COMPLETED | OUTPATIENT
Start: 2023-06-15 | End: 2023-06-15

## 2023-06-15 RX ADMIN — LACTULOSE 20 G: 20 SOLUTION ORAL at 01:05

## 2023-06-15 RX ADMIN — DICYCLOMINE HYDROCHLORIDE 20 MG: 10 CAPSULE ORAL at 01:05

## 2023-06-15 RX ADMIN — SODIUM CHLORIDE 1000 ML: 9 INJECTION, SOLUTION INTRAVENOUS at 01:04

## 2023-06-15 RX ADMIN — ONDANSETRON 4 MG: 2 INJECTION INTRAMUSCULAR; INTRAVENOUS at 01:12

## 2023-06-15 RX ADMIN — DIPHENHYDRAMINE HYDROCHLORIDE 12.5 MG: 12.5 SOLUTION ORAL at 01:05

## 2023-06-15 RX ADMIN — KETOROLAC TROMETHAMINE 30 MG: 30 INJECTION, SOLUTION INTRAMUSCULAR; INTRAVENOUS at 01:20

## 2023-06-15 NOTE — Clinical Note
[facility] EMERGENCY DEPARTMENT  [address] [address]  Phone: [phone]    Cheryl Mcnulty was seen and treated in our emergency department on 6/14/2023.  She may return to work on 06/16/2023.         Thank you for choosing [facility].    Hema Barajas, DO

## 2023-06-15 NOTE — ED NOTES
MEDICAL SCREENING:    Reason for Visit: abd pain, constipation2.5 wks.  Seen in ER on 6/11.  Had CT abd pelvis    Patient initially seen in triage.  The patient was advised further evaluation and diagnostic testing will be needed, some of the treatment and testing will be initiated in the lobby in order to begin the process.  The patient will be returned to the waiting area for the time being and possibly be re-assessed by a subsequent ED provider.  The patient will be brought back to the treatment area in as timely manner as possible.      Kareem Plummer II, PA  06/14/23 1644

## 2023-06-15 NOTE — ED PROVIDER NOTES
Subjective   History of Present Illness  33-year-old female presents to the ER with a primary complaint of abdominal pain and constipation.  Patient has been seen in this ER within the last 3 to 4 days with similar complaints.  Patient notes she has not had a bowel movement in nearly 2-1/2 weeks.  Confirms she is passing gas.  No bright red blood per rectum.  No changes in urinary habits.  Patient noted nausea with no significant vomiting.  Patient also noted bloating and distention.  No obvious alleviating factors.  Vital signs stable    Review of Systems   Gastrointestinal:  Positive for abdominal pain, constipation and nausea.   All other systems reviewed and are negative.    Past Medical History:   Diagnosis Date    Congestive heart failure     in pregnancy    Fibromyalgia     Frequent UTI     on daily abx per urology    History of depression     History of ear infections     History of heart disease     History of lupus     skin rashes, joint pain, fatigue.   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 DRAINAGE OF OVARIAN CYST.;  Surgeon: Rachel Caruso DO;  Location: [facility] OR;  Service: Obstetrics/Gynecology    ORIF SCAPHOID W VASCULARIZED BONE GRAFT      TUBAL COAGULATION LAPAROSCOPIC Bilateral 2018    Procedure: TUBAL FALLOPE FILSHIE CLIPPING LAPAROSCOPIC;  Surgeon: Rachel Caruso DO;  Location: [facility] OR;  Service: Obstetrics/Gynecology    ULNA/RADIUS CLOSED REDUCTION         Family History   Problem Relation Age of Onset    Heart disease Mother     Thyroid disease Mother      Anxiety disorder Mother     Depression Mother     Bipolar disorder Mother     Heart disease Father     Cancer Father     Heart disease Maternal Grandmother     Diabetes Maternal Grandmother     Diabetes Maternal Grandfather     Lupus Paternal Grandmother     Rheum arthritis Paternal Grandmother        Social History     Socioeconomic History    Marital status: Single   Tobacco Use    Smoking status: Never    Smokeless tobacco: Never   Vaping Use    Vaping Use: Some days    Substances: Nicotine    Devices: Disposable   Substance and Sexual Activity    Alcohol use: Yes     Comment: socially    Drug use: No    Sexual activity: Yes     Partners: Male     Birth control/protection: Surgical, Tubal ligation           Objective   Physical Exam  Constitutional:       General: She is not in acute distress.     Appearance: Normal appearance. She is not ill-appearing.   HENT:      Head: Normocephalic and atraumatic.      Right Ear: External ear normal.      Left Ear: External ear normal.      Nose: Nose normal.      Mouth/Throat:      Mouth: Mucous membranes are moist.   Eyes:      Extraocular Movements: Extraocular movements intact.      Pupils: Pupils are equal, round, and reactive to light.   Cardiovascular:      Rate and Rhythm: Normal rate and regular rhythm.      Heart sounds: No murmur heard.  Pulmonary:      Effort: Pulmonary effort is normal. No respiratory distress.      Breath sounds: Normal breath sounds. No wheezing.   Abdominal:      General: Abdomen is protuberant. Bowel sounds are normal.      Palpations: Abdomen is soft.      Tenderness: There is generalized abdominal tenderness.   Musculoskeletal:         General: No deformity or signs of injury. Normal range of motion.      Cervical back: Normal range of motion and neck supple.   Skin:     General: Skin is warm and dry.      Findings: No erythema.   Neurological:      General: No focal deficit present.      Mental Status: She is alert and oriented to person,  place, and time. Mental status is at baseline.      Cranial Nerves: No cranial nerve deficit.   Psychiatric:         Mood and Affect: Mood normal.         Behavior: Behavior normal.         Thought Content: Thought content normal.       Procedures           ED Course      CT Abdomen Pelvis Without Contrast    Result Date: 6/15/2023   1.  Possible mild ileus affecting large bowel with occasional air-fluid level identified. 2.  Normal appendix is well seen. 3.  The features of enteritis or colitis. 4.  No free fluid or free air. 5.  Mild dextroconvex curve at the mid lumbar spine consistent with scoliosis. 6.  No features of cholecystitis or pancreatitis. 7.  Trace volume of pericardial fluid. 8.  Right adnexal clips.  This report was finalized on 6/15/2023 1:53 AM by Dl Mobley MD.       Results for orders placed or performed during the hospital encounter of 06/15/23   Comprehensive Metabolic Panel    Specimen: Blood   Result Value Ref Range    Glucose 107 (H) 65 - 99 mg/dL    BUN 7 6 - 20 mg/dL    Creatinine 0.79 0.57 - 1.00 mg/dL    Sodium 141 136 - 145 mmol/L    Potassium 3.6 3.5 - 5.2 mmol/L    Chloride 104 98 - 107 mmol/L    CO2 27.2 22.0 - 29.0 mmol/L    Calcium 9.1 8.6 - 10.5 mg/dL    Total Protein 7.1 6.0 - 8.5 g/dL    Albumin 4.5 3.5 - 5.2 g/dL    ALT (SGPT) 10 1 - 33 U/L    AST (SGOT) 17 1 - 32 U/L    Alkaline Phosphatase 106 39 - 117 U/L    Total Bilirubin 0.4 0.0 - 1.2 mg/dL    Globulin 2.6 gm/dL    A/G Ratio 1.7 g/dL    BUN/Creatinine Ratio 8.9 7.0 - 25.0    Anion Gap 9.8 5.0 - 15.0 mmol/L    eGFR 101.4 >60.0 mL/min/1.73   Lipase    Specimen: Blood   Result Value Ref Range    Lipase 18 13 - 60 U/L   Urinalysis With Culture If Indicated - Urine, Clean Catch    Specimen: Urine, Clean Catch   Result Value Ref Range    Color, UA Yellow Yellow, Straw    Appearance, UA Cloudy (A) Clear    pH, UA 7.5 5.0 - 8.0    Specific Gravity, UA 1.012 1.005 - 1.030    Glucose, UA Negative Negative    Ketones, UA  Negative Negative    Bilirubin, UA Negative Negative    Blood, UA Small (1+) (A) Negative    Protein, UA Negative Negative    Leuk Esterase, UA Negative Negative    Nitrite, UA Negative Negative    Urobilinogen, UA 1.0 E.U./dL 0.2 - 1.0 E.U./dL   C-reactive Protein    Specimen: Blood   Result Value Ref Range    C-Reactive Protein <0.30 0.00 - 0.50 mg/dL   CBC Auto Differential    Specimen: Blood   Result Value Ref Range    WBC 13.76 (H) 3.40 - 10.80 10*3/mm3    RBC 4.66 3.77 - 5.28 10*6/mm3    Hemoglobin 13.3 12.0 - 15.9 g/dL    Hematocrit 40.1 34.0 - 46.6 %    MCV 86.1 79.0 - 97.0 fL    MCH 28.5 26.6 - 33.0 pg    MCHC 33.2 31.5 - 35.7 g/dL    RDW 12.7 12.3 - 15.4 %    RDW-SD 39.3 37.0 - 54.0 fl    MPV 10.1 6.0 - 12.0 fL    Platelets 335 140 - 450 10*3/mm3    Neutrophil % 65.3 42.7 - 76.0 %    Lymphocyte % 26.6 19.6 - 45.3 %    Monocyte % 6.4 5.0 - 12.0 %    Eosinophil % 1.1 0.3 - 6.2 %    Basophil % 0.4 0.0 - 1.5 %    Immature Grans % 0.2 0.0 - 0.5 %    Neutrophils, Absolute 8.98 (H) 1.70 - 7.00 10*3/mm3    Lymphocytes, Absolute 3.66 (H) 0.70 - 3.10 10*3/mm3    Monocytes, Absolute 0.88 0.10 - 0.90 10*3/mm3    Eosinophils, Absolute 0.15 0.00 - 0.40 10*3/mm3    Basophils, Absolute 0.06 0.00 - 0.20 10*3/mm3    Immature Grans, Absolute 0.03 0.00 - 0.05 10*3/mm3    nRBC 0.0 0.0 - 0.2 /100 WBC   Urinalysis, Microscopic Only - Urine, Clean Catch    Specimen: Urine, Clean Catch   Result Value Ref Range    RBC, UA 3-5 (A) None Seen, 0-2 /HPF    WBC, UA 3-5 (A) None Seen, 0-2 /HPF    Bacteria, UA Trace (A) None Seen /HPF    Squamous Epithelial Cells, UA 7-12 (A) None Seen, 0-2 /HPF    Hyaline Casts, UA None Seen None Seen /LPF    Mucus, UA Small/1+ (A) None Seen, Trace /HPF    Methodology Manual Light Microscopy    Green Top (Gel)   Result Value Ref Range    Extra Tube Hold for add-ons.    Lavender Top   Result Value Ref Range    Extra Tube hold for add-on    Gold Top - SST   Result Value Ref Range    Extra Tube Hold for  add-ons.    Light Blue Top   Result Value Ref Range    Extra Tube Hold for add-ons.                                           Medical Decision Making  Seen and CMP are unremarkable.  CT imaging unremarkable.  Patient revealed that she has recently started Ozempic.  I counseled the patient that her symptoms are likely secondary to this medication.  I discussed the risks of continuing this medication at this point time.  Patient will be discharged home with Zofran and Bentyl for symptom management.  Work up and results were discussed throughly with the patient.  The patient will be discharged for further monitoring and management with their PCP.  Red flags, warning signs, worsening symptoms, and when to return to the ER discussed with and understood by the patient.  Patient will follow up with their PCP in a timely manner.  Vitals stable at discharge.    Amount and/or Complexity of Data Reviewed  Labs: ordered. Decision-making details documented in ED Course.  Radiology: ordered. Decision-making details documented in ED Course.    Risk  OTC drugs.  Prescription drug management.        Final diagnoses:   Generalized abdominal pain       ED Disposition  ED Disposition       ED Disposition   Discharge    Condition   Stable    Comment   --               No follow-up provider specified.       Medication List      No changes were made to your prescriptions during this visit.            Hema Barajas,   06/15/23 0205

## 2023-06-15 NOTE — Clinical Note
[facility] EMERGENCY DEPARTMENT  [address] [facility] [address]  Phone: [phone]    Cheryl Mcnulty was seen and treated in our emergency department on 6/14/2023.  She may return to work on 06/16/2023.         Thank you for choosing [facility].    Hema Barajas, DO

## 2023-07-20 ENCOUNTER — OFFICE VISIT (OUTPATIENT)
Dept: FAMILY MEDICINE CLINIC | Facility: CLINIC | Age: 33
End: 2023-07-20
Payer: MEDICAID

## 2023-07-20 VITALS
WEIGHT: 219.2 LBS | BODY MASS INDEX: 40.34 KG/M2 | DIASTOLIC BLOOD PRESSURE: 88 MMHG | HEIGHT: 62 IN | HEART RATE: 103 BPM | SYSTOLIC BLOOD PRESSURE: 138 MMHG | TEMPERATURE: 97.3 F | OXYGEN SATURATION: 97 %

## 2023-07-20 DIAGNOSIS — F90.2 ATTENTION DEFICIT HYPERACTIVITY DISORDER (ADHD), COMBINED TYPE: Primary | ICD-10-CM

## 2023-07-20 DIAGNOSIS — F41.1 GENERALIZED ANXIETY DISORDER: ICD-10-CM

## 2023-07-20 DIAGNOSIS — R10.84 GENERALIZED ABDOMINAL PAIN: ICD-10-CM

## 2023-07-20 PROCEDURE — 3075F SYST BP GE 130 - 139MM HG: CPT | Performed by: FAMILY MEDICINE

## 2023-07-20 PROCEDURE — 1159F MED LIST DOCD IN RCRD: CPT | Performed by: FAMILY MEDICINE

## 2023-07-20 PROCEDURE — 99214 OFFICE O/P EST MOD 30 MIN: CPT | Performed by: FAMILY MEDICINE

## 2023-07-20 PROCEDURE — 3079F DIAST BP 80-89 MM HG: CPT | Performed by: FAMILY MEDICINE

## 2023-07-20 PROCEDURE — 1160F RVW MEDS BY RX/DR IN RCRD: CPT | Performed by: FAMILY MEDICINE

## 2023-07-20 RX ORDER — DICYCLOMINE HCL 20 MG
20 TABLET ORAL EVERY 6 HOURS PRN
Qty: 20 TABLET | Refills: 0 | Status: SHIPPED | OUTPATIENT
Start: 2023-07-20

## 2023-07-20 RX ORDER — ATOMOXETINE 60 MG/1
CAPSULE ORAL
COMMUNITY
Start: 2023-05-09 | End: 2023-07-20 | Stop reason: SDUPTHER

## 2023-07-20 RX ORDER — ATOMOXETINE 60 MG/1
CAPSULE ORAL
Qty: 30 CAPSULE | Refills: 1 | Status: SHIPPED | OUTPATIENT
Start: 2023-07-20

## 2023-07-20 RX ORDER — BUSPIRONE HYDROCHLORIDE 7.5 MG/1
7.5 TABLET ORAL 3 TIMES DAILY
Qty: 90 TABLET | Refills: 2 | Status: SHIPPED | OUTPATIENT
Start: 2023-07-20

## 2023-07-20 NOTE — PROGRESS NOTES
"Chief Complaint  GI Problem (Er f/u)    Subjective          Cheryl Mcnulty presents to [facility]  History of Present Illness  Patient here to follow-up for ADHD and anxiety.  States these are managed well with her Strattera and BuSpar which she needs refills on.    States she was recently seen in the ED for abdominal pain and was giving dicyclomine states this does help the pain she would like refill on this as well.  ER records reviewed    Review of Systems      Objective   Vital Signs:   /88 (BP Location: Right arm, Patient Position: Sitting, Cuff Size: Adult)   Pulse 103   Temp 97.3 °F (36.3 °C) (Temporal)   Ht 157.5 cm (62\")   Wt 99.4 kg (219 lb 3.2 oz)   SpO2 97%   BMI 40.09 kg/m²     Physical Exam  Constitutional:       General: She is not in acute distress.     Appearance: Normal appearance. She is well-developed and well-groomed. She is not ill-appearing, toxic-appearing or diaphoretic.   HENT:      Head: Normocephalic.      Nose: Nose normal. No congestion or rhinorrhea.      Mouth/Throat:      Mouth: Mucous membranes are moist.      Pharynx: Oropharynx is clear. No oropharyngeal exudate or posterior oropharyngeal erythema.   Eyes:      General: Lids are normal.         Right eye: No discharge.         Left eye: No discharge.      Extraocular Movements: Extraocular movements intact.      Pupils: Pupils are equal, round, and reactive to light.   Neck:      Vascular: No carotid bruit.   Cardiovascular:      Rate and Rhythm: Normal rate and regular rhythm.      Pulses: Normal pulses.      Heart sounds: Normal heart sounds. No murmur heard.    No friction rub. No gallop.   Pulmonary:      Effort: Pulmonary effort is normal. No respiratory distress.      Breath sounds: Normal breath sounds. No stridor. No wheezing, rhonchi or rales.   Chest:      Chest wall: No tenderness.   Abdominal:      General: Bowel sounds are normal. There is no distension.      Palpations: " Abdomen is soft. There is no mass.      Tenderness: There is no abdominal tenderness. There is no right CVA tenderness, left CVA tenderness, guarding or rebound.      Hernia: No hernia is present.   Musculoskeletal:         General: No swelling or tenderness. Normal range of motion.      Cervical back: Normal range of motion and neck supple. No rigidity or tenderness.      Right lower leg: No edema.      Left lower leg: No edema.   Lymphadenopathy:      Cervical: No cervical adenopathy.   Skin:     General: Skin is warm.      Capillary Refill: Capillary refill takes less than 2 seconds.      Coloration: Skin is not jaundiced.      Findings: No bruising, erythema or rash.   Neurological:      General: No focal deficit present.      Mental Status: She is alert and oriented to person, place, and time.      Motor: Motor function is intact. No weakness.      Coordination: Coordination is intact.      Gait: Gait is intact. Gait normal.   Psychiatric:         Attention and Perception: Attention normal.         Mood and Affect: Mood normal.         Speech: Speech normal.         Behavior: Behavior normal.         Cognition and Memory: Cognition normal.         Judgment: Judgment normal.      Result Review :                 Assessment and Plan    Diagnoses and all orders for this visit:    1. Attention deficit hyperactivity disorder (ADHD), combined type (Primary)  -     atomoxetine (STRATTERA) 60 MG capsule; Once daily  Dispense: 30 capsule; Refill: 1    2. Generalized anxiety disorder  -     busPIRone (BUSPAR) 7.5 MG tablet; Take 1 tablet by mouth 3 (Three) Times a Day.  Dispense: 90 tablet; Refill: 2    3. Generalized abdominal pain  -     dicyclomine (BENTYL) 20 MG tablet; Take 1 tablet by mouth Every 6 (Six) Hours As Needed for Abdominal Cramping.  Dispense: 20 tablet; Refill: 0      Patient's Body mass index is 40.09 kg/m². indicating that she is morbidly obese (BMI > 40 or > 35 with obesity - related health  condition). Obesity-related health conditions include the following: hypertension. Obesity is unchanged. BMI is is above average; BMI management plan is completed. We discussed low calorie, low carb based diet program, portion control, and increasing exercise..    Follow Up   Return in about 3 months (around 10/20/2023), or if symptoms worsen or fail to improve.  Patient was given instructions and counseling regarding her condition or for health maintenance advice. Please see specific information pulled into the AVS if appropriate.     This document has been electronically signed by EMELIA Blake  July 25, 2023 15:16 EDT

## 2023-09-13 ENCOUNTER — OFFICE VISIT (OUTPATIENT)
Dept: UROLOGY | Facility: CLINIC | Age: 33
End: 2023-09-13
Payer: MEDICAID

## 2023-09-13 VITALS
WEIGHT: 215.4 LBS | DIASTOLIC BLOOD PRESSURE: 102 MMHG | HEIGHT: 62 IN | BODY MASS INDEX: 39.64 KG/M2 | SYSTOLIC BLOOD PRESSURE: 159 MMHG | HEART RATE: 76 BPM

## 2023-09-13 DIAGNOSIS — N39.0 FREQUENT UTI: Primary | ICD-10-CM

## 2023-09-13 DIAGNOSIS — N30.00 ACUTE CYSTITIS WITHOUT HEMATURIA: ICD-10-CM

## 2023-09-13 DIAGNOSIS — N34.3 DYSURIA-FREQUENCY SYNDROME: ICD-10-CM

## 2023-09-13 LAB
BILIRUB BLD-MCNC: NEGATIVE MG/DL
CLARITY, POC: CLEAR
COLOR UR: YELLOW
EXPIRATION DATE: ABNORMAL
GLUCOSE UR STRIP-MCNC: NEGATIVE MG/DL
KETONES UR QL: NEGATIVE
LEUKOCYTE EST, POC: NEGATIVE
Lab: ABNORMAL
NITRITE UR-MCNC: NEGATIVE MG/ML
PH UR: 6.5 [PH] (ref 5–8)
PROT UR STRIP-MCNC: ABNORMAL MG/DL
RBC # UR STRIP: NEGATIVE /UL
SP GR UR: 1.01 (ref 1–1.03)
UROBILINOGEN UR QL: NORMAL

## 2023-09-13 PROCEDURE — 87086 URINE CULTURE/COLONY COUNT: CPT | Performed by: NURSE PRACTITIONER

## 2023-09-13 RX ORDER — PHENAZOPYRIDINE HYDROCHLORIDE 200 MG/1
200 TABLET, FILM COATED ORAL 3 TIMES DAILY PRN
Qty: 20 TABLET | Refills: 0 | Status: SHIPPED | OUTPATIENT
Start: 2023-09-13

## 2023-09-13 RX ORDER — NITROFURANTOIN 25; 75 MG/1; MG/1
100 CAPSULE ORAL DAILY
Qty: 90 CAPSULE | Refills: 3 | Status: SHIPPED | OUTPATIENT
Start: 2023-09-13

## 2023-09-13 RX ORDER — ONDANSETRON 4 MG/1
4 TABLET, FILM COATED ORAL EVERY 12 HOURS PRN
Qty: 20 TABLET | Refills: 0 | Status: SHIPPED | OUTPATIENT
Start: 2023-09-13

## 2023-09-13 NOTE — PROGRESS NOTES
Chief Complaint  ACUTE CYSTITIS/UTIS/DYSURIA (6 MONTH FOLLOW UP IC VS UTI/OAB/DI)    Subjective          Cheryl Mcnulty presents to [facility] for RECURRENT UTI/CYSTITIS  History of Present Illness    Ms. Cheryl Mcnulty is a pleasant 33-year-old female who returns to clinic today for evaluation. This is a 6-month follow-up with prior significant history of acute cystitis with hematuria, recurrent UTIs, IC, overactive bladder/detrusor instability complicated by urine urgency, dysuria, frequency syndrome with constant feeling of incomplete bladder emptying.     When last evaluated in clinic over 6 months ago, patient was in no apparent discomfort. She had been on antibiotic prophylaxis with Macrobid and denied any recurrent infections. She did deny any frequency, urgency, or burning with urination. She has had negative urine cultures since initiating therapy.   he, however, had concerns of STD exposure for which her STI urine check was completely negative.     She returns to clinic today just for follow-up, for reevaluation of her urinalysis, and refills on her medications.HER Urinalysis is completely negative for leukocyte esterase. It is negative for nitrite. It is negative for gross/microscopic hematuria. PVR is 0 mL. Overall, patient reports doing significantly better.    The patient reports she is doing well. She denies any new urinary symptoms. She has mild dysuria today. She is out of antibiotics. She is taking Strattera for anxiety.    Her father had a stroke on 08/20/2023.    Active Ambulatory Problems     Diagnosis Date Noted    UTI (urinary tract infection) 04/26/2017    Ureteral calculus 05/22/2020    Frequent UTI 08/23/2022    Acute cystitis without hematuria 08/23/2022    Dysuria 08/23/2022    Essential hypertension 01/31/2023    Lupus 01/31/2023    History of depression 01/31/2023    Pulmonary stenosis 01/31/2023    Preoperative cardiovascular examination  "03/14/2023    Concern about STD in female without diagnosis 03/14/2023    Dysuria-frequency syndrome 03/14/2023    Precordial pain 03/28/2023     Resolved Ambulatory Problems     Diagnosis Date Noted    No Resolved Ambulatory Problems     Past Medical History:   Diagnosis Date    Congestive heart failure     Fibromyalgia     History of ear infections     History of heart disease     History of lupus     History of migraine     Hypertension     [place] syndrome     Murmur     Nephritis     Osteoarthritis     Urinary tract infection       Objective   Vital Signs:   BP (!) 159/102   Pulse 76   Ht 157.5 cm (62.01\")   Wt 97.7 kg (215 lb 6.4 oz)   BMI 39.39 kg/m²       ROS:   Review of Systems   Constitutional:  Positive for activity change, fatigue, unexpected weight gain and unexpected weight loss. Negative for appetite change, chills, diaphoresis and fever.   HENT: Negative.  Negative for congestion, ear discharge, ear pain, nosebleeds, rhinorrhea, sinus pressure and sore throat.    Eyes: Negative.  Negative for blurred vision, double vision, photophobia, pain, redness and visual disturbance.   Respiratory: Negative.  Negative for apnea, cough, chest tightness, shortness of breath, wheezing and stridor.    Cardiovascular: Negative.  Negative for chest pain and palpitations.   Gastrointestinal:  Positive for constipation. Negative for abdominal distention, abdominal pain, diarrhea, nausea and vomiting.   Endocrine: Negative.  Negative for polydipsia, polyphagia and polyuria.   Genitourinary:  Positive for dysuria, frequency and pelvic pressure. Negative for decreased urine volume, difficulty urinating, dyspareunia, flank pain, genital sores, hematuria, pelvic pain, urgency, urinary incontinence and vaginal discharge.   Musculoskeletal:  Positive for back pain and myalgias. Negative for arthralgias and joint swelling.   Skin:  Positive for pallor. Negative for rash and wound.   Allergic/Immunologic: " Negative.    Neurological:  Positive for weakness. Negative for dizziness, tremors, syncope, light-headedness, headache, memory problem and confusion.   Hematological: Negative.    Psychiatric/Behavioral:  Positive for sleep disturbance and stress. Negative for behavioral problems and decreased concentration. The patient is nervous/anxious.       Physical Exam  Constitutional:       General: She is in acute distress.      Appearance: She is well-developed. She is obese.   HENT:      Head: Normocephalic and atraumatic.   Eyes:      Pupils: Pupils are equal, round, and reactive to light.   Neck:      Thyroid: No thyromegaly.      Trachea: No tracheal deviation.   Cardiovascular:      Rate and Rhythm: Normal rate and regular rhythm.      Heart sounds: No murmur heard.  Pulmonary:      Effort: Pulmonary effort is normal. No respiratory distress.      Breath sounds: Normal breath sounds. No stridor. No wheezing.   Abdominal:      General: Bowel sounds are normal. There is distension.      Palpations: Abdomen is soft.      Tenderness: There is abdominal tenderness.   Genitourinary:     Labia:         Right: No tenderness.         Left: No tenderness.       Vagina: Normal. No vaginal discharge.   Musculoskeletal:         General: Tenderness present. No deformity. Normal range of motion.      Cervical back: Normal range of motion.   Skin:     General: Skin is warm and dry.      Capillary Refill: Capillary refill takes less than 2 seconds.      Coloration: Skin is not pale.      Findings: No erythema or rash.   Neurological:      Mental Status: She is alert and oriented to person, place, and time.      Cranial Nerves: No cranial nerve deficit.      Sensory: No sensory deficit.      Motor: Weakness present.      Coordination: Coordination normal.   Psychiatric:         Behavior: Behavior normal.         Thought Content: Thought content normal.         Judgment: Judgment normal.      Result Review :               6/11/2023     19:54 6/15/2023    00:07 9/13/2023    15:21   Urinalysis   Squamous Epithelial Cells, UA 3-6  7-12     Specific Gravity, UA 1.019  1.012     Ketones, UA Negative  Negative  Negative    Blood, UA Large (3+)  Small (1+)     Leukocytes, UA Trace  Negative  Negative    Nitrite, UA Negative  Negative     RBC, UA 3-5  3-5     WBC, UA 3-5  3-5     Bacteria, UA None Seen  Trace       Urine Culture          3/7/2023    14:59 3/14/2023    13:27 9/13/2023    15:21   Urine Culture   Urine Culture 50,000 CFU/mL Normal Urogenital Nneka  No growth  <25,000 CFU/mL Normal Urogenital Nneka             Assessment and Plan    Problem List Items Addressed This Visit          Genitourinary and Reproductive     Frequent UTI - Primary    Overview     on daily abx per urology         Relevant Medications    nitrofurantoin, macrocrystal-monohydrate, (MACROBID) 100 MG capsule    phenazopyridine (Pyridium) 200 MG tablet    ondansetron (Zofran) 4 MG tablet    Other Relevant Orders    POC Urinalysis Dipstick, Automated (Completed)    Urine Culture - Urine, Urine, Clean Catch (Completed)    Acute cystitis without hematuria    Relevant Medications    nitrofurantoin, macrocrystal-monohydrate, (MACROBID) 100 MG capsule    phenazopyridine (Pyridium) 200 MG tablet    ondansetron (Zofran) 4 MG tablet    Dysuria-frequency syndrome    Relevant Medications    nitrofurantoin, macrocrystal-monohydrate, (MACROBID) 100 MG capsule    phenazopyridine (Pyridium) 200 MG tablet    ondansetron (Zofran) 4 MG tablet                                                     ASSESSMENT     RECURRENT UTI/ACUTE CYSTITIS/OAB/DI WITH MILDDYSURIA  Ms. Cheryl VO is a pleasant 33 year-old female with a significant history of recurrent UTIs, acute cystitis WITHOUT microscopic hematuria, who returns to clinic today for her 6-month follow-up.  SHe is in no apparent distress today BESIDES MILD INTERMITTENT DYSURIA episodes.      Nevertheless, she  reports feeling significantly  better since resuming therapy with Macrobid prophylaxis.  Patient was AGAIN RE-evaluated by her PCP last week with a negative urine cultures she reports.  HER Urine dipstick today is completely negative for any leukocyte esterase, negative nitrites, negative gross/she has trace microscopic hematuria, PVR 0 mm.     Interstitial cystitis-we discussed the diagnosis and management of this condition.  I indicated that it was a multifactorial condition with multifactorial symptomatology, Including frequency, urgency, the sensation of urinary tract infection in the absence of a positive culture, sexual symptomatology including significant dyspareunia.  We discussed treatment options including the importance of making a clinical diagnosis and the cystoscopic findings including Hunner's ulcers etc.  We also discussed medical management including pharmacologic treatment such as amitriptyline, naturopathic treatments such as pumpkin oil and which more aggressive options of Botox injections as well as the relative risks and merits of this.  We also discussed the use of dietary manipulation including the over-the-counter product relief which basically decreases the acid in the urine and avoidance of ascitic-containing foods such as citrus is etc.     Recurrent urinary tract infections/OverActiveBladder:  She reports doing relatively better on her antibiotic prophylaxis in the past, BUT STOPPED.  She would like to continue today.  Again,  We discussed the types of organisms that are found in the urinary tract indicating that the vast majority are results of the patient's own gastrointestinal patience.  We discussed how many of the antibiotics that are utilized can actually exacerbate these infections by creating resistant organisms and there is only a very few antibiotics that are concentrated in the urine and do not affect the rectal reservoir nor cause recurrent yeast vaginitis.       We discussed the risk factors for recurrent  infections being intercourse in younger patients and atrophic changes in older patients.  We discussed the symptoms that are found including pain, pressure, burning, frequency, urgency suprapubic pain and painful intercourse.  I discussed upper tract symptoms including fevers, chills, and indicated the workup would be much more aggressive if the patient were to present with recurrent infections in the face of upper tract symptomatology such as fever. I discussed the history of vesicoureteral reflux in young patients and finally chronic renal scarring as a result of such.                                                      PLAN  We resent her urine for culture. I will call her with results if any bacteria growth      Continue Macrobid 100 mg P.o. daily for suppressive therapy-acute cystitis     Continue Pyridium 200 mg as needed-dysuria      She has been encouraged to increase her p.o. fluid intake to at least 1 to 2 L daily and avoid bladder irritants such as caffeine products, spicy foods, and citrusy foods.      I recommend concomitant probiotics with treatment with antibiotics to protect the rectal reservoir including over-the-counter yogurt preparations to rebecca oral pills containing the appropriate probiotics. Patient reports the diligent use of Probiotics.     She is to also continue other medications FOR DYSURIA WITH AS NEEDED PYRIDIUM as prescribed above.     Will see her back for follow-up in clinic and reevaluate her symptoms at that time.  Discussed weaning her off antibiotic therapy if negative cultures.      She may return sooner for Follow up in clinic and recheck her urinalysis if she becomes symptomatic.     Patient is agreeable to plan of care.       Patient reports that she is not currently experiencing any symptoms of urinary incontinence.    RADIOLOGY (CT AND/OR KUB):    CT Abdomen and Pelvis: No results found for this or any previous visit.     CT Stone Protocol: Results for orders placed  during the hospital encounter of 12/08/22    CT Abdomen Pelvis Stone Protocol    Narrative  INDICATION:  Bilateral flank pain and dysuria. Kidney stones suspected.    TECHNIQUE:  CT of the abdomen and pelvis without contrast. Coronal and sagittal reconstructions were obtained.  Radiation dose reduction techniques included automated exposure control or exposure modulation based on body size. Radiation audit for number of CT and  nuclear cardiology exams performed in the last year: 0.    COMPARISON:  Abdomen pelvis CT from 11/11/2021.    FINDINGS:  Lung bases: Clear    Abdomen: Study is limited by lack of IV contrast media for evaluation for pathology other than urinary tract calculus disease.    The noncontrast liver, spleen, gallbladder, pancreas, adrenal glands, and kidneys are unremarkable. No intrarenal calculi and no hydronephrosis.    There is no evidence for bowel obstruction. The appendix is unremarkable. There is moderate well-formed stool in the distal colon to rectum. There is no free air.    Pelvis: Urinary bladder is unremarkable. No bladder calculus. No ureteral calculus is suspected.    There is a metallic device associated with the right side of the uterus and fallopian tube that was also present previously. Please correlate with GYN procedure. Otherwise ovaries unremarkable for age group. Previously noted [place]'s gland cysts are  considerably smaller/less apparent on comparison to prior.    Impression  1. No evidence for urinary tract calculus disease. No hydronephrosis.  2. Appendix unremarkable. No bowel obstruction.  3. There is a surgical clip like device in the right hemipelvis. Please correlate with procedure. It could be a tubal ligation clip but there is no left-sided clip.      Signer Name: Janice Marquez MD  Signed: 12/8/2022 10:00 PM  Workstation Name: [email or web address]  [facility]     KUB: Results for orders placed during the hospital encounter of 08/18/21    XR Abdomen  KUB    Narrative  EXAM:  XR Abdomen, 1 View    EXAM DATE:  8/18/2021 9:33 AM    CLINICAL HISTORY:  flank pain; R10.9-Unspecified abdominal pain    TECHNIQUE:  Frontal supine view of the abdomen/pelvis.    COMPARISON:  No relevant prior studies available.    FINDINGS:  GASTROINTESTINAL TRACT:  Unremarkable.  No dilation.  BONES/JOINTS:  Unremarkable.    Impression  Unremarkable abdominal x-ray.    This report was finalized on 8/18/2021 9:52 AM by Dr. Boyd Bailey MD.       LABS (3 MONTHS):    Office Visit on 09/13/2023   Component Date Value Ref Range Status    Color 09/13/2023 Yellow  Yellow, Straw, Dark Yellow, Lizette Final    Clarity, UA 09/13/2023 Clear  Clear Final    Specific Gravity  09/13/2023 1.015  1.005 - 1.030 Final    pH, Urine 09/13/2023 6.5  5.0 - 8.0 Final    Leukocytes 09/13/2023 Negative  Negative Final    Nitrite, UA 09/13/2023 Negative  Negative Final    Protein, POC 09/13/2023 Trace (A)  Negative mg/dL Final    Glucose, UA 09/13/2023 Negative  Negative mg/dL Final    Ketones, UA 09/13/2023 Negative  Negative Final    Urobilinogen, UA 09/13/2023 Normal  Normal, 0.2 E.U./dL Final    Bilirubin 09/13/2023 Negative  Negative Final    Blood, UA 09/13/2023 Negative  Negative Final    Lot Number 09/13/2023 [id number]   Final    Expiration Date 09/13/2023 10/25/2024   Final    Urine Culture 09/13/2023 <25,000 CFU/mL Normal Urogenital Nneka   Final   Admission on 06/15/2023, Discharged on 06/15/2023   Component Date Value Ref Range Status    Glucose 06/15/2023 107 (H)  65 - 99 mg/dL Final    BUN 06/15/2023 7  6 - 20 mg/dL Final    Creatinine 06/15/2023 0.79  0.57 - 1.00 mg/dL Final    Sodium 06/15/2023 141  136 - 145 mmol/L Final    Potassium 06/15/2023 3.6  3.5 - 5.2 mmol/L Final    Slight hemolysis detected by analyzer. Results may be affected.    Chloride 06/15/2023 104  98 - 107 mmol/L Final    CO2 06/15/2023 27.2  22.0 - 29.0 mmol/L Final    Calcium 06/15/2023 9.1  8.6 - 10.5 mg/dL Final    Total  Protein 06/15/2023 7.1  6.0 - 8.5 g/dL Final    Albumin 06/15/2023 4.5  3.5 - 5.2 g/dL Final    ALT (SGPT) 06/15/2023 10  1 - 33 U/L Final    AST (SGOT) 06/15/2023 17  1 - 32 U/L Final    Alkaline Phosphatase 06/15/2023 106  39 - 117 U/L Final    Total Bilirubin 06/15/2023 0.4  0.0 - 1.2 mg/dL Final    Globulin 06/15/2023 2.6  gm/dL Final    A/G Ratio 06/15/2023 1.7  g/dL Final    BUN/Creatinine Ratio 06/15/2023 8.9  7.0 - 25.0 Final    Anion Gap 06/15/2023 9.8  5.0 - 15.0 mmol/L Final    eGFR 06/15/2023 101.4  >60.0 mL/min/1.73 Final    Lipase 06/15/2023 18  13 - 60 U/L Final    Color, UA 06/15/2023 Yellow  Yellow, Straw Final    Appearance, UA 06/15/2023 Cloudy (A)  Clear Final    pH, UA 06/15/2023 7.5  5.0 - 8.0 Final    Specific Gravity, UA 06/15/2023 1.012  1.005 - 1.030 Final    Glucose, UA 06/15/2023 Negative  Negative Final    Ketones, UA 06/15/2023 Negative  Negative Final    Bilirubin, UA 06/15/2023 Negative  Negative Final    Blood, UA 06/15/2023 Small (1+) (A)  Negative Final    Protein, UA 06/15/2023 Negative  Negative Final    Leuk Esterase, UA 06/15/2023 Negative  Negative Final    Nitrite, UA 06/15/2023 Negative  Negative Final    Urobilinogen, UA 06/15/2023 1.0 E.U./dL  0.2 - 1.0 E.U./dL Final    C-Reactive Protein 06/15/2023 <0.30  0.00 - 0.50 mg/dL Final    WBC 06/15/2023 13.76 (H)  3.40 - 10.80 10*3/mm3 Final    RBC 06/15/2023 4.66  3.77 - 5.28 10*6/mm3 Final    Hemoglobin 06/15/2023 13.3  12.0 - 15.9 g/dL Final    Hematocrit 06/15/2023 40.1  34.0 - 46.6 % Final    MCV 06/15/2023 86.1  79.0 - 97.0 fL Final    MCH 06/15/2023 28.5  26.6 - 33.0 pg Final    MCHC 06/15/2023 33.2  31.5 - 35.7 g/dL Final    RDW 06/15/2023 12.7  12.3 - 15.4 % Final    RDW-SD 06/15/2023 39.3  37.0 - 54.0 fl Final    MPV 06/15/2023 10.1  6.0 - 12.0 fL Final    Platelets 06/15/2023 335  140 - 450 10*3/mm3 Final    Neutrophil % 06/15/2023 65.3  42.7 - 76.0 % Final    Lymphocyte % 06/15/2023 26.6  19.6 - 45.3 % Final     Monocyte % 06/15/2023 6.4  5.0 - 12.0 % Final    Eosinophil % 06/15/2023 1.1  0.3 - 6.2 % Final    Basophil % 06/15/2023 0.4  0.0 - 1.5 % Final    Immature Grans % 06/15/2023 0.2  0.0 - 0.5 % Final    Neutrophils, Absolute 06/15/2023 8.98 (H)  1.70 - 7.00 10*3/mm3 Final    Lymphocytes, Absolute 06/15/2023 3.66 (H)  0.70 - 3.10 10*3/mm3 Final    Monocytes, Absolute 06/15/2023 0.88  0.10 - 0.90 10*3/mm3 Final    Eosinophils, Absolute 06/15/2023 0.15  0.00 - 0.40 10*3/mm3 Final    Basophils, Absolute 06/15/2023 0.06  0.00 - 0.20 10*3/mm3 Final    Immature Grans, Absolute 06/15/2023 0.03  0.00 - 0.05 10*3/mm3 Final    nRBC 06/15/2023 0.0  0.0 - 0.2 /100 WBC Final    Extra Tube 06/15/2023 Hold for add-ons.   Final    Auto resulted.    Extra Tube 06/15/2023 hold for add-on   Final    Auto resulted    Extra Tube 06/15/2023 Hold for add-ons.   Final    Auto resulted.    Extra Tube 06/15/2023 Hold for add-ons.   Final    Auto resulted    RBC, UA 06/15/2023 3-5 (A)  None Seen, 0-2 /HPF Final    WBC, UA 06/15/2023 3-5 (A)  None Seen, 0-2 /HPF Final    Urine culture not indicated.    Bacteria, UA 06/15/2023 Trace (A)  None Seen /HPF Final    Squamous Epithelial Cells, UA 06/15/2023 7-12 (A)  None Seen, 0-2 /HPF Final    Hyaline Casts, UA 06/15/2023 None Seen  None Seen /LPF Final    Mucus, UA 06/15/2023 Small/1+ (A)  None Seen, Trace /HPF Final    Methodology 06/15/2023 Manual Light Microscopy   Final        Smoking Cessation Counseling:  Never a smoker.  Patient does not currently use any tobacco products.     Assessment:  1. Acute cystitis with hematuria  2. Recurrent UTI  3. Dysuria  4. Frequency syndrome    Plan:  - The patient's urine will be sent for a culture. She will be called with results if any positive bacteria growth.  - The patient will be prescribed Macrobid 100 mg.  - The patient will follow up in 1 year.    Follow Up   Return in about 1 year (around 9/13/2024) for Next scheduled follow up, RECURRENT  UTI/DYSURIA/DETRUSSOR INSTABILITY/IC.    Patient was given instructions and counseling regarding her condition or for health maintenance advice. Please see specific information pulled into the AVS if appropriate.          This document has been electronically signed by Griselda Cheng-Akwa, APRN   September 14, 2023 15:49 EDT      Dictated Utilizing Dragon Dictation: Part of this note may be an electronic transcription/translation of spoken language to printed text using the Dragon Dictation System.     Transcribed from ambient dictation for Griselda Cheng-Akwa, APRN by Janae Cali.  09/13/23   16:23 EDT    Patient or patient representative verbalized consent to the visit recording.  I have personally performed the services described in this document as transcribed by the above individual, and it is both accurate and complete.

## 2023-09-14 ENCOUNTER — TELEPHONE (OUTPATIENT)
Dept: UROLOGY | Facility: CLINIC | Age: 33
End: 2023-09-14
Payer: MEDICAID

## 2023-09-14 LAB — BACTERIA SPEC AEROBE CULT: NORMAL

## 2023-09-14 NOTE — TELEPHONE ENCOUNTER
Called patient to go over urine culture which showed no infection. Patient verbalized understanding.

## 2023-09-14 NOTE — TELEPHONE ENCOUNTER
----- Message from Griselda Cheng-Akwa, APRN sent at 9/14/2023  1:58 PM EDT -----  Please Let patient know her urine culture results showed mixed patience otherwise are negative for any bacterial growth at this time.  She may drop off another urine sample anytime she feels symptomatic.  If not follow-up in clinic as discussed.    Thank you      Urine Culture   <25,000 CFU/mL Normal Urogenital Patience      Colonization of the urinary tract without infection is common. Treatment is discouraged unless the patient is symptomatic, pregnant, or undergoing an invasive urologic procedu

## 2023-09-25 ENCOUNTER — OFFICE VISIT (OUTPATIENT)
Dept: FAMILY MEDICINE CLINIC | Facility: CLINIC | Age: 33
End: 2023-09-25

## 2023-09-25 VITALS
DIASTOLIC BLOOD PRESSURE: 90 MMHG | OXYGEN SATURATION: 99 % | HEART RATE: 120 BPM | SYSTOLIC BLOOD PRESSURE: 138 MMHG | BODY MASS INDEX: 39.31 KG/M2 | TEMPERATURE: 98.4 F | HEIGHT: 62 IN | WEIGHT: 213.6 LBS

## 2023-09-25 DIAGNOSIS — I10 PRIMARY HYPERTENSION: ICD-10-CM

## 2023-09-25 DIAGNOSIS — H65.191 OTHER NON-RECURRENT ACUTE NONSUPPURATIVE OTITIS MEDIA OF RIGHT EAR: ICD-10-CM

## 2023-09-25 DIAGNOSIS — R51.9 ACUTE NONINTRACTABLE HEADACHE, UNSPECIFIED HEADACHE TYPE: Primary | ICD-10-CM

## 2023-09-25 RX ORDER — HYDROXYCHLOROQUINE SULFATE 200 MG/1
200 TABLET, FILM COATED ORAL DAILY
COMMUNITY

## 2023-09-25 RX ORDER — KETOROLAC TROMETHAMINE 30 MG/ML
60 INJECTION, SOLUTION INTRAMUSCULAR; INTRAVENOUS ONCE
Status: COMPLETED | OUTPATIENT
Start: 2023-09-25 | End: 2023-09-25

## 2023-09-25 RX ORDER — AMOXICILLIN AND CLAVULANATE POTASSIUM 875; 125 MG/1; MG/1
1 TABLET, FILM COATED ORAL 2 TIMES DAILY
Qty: 20 TABLET | Refills: 0 | Status: SHIPPED | OUTPATIENT
Start: 2023-09-25

## 2023-09-25 RX ORDER — ATENOLOL 25 MG/1
25 TABLET ORAL DAILY
Qty: 90 TABLET | Refills: 0 | Status: SHIPPED | OUTPATIENT
Start: 2023-09-25

## 2023-09-25 RX ORDER — HYDROCHLOROTHIAZIDE 12.5 MG/1
12.5 CAPSULE, GELATIN COATED ORAL DAILY
Qty: 90 CAPSULE | Refills: 0 | Status: SHIPPED | OUTPATIENT
Start: 2023-09-25

## 2023-09-25 RX ADMIN — KETOROLAC TROMETHAMINE 60 MG: 30 INJECTION, SOLUTION INTRAMUSCULAR; INTRAVENOUS at 10:18

## 2023-09-25 NOTE — PROGRESS NOTES
"Chief Complaint  Migraine    Subjective          Cheryl Mcnulty presents to [facility]  Earache   There is pain in the right ear. This is a new problem. The current episode started in the past 7 days. The problem has been gradually worsening. There has been no fever. Associated symptoms include headaches. Associated symptoms comments: States she has had dizziness and nausea as well. . The treatment provided significant relief.   Hypertension  This is a chronic problem. The current episode started more than 1 year ago. The problem is controlled. Associated symptoms include headaches. Current antihypertension treatment includes diuretics and beta blockers. The current treatment provides significant improvement.     Review of Systems   HENT:  Positive for ear pain.    Neurological:  Positive for headaches.       Objective   Vital Signs:   /90 (BP Location: Right arm, Patient Position: Sitting, Cuff Size: Adult)   Pulse 120   Temp 98.4 °F (36.9 °C) (Temporal)   Ht 157.5 cm (62.01\")   Wt 96.9 kg (213 lb 9.6 oz)   SpO2 99%   BMI 39.06 kg/m²     Physical Exam  Constitutional:       General: She is not in acute distress.     Appearance: Normal appearance. She is well-developed and well-groomed. She is not ill-appearing, toxic-appearing or diaphoretic.   HENT:      Head: Normocephalic.      Right Ear: Tympanic membrane, ear canal and external ear normal. A middle ear effusion is present. Tympanic membrane is erythematous and retracted.      Left Ear: Tympanic membrane, ear canal and external ear normal. A middle ear effusion is present.      Nose: Nose normal. No congestion or rhinorrhea.      Mouth/Throat:      Mouth: Mucous membranes are moist.      Pharynx: Oropharynx is clear. No oropharyngeal exudate or posterior oropharyngeal erythema.   Eyes:      General: Lids are normal.         Right eye: No discharge.         Left eye: No discharge.      Extraocular Movements: " Extraocular movements intact.      Pupils: Pupils are equal, round, and reactive to light.   Neck:      Vascular: No carotid bruit.   Cardiovascular:      Rate and Rhythm: Normal rate and regular rhythm.      Pulses: Normal pulses.      Heart sounds: Normal heart sounds. No murmur heard.    No friction rub. No gallop.   Pulmonary:      Effort: Pulmonary effort is normal. No respiratory distress.      Breath sounds: Normal breath sounds. No stridor. No wheezing, rhonchi or rales.   Chest:      Chest wall: No tenderness.   Abdominal:      General: Bowel sounds are normal. There is no distension.      Palpations: Abdomen is soft. There is no mass.      Tenderness: There is no abdominal tenderness. There is no right CVA tenderness, left CVA tenderness, guarding or rebound.      Hernia: No hernia is present.   Musculoskeletal:         General: No swelling or tenderness. Normal range of motion.      Cervical back: Normal range of motion and neck supple. No rigidity or tenderness.      Right lower leg: No edema.      Left lower leg: No edema.   Lymphadenopathy:      Cervical: No cervical adenopathy.   Skin:     General: Skin is warm.      Capillary Refill: Capillary refill takes less than 2 seconds.      Coloration: Skin is not jaundiced.      Findings: No bruising, erythema or rash.   Neurological:      General: No focal deficit present.      Mental Status: She is alert and oriented to person, place, and time.      Motor: Motor function is intact. No weakness.      Coordination: Coordination is intact.      Gait: Gait is intact. Gait normal.   Psychiatric:         Attention and Perception: Attention normal.         Mood and Affect: Mood normal.         Speech: Speech normal.         Behavior: Behavior normal.         Cognition and Memory: Cognition normal.         Judgment: Judgment normal.      Result Review :                 Assessment and Plan    Diagnoses and all orders for this visit:    1. Acute nonintractable  headache, unspecified headache type (Primary)  -     ketorolac (TORADOL) injection 60 mg    2. Primary hypertension  -     atenolol (Tenormin) 25 MG tablet; Take 1 tablet by mouth Daily.  Dispense: 90 tablet; Refill: 0  -     hydroCHLOROthiazide (MICROZIDE) 12.5 MG capsule; Take 1 capsule by mouth Daily.  Dispense: 90 capsule; Refill: 0    3. Other non-recurrent acute nonsuppurative otitis media of right ear  -     amoxicillin-clavulanate (AUGMENTIN) 875-125 MG per tablet; Take 1 tablet by mouth 2 (Two) Times a Day.  Dispense: 20 tablet; Refill: 0      Patient's Body mass index is 39.06 kg/m². indicating that she is obese (BMI >30). Obesity-related health conditions include the following: hypertension. Obesity is unchanged. BMI is is above average; BMI management plan is completed. We discussed low calorie, low carb based diet program, portion control, and increasing exercise..    Follow Up   Return if symptoms worsen or fail to improve.  Patient was given instructions and counseling regarding her condition or for health maintenance advice. Please see specific information pulled into the AVS if appropriate.     This document has been electronically signed by EMELIA Blake  September 25, 2023 13:08 EDT

## 2023-10-15 DIAGNOSIS — Z86.59 HISTORY OF DEPRESSION: Primary | ICD-10-CM

## 2023-10-15 RX ORDER — DULOXETIN HYDROCHLORIDE 60 MG/1
60 CAPSULE, DELAYED RELEASE ORAL DAILY
Qty: 14 CAPSULE | Refills: 0 | Status: SHIPPED | OUTPATIENT
Start: 2023-10-15 | End: 2023-10-16 | Stop reason: SDUPTHER

## 2023-10-16 DIAGNOSIS — Z86.59 HISTORY OF DEPRESSION: ICD-10-CM

## 2023-10-16 RX ORDER — DULOXETIN HYDROCHLORIDE 60 MG/1
60 CAPSULE, DELAYED RELEASE ORAL DAILY
Qty: 30 CAPSULE | Refills: 2 | Status: SHIPPED | OUTPATIENT
Start: 2023-10-16

## 2023-10-17 ENCOUNTER — E-VISIT (OUTPATIENT)
Dept: FAMILY MEDICINE CLINIC | Facility: TELEHEALTH | Age: 33
End: 2023-10-17
Payer: MEDICAID

## 2023-10-17 NOTE — E-VISIT ESCALATED
Patient escalated   Provider EMELIA Amin chose to escalate patient to another level of care because: Patient used incorrect module   Additional Details: Patient has had her concerned addressed by her PCP.   Patient was sent the following message:   Based on the information you've provided us, you need to take another step to get care.   What to do now:   Setup a video visit   Please, schedule your video visit   Video visit     You won't be charged for your eVisit. If you paid with a credit card, the charge will be reversed.   Chief Complaint: Anxiety, Depression, Stress   Patient introduction   Patient is 33-year-old female presenting with mood symptoms. Patient is not sure how long they've been having symptoms. Has had recent unusual stress relating to family functioning, work, and finances.   Patient-submitted comments explaining reason for visit: because I am struggling with Fibromyalgia pain and depression. I need a refill on Duloxetine 60mg. I have been out of it for a few weeks and I can feel a difference. .   Patient is willing to try medication as part of their treatment plan.   Patient did not request an excuse note.   Depression screening   PHQ-9. Response options are: Not at all (0), On several days (1), More than half the days (2), or Nearly every day (3).   Over the past 2 weeks, patient has been bothered:    (1) On several days by having little interest or pleasure in doing things    (2) On more than half the days by depressed mood    (3) Nearly every day by sleep disturbance    (2) On more than half the days by fatigue or lethargy    (3) Nearly every day by change in appetite    (2) On more than half the days by feelings of worthlessness or excessive guilt    (2) On more than half the days by poor concentration    (2) On more than half the days by observable restlessness or slowness in movement    (0) Not at all by thoughts of hurting themselves or that they would be better off dead   The  above problems have made it very difficult to work, function at home, or get along with other people.   Score: 17. Interpretation: 0 to 4: None to minimal. 5 to 9: Mild depression. 10 to 14: Major Depressive Disorder, Mild. 15 to 19: Major Depressive Disorder, Moderately Severe. 20 to 27: Major Depressive Disorder, Severe.   Anxiety screening   RENAN-7. Response options are: Not at all (0), On several days (1), More than half the days (2), or Nearly every day (3)   Over the past 2 weeks, patient has been bothered:    (3) Nearly every day by feeling nervous, anxious, or on edge    (1) On several days by not being able to stop or control worrying    (3) Nearly every day by worrying too much about different things    (3) Nearly every day by having trouble relaxing    (3) Nearly every day by being so restless that it is hard to sit still    (3) Nearly every day by becoming easily annoyed or irritable    (3) Nearly every day by feeling afraid, as if something awful might happen   The above problems have made it very difficult to work, function at home, or get along with other people.   Score: 19. Interpretation: 0 to 4: None to minimal. 5 to 9: Mild anxiety. 10 to 14: Moderate anxiety. 15 to 21: Severe anxiety.   Suicide risk screening   Score: Negative screen (based on PHQ-9 responses above).   Action taken based on risk:    Negative screen: Patient completed interview.    Low risk: Patient completed interview. Follow-up per provider discretion.    Moderate risk: Recommended to call [phone] or [phone] or to go to their nearest ER. Patient given option to continue with the interview if those options are not relevant at this time. Follow-up per provider discretion.    High risk: Interview terminated. Recommended to go to ER or to call [phone] or [phone].   Repetitive thoughts and behaviors screening   DSM-5 Level 1 Cross-Cutting Symptom Measure, Section X. 2 items. Response options are: Not at all (0), Rarely (1), Several days (2), More  than half the days (3), or Nearly every day (4)   Over the past 2 weeks, patient has been bothered:    (0) Not at all by unpleasant thoughts, urges, or images that repeatedly enter their mind    (0) Not at all by feeling driven to repeat certain behaviors or mental acts   Score: 0. Interpretation: 0 to 2 (with 0 to 1 on both items): Negative screen. 2 or higher (with 2 or higher on either item): Positive screen.   Kat/hypomania screening   DSM-5 Level 1 Cross-Cutting Symptom Measure, Section III. 2 items. Response options are: Not at all (0), Rarely (1), Several days (2), More than half the days (3), or Nearly every day (4)   Over the past 2 weeks, patient has been bothered:    (2) On several days by sleeping less than usual, but still having a lot of energy    (0) Not at all by starting lots more projects than usual or doing more risky things than usual   Score: 2. Interpretation: 0 to 2 (with 1 on both items): Negative screen. 2 or higher (with 2 or higher on at least 1 item): Positive screen; in-interview follow-up with Radha Self-Rating Kat (ASRM) Scale.   Radha Self-Rating Kat (ASRM) Scale. 5 items in which patient chooses the statement that best describes how they have been feeling over the past week.   Patient responses:    (0) I do not feel happier or more cheerful than usual    (0) I do not feel more self-confident than usual    (0) I do not need less sleep than usual    (0) I do not talk more than usual    (0) I have not been more active than usual   Score: 0. Interpretation: A score of 6 or higher indicates a high probability of a manic or hypomanic condition and may indicate a need for treatment and/or further diagnostic workup. A score of 5 or lower is less likely to be associated with significant symptoms of kat.   Psychosis/hallucination screening   DSM-5 Level 1 Cross-Cutting Symptom Measure, Section VII. 2 items. Response options are: Not at all (0), Rarely (1), Several days (2), More than  half the days (3), or Nearly every day (4)   Over the past 2 weeks, patient has been bothered:    (0) Not at all by hearing things other people could not hear    (0) Not at all by feeling that someone could hear their thoughts   Score: 0. Interpretation: 0: Negative screen. 1 or higher: Positive screen.   Substance abuse screening   DSM-5 Level 1 Cross-Cutting Symptom Measure, Section XIII. 2 items on use of tobacco, recreational drugs, or prescription medications beyond the amount prescribed or duration of prescription.   Over the past 2 weeks, patient:    (0) Did not use tobacco    (0) Did not use a recreational or prescription drug on their own   Score: 0. Interpretation: 0 is a negative screen. 1 or higher with positive response for prescription/recreational drug abuse leads to follow-up with Level 2 Cross-Cutting Symptom Measure, Section XIII. 1 or higher with positive response for tobacco use leads to tobacco cessation advice in AVS.   Comorbid/Exacerbating conditions   History of chronic pain, hypertension, and lupus (SLE).   Past mental health history   Previous diagnosis of depression. Regarding month and year of first depression diagnosis, patient writes: At the age of 16. Since initial depression diagnosis, patient has had a period when symptoms resolved and they did not need medication.   Family history of mental health disorders   First-degree relative(s) with a history of depression, panic attacks, and bipolar disorder. Regarding medication taken by first-degree relative(s), patient writes: unanswered.   Current mental health treatment   Patient is not currently taking medication for any mental health condition. Patient is not currently in counseling or therapy. Patient is not currently being seen by a psychiatrist and has not been seen by one in the last 2 years.   Previous mental health treatment   Has taken duloxetine and bupropion in the past.   As to effectiveness of past treatment:   Patient was  not satisfied with bupropion. They felt it helped some, but symptoms were still present. Patient does not want to refill bupropion.   Patient was satisfied with duloxetine. Patient wants to refill duloxetine.   Vital signs    Height: 157 centimeters    Weight: 99.7 kilograms   Current medications   Currently taking tiZANidine 4 MG tablet, ondansetron ODT 4 MG disintegrating tablet, atomoxetine 60 MG capsule, hydroCHLOROthiazide 12.5 MG capsule, multivitamin with minerals tablet tablet, loratadine 10 MG tablet, hydroxychloroquine 200 MG tablet, dicyclomine 20 MG tablet, UNISOM SLEEPGELS PO, nitrofurantoin (macrocrystal-monohydrate) 100 MG capsule, atenolol 25 MG tablet, ondansetron 4 MG tablet, and phenazopyridine 200 MG tablet.   Medication allergies   None.   Medication contraindications   History of CHF. Therefore, the following not prescribed:    Citalopram   Assessment:   Patient determined to need a level of care not appropriate to be delivered through eVisit.   Plan:   Patient informed of need to seek in-person care   ----------   Electronically signed by EMELIA Amin on 2023-10-17 at 10:42AM   ----------   Patient Interview Transcript:   Have you ever been diagnosed with any of these mental health conditions? Select all that apply.    Depression   Not selected:    Generalized anxiety disorder (RENAN)    Panic attacks    Post traumatic stress disorder (PTSD)    Obsessive-compulsive disorder (OCD)    Bipolar disorder    Schizophrenia or schizoaffective disorder    A mental health condition not listed here (specify)    None of the above   When were you first diagnosed with depression? Please specify the month and year, or your best estimate, as MM/YYYY.    At the age of 16   Since you were first diagnosed with depression, has there been a time when your symptoms went away completely and you didn't need to take medication? Select one.    Yes   Not selected:    No   Are you currently taking medication for any  mental health condition? Select one.    No   Not selected:    Yes   Have you taken medication for any mental health condition in the past? Select one.    Yes   Not selected:    No   Which medications have you taken in the past for your mental health condition(s)? Select all that apply.    Cymbalta or Drizalma Sprinkle (duloxetine)    Wellbutrin SR, Wellbutrin XL, Forfivo XL, or Aplenzin (bupropion)   Not selected:    Atarax or Vistaril (hydroxyzine)    BuSpar (buspirone)    Celexa (citalopram)    Effexor or Effexor XR (venlafaxine)    Lexapro (escitalopram)    Paxil, Paxil CR, or Pexeva (paroxetine)    Pristiq (desvenlafaxine)    Prozac (fluoxetine)    Remeron (mirtazapine)    Trazodone    Zoloft (sertraline)    Other (specify medication and whether you were satisfied with it)   I'm satisfied with Cymbalta or Drizalma Sprinkle (duloxetine)    Yes   Not selected:    No   I want to refill/restart    Yes   Not selected:    No   I'm satisfied with Wellbutrin SR, Wellbutrin XL, Forfivo XL, or Aplenzin    No   Not selected:    Yes   I want to refill/restart    No   Not selected:    Yes   Why were you unsatisfied with Wellbutrin SR, Wellbutrin XL, Forfivo XL, or Aplenzin (bupropion)? Select all that apply.    It helps some, but I still have bothersome symptoms   Not selected:    It doesn't help my symptoms at all    I don't like the side effects    It's too expensive    None of the above   Have you recently experienced unusual stress from any of these? Select all that apply.    Family    Work    Finances   Not selected:    Personal relationships    Home situation    Something related to COVID-19    Current news and events    None of the above   Are you currently in counseling or therapy? Select one.    No   Not selected:    Yes   Are you currently being seen by a psychiatrist, or have you been seen by a psychiatrist in the last 2 years? Select one.    No   Not selected:    Yes, currently    Yes, within the last 2 years    Do any of these apply to you? Select all that apply.    None of the above   Not selected:    I'm pregnant    I've given birth in the past 12 months    I'm breastfeeding   Do you have any of these medical conditions? Scroll to see all options. Select all that apply.    Chronic pain    High blood pressure    Lupus (SLE)   Not selected:    Asthma    Cancer    Congestive heart failure    Coronary artery disease (blocked arteries in the heart)    Diabetes    Epilepsy    Inflammatory arthritis    Kidney disease or history of kidney function problems    Multiple sclerosis    Parkinson disease    Thyroid disorder    Viral hepatitis    None of the above   Do any of these apply to your first-degree blood relatives? First-degree blood relatives include parents, siblings, and children who you're related to by birth, not by marriage or adoption. Select all that apply.    Depression    Panic attacks    Bipolar disorder   Not selected:    Generalized anxiety disorder (RENAN)    Post traumatic stress disorder (PTSD)    Obsessive-compulsive disorder (OCD)    Schizophrenia or schizoaffective disorder    Drug or alcohol addiction (substance use disorder)     by suicide    Attempted suicide    No, not that I know of   Are any of your first-degree blood relatives taking medications for their condition? Select one.    Yes   Not selected:    No, not that I know of   Genetics often play a role in how well medications work for mental health conditions. For example, if your sister with depression did well on sertraline (Zoloft), then it's likely that sertraline would work well for you, too. If you know the name of the medication your family member takes for their mental health condition, list it here. If not, click Next.   The patient did not enter any additional information.   1. Over the past 2 weeks, how often have you been bothered by: Having little interest or pleasure in doing things Select one.    Several days   Not selected:    Not  at all    More than half the days    Nearly every day   2. Over the past 2 weeks, how often have you been bothered by: Feeling down, depressed, or hopeless Select one.    More than half the days   Not selected:    Not at all    Several days    Nearly every day   3. Over the past 2 weeks, how often have you been bothered by: Trouble falling or staying asleep, or sleeping too much Select one.    Nearly every day   Not selected:    Not at all    Several days    More than half the days   4. Over the past 2 weeks, how often have you been bothered by: Feeling tired or having little energy Select one.    More than half the days   Not selected:    Not at all    Several days    Nearly every day   5. Over the past 2 weeks, how often have you been bothered by: Poor appetite or overeating Select one.    Nearly every day   Not selected:    Not at all    Several days    More than half the days   6. Over the past 2 weeks, how often have you been bothered by: Feeling bad about yourself, that you're a failure, or that you've let yourself or friends and family down Select one.    More than half the days   Not selected:    Not at all    Several days    Nearly every day   7. Over the past 2 weeks, how often have you been bothered by: Trouble concentrating on things like watching TV or reading the news Select one.    More than half the days   Not selected:    Not at all    Several days    Nearly every day   8. Over the past 2 weeks, how often have you been bothered by: Moving or speaking so slowly that other people could have noticed OR Being so fidgety or restless that you have been moving around a lot more than usual Select one.    More than half the days   Not selected:    Not at all    Several days    Nearly every day   9. Over the past 2 weeks, how often have you been bothered by: Thoughts that you'd be better off dead or thoughts of hurting yourself Select one.    Not at all   Not selected:    Several days    More than half the  days    Nearly every day   How difficult have these problems made it for you to work, take care of things at home, or get along with other people? Select one.    Very difficult   Not selected:    Not difficult at all    Somewhat difficult    Extremely difficult   1. Over the past 2 weeks, how often have you been bothered by: Feeling nervous, anxious, or on edge? Select one.    Nearly every day   Not selected:    Not at all    Several days    More than half the days   2. Over the past 2 weeks, how often have you been bothered by: Not being able to stop or control worrying? Select one.    Several days   Not selected:    Not at all    More than half the days    Nearly every day   3. Over the past 2 weeks, how often have you been bothered by: Worrying too much about different things? Select one.    Nearly every day   Not selected:    Not at all    Several days    More than half the days   4. Over the past 2 weeks, how often have you been bothered by: Having trouble relaxing? Select one.    Nearly every day   Not selected:    Not at all    Several days    More than half the days   5. Over the past 2 weeks, how often have you been bothered by: Being so restless that it's hard to sit still? Select one.    Nearly every day   Not selected:    Not at all    Several days    More than half the days   6. Over the past 2 weeks, how often have you been bothered by: Becoming easily annoyed or irritable? Select one.    Nearly every day   Not selected:    Not at all    Several days    More than half the days   7. Over the past 2 weeks, how often have you been bothered by: Feeling afraid, as if something awful might happen? Select one.    Nearly every day   Not selected:    Not at all    Several days    More than half the days   How difficult have these symptoms made it for you to do your work, take care of things at home, or get along with other people? Select one.    Very difficult   Not selected:    Not difficult at all    Somewhat  "difficult    Extremely difficult   Over the past 2 weeks, how often have you been bothered by: Sleeping less than usual, but still having a lot of energy? Select one.    Several days   Not selected:    Not at all    1 to 2 days    More than half the days    Nearly every day   Over the past 2 weeks, how often have you been bothered by: Starting lots more projects than usual or doing more risky things than usual? Select one.    Not at all   Not selected:    1 to 2 days    Several days    More than half the days    Nearly every day   Which statement best describes how you've been feeling over the past week? \"Occasionally\" here means once or twice, and \"often\" means several times or more. Select one.    I don't feel happier or more cheerful than usual   Not selected:    I occasionally feel happier or more cheerful than usual    I often feel happier or more cheerful than usual    I feel happier or more cheerful than usual most of the time    I feel happier or more cheerful than usual all of the time   Which statement best describes how you've been feeling over the past week? \"Occasionally\" here means once or twice, and \"often\" means several times or more. Select one.    I don't feel more self-confident than usual   Not selected:    I occasionally feel more self-confident than usual    I often feel more self-confident than usual    I feel more self-confident than usual most of the time    I feel extremely self-confident all of the time   Which statement best describes how you've been feeling over the past week? \"Occasionally\" here means once or twice, and \"often\" means several times or more. Select one.    I don't need less sleep than usual   Not selected:    I occasionally need less sleep than usual    I often need less sleep than usual    I need less sleep than usual most of the time    I can go all day and all night without any sleep and still not feel tired   Which statement best describes how you've been feeling over " "the past week? \"Occasionally\" here means once or twice, and \"often\" means several times or more. Select one.    I don't talk more than usual   Not selected:    I occasionally talk more than usual    I often talk more than usual    I talk more than usual most of the time    I talk constantly and can't be interrupted   Which statement best describes how you've been feeling over the past week? \"Occasionally\" here means once or twice, and \"often\" means several times or more. By \"active,\" we mean socially, sexually, or at work, home, or school. Select one.    I haven't been more active than usual   Not selected:    I have occasionally been more active than usual    I have often been more active than usual    I have been more active than usual most of the time    I am constantly active or on the go all the time   Over the past 2 weeks, how often have you been bothered by: Hearing things other people couldn't hear, such as voices even when no one was around? Select one.    Not at all   Not selected:    1 to 2 days    Several days    More than half the days    Nearly every day   Over the past 2 weeks, how often have you been bothered by: Feeling that someone could hear your thoughts, or that you could hear what another person was thinking? Select one.    Not at all   Not selected:    1 to 2 days    Several days    More than half the days    Nearly every day   Over the past 2 weeks, how often have you been bothered by: Unpleasant thoughts, urges, or images that repeatedly enter your mind? Select one.    Not at all   Not selected:    1 to 2 days    Several days    More than half the days    Nearly every day   Over the past 2 weeks, how often have you been bothered by: Feeling driven to perform certain behaviors or mental acts over and over again? Select one.    Not at all   Not selected:    1 to 2 days    Several days    More than half the days    Nearly every day   In the past year, how often did you have a drink containing " "alcohol? Select one.    Never   Not selected:    Monthly or less    2 to 4 times per month    2 to 3 times per week    4 or more times per week   Over the past 2 weeks, how often have you: Smoked any cigarettes, smoked a cigar or pipe, or used snuff or chewing tobacco? Select one.    Not at all   Not selected:    1 to 2 days    Several days    More than half the days    Nearly every day   Over the past 2 weeks, how often did you use any of these on your own? \"On your own\" means without a doctor's prescription, or more than prescribed, or longer than prescribed. - Prescription painkillers, such as Vicodin - Stimulants, such as Ritalin or Adderall - Sedatives or tranquilizers, such as sleeping pills or Valium - Marijuana - Cocaine or crack - Club drugs, such as Ecstasy - Hallucinogens, such as LSD - Heroin - Inhalants or solvents, such as glue - Methamphetamines, such as speed Select one.    Not at all   Not selected:    1 to 2 days    Several days    More than half the days    Nearly every day   Think about all of the symptoms you've shared with us today. How long have you been feeling this way? Select one.    I'm not sure   Not selected:    More than a year    Less than a year   These last few questions help us make sure your treatment plan is safe for you. Do you have any of these conditions? Select all that apply.    Congestive heart failure (CHF)   Not selected:    Uncorrected or persistent electrolyte abnormalities, such as potassium, sodium, calcium or magnesium    QT prolongation    Congenital long QT syndrome (LQTS)    Ventricular arrhythmias, such as ventricular fibrillation or ventricular tachycardia    Bradycardia (low heart rate)    Recent heart attack    None of these   Do any of these apply to you now or in the recent past? \"Cold turkey\" here means stopping a medication suddenly rather than slowly taking lower and lower doses until you're off the medication. Select all that apply.    None of these   Not " "selected:    Seizure disorder    Bulimia or anorexia    Liver disease    Stopped using alcohol \"cold turkey\"    Stopped using a sedative \"cold turkey\"    Stopped using an anti-seizure drug \"cold turkey\"    Stopped using a benzodiazepine drug (Klonopin, Valium, Ativan, Xanax) \"cold turkey\"   Do any of the following apply to you? Select all that apply.    None of these   Not selected:    I'm currently taking pimozide    I'm currently taking thioridazine    I've taken an MAO inhibitor in the last 14 days    I've taken linezolid or IV methylene blue in the last 14 days   Are you still taking these medications listed in your medical record? If you're not taking any of these, click Next. Select all that apply.    tiZANidine 4 MG tablet    ondansetron ODT 4 MG disintegrating tablet    atomoxetine 60 MG capsule    hydroCHLOROthiazide 12.5 MG capsule    multivitamin with minerals tablet tablet    loratadine 10 MG tablet    hydroxychloroquine 200 MG tablet    dicyclomine 20 MG tablet    UNISOM SLEEPGELS PO    nitrofurantoin (macrocrystal-monohydrate) 100 MG capsule    atenolol 25 MG tablet    ondansetron 4 MG tablet    phenazopyridine 200 MG tablet   Are you taking any other medications, vitamins, or supplements? Select one.    No   Not selected:    Yes   Have you ever had an allergic or bad reaction to any medication? Select one.    No   Not selected:    Yes   If medication is recommended as part of your treatment plan, is that something you're willing to try? Select one.    Yes   Not selected:    No   Knowing your Body Mass Index (BMI) can help your provider choose the best medication for you. To determine your BMI, we need to know your height and weight. Enter your height.    Height   Enter your weight (in pounds).    Weight   Do you need a doctor's note? A doctor's note confirms that you received care today and states when you can return to school or work. It does not contain information about your diagnosis or treatment " plan. Your provider will make the final decision on whether to give you a doctor's note. Doctor's notes CANNOT be backdated. Select one.    No   Not selected:    Today only (1 day)    Today and tomorrow (2 days)    3 days   What is the main reason you're taking this interview today?    because I am struggling with Fibromyalgia pain and depression. I need a refill on Duloxetine 60mg. I have been out of it for a few weeks and I can feel a difference.   ----------   Medical history   The following information was received from the EMR on October 17, 2023.   Allergies:    No Known Allergies   - Allergy Type:   - Reaction:   - Severity: None   - Clinical Status: Active   - Verification Status: Confirmed   Medications:    tiZANidine (ZANAFLEX) tablet   - Route: Oral   - Start Date: December 20, 2022   - End Date: None   - Status: Active    vitamin B-12 (CYANOCOBALAMIN) tablet   - Route: Oral   - Start Date: August 25, 2022   - End Date: None   - Status: Active    ondansetron (ZOFRAN-ODT) disintegrating tablet   - Route: Translingual   - Start Date: Viki 15, 2023   - End Date: None   - Status: Active    DULoxetine (CYMBALTA) DR capsule   - Route: Oral   - Start Date: October 16, 2023   - End Date: None   - Status: Active    atomoxetine (STRATTERA) capsule   - Route:   - Start Date: July 20, 2023   - End Date: None   - Status: Active    hydroCHLOROthiazide (MICROZIDE) capsule 12.5 mg   - Route: Oral   - Start Date: September 25, 2023   - End Date: None   - Status: Active    multivitamin with minerals tablet   - Route: Oral   - Start Date: August 25, 2022   - End Date: None   - Status: Active    loratadine (CLARITIN) tablet 10 mg   - Route: Oral   - Start Date: March 16, 2023   - End Date: None   - Status: Active    hydroxychloroquine (PLAQUENIL) tablet 200 mg   - Route: Oral   - Start Date: September 25, 2023   - End Date: None   - Status: Active    dicyclomine (BENTYL) tablet 20 mg   - Route: Oral   - Start Date: July 20,  2023   - End Date: None   - Status: Active    amoxicillin-clavulanate (AUGMENTIN) tablet 875-125 mg   - Route: Oral   - Start Date: September 25, 2023   - End Date: None   - Status: Active    UNISOM SLEEPGELS PO   - Route: Oral   - Start Date: August 25, 2022   - End Date: None   - Status: Active    nitrofurantoin monohydrate (MACROBID) capsule   - Route: Oral   - Start Date: September 13, 2023   - End Date: None   - Status: Active    atenolol (TENORMIN) tablet   - Route: Oral   - Start Date: September 25, 2023   - End Date: None   - Status: Active    cholecalciferol (VITAMIN D3) tablet   - Route: Oral   - Start Date: September 13, 2022   - End Date: None   - Status: Active    ondansetron (ZOFRAN) tablet   - Route: Oral   - Start Date: September 13, 2023   - End Date: None   - Status: Active    phenazopyridine (PYRIDIUM) tablet   - Route:   - Start Date: April 10, 2023   - End Date: None   - Status: Active   Problem list:    UTI (urinary tract infection)   - Category: Problem List Item   - Health Status:   - Start Date: April 26, 2017   - End Date: None   - Status: Active    Ureteral calculus   - Category: Problem List Item   - Health Status:   - Start Date: May 22, 2020   - End Date: None   - Status: Active    Frequent UTI   - Category: Problem List Item   - Health Status:   - Start Date: August 23, 2022   - End Date: None   - Status: Active    Acute cystitis without hematuria   - Category: Problem List Item   - Health Status:   - Start Date: August 23, 2022   - End Date: None   - Status: Active    Dysuria   - Category: Problem List Item   - Health Status:   - Start Date: August 23, 2022   - End Date: None   - Status: Active    Essential hypertension   - Category: Problem List Item   - Health Status:   - Start Date: January 31, 2023   - End Date: None   - Status: Active    Lupus   - Category: Problem List Item   - Health Status:   - Start Date: January 31, 2023   - End Date: None   - Status: Active    History of  depression   - Category: Problem List Item   - Health Status:   - Start Date: January 31, 2023   - End Date: None   - Status: Active    Pulmonary stenosis   - Category: Problem List Item   - Health Status:   - Start Date: January 31, 2023   - End Date: None   - Status: Active    Preoperative cardiovascular examination   - Category: Problem List Item   - Health Status:   - Start Date: March 14, 2023   - End Date: None   - Status: Active    Concern about STD in female without diagnosis   - Category: Problem List Item   - Health Status:   - Start Date: March 14, 2023   - End Date: None   - Status: Active    Dysuria-frequency syndrome   - Category: Problem List Item   - Health Status:   - Start Date: March 14, 2023   - End Date: None   - Status: Active    Precordial pain   - Category: Problem List Item   - Health Status:   - Start Date: March 28, 2023   - End Date: None   - Status: Active

## 2023-12-28 ENCOUNTER — HOSPITAL ENCOUNTER (EMERGENCY)
Facility: HOSPITAL | Age: 33
Discharge: HOME | End: 2023-12-28
Attending: STUDENT IN AN ORGANIZED HEALTH CARE EDUCATION/TRAINING PROGRAM
Payer: MEDICAID

## 2023-12-28 VITALS
RESPIRATION RATE: 17 BRPM | OXYGEN SATURATION: 98 % | BODY MASS INDEX: 37.56 KG/M2 | HEIGHT: 63 IN | TEMPERATURE: 97.4 F | SYSTOLIC BLOOD PRESSURE: 136 MMHG | DIASTOLIC BLOOD PRESSURE: 67 MMHG | WEIGHT: 212 LBS | HEART RATE: 94 BPM

## 2023-12-28 DIAGNOSIS — L02.91 ABSCESS: Primary | ICD-10-CM

## 2023-12-28 PROCEDURE — 99282 EMERGENCY DEPT VISIT SF MDM: CPT

## 2023-12-28 RX ORDER — DOXYCYCLINE 100 MG/1
100 CAPSULE ORAL 2 TIMES DAILY
Qty: 14 CAPSULE | Refills: 0 | Status: SHIPPED | OUTPATIENT
Start: 2023-12-28

## 2023-12-28 RX ORDER — LIDOCAINE HYDROCHLORIDE 10 MG/ML
10 INJECTION, SOLUTION EPIDURAL; INFILTRATION; INTRACAUDAL; PERINEURAL ONCE
Status: DISCONTINUED | OUTPATIENT
Start: 2023-12-28 | End: 2023-12-28 | Stop reason: HOSPADM

## 2023-12-29 NOTE — ED PROVIDER NOTES
Subjective   History of Present Illness  33-year-old female presents secondary to abscess under her left breast.  Patient states this started a few days ago.  She states that she has had no fever.  She states that she is been poking this with a needle trying to get some pus to come out however she has been unsuccessful.  Patient had a cyst in this area in the past though this was removed.  Patient has a past medical history of congestive heart failure, fibromyalgia, depression, hypertension.  She presents by private vehicle.        Review of Systems   Constitutional: Negative.  Negative for fever.   HENT: Negative.     Respiratory: Negative.     Cardiovascular: Negative.  Negative for chest pain.   Gastrointestinal: Negative.  Negative for abdominal pain.   Endocrine: Negative.    Genitourinary: Negative.  Negative for dysuria.   Skin: Negative.    Neurological: Negative.    Psychiatric/Behavioral: Negative.     All other systems reviewed and are negative.      Past Medical History:   Diagnosis Date    Congestive heart failure     in pregnancy    Fibromyalgia     Frequent UTI     on daily abx per urology    History of depression     History of ear infections     History of heart disease     History of lupus     skin rashes, joint pain, fatigue.   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 DRAINAGE OF OVARIAN CYST.;  Surgeon: Rachel Caruso DO;  Location: [facility] OR;  Service: Obstetrics/Gynecology    ORIF SCAPHOID W VASCULARIZED BONE GRAFT      TUBAL COAGULATION LAPAROSCOPIC Bilateral 2018    Procedure:  TUBAL FALLOPE FILSHIE CLIPPING LAPAROSCOPIC;  Surgeon: Rachel Caruso DO;  Location: [facility] OR;  Service: Obstetrics/Gynecology    ULNA/RADIUS CLOSED REDUCTION  2006       Family History   Problem Relation Age of Onset    Heart disease Mother     Thyroid disease Mother     Anxiety disorder Mother     Depression Mother     Bipolar disorder Mother     Heart disease Father     Cancer Father     Heart disease Maternal Grandmother     Diabetes Maternal Grandmother     Diabetes Maternal Grandfather     Lupus Paternal Grandmother     Rheum arthritis Paternal Grandmother        Social History     Socioeconomic History    Marital status: Single   Tobacco Use    Smoking status: Never    Smokeless tobacco: Never   Vaping Use    Vaping Use: Every day    Substances: Nicotine    Devices: Disposable   Substance and Sexual Activity    Alcohol use: Yes     Comment: socially    Drug use: No    Sexual activity: Yes     Partners: Male     Birth control/protection: Surgical, Tubal ligation           Objective   Physical Exam  Vitals and nursing note reviewed.   Constitutional:       General: She is not in acute distress.     Appearance: She is well-developed. She is not diaphoretic.   HENT:      Head: Normocephalic and atraumatic.      Right Ear: External ear normal.      Left Ear: External ear normal.      Nose: Nose normal.   Eyes:      Conjunctiva/sclera: Conjunctivae normal.      Pupils: Pupils are equal, round, and reactive to light.   Neck:      Vascular: No JVD.      Trachea: No tracheal deviation.   Cardiovascular:      Rate and Rhythm: Normal rate and regular rhythm.      Heart sounds: Normal heart sounds. No murmur heard.  Pulmonary:      Effort: Pulmonary effort is normal. No respiratory distress.      Breath sounds: Normal breath sounds. No wheezing.   Abdominal:      General: Bowel sounds are normal.      Palpations: Abdomen is soft.      Tenderness: There is no abdominal tenderness.   Musculoskeletal:          General: No deformity. Normal range of motion.      Cervical back: Normal range of motion and neck supple.   Skin:     General: Skin is warm and dry.      Coloration: Skin is not pale.      Findings: No erythema or rash.      Comments: Patient has an approximate quarter sized area of abscess that is felt to be due to an underlying cyst.  No surrounding cellulitis.  No streaking.  No sign of deep space infection.   Neurological:      Mental Status: She is alert and oriented to person, place, and time.      Cranial Nerves: No cranial nerve deficit.   Psychiatric:         Behavior: Behavior normal.         Thought Content: Thought content normal.         Incision & Drainage    Date/Time: 12/28/2023 7:21 PM    Performed by: Jefferson Wong PA  Authorized by: Angelica eHrman MD    Consent:     Consent obtained:  Verbal    Consent given by:  Patient    Risks, benefits, and alternatives were discussed: yes      Risks discussed:  Bleeding, pain, infection and incomplete drainage  Universal protocol:     Patient identity confirmed:  Verbally with patient  Location:     Type:  Abscess    Size:  3cm    Location:  Trunk    Trunk location:  L breast  Pre-procedure details:     Skin preparation:  Povidone-iodine  Anesthesia:     Anesthesia method:  Local infiltration    Local anesthetic:  Lidocaine 1% w/o epi  Procedure type:     Complexity:  Simple  Procedure details:     Incision types:  Single straight    Incision depth:  Subcutaneous    Wound management:  Probed and deloculated    Drainage:  Purulent    Drainage amount:  Moderate    Wound treatment:  Wound left open    Packing materials:  None  Post-procedure details:     Procedure completion:  Tolerated             ED Course                                             Medical Decision Making  Patient has an approximate quarter sized area of abscess that is felt to be due to an underlying cyst.  No surrounding cellulitis.  No streaking.  No sign of deep space  infection.    Problems Addressed:  Abscess: complicated acute illness or injury    Risk  Prescription drug management.        Final diagnoses:   Abscess       ED Disposition  ED Disposition       ED Disposition   Discharge    Condition   Stable    Comment   --               Amy Leija, APRN  [address] [place] [address]  [phone]      As needed    Gallito Suazo MD  [address] [address]  [place] [address]  [phone]    In 1 week  if persists         Medication List        New Prescriptions      doxycycline 100 MG capsule  Commonly known as: MONODOX  Take 1 capsule by mouth 2 (Two) Times a Day.            Stop      amoxicillin-clavulanate 875-125 MG per tablet  Commonly known as: AUGMENTIN               Where to Get Your Medications        You can get these medications from any pharmacy    Bring a paper prescription for each of these medications  doxycycline 100 MG capsule            Jefferson Wong PA  12/28/23 1922

## 2024-02-08 ENCOUNTER — OFFICE VISIT (OUTPATIENT)
Dept: FAMILY MEDICINE CLINIC | Facility: CLINIC | Age: 34
End: 2024-02-08

## 2024-02-08 VITALS
OXYGEN SATURATION: 99 % | HEIGHT: 62 IN | DIASTOLIC BLOOD PRESSURE: 78 MMHG | TEMPERATURE: 97.8 F | WEIGHT: 213.2 LBS | HEART RATE: 108 BPM | SYSTOLIC BLOOD PRESSURE: 130 MMHG | BODY MASS INDEX: 39.23 KG/M2

## 2024-02-08 DIAGNOSIS — R10.84 GENERALIZED ABDOMINAL PAIN: ICD-10-CM

## 2024-02-08 DIAGNOSIS — F90.2 ATTENTION DEFICIT HYPERACTIVITY DISORDER (ADHD), COMBINED TYPE: ICD-10-CM

## 2024-02-08 DIAGNOSIS — F41.9 ANXIETY: ICD-10-CM

## 2024-02-08 DIAGNOSIS — Z86.59 HISTORY OF DEPRESSION: ICD-10-CM

## 2024-02-08 DIAGNOSIS — I10 PRIMARY HYPERTENSION: ICD-10-CM

## 2024-02-08 DIAGNOSIS — Z71.1 CONCERN ABOUT STD IN FEMALE WITHOUT DIAGNOSIS: Primary | ICD-10-CM

## 2024-02-08 LAB
ALBUMIN SERPL-MCNC: 4.4 G/DL (ref 3.5–5.2)
ALBUMIN/GLOB SERPL: 1.8 G/DL
ALP SERPL-CCNC: 94 U/L (ref 39–117)
ALT SERPL W P-5'-P-CCNC: 12 U/L (ref 1–33)
ANION GAP SERPL CALCULATED.3IONS-SCNC: 10.8 MMOL/L (ref 5–15)
AST SERPL-CCNC: 15 U/L (ref 1–32)
BASOPHILS # BLD AUTO: 0.03 10*3/MM3 (ref 0–0.2)
BASOPHILS NFR BLD AUTO: 0.4 % (ref 0–1.5)
BILIRUB SERPL-MCNC: 0.4 MG/DL (ref 0–1.2)
BUN SERPL-MCNC: 12 MG/DL (ref 6–20)
BUN/CREAT SERPL: 14 (ref 7–25)
C TRACH RRNA CVX QL NAA+PROBE: NOT DETECTED
CALCIUM SPEC-SCNC: 9.9 MG/DL (ref 8.6–10.5)
CHLORIDE SERPL-SCNC: 103 MMOL/L (ref 98–107)
CO2 SERPL-SCNC: 26.2 MMOL/L (ref 22–29)
CREAT SERPL-MCNC: 0.86 MG/DL (ref 0.57–1)
DEPRECATED RDW RBC AUTO: 37 FL (ref 37–54)
EGFRCR SERPLBLD CKD-EPI 2021: 91 ML/MIN/1.73
EOSINOPHIL # BLD AUTO: 0.08 10*3/MM3 (ref 0–0.4)
EOSINOPHIL NFR BLD AUTO: 1.1 % (ref 0.3–6.2)
ERYTHROCYTE [DISTWIDTH] IN BLOOD BY AUTOMATED COUNT: 12.6 % (ref 12.3–15.4)
GLOBULIN UR ELPH-MCNC: 2.5 GM/DL
GLUCOSE SERPL-MCNC: 87 MG/DL (ref 65–99)
HAV IGM SERPL QL IA: NORMAL
HBV CORE IGM SERPL QL IA: NORMAL
HBV SURFACE AG SERPL QL IA: NORMAL
HCT VFR BLD AUTO: 39.6 % (ref 34–46.6)
HCV AB SER DONR QL: NORMAL
HGB BLD-MCNC: 13 G/DL (ref 12–15.9)
HIV 1+2 AB+HIV1 P24 AG SERPL QL IA: NORMAL
IMM GRANULOCYTES # BLD AUTO: 0.02 10*3/MM3 (ref 0–0.05)
IMM GRANULOCYTES NFR BLD AUTO: 0.3 % (ref 0–0.5)
LYMPHOCYTES # BLD AUTO: 2.33 10*3/MM3 (ref 0.7–3.1)
LYMPHOCYTES NFR BLD AUTO: 31.4 % (ref 19.6–45.3)
MCH RBC QN AUTO: 27 PG (ref 26.6–33)
MCHC RBC AUTO-ENTMCNC: 32.8 G/DL (ref 31.5–35.7)
MCV RBC AUTO: 82.2 FL (ref 79–97)
MONOCYTES # BLD AUTO: 0.39 10*3/MM3 (ref 0.1–0.9)
MONOCYTES NFR BLD AUTO: 5.2 % (ref 5–12)
N GONORRHOEA RRNA SPEC QL NAA+PROBE: NOT DETECTED
NEUTROPHILS NFR BLD AUTO: 4.58 10*3/MM3 (ref 1.7–7)
NEUTROPHILS NFR BLD AUTO: 61.6 % (ref 42.7–76)
NRBC BLD AUTO-RTO: 0 /100 WBC (ref 0–0.2)
PLATELET # BLD AUTO: 291 10*3/MM3 (ref 140–450)
PMV BLD AUTO: 11.2 FL (ref 6–12)
POTASSIUM SERPL-SCNC: 3.9 MMOL/L (ref 3.5–5.2)
PROT SERPL-MCNC: 6.9 G/DL (ref 6–8.5)
RBC # BLD AUTO: 4.82 10*6/MM3 (ref 3.77–5.28)
SODIUM SERPL-SCNC: 140 MMOL/L (ref 136–145)
T4 FREE SERPL-MCNC: 1.28 NG/DL (ref 0.93–1.7)
TSH SERPL DL<=0.05 MIU/L-ACNC: 1.99 UIU/ML (ref 0.27–4.2)
WBC NRBC COR # BLD AUTO: 7.43 10*3/MM3 (ref 3.4–10.8)

## 2024-02-08 PROCEDURE — G0432 EIA HIV-1/HIV-2 SCREEN: HCPCS | Performed by: FAMILY MEDICINE

## 2024-02-08 PROCEDURE — 86695 HERPES SIMPLEX TYPE 1 TEST: CPT | Performed by: FAMILY MEDICINE

## 2024-02-08 PROCEDURE — 86696 HERPES SIMPLEX TYPE 2 TEST: CPT | Performed by: FAMILY MEDICINE

## 2024-02-08 PROCEDURE — 86592 SYPHILIS TEST NON-TREP QUAL: CPT | Performed by: FAMILY MEDICINE

## 2024-02-08 PROCEDURE — 36415 COLL VENOUS BLD VENIPUNCTURE: CPT | Performed by: FAMILY MEDICINE

## 2024-02-08 PROCEDURE — 85025 COMPLETE CBC W/AUTO DIFF WBC: CPT | Performed by: FAMILY MEDICINE

## 2024-02-08 PROCEDURE — 80074 ACUTE HEPATITIS PANEL: CPT | Performed by: FAMILY MEDICINE

## 2024-02-08 PROCEDURE — 84439 ASSAY OF FREE THYROXINE: CPT | Performed by: FAMILY MEDICINE

## 2024-02-08 PROCEDURE — 87591 N.GONORRHOEAE DNA AMP PROB: CPT | Performed by: FAMILY MEDICINE

## 2024-02-08 PROCEDURE — 80053 COMPREHEN METABOLIC PANEL: CPT | Performed by: FAMILY MEDICINE

## 2024-02-08 PROCEDURE — 87491 CHLMYD TRACH DNA AMP PROBE: CPT | Performed by: FAMILY MEDICINE

## 2024-02-08 PROCEDURE — 84443 ASSAY THYROID STIM HORMONE: CPT | Performed by: FAMILY MEDICINE

## 2024-02-08 RX ORDER — DULOXETIN HYDROCHLORIDE 60 MG/1
60 CAPSULE, DELAYED RELEASE ORAL DAILY
Qty: 30 CAPSULE | Refills: 2 | Status: SHIPPED | OUTPATIENT
Start: 2024-02-08

## 2024-02-08 RX ORDER — ATOMOXETINE 60 MG/1
CAPSULE ORAL
Qty: 30 CAPSULE | Refills: 1 | Status: SHIPPED | OUTPATIENT
Start: 2024-02-08

## 2024-02-08 RX ORDER — HYDROCHLOROTHIAZIDE 12.5 MG/1
12.5 CAPSULE, GELATIN COATED ORAL DAILY
Qty: 90 CAPSULE | Refills: 0 | Status: SHIPPED | OUTPATIENT
Start: 2024-02-08

## 2024-02-08 RX ORDER — DICYCLOMINE HCL 20 MG
20 TABLET ORAL EVERY 6 HOURS PRN
Qty: 20 TABLET | Refills: 0 | Status: SHIPPED | OUTPATIENT
Start: 2024-02-08

## 2024-02-08 RX ORDER — ATENOLOL 25 MG/1
25 TABLET ORAL DAILY
Qty: 90 TABLET | Refills: 0 | Status: SHIPPED | OUTPATIENT
Start: 2024-02-08

## 2024-02-08 RX ORDER — HYDROXYZINE HYDROCHLORIDE 10 MG/1
10 TABLET, FILM COATED ORAL 3 TIMES DAILY PRN
Qty: 90 TABLET | Refills: 2 | Status: SHIPPED | OUTPATIENT
Start: 2024-02-08

## 2024-02-08 NOTE — PROGRESS NOTES
"Chief Complaint  Anxiety    Subjective          Cheryl Mcnulty presents to [facility]  History of Present Illness    Patient here to follow-up for ADHD and anxiety.  States these are managed well with her Strattera which she needs refills on. States she has been having some increased anxiety and needs something to help with this.     States she is concerned she may have been exposed to an std and would like to be tested. This has contributed to her increased anxiety as well. States she has no symptoms at this time.     States she is doing well with her current blood pressure medications, she needs refills.     Review of Systems      Objective   Vital Signs:   /78 (BP Location: Right arm, Patient Position: Sitting)   Pulse 108   Temp 97.8 °F (36.6 °C)   Ht 157.5 cm (62.01\")   Wt 96.7 kg (213 lb 3.2 oz)   SpO2 99%   BMI 38.98 kg/m²     Physical Exam  Constitutional:       General: She is not in acute distress.     Appearance: Normal appearance. She is well-developed and well-groomed. She is not ill-appearing, toxic-appearing or diaphoretic.   HENT:      Head: Normocephalic.      Nose: Nose normal. No congestion or rhinorrhea.      Mouth/Throat:      Mouth: Mucous membranes are moist.      Pharynx: Oropharynx is clear. No oropharyngeal exudate or posterior oropharyngeal erythema.   Eyes:      General: Lids are normal.         Right eye: No discharge.         Left eye: No discharge.      Extraocular Movements: Extraocular movements intact.      Pupils: Pupils are equal, round, and reactive to light.   Neck:      Vascular: No carotid bruit.   Cardiovascular:      Rate and Rhythm: Normal rate and regular rhythm.      Pulses: Normal pulses.      Heart sounds: Normal heart sounds. No murmur heard.     No friction rub. No gallop.   Pulmonary:      Effort: Pulmonary effort is normal. No respiratory distress.      Breath sounds: Normal breath sounds. No stridor. No wheezing, " rhonchi or rales.   Chest:      Chest wall: No tenderness.   Abdominal:      General: Bowel sounds are normal. There is no distension.      Palpations: Abdomen is soft. There is no mass.      Tenderness: There is no abdominal tenderness. There is no right CVA tenderness, left CVA tenderness, guarding or rebound.      Hernia: No hernia is present.   Musculoskeletal:         General: No swelling or tenderness. Normal range of motion.      Cervical back: Normal range of motion and neck supple. No rigidity or tenderness.      Right lower leg: No edema.      Left lower leg: No edema.   Lymphadenopathy:      Cervical: No cervical adenopathy.   Skin:     General: Skin is warm.      Capillary Refill: Capillary refill takes less than 2 seconds.      Coloration: Skin is not jaundiced.      Findings: No bruising, erythema or rash.   Neurological:      General: No focal deficit present.      Mental Status: She is alert and oriented to person, place, and time.      Motor: Motor function is intact. No weakness.      Coordination: Coordination is intact.      Gait: Gait is intact. Gait normal.   Psychiatric:         Attention and Perception: Attention normal.         Mood and Affect: Mood normal.         Speech: Speech normal.         Behavior: Behavior normal.         Cognition and Memory: Cognition normal.         Judgment: Judgment normal.        Result Review :                 Assessment and Plan    Diagnoses and all orders for this visit:    1. Concern about STD in female without diagnosis (Primary)  -     Hepatitis panel, acute; Future  -     HIV-1/O/2 ANTIGEN/ANTIBODY, 4TH GENERATION; Future  -     HSV 1 and 2-Specific Ab, IgG; Future  -     RPR; Future  -     Chlamydia trachomatis, Neisseria gonorrhoeae, PCR - , Urine, Clean Catch; Future  -     Hepatitis panel, acute  -     HIV-1/O/2 ANTIGEN/ANTIBODY, 4TH GENERATION  -     HSV 1 and 2-Specific Ab, IgG  -     RPR  -     Chlamydia trachomatis, Neisseria gonorrhoeae, PCR - ,  Urine, Clean Catch    2. Primary hypertension  -     atenolol (Tenormin) 25 MG tablet; Take 1 tablet by mouth Daily.  Dispense: 90 tablet; Refill: 0  -     hydroCHLOROthiazide (MICROZIDE) 12.5 MG capsule; Take 1 capsule by mouth Daily.  Dispense: 90 capsule; Refill: 0  -     CBC Auto Differential; Future  -     Comprehensive Metabolic Panel; Future  -     TSH; Future  -     T4, Free; Future  -     CBC Auto Differential  -     Comprehensive Metabolic Panel  -     TSH  -     T4, Free    3. Attention deficit hyperactivity disorder (ADHD), combined type  -     atomoxetine (STRATTERA) 60 MG capsule; Once daily  Dispense: 30 capsule; Refill: 1    4. Generalized abdominal pain  -     dicyclomine (BENTYL) 20 MG tablet; Take 1 tablet by mouth Every 6 (Six) Hours As Needed for Abdominal Cramping.  Dispense: 20 tablet; Refill: 0    5. History of depression  -     DULoxetine (CYMBALTA) 60 MG capsule; Take 1 capsule by mouth Daily.  Dispense: 30 capsule; Refill: 2    6. Anxiety  -     hydrOXYzine (ATARAX) 10 MG tablet; Take 1 tablet by mouth 3 (Three) Times a Day As Needed for Itching.  Dispense: 90 tablet; Refill: 2      Patient's Body mass index is 38.98 kg/m². indicating that she is obese (BMI >30). Obesity-related health conditions include the following: hypertension. Obesity is unchanged. BMI is is above average; BMI management plan is completed. We discussed low calorie, low carb based diet program, portion control, and increasing exercise..    Follow Up   Return in about 3 months (around 5/8/2024), or labs today.  Patient was given instructions and counseling regarding her condition or for health maintenance advice. Please see specific information pulled into the AVS if appropriate.     This document has been electronically signed by EMELIA Blake  February 8, 2024 14:39 EST

## 2024-02-09 LAB
HSV1 IGG SER IA-ACNC: 30.6 INDEX (ref 0–0.9)
HSV2 IGG SER IA-ACNC: <0.91 INDEX (ref 0–0.9)
RPR SER QL: NORMAL

## 2024-02-13 ENCOUNTER — TELEPHONE (OUTPATIENT)
Dept: FAMILY MEDICINE CLINIC | Facility: CLINIC | Age: 34
End: 2024-02-13
Payer: MEDICAID

## 2024-03-04 ENCOUNTER — OFFICE VISIT (OUTPATIENT)
Dept: FAMILY MEDICINE CLINIC | Facility: CLINIC | Age: 34
End: 2024-03-04
Payer: COMMERCIAL

## 2024-03-04 VITALS
WEIGHT: 212.6 LBS | SYSTOLIC BLOOD PRESSURE: 108 MMHG | HEART RATE: 84 BPM | OXYGEN SATURATION: 96 % | TEMPERATURE: 97.7 F | BODY MASS INDEX: 39.12 KG/M2 | DIASTOLIC BLOOD PRESSURE: 76 MMHG | HEIGHT: 62 IN

## 2024-03-04 DIAGNOSIS — R09.81 NASAL CONGESTION: ICD-10-CM

## 2024-03-04 DIAGNOSIS — J01.90 ACUTE NON-RECURRENT SINUSITIS, UNSPECIFIED LOCATION: ICD-10-CM

## 2024-03-04 DIAGNOSIS — R51.9 NONINTRACTABLE HEADACHE, UNSPECIFIED CHRONICITY PATTERN, UNSPECIFIED HEADACHE TYPE: Primary | ICD-10-CM

## 2024-03-04 DIAGNOSIS — R43.2 TASTE ABSENT: ICD-10-CM

## 2024-03-04 LAB
EXPIRATION DATE: NORMAL
FLUAV AG UPPER RESP QL IA.RAPID: NOT DETECTED
FLUBV AG UPPER RESP QL IA.RAPID: NOT DETECTED
INTERNAL CONTROL: NORMAL
Lab: NORMAL
SARS-COV-2 AG UPPER RESP QL IA.RAPID: NOT DETECTED

## 2024-03-04 PROCEDURE — 87428 SARSCOV & INF VIR A&B AG IA: CPT | Performed by: FAMILY MEDICINE

## 2024-03-04 PROCEDURE — 3078F DIAST BP <80 MM HG: CPT | Performed by: FAMILY MEDICINE

## 2024-03-04 PROCEDURE — 3074F SYST BP LT 130 MM HG: CPT | Performed by: FAMILY MEDICINE

## 2024-03-04 PROCEDURE — 99214 OFFICE O/P EST MOD 30 MIN: CPT | Performed by: FAMILY MEDICINE

## 2024-03-04 RX ORDER — ONDANSETRON 4 MG/1
4 TABLET, ORALLY DISINTEGRATING ORAL EVERY 8 HOURS PRN
Qty: 20 TABLET | Refills: 0 | Status: SHIPPED | OUTPATIENT
Start: 2024-03-04

## 2024-03-04 RX ORDER — AZITHROMYCIN 250 MG/1
TABLET, FILM COATED ORAL
Qty: 6 TABLET | Refills: 0 | Status: SHIPPED | OUTPATIENT
Start: 2024-03-04

## 2024-03-04 NOTE — PROGRESS NOTES
"Chief Complaint  Nasal Congestion, Headache, and No taste    Subjective          Cheryl Mcnulty presents to [facility]  Sinusitis  This is a new problem. The current episode started in the past 7 days. Associated symptoms include congestion, coughing, headaches, sneezing and a sore throat. Pertinent negatives include no sinus pressure. Past treatments include acetaminophen, sitting up and nasal decongestants. The treatment provided mild relief.       Review of Systems   HENT:  Positive for congestion, sneezing and sore throat. Negative for sinus pressure.    Respiratory:  Positive for cough.    Neurological:  Positive for headaches.         Objective   Vital Signs:   /76 (BP Location: Right arm, Patient Position: Sitting, Cuff Size: Adult)   Pulse 84   Temp 97.7 °F (36.5 °C) (Temporal)   Ht 157.5 cm (62.01\")   Wt 96.4 kg (212 lb 9.6 oz)   SpO2 96%   BMI 38.87 kg/m²     Physical Exam  Constitutional:       General: She is not in acute distress.     Appearance: Normal appearance. She is well-developed and well-groomed. She is not ill-appearing, toxic-appearing or diaphoretic.   HENT:      Head: Normocephalic.      Nose: Congestion and rhinorrhea present.      Mouth/Throat:      Mouth: Mucous membranes are moist.      Pharynx: Oropharynx is clear. Posterior oropharyngeal erythema present. No oropharyngeal exudate.   Eyes:      General: Lids are normal.         Right eye: No discharge.         Left eye: No discharge.      Extraocular Movements: Extraocular movements intact.      Pupils: Pupils are equal, round, and reactive to light.   Neck:      Vascular: No carotid bruit.   Cardiovascular:      Rate and Rhythm: Normal rate and regular rhythm.      Pulses: Normal pulses.      Heart sounds: Normal heart sounds. No murmur heard.     No friction rub. No gallop.   Pulmonary:      Effort: Pulmonary effort is normal. No respiratory distress.      Breath sounds: Normal breath " sounds. No stridor. No wheezing, rhonchi or rales.   Chest:      Chest wall: No tenderness.   Abdominal:      General: Bowel sounds are normal. There is no distension.      Palpations: Abdomen is soft. There is no mass.      Tenderness: There is no abdominal tenderness. There is no right CVA tenderness, left CVA tenderness, guarding or rebound.      Hernia: No hernia is present.   Musculoskeletal:         General: No swelling or tenderness. Normal range of motion.      Cervical back: Normal range of motion and neck supple. No rigidity or tenderness.      Right lower leg: No edema.      Left lower leg: No edema.   Lymphadenopathy:      Cervical: No cervical adenopathy.   Skin:     General: Skin is warm.      Capillary Refill: Capillary refill takes less than 2 seconds.      Coloration: Skin is not jaundiced.      Findings: No bruising, erythema or rash.   Neurological:      General: No focal deficit present.      Mental Status: She is alert and oriented to person, place, and time.      Motor: Motor function is intact. No weakness.      Coordination: Coordination is intact.      Gait: Gait is intact. Gait normal.   Psychiatric:         Attention and Perception: Attention normal.         Mood and Affect: Mood normal.         Speech: Speech normal.         Behavior: Behavior normal.         Cognition and Memory: Cognition normal.         Judgment: Judgment normal.        Result Review :                 Assessment and Plan    Diagnoses and all orders for this visit:    1. Nonintractable headache, unspecified chronicity pattern, unspecified headache type (Primary)  -     POCT SARS-CoV-2 Antigen GILDA + Flu    2. Taste absent  -     POCT SARS-CoV-2 Antigen GILDA + Flu    3. Nasal congestion  -     POCT SARS-CoV-2 Antigen GILDA + Flu    4. Acute non-recurrent sinusitis, unspecified location  -     azithromycin (Zithromax Z-Hilario) 250 MG tablet; Take 2 tablets by mouth on day 1, then 1 tablet daily on days 2-5  Dispense: 6 tablet;  Refill: 0  -     ondansetron ODT (ZOFRAN-ODT) 4 MG disintegrating tablet; Place 1 tablet on the tongue Every 8 (Eight) Hours As Needed for Vomiting.  Dispense: 20 tablet; Refill: 0      Patient's Body mass index is 38.87 kg/m². indicating that she is obese (BMI >30). Obesity-related health conditions include the following: hypertension. Obesity is unchanged. BMI is is above average; BMI management plan is completed. We discussed low calorie, low carb based diet program, portion control, and increasing exercise..    Follow Up   Return if symptoms worsen or fail to improve.  Patient was given instructions and counseling regarding her condition or for health maintenance advice. Please see specific information pulled into the AVS if appropriate.     This document has been electronically signed by EMELIA Blake  March 4, 2024 15:00 EST

## 2024-04-18 ENCOUNTER — LAB (OUTPATIENT)
Dept: FAMILY MEDICINE CLINIC | Facility: CLINIC | Age: 34
End: 2024-04-18
Payer: COMMERCIAL

## 2024-04-18 ENCOUNTER — OFFICE VISIT (OUTPATIENT)
Dept: FAMILY MEDICINE CLINIC | Facility: CLINIC | Age: 34
End: 2024-04-18
Payer: COMMERCIAL

## 2024-04-18 VITALS
OXYGEN SATURATION: 97 % | SYSTOLIC BLOOD PRESSURE: 130 MMHG | DIASTOLIC BLOOD PRESSURE: 80 MMHG | BODY MASS INDEX: 38.16 KG/M2 | TEMPERATURE: 96 F | HEART RATE: 105 BPM | HEIGHT: 62 IN | WEIGHT: 207.4 LBS

## 2024-04-18 DIAGNOSIS — J01.90 ACUTE NON-RECURRENT SINUSITIS, UNSPECIFIED LOCATION: ICD-10-CM

## 2024-04-18 DIAGNOSIS — R30.0 DYSURIA: ICD-10-CM

## 2024-04-18 DIAGNOSIS — E55.9 VITAMIN D DEFICIENCY: ICD-10-CM

## 2024-04-18 DIAGNOSIS — Z86.59 HISTORY OF DEPRESSION: ICD-10-CM

## 2024-04-18 DIAGNOSIS — R53.83 FATIGUE, UNSPECIFIED TYPE: ICD-10-CM

## 2024-04-18 DIAGNOSIS — R30.0 DYSURIA: Primary | ICD-10-CM

## 2024-04-18 LAB
BILIRUB BLD-MCNC: ABNORMAL MG/DL
CLARITY, POC: ABNORMAL
COLOR UR: YELLOW
EXPIRATION DATE: ABNORMAL
GLUCOSE UR STRIP-MCNC: NEGATIVE MG/DL
KETONES UR QL: ABNORMAL
LEUKOCYTE EST, POC: ABNORMAL
Lab: ABNORMAL
NITRITE UR-MCNC: NEGATIVE MG/ML
PH UR: 6 [PH] (ref 5–8)
PROT UR STRIP-MCNC: ABNORMAL MG/DL
RBC # UR STRIP: NEGATIVE /UL
SP GR UR: 1.03 (ref 1–1.03)
UROBILINOGEN UR QL: NORMAL

## 2024-04-18 PROCEDURE — 82306 VITAMIN D 25 HYDROXY: CPT | Performed by: FAMILY MEDICINE

## 2024-04-18 PROCEDURE — 82728 ASSAY OF FERRITIN: CPT | Performed by: FAMILY MEDICINE

## 2024-04-18 PROCEDURE — 83540 ASSAY OF IRON: CPT | Performed by: FAMILY MEDICINE

## 2024-04-18 PROCEDURE — 84439 ASSAY OF FREE THYROXINE: CPT | Performed by: FAMILY MEDICINE

## 2024-04-18 PROCEDURE — 87086 URINE CULTURE/COLONY COUNT: CPT | Performed by: FAMILY MEDICINE

## 2024-04-18 PROCEDURE — 84466 ASSAY OF TRANSFERRIN: CPT | Performed by: FAMILY MEDICINE

## 2024-04-18 PROCEDURE — 80050 GENERAL HEALTH PANEL: CPT | Performed by: FAMILY MEDICINE

## 2024-04-18 PROCEDURE — 82607 VITAMIN B-12: CPT | Performed by: FAMILY MEDICINE

## 2024-04-18 RX ORDER — MELATONIN
1000 DAILY
Qty: 90 TABLET | Refills: 1 | Status: SHIPPED | OUTPATIENT
Start: 2024-04-18

## 2024-04-18 RX ORDER — ONDANSETRON 4 MG/1
4 TABLET, ORALLY DISINTEGRATING ORAL EVERY 8 HOURS PRN
Qty: 20 TABLET | Refills: 0 | Status: SHIPPED | OUTPATIENT
Start: 2024-04-18

## 2024-04-18 RX ORDER — METHYLPREDNISOLONE 4 MG/1
TABLET ORAL
Qty: 21 TABLET | Refills: 0 | Status: SHIPPED | OUTPATIENT
Start: 2024-04-18

## 2024-04-18 RX ORDER — DULOXETIN HYDROCHLORIDE 60 MG/1
60 CAPSULE, DELAYED RELEASE ORAL 2 TIMES DAILY
Qty: 60 CAPSULE | Refills: 2 | Status: SHIPPED | OUTPATIENT
Start: 2024-04-18

## 2024-04-18 RX ORDER — DULOXETIN HYDROCHLORIDE 60 MG/1
60 CAPSULE, DELAYED RELEASE ORAL DAILY
Qty: 30 CAPSULE | Refills: 2 | Status: SHIPPED | OUTPATIENT
Start: 2024-04-18 | End: 2024-04-18

## 2024-04-18 RX ORDER — LANOLIN ALCOHOL/MO/W.PET/CERES
1000 CREAM (GRAM) TOPICAL DAILY
Qty: 30 TABLET | Refills: 2 | Status: SHIPPED | OUTPATIENT
Start: 2024-04-18

## 2024-04-18 NOTE — PROGRESS NOTES
"Chief Complaint  Difficulty Urinating    Subjective          Cheryl Mcnulty presents to [facility]  Difficulty Urinating   This is a recurrent problem. The current episode started in the past 7 days. The quality of the pain is described as aching and burning. The pain is mild. There has been no fever. She has tried increased fluids and antibiotics (restarted abx, had been out of her abd urology has her on) for the symptoms. Her past medical history is significant for recurrent UTIs.   Fatigue  This is a recurrent problem. The current episode started more than 1 month ago. The problem has been unchanged. Associated symptoms include fatigue. She has tried rest and sleep for the symptoms. The treatment provided mild relief.   Fibromyalgia  This is a chronic problem. The current episode started more than 1 year ago. Associated symptoms include fatigue. She has tried rest (cymbalta) for the symptoms. The treatment provided mild relief.       Review of Systems   Constitutional:  Positive for fatigue.   Genitourinary:  Positive for difficulty urinating.         Objective   Vital Signs:   /80 (BP Location: Right arm, Patient Position: Sitting, Cuff Size: Adult)   Pulse 105   Temp 96 °F (35.6 °C) (Temporal)   Ht 157.5 cm (62.01\")   Wt 94.1 kg (207 lb 6.4 oz)   SpO2 97%   BMI 37.92 kg/m²     Physical Exam  Constitutional:       General: She is not in acute distress.     Appearance: Normal appearance. She is well-developed and well-groomed. She is not ill-appearing, toxic-appearing or diaphoretic.   HENT:      Head: Normocephalic.      Right Ear: Tympanic membrane, ear canal and external ear normal.      Left Ear: Tympanic membrane, ear canal and external ear normal.      Nose: Nose normal. No congestion or rhinorrhea.      Mouth/Throat:      Mouth: Mucous membranes are moist.      Pharynx: Oropharynx is clear. No oropharyngeal exudate or posterior oropharyngeal erythema.   Eyes: "      General: Lids are normal.         Right eye: No discharge.         Left eye: No discharge.      Extraocular Movements: Extraocular movements intact.      Pupils: Pupils are equal, round, and reactive to light.   Neck:      Vascular: No carotid bruit.   Cardiovascular:      Rate and Rhythm: Normal rate and regular rhythm.      Pulses: Normal pulses.      Heart sounds: Normal heart sounds. No murmur heard.     No friction rub. No gallop.   Pulmonary:      Effort: Pulmonary effort is normal. No respiratory distress.      Breath sounds: Normal breath sounds. No stridor. No wheezing, rhonchi or rales.   Chest:      Chest wall: No tenderness.   Abdominal:      General: Bowel sounds are normal. There is no distension.      Palpations: Abdomen is soft. There is no mass.      Tenderness: There is no abdominal tenderness. There is no right CVA tenderness, left CVA tenderness, guarding or rebound.      Hernia: No hernia is present.   Musculoskeletal:         General: No swelling or tenderness. Normal range of motion.      Cervical back: Normal range of motion and neck supple. No rigidity or tenderness.      Right lower leg: No edema.      Left lower leg: No edema.   Lymphadenopathy:      Cervical: No cervical adenopathy.   Skin:     General: Skin is warm.      Capillary Refill: Capillary refill takes less than 2 seconds.      Coloration: Skin is not jaundiced.      Findings: No bruising, erythema or rash.   Neurological:      General: No focal deficit present.      Mental Status: She is alert and oriented to person, place, and time.      Motor: Motor function is intact. No weakness.      Coordination: Coordination is intact.      Gait: Gait is intact. Gait normal.   Psychiatric:         Attention and Perception: Attention normal.         Mood and Affect: Mood normal.         Speech: Speech normal.         Behavior: Behavior normal.         Cognition and Memory: Cognition normal.         Judgment: Judgment normal.         Result Review :                 Assessment and Plan    Diagnoses and all orders for this visit:    1. Dysuria (Primary)  -     Urine Culture - Urine, Urine, Clean Catch; Future  -     POCT urinalysis dipstick, automated    2. Vitamin D deficiency  -     cholecalciferol (VITAMIN D3) 25 MCG (1000 UT) tablet; Take 1 tablet by mouth Daily.  Dispense: 90 tablet; Refill: 1    3. History of depression  -     Discontinue: DULoxetine (CYMBALTA) 60 MG capsule; Take 1 capsule by mouth Daily.  Dispense: 30 capsule; Refill: 2    4. Fatigue, unspecified type  -     CBC Auto Differential; Future  -     Comprehensive Metabolic Panel; Future  -     TSH; Future  -     T4, Free; Future  -     Vitamin D,25-Hydroxy; Future  -     Vitamin B12; Future  -     Iron Profile; Future  -     Ferritin; Future    5. Acute non-recurrent sinusitis, unspecified location  -     ondansetron ODT (ZOFRAN-ODT) 4 MG disintegrating tablet; Place 1 tablet on the tongue Every 8 (Eight) Hours As Needed for Vomiting.  Dispense: 20 tablet; Refill: 0    Other orders  -     vitamin B-12 (CYANOCOBALAMIN) 1000 MCG tablet; Take 1 tablet by mouth Daily.  Dispense: 30 tablet; Refill: 2  -     methylPREDNISolone (MEDROL) 4 MG dose pack; Take as directed on package instructions.  Dispense: 21 tablet; Refill: 0  -     DULoxetine (CYMBALTA) 60 MG capsule; Take 1 capsule by mouth 2 (Two) Times a Day.  Dispense: 60 capsule; Refill: 2      Patient's Body mass index is 37.92 kg/m². indicating that she is obese (BMI >30). Obesity-related health conditions include the following: hypertension. Obesity is unchanged. BMI is is above average; BMI management plan is completed. We discussed low calorie, low carb based diet program, portion control, and increasing exercise..    Follow Up   Return labs today, for Next scheduled follow up.  Patient was given instructions and counseling regarding her condition or for health maintenance advice. Please see specific information pulled  into the AVS if appropriate.     This document has been electronically signed by EMELIA Blake  April 18, 2024 15:18 EDT

## 2024-04-19 LAB
25(OH)D3 SERPL-MCNC: 15.1 NG/ML (ref 30–100)
ALBUMIN SERPL-MCNC: 4.6 G/DL (ref 3.5–5.2)
ALBUMIN/GLOB SERPL: 2 G/DL
ALP SERPL-CCNC: 85 U/L (ref 39–117)
ALT SERPL W P-5'-P-CCNC: 14 U/L (ref 1–33)
ANION GAP SERPL CALCULATED.3IONS-SCNC: 10 MMOL/L (ref 5–15)
AST SERPL-CCNC: 12 U/L (ref 1–32)
BASOPHILS # BLD AUTO: 0.03 10*3/MM3 (ref 0–0.2)
BASOPHILS NFR BLD AUTO: 0.3 % (ref 0–1.5)
BILIRUB SERPL-MCNC: 0.5 MG/DL (ref 0–1.2)
BUN SERPL-MCNC: 9 MG/DL (ref 6–20)
BUN/CREAT SERPL: 12.5 (ref 7–25)
CALCIUM SPEC-SCNC: 9.6 MG/DL (ref 8.6–10.5)
CHLORIDE SERPL-SCNC: 103 MMOL/L (ref 98–107)
CO2 SERPL-SCNC: 26 MMOL/L (ref 22–29)
CREAT SERPL-MCNC: 0.72 MG/DL (ref 0.57–1)
DEPRECATED RDW RBC AUTO: 37.3 FL (ref 37–54)
EGFRCR SERPLBLD CKD-EPI 2021: 112.7 ML/MIN/1.73
EOSINOPHIL # BLD AUTO: 0.1 10*3/MM3 (ref 0–0.4)
EOSINOPHIL NFR BLD AUTO: 1.1 % (ref 0.3–6.2)
ERYTHROCYTE [DISTWIDTH] IN BLOOD BY AUTOMATED COUNT: 12.3 % (ref 12.3–15.4)
FERRITIN SERPL-MCNC: 40.2 NG/ML (ref 13–150)
GLOBULIN UR ELPH-MCNC: 2.3 GM/DL
GLUCOSE SERPL-MCNC: 81 MG/DL (ref 65–99)
HCT VFR BLD AUTO: 38.7 % (ref 34–46.6)
HGB BLD-MCNC: 13 G/DL (ref 12–15.9)
IMM GRANULOCYTES # BLD AUTO: 0.02 10*3/MM3 (ref 0–0.05)
IMM GRANULOCYTES NFR BLD AUTO: 0.2 % (ref 0–0.5)
IRON 24H UR-MRATE: 63 MCG/DL (ref 37–145)
IRON SATN MFR SERPL: 15 % (ref 20–50)
LYMPHOCYTES # BLD AUTO: 2.78 10*3/MM3 (ref 0.7–3.1)
LYMPHOCYTES NFR BLD AUTO: 31 % (ref 19.6–45.3)
MCH RBC QN AUTO: 28.3 PG (ref 26.6–33)
MCHC RBC AUTO-ENTMCNC: 33.6 G/DL (ref 31.5–35.7)
MCV RBC AUTO: 84.1 FL (ref 79–97)
MONOCYTES # BLD AUTO: 0.6 10*3/MM3 (ref 0.1–0.9)
MONOCYTES NFR BLD AUTO: 6.7 % (ref 5–12)
NEUTROPHILS NFR BLD AUTO: 5.43 10*3/MM3 (ref 1.7–7)
NEUTROPHILS NFR BLD AUTO: 60.7 % (ref 42.7–76)
NRBC BLD AUTO-RTO: 0 /100 WBC (ref 0–0.2)
PLATELET # BLD AUTO: 318 10*3/MM3 (ref 140–450)
PMV BLD AUTO: 11.5 FL (ref 6–12)
POTASSIUM SERPL-SCNC: 3.8 MMOL/L (ref 3.5–5.2)
PROT SERPL-MCNC: 6.9 G/DL (ref 6–8.5)
RBC # BLD AUTO: 4.6 10*6/MM3 (ref 3.77–5.28)
SODIUM SERPL-SCNC: 139 MMOL/L (ref 136–145)
T4 FREE SERPL-MCNC: 1.37 NG/DL (ref 0.93–1.7)
TIBC SERPL-MCNC: 411 MCG/DL (ref 298–536)
TRANSFERRIN SERPL-MCNC: 276 MG/DL (ref 200–360)
TSH SERPL DL<=0.05 MIU/L-ACNC: 0.98 UIU/ML (ref 0.27–4.2)
VIT B12 BLD-MCNC: 615 PG/ML (ref 211–946)
WBC NRBC COR # BLD AUTO: 8.96 10*3/MM3 (ref 3.4–10.8)

## 2024-04-20 LAB — BACTERIA SPEC AEROBE CULT: NO GROWTH

## 2024-04-22 ENCOUNTER — TELEPHONE (OUTPATIENT)
Dept: FAMILY MEDICINE CLINIC | Facility: CLINIC | Age: 34
End: 2024-04-22
Payer: COMMERCIAL

## 2024-04-22 RX ORDER — ERGOCALCIFEROL 1.25 MG/1
50000 CAPSULE ORAL WEEKLY
Qty: 5 CAPSULE | Refills: 2 | Status: SHIPPED | OUTPATIENT
Start: 2024-04-22

## 2024-04-22 NOTE — TELEPHONE ENCOUNTER
----- Message from EMELIA Blake sent at 4/22/2024  3:58 PM EDT -----  Please call the patient regarding her abnormal result. Her vitamin d is low, I have sent in a weekly supplement for this instead of the daily. Her iron is a little low as well, would recommend daily multi vitamin. All other labs wnl.

## 2024-04-23 DIAGNOSIS — F41.9 ANXIETY: ICD-10-CM

## 2024-04-24 RX ORDER — HYDROXYZINE HYDROCHLORIDE 10 MG/1
10 TABLET, FILM COATED ORAL 3 TIMES DAILY PRN
Qty: 90 TABLET | Refills: 2 | OUTPATIENT
Start: 2024-04-24

## 2024-05-08 ENCOUNTER — TELEPHONE (OUTPATIENT)
Dept: FAMILY MEDICINE CLINIC | Facility: CLINIC | Age: 34
End: 2024-05-08
Payer: COMMERCIAL

## 2024-05-23 ENCOUNTER — OFFICE VISIT (OUTPATIENT)
Dept: FAMILY MEDICINE CLINIC | Facility: CLINIC | Age: 34
End: 2024-05-23
Payer: COMMERCIAL

## 2024-05-23 VITALS
HEIGHT: 62 IN | TEMPERATURE: 98.4 F | DIASTOLIC BLOOD PRESSURE: 84 MMHG | BODY MASS INDEX: 38.46 KG/M2 | HEART RATE: 87 BPM | SYSTOLIC BLOOD PRESSURE: 124 MMHG | WEIGHT: 209 LBS | OXYGEN SATURATION: 97 %

## 2024-05-23 DIAGNOSIS — I10 PRIMARY HYPERTENSION: ICD-10-CM

## 2024-05-23 DIAGNOSIS — E55.9 VITAMIN D DEFICIENCY: ICD-10-CM

## 2024-05-23 DIAGNOSIS — F90.2 ATTENTION DEFICIT HYPERACTIVITY DISORDER (ADHD), COMBINED TYPE: ICD-10-CM

## 2024-05-23 DIAGNOSIS — F41.9 ANXIETY: ICD-10-CM

## 2024-05-23 PROCEDURE — 99214 OFFICE O/P EST MOD 30 MIN: CPT | Performed by: FAMILY MEDICINE

## 2024-05-23 PROCEDURE — 3074F SYST BP LT 130 MM HG: CPT | Performed by: FAMILY MEDICINE

## 2024-05-23 PROCEDURE — 3079F DIAST BP 80-89 MM HG: CPT | Performed by: FAMILY MEDICINE

## 2024-05-23 PROCEDURE — 1160F RVW MEDS BY RX/DR IN RCRD: CPT | Performed by: FAMILY MEDICINE

## 2024-05-23 PROCEDURE — 1125F AMNT PAIN NOTED PAIN PRSNT: CPT | Performed by: FAMILY MEDICINE

## 2024-05-23 PROCEDURE — 1159F MED LIST DOCD IN RCRD: CPT | Performed by: FAMILY MEDICINE

## 2024-05-23 RX ORDER — ATENOLOL 25 MG/1
25 TABLET ORAL DAILY
Qty: 90 TABLET | Refills: 0 | Status: SHIPPED | OUTPATIENT
Start: 2024-05-23

## 2024-05-23 RX ORDER — DULOXETIN HYDROCHLORIDE 60 MG/1
60 CAPSULE, DELAYED RELEASE ORAL 2 TIMES DAILY
Qty: 60 CAPSULE | Refills: 2 | Status: SHIPPED | OUTPATIENT
Start: 2024-05-23

## 2024-05-23 RX ORDER — HYDROCHLOROTHIAZIDE 12.5 MG/1
12.5 CAPSULE, GELATIN COATED ORAL DAILY
Qty: 90 CAPSULE | Refills: 0 | Status: SHIPPED | OUTPATIENT
Start: 2024-05-23

## 2024-05-23 RX ORDER — MELATONIN
1000 DAILY
Qty: 90 TABLET | Refills: 1 | Status: SHIPPED | OUTPATIENT
Start: 2024-05-23

## 2024-05-23 RX ORDER — LANOLIN ALCOHOL/MO/W.PET/CERES
1000 CREAM (GRAM) TOPICAL DAILY
Qty: 30 TABLET | Refills: 2 | Status: SHIPPED | OUTPATIENT
Start: 2024-05-23

## 2024-05-23 RX ORDER — ATOMOXETINE 60 MG/1
CAPSULE ORAL
Qty: 30 CAPSULE | Refills: 2 | Status: SHIPPED | OUTPATIENT
Start: 2024-05-23

## 2024-05-23 RX ORDER — ERGOCALCIFEROL 1.25 MG/1
50000 CAPSULE ORAL WEEKLY
Qty: 5 CAPSULE | Refills: 2 | Status: SHIPPED | OUTPATIENT
Start: 2024-05-23

## 2024-05-23 RX ORDER — HYDROXYZINE HYDROCHLORIDE 10 MG/1
10 TABLET, FILM COATED ORAL 3 TIMES DAILY PRN
Qty: 90 TABLET | Refills: 2 | Status: SHIPPED | OUTPATIENT
Start: 2024-05-23

## 2024-05-23 NOTE — PROGRESS NOTES
"Chief Complaint  hypertension    Subjective          Cheryl Mcnulty presents to [facility]  History of Present Illness    Patient here for medication refills.  States she is doing well with her current medications at this time.  Denies any adverse side effects.    Review of Systems      Objective   Vital Signs:   /84 (BP Location: Right arm, Patient Position: Sitting, Cuff Size: Adult)   Pulse 87   Temp 98.4 °F (36.9 °C) (Temporal)   Ht 157.5 cm (62.01\")   Wt 94.8 kg (209 lb)   SpO2 97%   BMI 38.21 kg/m²     Physical Exam  Constitutional:       General: She is not in acute distress.     Appearance: Normal appearance. She is well-developed and well-groomed. She is not ill-appearing, toxic-appearing or diaphoretic.   HENT:      Head: Normocephalic.      Nose: Nose normal. No congestion or rhinorrhea.      Mouth/Throat:      Mouth: Mucous membranes are moist.      Pharynx: Oropharynx is clear. No oropharyngeal exudate or posterior oropharyngeal erythema.   Eyes:      General: Lids are normal.         Right eye: No discharge.         Left eye: No discharge.      Extraocular Movements: Extraocular movements intact.      Pupils: Pupils are equal, round, and reactive to light.   Neck:      Vascular: No carotid bruit.   Cardiovascular:      Rate and Rhythm: Normal rate and regular rhythm.      Pulses: Normal pulses.      Heart sounds: Normal heart sounds. No murmur heard.     No friction rub. No gallop.   Pulmonary:      Effort: Pulmonary effort is normal. No respiratory distress.      Breath sounds: Normal breath sounds. No stridor. No wheezing, rhonchi or rales.   Chest:      Chest wall: No tenderness.   Abdominal:      General: Bowel sounds are normal. There is no distension.      Palpations: Abdomen is soft. There is no mass.      Tenderness: There is no abdominal tenderness. There is no right CVA tenderness, left CVA tenderness, guarding or rebound.      Hernia: No hernia " is present.   Musculoskeletal:         General: No swelling or tenderness. Normal range of motion.      Cervical back: Normal range of motion and neck supple. No rigidity or tenderness.      Right lower leg: No edema.      Left lower leg: No edema.   Lymphadenopathy:      Cervical: No cervical adenopathy.   Skin:     General: Skin is warm.      Capillary Refill: Capillary refill takes less than 2 seconds.      Coloration: Skin is not jaundiced.      Findings: No bruising, erythema or rash.   Neurological:      General: No focal deficit present.      Mental Status: She is alert and oriented to person, place, and time.      Motor: Motor function is intact. No weakness.      Coordination: Coordination is intact.      Gait: Gait is intact. Gait normal.   Psychiatric:         Attention and Perception: Attention normal.         Mood and Affect: Mood normal.         Speech: Speech normal.         Behavior: Behavior normal.         Cognition and Memory: Cognition normal.         Judgment: Judgment normal.        Result Review :                 Assessment and Plan    Diagnoses and all orders for this visit:    1. Primary hypertension  -     atenolol (Tenormin) 25 MG tablet; Take 1 tablet by mouth Daily.  Dispense: 90 tablet; Refill: 0  -     hydroCHLOROthiazide (MICROZIDE) 12.5 MG capsule; Take 1 capsule by mouth Daily.  Dispense: 90 capsule; Refill: 0    2. Attention deficit hyperactivity disorder (ADHD), combined type  -     atomoxetine (STRATTERA) 60 MG capsule; Once daily  Dispense: 30 capsule; Refill: 2    3. Vitamin D deficiency  -     cholecalciferol (VITAMIN D3) 25 MCG (1000 UT) tablet; Take 1 tablet by mouth Daily.  Dispense: 90 tablet; Refill: 1    4. Anxiety  -     hydrOXYzine (ATARAX) 10 MG tablet; Take 1 tablet by mouth 3 (Three) Times a Day As Needed for Itching.  Dispense: 90 tablet; Refill: 2    Other orders  -     DULoxetine (CYMBALTA) 60 MG capsule; Take 1 capsule by mouth 2 (Two) Times a Day.  Dispense: 60  capsule; Refill: 2  -     vitamin B-12 (CYANOCOBALAMIN) 1000 MCG tablet; Take 1 tablet by mouth Daily.  Dispense: 30 tablet; Refill: 2  -     vitamin D (ERGOCALCIFEROL) 1.25 MG (58696 UT) capsule capsule; Take 1 capsule by mouth 1 (One) Time Per Week.  Dispense: 5 capsule; Refill: 2      Patient's Body mass index is 38.21 kg/m². indicating that she is obese (BMI >30). Obesity-related health conditions include the following: hypertension. Obesity is unchanged. BMI is is above average; BMI management plan is completed. We discussed low calorie, low carb based diet program, portion control, and increasing exercise..    Follow Up   No follow-ups on file.  Patient was given instructions and counseling regarding her condition or for health maintenance advice. Please see specific information pulled into the AVS if appropriate.     This document has been electronically signed by EMELIA Blake  May 23, 2024 11:15 EDT

## 2024-05-31 VITALS
BODY MASS INDEX: 35.44 KG/M2 | TEMPERATURE: 97.8 F | RESPIRATION RATE: 17 BRPM | HEART RATE: 91 BPM | WEIGHT: 200 LBS | HEIGHT: 63 IN | OXYGEN SATURATION: 97 % | SYSTOLIC BLOOD PRESSURE: 132 MMHG | DIASTOLIC BLOOD PRESSURE: 88 MMHG

## 2024-05-31 PROCEDURE — 99284 EMERGENCY DEPT VISIT MOD MDM: CPT

## 2024-05-31 RX ORDER — SODIUM CHLORIDE 0.9 % (FLUSH) 0.9 %
10 SYRINGE (ML) INJECTION AS NEEDED
Status: DISCONTINUED | OUTPATIENT
Start: 2024-05-31 | End: 2024-06-01 | Stop reason: HOSPADM

## 2024-06-01 ENCOUNTER — HOSPITAL ENCOUNTER (EMERGENCY)
Facility: HOSPITAL | Age: 34
Discharge: HOME | End: 2024-06-01
Attending: STUDENT IN AN ORGANIZED HEALTH CARE EDUCATION/TRAINING PROGRAM
Payer: COMMERCIAL

## 2024-06-01 ENCOUNTER — APPOINTMENT (OUTPATIENT)
Dept: CT IMAGING | Facility: HOSPITAL | Age: 34
End: 2024-06-01
Payer: COMMERCIAL

## 2024-06-01 DIAGNOSIS — N39.0 ACUTE UTI: Primary | ICD-10-CM

## 2024-06-01 LAB
ALBUMIN SERPL-MCNC: 4.2 G/DL (ref 3.5–5.2)
ALBUMIN/GLOB SERPL: 1.8 G/DL
ALP SERPL-CCNC: 88 U/L (ref 39–117)
ALT SERPL W P-5'-P-CCNC: 9 U/L (ref 1–33)
ANION GAP SERPL CALCULATED.3IONS-SCNC: 10.7 MMOL/L (ref 5–15)
AST SERPL-CCNC: 14 U/L (ref 1–32)
BACTERIA UR QL AUTO: ABNORMAL /HPF
BASOPHILS # BLD AUTO: 0.05 10*3/MM3 (ref 0–0.2)
BASOPHILS NFR BLD AUTO: 0.5 % (ref 0–1.5)
BILIRUB SERPL-MCNC: 0.4 MG/DL (ref 0–1.2)
BILIRUB UR QL STRIP: NEGATIVE
BUN SERPL-MCNC: 9 MG/DL (ref 6–20)
BUN/CREAT SERPL: 12.9 (ref 7–25)
CALCIUM SPEC-SCNC: 8.9 MG/DL (ref 8.6–10.5)
CHLORIDE SERPL-SCNC: 106 MMOL/L (ref 98–107)
CLARITY UR: CLEAR
CO2 SERPL-SCNC: 23.3 MMOL/L (ref 22–29)
COLOR UR: ABNORMAL
CREAT SERPL-MCNC: 0.7 MG/DL (ref 0.57–1)
DEPRECATED RDW RBC AUTO: 39.8 FL (ref 37–54)
EGFRCR SERPLBLD CKD-EPI 2021: 116.6 ML/MIN/1.73
EOSINOPHIL # BLD AUTO: 0.11 10*3/MM3 (ref 0–0.4)
EOSINOPHIL NFR BLD AUTO: 1 % (ref 0.3–6.2)
ERYTHROCYTE [DISTWIDTH] IN BLOOD BY AUTOMATED COUNT: 12.7 % (ref 12.3–15.4)
GLOBULIN UR ELPH-MCNC: 2.3 GM/DL
GLUCOSE SERPL-MCNC: 95 MG/DL (ref 65–99)
GLUCOSE UR STRIP-MCNC: NEGATIVE MG/DL
HCG SERPL QL: NEGATIVE
HCT VFR BLD AUTO: 38.8 % (ref 34–46.6)
HGB BLD-MCNC: 12.8 G/DL (ref 12–15.9)
HGB UR QL STRIP.AUTO: ABNORMAL
HOLD SPECIMEN: NORMAL
HYALINE CASTS UR QL AUTO: ABNORMAL /LPF
IMM GRANULOCYTES # BLD AUTO: 0.04 10*3/MM3 (ref 0–0.05)
IMM GRANULOCYTES NFR BLD AUTO: 0.4 % (ref 0–0.5)
KETONES UR QL STRIP: ABNORMAL
LEUKOCYTE ESTERASE UR QL STRIP.AUTO: NEGATIVE
LIPASE SERPL-CCNC: 31 U/L (ref 13–60)
LYMPHOCYTES # BLD AUTO: 3.38 10*3/MM3 (ref 0.7–3.1)
LYMPHOCYTES NFR BLD AUTO: 31.4 % (ref 19.6–45.3)
MCH RBC QN AUTO: 28.4 PG (ref 26.6–33)
MCHC RBC AUTO-ENTMCNC: 33 G/DL (ref 31.5–35.7)
MCV RBC AUTO: 86.2 FL (ref 79–97)
MONOCYTES # BLD AUTO: 0.67 10*3/MM3 (ref 0.1–0.9)
MONOCYTES NFR BLD AUTO: 6.2 % (ref 5–12)
NEUTROPHILS NFR BLD AUTO: 6.52 10*3/MM3 (ref 1.7–7)
NEUTROPHILS NFR BLD AUTO: 60.5 % (ref 42.7–76)
NITRITE UR QL STRIP: NEGATIVE
NRBC BLD AUTO-RTO: 0 /100 WBC (ref 0–0.2)
PH UR STRIP.AUTO: 5.5 [PH] (ref 5–8)
PLATELET # BLD AUTO: 322 10*3/MM3 (ref 140–450)
PMV BLD AUTO: 10.4 FL (ref 6–12)
POTASSIUM SERPL-SCNC: 4.1 MMOL/L (ref 3.5–5.2)
PROT SERPL-MCNC: 6.5 G/DL (ref 6–8.5)
PROT UR QL STRIP: ABNORMAL
RBC # BLD AUTO: 4.5 10*6/MM3 (ref 3.77–5.28)
RBC # UR STRIP: ABNORMAL /HPF
REF LAB TEST METHOD: ABNORMAL
SODIUM SERPL-SCNC: 140 MMOL/L (ref 136–145)
SP GR UR STRIP: 1.02 (ref 1–1.03)
SQUAMOUS #/AREA URNS HPF: ABNORMAL /HPF
UROBILINOGEN UR QL STRIP: ABNORMAL
WBC # UR STRIP: ABNORMAL /HPF
WBC NRBC COR # BLD AUTO: 10.77 10*3/MM3 (ref 3.4–10.8)
WHOLE BLOOD HOLD COAG: NORMAL
WHOLE BLOOD HOLD SPECIMEN: NORMAL

## 2024-06-01 PROCEDURE — 81001 URINALYSIS AUTO W/SCOPE: CPT | Performed by: STUDENT IN AN ORGANIZED HEALTH CARE EDUCATION/TRAINING PROGRAM

## 2024-06-01 PROCEDURE — 74176 CT ABD & PELVIS W/O CONTRAST: CPT | Performed by: RADIOLOGY

## 2024-06-01 PROCEDURE — 85025 COMPLETE CBC W/AUTO DIFF WBC: CPT | Performed by: STUDENT IN AN ORGANIZED HEALTH CARE EDUCATION/TRAINING PROGRAM

## 2024-06-01 PROCEDURE — 83690 ASSAY OF LIPASE: CPT | Performed by: STUDENT IN AN ORGANIZED HEALTH CARE EDUCATION/TRAINING PROGRAM

## 2024-06-01 PROCEDURE — 96372 THER/PROPH/DIAG INJ SC/IM: CPT

## 2024-06-01 PROCEDURE — 80053 COMPREHEN METABOLIC PANEL: CPT | Performed by: STUDENT IN AN ORGANIZED HEALTH CARE EDUCATION/TRAINING PROGRAM

## 2024-06-01 PROCEDURE — 25010000002 CEFTRIAXONE PER 250 MG: Performed by: NURSE PRACTITIONER

## 2024-06-01 PROCEDURE — 84703 CHORIONIC GONADOTROPIN ASSAY: CPT | Performed by: STUDENT IN AN ORGANIZED HEALTH CARE EDUCATION/TRAINING PROGRAM

## 2024-06-01 PROCEDURE — 74176 CT ABD & PELVIS W/O CONTRAST: CPT

## 2024-06-01 RX ORDER — CEFDINIR 300 MG/1
300 CAPSULE ORAL 2 TIMES DAILY
Qty: 14 CAPSULE | Refills: 0 | Status: SHIPPED | OUTPATIENT
Start: 2024-06-01 | End: 2024-06-08

## 2024-06-01 RX ADMIN — LIDOCAINE HYDROCHLORIDE 1 G: 10 INJECTION, SOLUTION EPIDURAL; INFILTRATION; INTRACAUDAL; PERINEURAL at 01:58

## 2024-06-01 NOTE — ED PROVIDER NOTES
"Subjective   History of Present Illness  Patient is a 34-year-old female presenting to the ER complaints of dysuria and flank pain.  Patient's past medical history is fibromyalgia, depression, hypertension, frequent UTIs.  Patient states, \"I take Macrobid 100mg daily to prevent UTIs. I have been without my medication x1 week because I think I accidentally threw the medication away. I have been having bilateral flank pain with blood in my urine x3 days. I feel like I am dehydrated.\"  Denies any additional symptoms at this time.  Patient denies nausea, vomiting, fever, cough, any additional symptoms.    History provided by:  Patient   used: No        Review of Systems   Constitutional: Negative.  Negative for fever.   HENT: Negative.     Respiratory: Negative.     Cardiovascular: Negative.  Negative for chest pain.   Gastrointestinal: Negative.  Negative for abdominal pain.   Endocrine: Negative.    Genitourinary:  Positive for dysuria, flank pain and hematuria.   Skin: Negative.    Neurological: Negative.    Psychiatric/Behavioral: Negative.     All other systems reviewed and are negative.      Past Medical History:   Diagnosis Date    Congestive heart failure     in pregnancy    Fibromyalgia     Frequent UTI     on daily abx per urology    History of depression     History of ear infections     History of heart disease     History of lupus     skin rashes, joint pain, fatigue.   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 DRAINAGE OF OVARIAN CYST.;  Surgeon: Rachel Caruso DO;  Location:  " [facility];  Service: Obstetrics/Gynecology    ORIF SCAPHOID W VASCULARIZED BONE GRAFT      TUBAL COAGULATION LAPAROSCOPIC Bilateral 08/13/2018    Procedure: TUBAL FALLOPE FILSHIE CLIPPING LAPAROSCOPIC;  Surgeon: Rachel Caruso DO;  Location:  [facility];  Service: Obstetrics/Gynecology    ULNA/RADIUS CLOSED REDUCTION  2006       Family History   Problem Relation Age of Onset    Heart disease Mother     Thyroid disease Mother     Anxiety disorder Mother     Depression Mother     Bipolar disorder Mother     Heart disease Father     Cancer Father     Heart disease Maternal Grandmother     Diabetes Maternal Grandmother     Diabetes Maternal Grandfather     Lupus Paternal Grandmother     Rheum arthritis Paternal Grandmother        Social History     Socioeconomic History    Marital status: Single   Tobacco Use    Smoking status: Never    Smokeless tobacco: Never   Vaping Use    Vaping status: Every Day    Substances: Nicotine    Devices: Disposable   Substance and Sexual Activity    Alcohol use: Yes     Comment: socially    Drug use: No    Sexual activity: Yes     Partners: Male     Birth control/protection: Surgical, Tubal ligation           Objective   Physical Exam  Vitals and nursing note reviewed.   Constitutional:       General: She is not in acute distress.     Appearance: She is well-developed. She is not diaphoretic.   HENT:      Head: Normocephalic and atraumatic.      Right Ear: External ear normal.      Left Ear: External ear normal.      Nose: Nose normal.   Eyes:      Conjunctiva/sclera: Conjunctivae normal.      Pupils: Pupils are equal, round, and reactive to light.   Neck:      Vascular: No JVD.      Trachea: No tracheal deviation.   Cardiovascular:      Rate and Rhythm: Normal rate and regular rhythm.      Heart sounds: Normal heart sounds. No murmur heard.  Pulmonary:      Effort: Pulmonary effort is normal. No respiratory distress.      Breath sounds: Normal breath sounds. No wheezing.    Abdominal:      General: Bowel sounds are normal.      Palpations: Abdomen is soft.      Tenderness: There is no abdominal tenderness.   Musculoskeletal:         General: No deformity. Normal range of motion.      Cervical back: Normal range of motion and neck supple.   Skin:     General: Skin is warm and dry.      Coloration: Skin is not pale.      Findings: No erythema or rash.   Neurological:      Mental Status: She is alert and oriented to person, place, and time.      Cranial Nerves: No cranial nerve deficit.   Psychiatric:         Behavior: Behavior normal.         Thought Content: Thought content normal.         Procedures       Results for orders placed or performed during the hospital encounter of 06/01/24   Comprehensive Metabolic Panel    Specimen: Blood   Result Value Ref Range    Glucose 95 65 - 99 mg/dL    BUN 9 6 - 20 mg/dL    Creatinine 0.70 0.57 - 1.00 mg/dL    Sodium 140 136 - 145 mmol/L    Potassium 4.1 3.5 - 5.2 mmol/L    Chloride 106 98 - 107 mmol/L    CO2 23.3 22.0 - 29.0 mmol/L    Calcium 8.9 8.6 - 10.5 mg/dL    Total Protein 6.5 6.0 - 8.5 g/dL    Albumin 4.2 3.5 - 5.2 g/dL    ALT (SGPT) 9 1 - 33 U/L    AST (SGOT) 14 1 - 32 U/L    Alkaline Phosphatase 88 39 - 117 U/L    Total Bilirubin 0.4 0.0 - 1.2 mg/dL    Globulin 2.3 gm/dL    A/G Ratio 1.8 g/dL    BUN/Creatinine Ratio 12.9 7.0 - 25.0    Anion Gap 10.7 5.0 - 15.0 mmol/L    eGFR 116.6 >60.0 mL/min/1.73   Lipase    Specimen: Blood   Result Value Ref Range    Lipase 31 13 - 60 U/L   hCG, Serum, Qualitative    Specimen: Blood   Result Value Ref Range    HCG Qualitative Negative Negative   Urinalysis With Culture If Indicated - Urine, Clean Catch    Specimen: Urine, Clean Catch   Result Value Ref Range    Color, UA Dark Yellow (A) Yellow, Straw    Appearance, UA Clear Clear    pH, UA 5.5 5.0 - 8.0    Specific Gravity, UA 1.025 1.005 - 1.030    Glucose, UA Negative Negative    Ketones, UA Trace (A) Negative    Bilirubin, UA Negative Negative     Blood, UA Moderate (2+) (A) Negative    Protein, UA Trace (A) Negative    Leuk Esterase, UA Negative Negative    Nitrite, UA Negative Negative    Urobilinogen, UA 0.2 E.U./dL 0.2 - 1.0 E.U./dL   CBC Auto Differential    Specimen: Blood   Result Value Ref Range    WBC 10.77 3.40 - 10.80 10*3/mm3    RBC 4.50 3.77 - 5.28 10*6/mm3    Hemoglobin 12.8 12.0 - 15.9 g/dL    Hematocrit 38.8 34.0 - 46.6 %    MCV 86.2 79.0 - 97.0 fL    MCH 28.4 26.6 - 33.0 pg    MCHC 33.0 31.5 - 35.7 g/dL    RDW 12.7 12.3 - 15.4 %    RDW-SD 39.8 37.0 - 54.0 fl    MPV 10.4 6.0 - 12.0 fL    Platelets 322 140 - 450 10*3/mm3    Neutrophil % 60.5 42.7 - 76.0 %    Lymphocyte % 31.4 19.6 - 45.3 %    Monocyte % 6.2 5.0 - 12.0 %    Eosinophil % 1.0 0.3 - 6.2 %    Basophil % 0.5 0.0 - 1.5 %    Immature Grans % 0.4 0.0 - 0.5 %    Neutrophils, Absolute 6.52 1.70 - 7.00 10*3/mm3    Lymphocytes, Absolute 3.38 (H) 0.70 - 3.10 10*3/mm3    Monocytes, Absolute 0.67 0.10 - 0.90 10*3/mm3    Eosinophils, Absolute 0.11 0.00 - 0.40 10*3/mm3    Basophils, Absolute 0.05 0.00 - 0.20 10*3/mm3    Immature Grans, Absolute 0.04 0.00 - 0.05 10*3/mm3    nRBC 0.0 0.0 - 0.2 /100 WBC   Urinalysis, Microscopic Only - Urine, Clean Catch    Specimen: Urine, Clean Catch   Result Value Ref Range    RBC, UA 3-5 (A) None Seen, 0-2 /HPF    WBC, UA 3-5 (A) None Seen, 0-2 /HPF    Bacteria, UA 3+ (A) None Seen /HPF    Squamous Epithelial Cells, UA 7-12 (A) None Seen, 0-2 /HPF    Hyaline Casts, UA None Seen None Seen /LPF    Methodology Manual Light Microscopy    Green Top (Gel)   Result Value Ref Range    Extra Tube Hold for add-ons.    Lavender Top   Result Value Ref Range    Extra Tube hold for add-on    Light Blue Top   Result Value Ref Range    Extra Tube Hold for add-ons.        CT Abdomen Pelvis Without Contrast   Final Result   1.  No acute abnormality of the abdomen or pelvis.  There is no   nephrolithiasis or ureterolithiasis.    2.  Possible fluid within the endocervical canal.   "This may be   physiologic in a patient of this age.  This could be evaluated with   ultrasound.                   This report was finalized on 6/1/2024 1:36 AM by Atiya Melchor MD.                ED Course                                             Medical Decision Making  Patient is a 34-year-old female presenting to the ER complaints of dysuria and flank pain.  Patient's past medical history is fibromyalgia, depression, hypertension, frequent UTIs.  Patient states, \"I take Macrobid 100mg daily to prevent UTIs. I have been without my medication x1 week because I think I accidentally threw the medication away. I have been having bilateral flank pain with blood in my urine x3 days. I feel like I am dehydrated.\"  Denies any additional symptoms at this time.  Patient denies nausea, vomiting, fever, cough, any additional symptoms.    Advised patient to return to the ER with new or worsening symptoms.  Advised patient to follow-up with PCP.  Patient verbalized understanding and agrees.  Vital signs are stable at discharge.  Patient is in no acute distress.        Problems Addressed:  Acute UTI: complicated acute illness or injury    Amount and/or Complexity of Data Reviewed  Labs: ordered.  Radiology: ordered.    Risk  Prescription drug management.        Final diagnoses:   Acute UTI       ED Disposition  ED Disposition       ED Disposition   Discharge    Condition   Stable    Comment   --               Amy Leija, APRN  [address] [facility] [phone]    Schedule an appointment as soon as possible for a visit            Medication List        New Prescriptions      cefdinir 300 MG capsule  Commonly known as: OMNICEF  Take 1 capsule by mouth 2 (Two) Times a Day for 7 days.               Where to Get Your Medications        These medications were sent to [facility] [address] [place] [address] [phone] [facility] [phone] [address] [address]      Phone: " [phone]   cefdinir 300 MG capsule            Melany Milian, APRN  06/01/24 0455

## 2024-06-01 NOTE — ED NOTES
MEDICAL SCREENING:    Reason for Visit: Abdominal pain, dysuria    Patient initially seen in triage.  The patient was advised further evaluation and diagnostic testing will be needed, some of the treatment and testing will be initiated in the lobby in order to begin the process.  The patient will be returned to the waiting area for the time being and possibly be re-assessed by a subsequent ED provider.  The patient will be brought back to the treatment area in as timely manner as possible.       Hema Barajas,   05/31/24 2346

## 2024-06-18 ENCOUNTER — LAB (OUTPATIENT)
Dept: FAMILY MEDICINE CLINIC | Facility: CLINIC | Age: 34
End: 2024-06-18
Payer: COMMERCIAL

## 2024-06-18 ENCOUNTER — OFFICE VISIT (OUTPATIENT)
Dept: FAMILY MEDICINE CLINIC | Facility: CLINIC | Age: 34
End: 2024-06-18
Payer: COMMERCIAL

## 2024-06-18 VITALS
HEIGHT: 63 IN | TEMPERATURE: 98.4 F | WEIGHT: 209.8 LBS | BODY MASS INDEX: 37.17 KG/M2 | DIASTOLIC BLOOD PRESSURE: 88 MMHG | SYSTOLIC BLOOD PRESSURE: 132 MMHG | HEART RATE: 91 BPM | OXYGEN SATURATION: 98 %

## 2024-06-18 DIAGNOSIS — J01.90 ACUTE NON-RECURRENT SINUSITIS, UNSPECIFIED LOCATION: ICD-10-CM

## 2024-06-18 DIAGNOSIS — R10.84 GENERALIZED ABDOMINAL PAIN: ICD-10-CM

## 2024-06-18 DIAGNOSIS — Z20.5 EXPOSURE TO HEPATITIS C: Primary | ICD-10-CM

## 2024-06-18 DIAGNOSIS — I10 PRIMARY HYPERTENSION: ICD-10-CM

## 2024-06-18 DIAGNOSIS — Z20.5 EXPOSURE TO HEPATITIS C: ICD-10-CM

## 2024-06-18 PROCEDURE — 99214 OFFICE O/P EST MOD 30 MIN: CPT | Performed by: FAMILY MEDICINE

## 2024-06-18 PROCEDURE — 80074 ACUTE HEPATITIS PANEL: CPT | Performed by: FAMILY MEDICINE

## 2024-06-18 PROCEDURE — 3079F DIAST BP 80-89 MM HG: CPT | Performed by: FAMILY MEDICINE

## 2024-06-18 PROCEDURE — 3075F SYST BP GE 130 - 139MM HG: CPT | Performed by: FAMILY MEDICINE

## 2024-06-18 PROCEDURE — 36415 COLL VENOUS BLD VENIPUNCTURE: CPT

## 2024-06-18 PROCEDURE — 1125F AMNT PAIN NOTED PAIN PRSNT: CPT | Performed by: FAMILY MEDICINE

## 2024-06-18 RX ORDER — DICYCLOMINE HCL 20 MG
20 TABLET ORAL EVERY 6 HOURS PRN
Qty: 20 TABLET | Refills: 0 | Status: SHIPPED | OUTPATIENT
Start: 2024-06-18

## 2024-06-18 RX ORDER — ONDANSETRON 4 MG/1
4 TABLET, ORALLY DISINTEGRATING ORAL EVERY 8 HOURS PRN
Qty: 20 TABLET | Refills: 0 | Status: SHIPPED | OUTPATIENT
Start: 2024-06-18

## 2024-06-18 RX ORDER — HYDROCHLOROTHIAZIDE 12.5 MG/1
12.5 CAPSULE, GELATIN COATED ORAL DAILY
Qty: 90 CAPSULE | Refills: 0 | Status: SHIPPED | OUTPATIENT
Start: 2024-06-18

## 2024-06-18 RX ORDER — ATENOLOL 25 MG/1
25 TABLET ORAL DAILY
Qty: 90 TABLET | Refills: 0 | Status: SHIPPED | OUTPATIENT
Start: 2024-06-18

## 2024-06-18 NOTE — PROGRESS NOTES
"Chief Complaint  Fibromyalgia    Subjective          Cheryl Mcnulty presents to [facility]  History of Present Illness    Patient here for refills on medications. States she is doing well with current medications at this time.   Also states her partner was newly diagnosed with hepatitis c and she would like to be checked for this.     Review of Systems      Objective   Vital Signs:   /88 (BP Location: Right arm, Patient Position: Sitting, Cuff Size: Adult)   Pulse 91   Temp 98.4 °F (36.9 °C) (Temporal)   Ht 160 cm (63\")   Wt 95.2 kg (209 lb 12.8 oz)   SpO2 98%   BMI 37.16 kg/m²     Physical Exam  Constitutional:       General: She is not in acute distress.     Appearance: Normal appearance. She is well-developed and well-groomed. She is not ill-appearing, toxic-appearing or diaphoretic.   HENT:      Head: Normocephalic.      Nose: Nose normal. No congestion or rhinorrhea.      Mouth/Throat:      Mouth: Mucous membranes are moist.      Pharynx: Oropharynx is clear. No oropharyngeal exudate or posterior oropharyngeal erythema.   Eyes:      General: Lids are normal.         Right eye: No discharge.         Left eye: No discharge.      Extraocular Movements: Extraocular movements intact.      Pupils: Pupils are equal, round, and reactive to light.   Neck:      Vascular: No carotid bruit.   Cardiovascular:      Rate and Rhythm: Normal rate and regular rhythm.      Pulses: Normal pulses.      Heart sounds: Normal heart sounds. No murmur heard.     No friction rub. No gallop.   Pulmonary:      Effort: Pulmonary effort is normal. No respiratory distress.      Breath sounds: Normal breath sounds. No stridor. No wheezing, rhonchi or rales.   Chest:      Chest wall: No tenderness.   Abdominal:      General: Bowel sounds are normal. There is no distension.      Palpations: Abdomen is soft. There is no mass.      Tenderness: There is no abdominal tenderness. There is no right CVA " tenderness, left CVA tenderness, guarding or rebound.      Hernia: No hernia is present.   Musculoskeletal:         General: No swelling or tenderness. Normal range of motion.      Cervical back: Normal range of motion and neck supple. No rigidity or tenderness.      Right lower leg: No edema.      Left lower leg: No edema.   Lymphadenopathy:      Cervical: No cervical adenopathy.   Skin:     General: Skin is warm.      Capillary Refill: Capillary refill takes less than 2 seconds.      Coloration: Skin is not jaundiced.      Findings: No bruising, erythema or rash.   Neurological:      General: No focal deficit present.      Mental Status: She is alert and oriented to person, place, and time.      Motor: Motor function is intact. No weakness.      Coordination: Coordination is intact.      Gait: Gait is intact. Gait normal.   Psychiatric:         Attention and Perception: Attention normal.         Mood and Affect: Mood normal.         Speech: Speech normal.         Behavior: Behavior normal.         Cognition and Memory: Cognition normal.         Judgment: Judgment normal.        Result Review :                 Assessment and Plan    Diagnoses and all orders for this visit:    1. Exposure to hepatitis C (Primary)  -     Hepatitis panel, acute; Future    2. Primary hypertension  -     atenolol (Tenormin) 25 MG tablet; Take 1 tablet by mouth Daily.  Dispense: 90 tablet; Refill: 0  -     hydroCHLOROthiazide (MICROZIDE) 12.5 MG capsule; Take 1 capsule by mouth Daily.  Dispense: 90 capsule; Refill: 0    3. Generalized abdominal pain  -     dicyclomine (BENTYL) 20 MG tablet; Take 1 tablet by mouth Every 6 (Six) Hours As Needed for Abdominal Cramping.  Dispense: 20 tablet; Refill: 0    4. Acute non-recurrent sinusitis, unspecified location  -     ondansetron ODT (ZOFRAN-ODT) 4 MG disintegrating tablet; Place 1 tablet on the tongue Every 8 (Eight) Hours As Needed for Vomiting.  Dispense: 20 tablet; Refill: 0      Patient's  Body mass index is 37.16 kg/m². indicating that she is obese (BMI >30). Obesity-related health conditions include the following: hypertension. Obesity is unchanged. BMI is is above average; BMI management plan is completed. We discussed low calorie, low carb based diet program, portion control, and increasing exercise..    Follow Up   Return labs today, for Next scheduled follow up.  Patient was given instructions and counseling regarding her condition or for health maintenance advice. Please see specific information pulled into the AVS if appropriate.     This document has been electronically signed by EMELIA Blake  June 18, 2024 13:34 EDT

## 2024-06-19 ENCOUNTER — TELEPHONE (OUTPATIENT)
Dept: FAMILY MEDICINE CLINIC | Facility: CLINIC | Age: 34
End: 2024-06-19
Payer: COMMERCIAL

## 2024-06-19 LAB
HAV IGM SERPL QL IA: NORMAL
HBV CORE IGM SERPL QL IA: NORMAL
HBV SURFACE AG SERPL QL IA: NORMAL
HCV AB SER QL: NORMAL

## 2024-06-19 NOTE — TELEPHONE ENCOUNTER
----- Message from Amy Leija sent at 6/19/2024  8:53 AM EDT -----  Please let patient know results are normal. Thank you.

## 2024-06-27 ENCOUNTER — OFFICE VISIT (OUTPATIENT)
Dept: FAMILY MEDICINE CLINIC | Facility: CLINIC | Age: 34
End: 2024-06-27
Payer: COMMERCIAL

## 2024-06-27 VITALS
HEART RATE: 101 BPM | HEIGHT: 63 IN | DIASTOLIC BLOOD PRESSURE: 80 MMHG | OXYGEN SATURATION: 98 % | TEMPERATURE: 98.4 F | WEIGHT: 211 LBS | SYSTOLIC BLOOD PRESSURE: 138 MMHG | BODY MASS INDEX: 37.39 KG/M2

## 2024-06-27 DIAGNOSIS — E66.01 MORBID (SEVERE) OBESITY DUE TO EXCESS CALORIES: Primary | ICD-10-CM

## 2024-06-27 PROCEDURE — 1160F RVW MEDS BY RX/DR IN RCRD: CPT | Performed by: FAMILY MEDICINE

## 2024-06-27 PROCEDURE — 1159F MED LIST DOCD IN RCRD: CPT | Performed by: FAMILY MEDICINE

## 2024-06-27 PROCEDURE — 1126F AMNT PAIN NOTED NONE PRSNT: CPT | Performed by: FAMILY MEDICINE

## 2024-06-27 PROCEDURE — 99214 OFFICE O/P EST MOD 30 MIN: CPT | Performed by: FAMILY MEDICINE

## 2024-06-27 PROCEDURE — 3079F DIAST BP 80-89 MM HG: CPT | Performed by: FAMILY MEDICINE

## 2024-06-27 PROCEDURE — 3075F SYST BP GE 130 - 139MM HG: CPT | Performed by: FAMILY MEDICINE

## 2024-06-27 NOTE — PROGRESS NOTES
"Chief Complaint  Obesity    Subjective          Cheryl Mcnulty presents to [facility]  Obesity        States she would like something for weight loss. States she previously tried compounded semaglutide and she isn't able to afford it.     Review of Systems      Objective   Vital Signs:   /80 (BP Location: Right arm, Patient Position: Sitting, Cuff Size: Adult)   Pulse 101   Temp 98.4 °F (36.9 °C) (Temporal)   Ht 160 cm (63\")   Wt 95.7 kg (211 lb)   SpO2 98%   BMI 37.38 kg/m²     Physical Exam  Constitutional:       General: She is not in acute distress.     Appearance: Normal appearance. She is well-developed and well-groomed. She is not ill-appearing, toxic-appearing or diaphoretic.   HENT:      Head: Normocephalic.      Nose: Nose normal. No congestion or rhinorrhea.      Mouth/Throat:      Mouth: Mucous membranes are moist.      Pharynx: Oropharynx is clear. No oropharyngeal exudate or posterior oropharyngeal erythema.   Eyes:      General: Lids are normal.         Right eye: No discharge.         Left eye: No discharge.      Extraocular Movements: Extraocular movements intact.      Pupils: Pupils are equal, round, and reactive to light.   Neck:      Vascular: No carotid bruit.   Cardiovascular:      Rate and Rhythm: Normal rate and regular rhythm.      Pulses: Normal pulses.      Heart sounds: Normal heart sounds. No murmur heard.     No friction rub. No gallop.   Pulmonary:      Effort: Pulmonary effort is normal. No respiratory distress.      Breath sounds: Normal breath sounds. No stridor. No wheezing, rhonchi or rales.   Chest:      Chest wall: No tenderness.   Abdominal:      General: Bowel sounds are normal. There is no distension.      Palpations: Abdomen is soft. There is no mass.      Tenderness: There is no abdominal tenderness. There is no right CVA tenderness, left CVA tenderness, guarding or rebound.      Hernia: No hernia is present.   Musculoskeletal:  "        General: No swelling or tenderness. Normal range of motion.      Cervical back: Normal range of motion and neck supple. No rigidity or tenderness.      Right lower leg: No edema.      Left lower leg: No edema.   Lymphadenopathy:      Cervical: No cervical adenopathy.   Skin:     General: Skin is warm.      Capillary Refill: Capillary refill takes less than 2 seconds.      Coloration: Skin is not jaundiced.      Findings: No bruising, erythema or rash.   Neurological:      General: No focal deficit present.      Mental Status: She is alert and oriented to person, place, and time.      Motor: Motor function is intact. No weakness.      Coordination: Coordination is intact.      Gait: Gait is intact. Gait normal.   Psychiatric:         Attention and Perception: Attention normal.         Mood and Affect: Mood normal.         Speech: Speech normal.         Behavior: Behavior normal.         Cognition and Memory: Cognition normal.         Judgment: Judgment normal.        Result Review :                 Assessment and Plan    Diagnoses and all orders for this visit:    1. Morbid (severe) obesity due to excess calories (Primary)  -     naltrexone-bupropion ER (CONTRAVE) 8-90 MG tablet; Wk 1: 1 tab daily, Wk 2: 1 tab twice a day, Wk 3: 2 tabs in AM, 1 tab in PM, Wk 4: 2 tabs twice a day, Maintenance dose: 2 tabs twice daily.  Dispense: 120 tablet; Refill: 0      Patient's Body mass index is 37.38 kg/m². indicating that she is obese (BMI >30). Obesity-related health conditions include the following: hypertension. Obesity is unchanged. BMI is is above average; BMI management plan is completed. We discussed low calorie, low carb based diet program, portion control, and increasing exercise..    Follow Up   Return in about 4 weeks (around 7/25/2024), or if symptoms worsen or fail to improve.  Patient was given instructions and counseling regarding her condition or for health maintenance advice. Please see specific  information pulled into the AVS if appropriate.     This document has been electronically signed by EMELIA Blake  June 27, 2024 15:49 EDT

## 2024-08-29 ENCOUNTER — LAB (OUTPATIENT)
Dept: FAMILY MEDICINE CLINIC | Facility: CLINIC | Age: 34
End: 2024-08-29
Payer: COMMERCIAL

## 2024-08-29 ENCOUNTER — OFFICE VISIT (OUTPATIENT)
Dept: FAMILY MEDICINE CLINIC | Facility: CLINIC | Age: 34
End: 2024-08-29
Payer: COMMERCIAL

## 2024-08-29 VITALS
HEART RATE: 91 BPM | TEMPERATURE: 98 F | DIASTOLIC BLOOD PRESSURE: 78 MMHG | SYSTOLIC BLOOD PRESSURE: 130 MMHG | HEIGHT: 63 IN | OXYGEN SATURATION: 98 % | BODY MASS INDEX: 37.92 KG/M2 | WEIGHT: 214 LBS

## 2024-08-29 DIAGNOSIS — E55.9 VITAMIN D DEFICIENCY: ICD-10-CM

## 2024-08-29 DIAGNOSIS — E53.9 VITAMIN B DEFICIENCY: ICD-10-CM

## 2024-08-29 DIAGNOSIS — R53.83 OTHER FATIGUE: ICD-10-CM

## 2024-08-29 DIAGNOSIS — R10.2 PELVIC PRESSURE IN FEMALE: Primary | ICD-10-CM

## 2024-08-29 DIAGNOSIS — M32.9 LUPUS: ICD-10-CM

## 2024-08-29 DIAGNOSIS — R73.9 HYPERGLYCEMIA: ICD-10-CM

## 2024-08-29 DIAGNOSIS — R10.2 PELVIC PRESSURE IN FEMALE: ICD-10-CM

## 2024-08-29 DIAGNOSIS — K59.09 OTHER CONSTIPATION: ICD-10-CM

## 2024-08-29 LAB
BILIRUB BLD-MCNC: NEGATIVE MG/DL
CLARITY, POC: CLEAR
COLOR UR: YELLOW
EXPIRATION DATE: NORMAL
GLUCOSE UR STRIP-MCNC: NEGATIVE MG/DL
KETONES UR QL: NEGATIVE
LEUKOCYTE EST, POC: NEGATIVE
Lab: NORMAL
NITRITE UR-MCNC: NEGATIVE MG/ML
PH UR: 7.5 [PH] (ref 5–8)
PROT UR STRIP-MCNC: NEGATIVE MG/DL
RBC # UR STRIP: NEGATIVE /UL
SP GR UR: 1.01 (ref 1–1.03)
UROBILINOGEN UR QL: NORMAL

## 2024-08-29 PROCEDURE — 82746 ASSAY OF FOLIC ACID SERUM: CPT | Performed by: NURSE PRACTITIONER

## 2024-08-29 PROCEDURE — 81001 URINALYSIS AUTO W/SCOPE: CPT | Performed by: NURSE PRACTITIONER

## 2024-08-29 PROCEDURE — 3078F DIAST BP <80 MM HG: CPT | Performed by: NURSE PRACTITIONER

## 2024-08-29 PROCEDURE — 3075F SYST BP GE 130 - 139MM HG: CPT | Performed by: NURSE PRACTITIONER

## 2024-08-29 PROCEDURE — 82306 VITAMIN D 25 HYDROXY: CPT | Performed by: NURSE PRACTITIONER

## 2024-08-29 PROCEDURE — 82607 VITAMIN B-12: CPT | Performed by: NURSE PRACTITIONER

## 2024-08-29 PROCEDURE — 99214 OFFICE O/P EST MOD 30 MIN: CPT | Performed by: NURSE PRACTITIONER

## 2024-08-29 PROCEDURE — 83036 HEMOGLOBIN GLYCOSYLATED A1C: CPT | Performed by: NURSE PRACTITIONER

## 2024-08-29 PROCEDURE — 80050 GENERAL HEALTH PANEL: CPT | Performed by: NURSE PRACTITIONER

## 2024-08-29 PROCEDURE — 87086 URINE CULTURE/COLONY COUNT: CPT | Performed by: NURSE PRACTITIONER

## 2024-08-29 PROCEDURE — 1126F AMNT PAIN NOTED NONE PRSNT: CPT | Performed by: NURSE PRACTITIONER

## 2024-08-29 NOTE — PROGRESS NOTES
Please let know moderate amount of stool in colon, continue mirlax and should see a BM in the next 2-3 days. Notify us if no improvement or worsening. Thank you

## 2024-08-29 NOTE — PROGRESS NOTES
[facility]     Chief Complaint  Bloated (/), Abdominal Pain, and Fatigue (/)    Cheryl Mcnulty is a 34 y.o. female who presents today to [facility] for Bloated (/), Abdominal Pain, and Fatigue (/).    HPI:   HPI     Patient reports she stopped taking her medications for lupus about 3 months ago. States she had came to a point she felt tired and let down because she has been sick with autoimmune disease since her 20's. States began to get depressed. She has had no thoughts of si/hi. Reports she has started to feel nauseous as well at times. States she has continued to kind of feel fatigued and worn out to the point of feeling like she needs to call in 1-2 times per week.     States some lower abdominal pressure, similar to how she's felt in the past with episodes of constipation. She does have urinary frequency but no burning with urination. Her periods are regular and she just completed her menstrual cycle about 2-3 days ago. She is supposed to take a prophylactic antibiotic for UTI prevention but forgets at times.     Previous History:   Past Medical History:   Diagnosis Date    Congestive heart failure     in pregnancy    Fibromyalgia     Frequent UTI     on daily abx per urology    History of depression     History of ear infections     History of heart disease     History of lupus     skin rashes, joint pain, fatigue.   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 DRAINAGE OF OVARIAN CYST.;  Surgeon: Rachel Caruso DO;  Location: [facility];  Service: Obstetrics/Gynecology    ORIF  Upon Nutritional Assessment by the Registered Dietitian your patient was determined to meet criteria / has evidence of the following diagnosis/diagnoses:          [ ]  Mild Protein Calorie Malnutrition        [ ]  Moderate Protein Calorie Malnutrition        [X] Severe Protein Calorie Malnutrition        [ ] Unspecified Protein Calorie Malnutrition        [ ] Underweight / BMI <19        [ ] Morbid Obesity / BMI > 40      Findings as based on:  [X] Comprehensive nutrition assessment   [ ] Nutrition Focused Physical Exam  [ ] Other:       Nutrition Plan/Recommendations:    1) Continue with current therapeutic diet order of dysphagia 2 mechanical soft thin liquids, consistent carbohydrate no snacks, dash/tlc, Glucerna shake 3x/day  2) Malnutrition sticker placed in chart and discussed with provider  3) Monitor pt's PO intake, weight, skin, edema, GI distress   4) Will follow up for any further interventions regarding Pt's nutritional status per medical team        PROVIDER Section:     By signing this assessment you are acknowledging and agree with the diagnosis/diagnoses assigned by the Registered Dietitian    Comments: SCAPHOID W VASCULARIZED BONE GRAFT      TUBAL COAGULATION LAPAROSCOPIC Bilateral 08/13/2018    Procedure: TUBAL FALLOPE FILSHIE CLIPPING LAPAROSCOPIC;  Surgeon: Rachel Caruso DO;  Location: [facility];  Service: Obstetrics/Gynecology    ULNA/RADIUS CLOSED REDUCTION  2006      Social History     Socioeconomic History    Marital status: Single   Tobacco Use    Smoking status: Never    Smokeless tobacco: Never   Vaping Use    Vaping status: Every Day    Substances: Nicotine    Devices: Disposable   Substance and Sexual Activity    Alcohol use: Yes     Comment: socially    Drug use: No    Sexual activity: Yes     Partners: Male     Birth control/protection: Surgical, Tubal ligation      Health Maintenance Due   Topic Date Due    ANNUAL PHYSICAL  Never done    COVID-19 Vaccine (4 - 2023-24 season) 09/01/2023    INFLUENZA VACCINE  08/01/2024        Current Medications:  Current Outpatient Medications   Medication Sig Dispense Refill    atenolol (Tenormin) 25 MG tablet Take 1 tablet by mouth Daily. 90 tablet 0    DULoxetine (CYMBALTA) 60 MG capsule Take 1 capsule by mouth 2 (Two) Times a Day. 60 capsule 2    hydroCHLOROthiazide (MICROZIDE) 12.5 MG capsule Take 1 capsule by mouth Daily. 90 capsule 0    atomoxetine (STRATTERA) 60 MG capsule Once daily (Patient not taking: Reported on 8/29/2024) 30 capsule 2    cholecalciferol (VITAMIN D3) 25 MCG (1000 UT) tablet Take 1 tablet by mouth Daily. (Patient not taking: Reported on 8/29/2024) 90 tablet 1    dicyclomine (BENTYL) 20 MG tablet Take 1 tablet by mouth Every 6 (Six) Hours As Needed for Abdominal Cramping. (Patient not taking: Reported on 8/29/2024) 20 tablet 0    hydroxychloroquine (PLAQUENIL) 200 MG tablet Take 1 tablet by mouth Daily. (Patient not taking: Reported on 8/29/2024)      hydrOXYzine (ATARAX) 10 MG tablet Take 1 tablet by mouth 3 (Three) Times a Day As Needed for Itching. (Patient not taking: Reported on 8/29/2024) 90 tablet 2    loratadine  "(Claritin) 10 MG tablet Take 1 tablet by mouth Daily. (Patient not taking: Reported on 8/29/2024) 90 tablet 1    multivitamin with minerals tablet tablet Take 1 tablet by mouth Daily. (Patient not taking: Reported on 8/29/2024)      naltrexone-bupropion ER (CONTRAVE) 8-90 MG tablet Wk 1: 1 tab daily, Wk 2: 1 tab twice a day, Wk 3: 2 tabs in AM, 1 tab in PM, Wk 4: 2 tabs twice a day, Maintenance dose: 2 tabs twice daily. (Patient not taking: Reported on 8/29/2024) 120 tablet 0    ondansetron ODT (ZOFRAN-ODT) 4 MG disintegrating tablet Place 1 tablet on the tongue Every 8 (Eight) Hours As Needed for Vomiting. (Patient not taking: Reported on 8/29/2024) 20 tablet 0    phenazopyridine (PYRIDIUM) 200 MG tablet TAKE ONE TABLET BY MOUTH THREE TIMES A DAY AS NEEDED FOR BLADDER SPASMS (Patient not taking: Reported on 8/29/2024) 19 tablet 0    tiZANidine (ZANAFLEX) 4 MG tablet Take 1 tablet by mouth 2 (Two) Times a Day. (Patient not taking: Reported on 8/29/2024) 60 tablet 4    vitamin B-12 (CYANOCOBALAMIN) 1000 MCG tablet Take 1 tablet by mouth Daily. (Patient not taking: Reported on 8/29/2024) 30 tablet 2    vitamin D (ERGOCALCIFEROL) 1.25 MG (45696 UT) capsule capsule Take 1 capsule by mouth 1 (One) Time Per Week. (Patient not taking: Reported on 8/29/2024) 5 capsule 2     No current facility-administered medications for this visit.       Allergies:   No Known Allergies    Vitals:   /78 (BP Location: Right arm, Patient Position: Sitting)   Pulse 91   Temp 98 °F (36.7 °C) (Temporal)   Ht 160 cm (62.99\")   Wt 97.1 kg (214 lb)   LMP 08/19/2024   SpO2 98%   BMI 37.92 kg/m²   Estimated body mass index is 37.92 kg/m² as calculated from the following:    Height as of this encounter: 160 cm (62.99\").    Weight as of this encounter: 97.1 kg (214 lb).             Physical Exam:   Physical Exam  Vitals reviewed.   Constitutional:       Appearance: Normal appearance.   HENT:      Head: Normocephalic.      Right Ear: " Tympanic membrane and ear canal normal.      Left Ear: Tympanic membrane and ear canal normal.      Nose: Nose normal.      Mouth/Throat:      Mouth: Mucous membranes are moist.      Pharynx: Oropharynx is clear.   Eyes:      Extraocular Movements: Extraocular movements intact.      Conjunctiva/sclera: Conjunctivae normal.      Pupils: Pupils are equal, round, and reactive to light.   Cardiovascular:      Rate and Rhythm: Normal rate.      Heart sounds: Normal heart sounds.   Pulmonary:      Effort: Pulmonary effort is normal.      Breath sounds: Normal breath sounds.   Abdominal:      General: Bowel sounds are normal. There is no distension.      Palpations: Abdomen is soft.   Skin:     General: Skin is warm and dry.   Neurological:      Mental Status: She is alert and oriented to person, place, and time. Mental status is at baseline.      Comments: Normal gait    Psychiatric:         Attention and Perception: Attention normal.         Mood and Affect: Mood is depressed.         Speech: Speech normal.         Behavior: Behavior normal. Behavior is cooperative.         Thought Content: Thought content normal.         Cognition and Memory: Cognition normal.         Judgment: Judgment normal.          Lab Results:   Lab on 08/29/2024   Component Date Value Ref Range Status    Urine Culture 08/29/2024 No growth   Final    Glucose 08/29/2024 80  65 - 99 mg/dL Final    BUN 08/29/2024 10  6 - 20 mg/dL Final    Creatinine 08/29/2024 0.84  0.57 - 1.00 mg/dL Final    Sodium 08/29/2024 140  136 - 145 mmol/L Final    Potassium 08/29/2024 4.1  3.5 - 5.2 mmol/L Final    Chloride 08/29/2024 105  98 - 107 mmol/L Final    CO2 08/29/2024 23.1  22.0 - 29.0 mmol/L Final    Calcium 08/29/2024 9.3  8.6 - 10.5 mg/dL Final    Total Protein 08/29/2024 6.8  6.0 - 8.5 g/dL Final    Albumin 08/29/2024 4.3  3.5 - 5.2 g/dL Final    ALT (SGPT) 08/29/2024 13  1 - 33 U/L Final    AST (SGOT) 08/29/2024 14  1 - 32 U/L Final    Alkaline Phosphatase  08/29/2024 92  39 - 117 U/L Final    Total Bilirubin 08/29/2024 0.4  0.0 - 1.2 mg/dL Final    Globulin 08/29/2024 2.5  gm/dL Final    A/G Ratio 08/29/2024 1.7  g/dL Final    BUN/Creatinine Ratio 08/29/2024 11.9  7.0 - 25.0 Final    Anion Gap 08/29/2024 11.9  5.0 - 15.0 mmol/L Final    eGFR 08/29/2024 93.7  >60.0 mL/min/1.73 Final    TSH 08/29/2024 2.510  0.270 - 4.200 uIU/mL Final    Hemoglobin A1C 08/29/2024 5.10  4.80 - 5.60 % Final    Vitamin B-12 08/29/2024 705  211 - 946 pg/mL Final    Folate 08/29/2024 6.39  4.78 - 24.20 ng/mL Final    25 Hydroxy, Vitamin D 08/29/2024 17.5 (L)  30.0 - 100.0 ng/ml Final    Color, UA 08/29/2024 Yellow  Yellow, Straw Final    Appearance, UA 08/29/2024 Clear  Clear Final    pH, UA 08/29/2024 8.0  5.0 - 8.0 Final    Specific Gravity, UA 08/29/2024 1.027  1.005 - 1.030 Final    Glucose, UA 08/29/2024 Negative  Negative Final    Ketones, UA 08/29/2024 Trace (A)  Negative Final    Bilirubin, UA 08/29/2024 Negative  Negative Final    Blood, UA 08/29/2024 Negative  Negative Final    Protein, UA 08/29/2024 30 mg/dL (1+) (A)  Negative Final    Leuk Esterase, UA 08/29/2024 Negative  Negative Final    Nitrite, UA 08/29/2024 Negative  Negative Final    Urobilinogen, UA 08/29/2024 1.0 E.U./dL  0.2 - 1.0 E.U./dL Final    RBC, UA 08/29/2024 0-2  None Seen, 0-2 /HPF Final    WBC, UA 08/29/2024 0-2  None Seen, 0-2 /HPF Final    Bacteria, UA 08/29/2024 None Seen  None Seen /HPF Final    Squamous Epithelial Cells, UA 08/29/2024 3-6 (A)  None Seen, 0-2 /HPF Final    Hyaline Casts, UA 08/29/2024 0-2  None Seen /LPF Final    Methodology 08/29/2024 Automated Microscopy   Final   Office Visit on 08/29/2024   Component Date Value Ref Range Status    Color 08/29/2024 Yellow  Yellow, Straw, Dark Yellow, Lizette Final    Clarity, UA 08/29/2024 Clear  Clear Final    Specific Gravity  08/29/2024 1.010  1.005 - 1.030 Final    pH, Urine 08/29/2024 7.5  5.0 - 8.0 Final    Leukocytes 08/29/2024 Negative  Negative  Final    Nitrite, UA 08/29/2024 Negative  Negative Final    Protein, POC 08/29/2024 Negative  Negative mg/dL Final    Glucose, UA 08/29/2024 Negative  Negative mg/dL Final    Ketones, UA 08/29/2024 Negative  Negative Final    Urobilinogen, UA 08/29/2024 Normal  Normal, 0.2 E.U./dL Final    Bilirubin 08/29/2024 Negative  Negative Final    Blood, UA 08/29/2024 Negative  Negative Final    Lot Number 08/29/2024 [id number]   Final    Expiration Date 08/29/2024 01/14/2025   Final    WBC 08/29/2024 9.46  3.40 - 10.80 10*3/mm3 Final    RBC 08/29/2024 4.67  3.77 - 5.28 10*6/mm3 Final    Hemoglobin 08/29/2024 13.0  12.0 - 15.9 g/dL Final    Hematocrit 08/29/2024 39.2  34.0 - 46.6 % Final    MCV 08/29/2024 83.9  79.0 - 97.0 fL Final    MCH 08/29/2024 27.8  26.6 - 33.0 pg Final    MCHC 08/29/2024 33.2  31.5 - 35.7 g/dL Final    RDW 08/29/2024 12.0 (L)  12.3 - 15.4 % Final    RDW-SD 08/29/2024 36.0 (L)  37.0 - 54.0 fl Final    MPV 08/29/2024 11.6  6.0 - 12.0 fL Final    Platelets 08/29/2024 287  140 - 450 10*3/mm3 Final    Neutrophil % 08/29/2024 63.3  42.7 - 76.0 % Final    Lymphocyte % 08/29/2024 29.0  19.6 - 45.3 % Final    Monocyte % 08/29/2024 5.8  5.0 - 12.0 % Final    Eosinophil % 08/29/2024 1.2  0.3 - 6.2 % Final    Basophil % 08/29/2024 0.4  0.0 - 1.5 % Final    Immature Grans % 08/29/2024 0.3  0.0 - 0.5 % Final    Neutrophils, Absolute 08/29/2024 5.99  1.70 - 7.00 10*3/mm3 Final    Lymphocytes, Absolute 08/29/2024 2.74  0.70 - 3.10 10*3/mm3 Final    Monocytes, Absolute 08/29/2024 0.55  0.10 - 0.90 10*3/mm3 Final    Eosinophils, Absolute 08/29/2024 0.11  0.00 - 0.40 10*3/mm3 Final    Basophils, Absolute 08/29/2024 0.04  0.00 - 0.20 10*3/mm3 Final    Immature Grans, Absolute 08/29/2024 0.03  0.00 - 0.05 10*3/mm3 Final    nRBC 08/29/2024 0.0  0.0 - 0.2 /100 WBC Final   Lab on 06/18/2024   Component Date Value Ref Range Status    Hepatitis B Surface Ag 06/18/2024 Non-Reactive  Non-Reactive Final    Hep A IgM 06/18/2024  Non-Reactive  Non-Reactive Final    Hep B C IgM 06/18/2024 Non-Reactive  Non-Reactive Final    Hepatitis C Ab 06/18/2024 Non-Reactive  Non-Reactive Final   Admission on 06/01/2024, Discharged on 06/01/2024   Component Date Value Ref Range Status    Glucose 06/01/2024 95  65 - 99 mg/dL Final    BUN 06/01/2024 9  6 - 20 mg/dL Final    Creatinine 06/01/2024 0.70  0.57 - 1.00 mg/dL Final    Sodium 06/01/2024 140  136 - 145 mmol/L Final    Potassium 06/01/2024 4.1  3.5 - 5.2 mmol/L Final    Chloride 06/01/2024 106  98 - 107 mmol/L Final    CO2 06/01/2024 23.3  22.0 - 29.0 mmol/L Final    Calcium 06/01/2024 8.9  8.6 - 10.5 mg/dL Final    Total Protein 06/01/2024 6.5  6.0 - 8.5 g/dL Final    Albumin 06/01/2024 4.2  3.5 - 5.2 g/dL Final    ALT (SGPT) 06/01/2024 9  1 - 33 U/L Final    AST (SGOT) 06/01/2024 14  1 - 32 U/L Final    Alkaline Phosphatase 06/01/2024 88  39 - 117 U/L Final    Total Bilirubin 06/01/2024 0.4  0.0 - 1.2 mg/dL Final    Globulin 06/01/2024 2.3  gm/dL Final    A/G Ratio 06/01/2024 1.8  g/dL Final    BUN/Creatinine Ratio 06/01/2024 12.9  7.0 - 25.0 Final    Anion Gap 06/01/2024 10.7  5.0 - 15.0 mmol/L Final    eGFR 06/01/2024 116.6  >60.0 mL/min/1.73 Final    Lipase 06/01/2024 31  13 - 60 U/L Final    HCG Qualitative 06/01/2024 Negative  Negative Final    Color, UA 06/01/2024 Dark Yellow (A)  Yellow, Straw Final    Appearance, UA 06/01/2024 Clear  Clear Final    pH, UA 06/01/2024 5.5  5.0 - 8.0 Final    Specific Gravity, UA 06/01/2024 1.025  1.005 - 1.030 Final    Glucose, UA 06/01/2024 Negative  Negative Final    Ketones, UA 06/01/2024 Trace (A)  Negative Final    Bilirubin, UA 06/01/2024 Negative  Negative Final    Blood, UA 06/01/2024 Moderate (2+) (A)  Negative Final    Protein, UA 06/01/2024 Trace (A)  Negative Final    Leuk Esterase, UA 06/01/2024 Negative  Negative Final    Nitrite, UA 06/01/2024 Negative  Negative Final    Urobilinogen, UA 06/01/2024 0.2 E.U./dL  0.2 - 1.0 E.U./dL Final    Extra  Tube 06/01/2024 Hold for add-ons.   Final    Auto resulted.    Extra Tube 06/01/2024 hold for add-on   Final    Auto resulted    Extra Tube 06/01/2024 Hold for add-ons.   Final    Auto resulted    WBC 06/01/2024 10.77  3.40 - 10.80 10*3/mm3 Final    RBC 06/01/2024 4.50  3.77 - 5.28 10*6/mm3 Final    Hemoglobin 06/01/2024 12.8  12.0 - 15.9 g/dL Final    Hematocrit 06/01/2024 38.8  34.0 - 46.6 % Final    MCV 06/01/2024 86.2  79.0 - 97.0 fL Final    MCH 06/01/2024 28.4  26.6 - 33.0 pg Final    MCHC 06/01/2024 33.0  31.5 - 35.7 g/dL Final    RDW 06/01/2024 12.7  12.3 - 15.4 % Final    RDW-SD 06/01/2024 39.8  37.0 - 54.0 fl Final    MPV 06/01/2024 10.4  6.0 - 12.0 fL Final    Platelets 06/01/2024 322  140 - 450 10*3/mm3 Final    Neutrophil % 06/01/2024 60.5  42.7 - 76.0 % Final    Lymphocyte % 06/01/2024 31.4  19.6 - 45.3 % Final    Monocyte % 06/01/2024 6.2  5.0 - 12.0 % Final    Eosinophil % 06/01/2024 1.0  0.3 - 6.2 % Final    Basophil % 06/01/2024 0.5  0.0 - 1.5 % Final    Immature Grans % 06/01/2024 0.4  0.0 - 0.5 % Final    Neutrophils, Absolute 06/01/2024 6.52  1.70 - 7.00 10*3/mm3 Final    Lymphocytes, Absolute 06/01/2024 3.38 (H)  0.70 - 3.10 10*3/mm3 Final    Monocytes, Absolute 06/01/2024 0.67  0.10 - 0.90 10*3/mm3 Final    Eosinophils, Absolute 06/01/2024 0.11  0.00 - 0.40 10*3/mm3 Final    Basophils, Absolute 06/01/2024 0.05  0.00 - 0.20 10*3/mm3 Final    Immature Grans, Absolute 06/01/2024 0.04  0.00 - 0.05 10*3/mm3 Final    nRBC 06/01/2024 0.0  0.0 - 0.2 /100 WBC Final    RBC, UA 06/01/2024 3-5 (A)  None Seen, 0-2 /HPF Final    WBC, UA 06/01/2024 3-5 (A)  None Seen, 0-2 /HPF Final    Urine culture not indicated.    Bacteria, UA 06/01/2024 3+ (A)  None Seen /HPF Final    Squamous Epithelial Cells, UA 06/01/2024 7-12 (A)  None Seen, 0-2 /HPF Final    Hyaline Casts, UA 06/01/2024 None Seen  None Seen /LPF Final    Methodology 06/01/2024 Manual Light Microscopy   Final       Results review: During today's  encounter, all relevant clinical data has been reviewed.      Assessment and Plan  Diagnoses and all orders for this visit:    1. Pelvic pressure in female (Primary)  -     Urine Culture - Urine, Urine, Clean Catch; Future  -     Urinalysis With Microscopic - Urine, Clean Catch; Future  -     POCT urinalysis dipstick, automated    2. Other constipation  -     XR Abdomen KUB    3. Other fatigue  -     CBC & Differential  -     Comprehensive Metabolic Panel; Future  -     TSH Rfx On Abnormal To Free T4; Future    4. Hyperglycemia  -     Hemoglobin A1c; Future    5. Vitamin B deficiency  -     Vitamin B12; Future  -     Folate; Future    6. Vitamin D deficiency  -     Vitamin D,25-Hydroxy; Future    7. Lupus  -     Ambulatory Referral to Rheumatology         1-2) Performing U/A and KUB to rule out constipation or UTI today.   3-6) Performing labs today to look for source of fatigue. Diff dx discussed including possibility of being related to autoimmune disease or depression. She follows up next week with PCP and was going to discuss increasing her cymbalta- reports she took the increased dose in the past and feels like it may have helped control her symptoms more.   7.) Agreeable to new Rheumatology consult as she feels like sometimes when the treatment plan wasn't working for her they didn't always address the concerns.        New Medications:   No orders of the defined types were placed in this encounter.      Discontinued Medications:   Medications Discontinued During This Encounter   Medication Reason    nitrofurantoin, macrocrystal-monohydrate, (MACROBID) 100 MG capsule *Therapy completed              Visit Diagnoses:    ICD-10-CM ICD-9-CM   1. Pelvic pressure in female  R10.2 625.8   2. Other constipation  K59.09 564.09   3. Other fatigue  R53.83 780.79   4. Hyperglycemia  R73.9 790.29   5. Vitamin B deficiency  E53.9 266.9   6. Vitamin D deficiency  E55.9 268.9   7. Lupus  M32.9 710.0            Follow Up:    Return if symptoms worsen or fail to improve.    Patient was given instructions and counseling regarding her condition or for health maintenance advice. Please see specific information pulled into the AVS if appropriate.       This document has been electronically signed by EMELIA Sandy   August 31, 2024 20:32 EDT    Dictated Utilizing Dragon Dictation: Part of this note may be an electronic transcription/translation of spoken language to printed text using the Dragon Dictation System.

## 2024-08-30 ENCOUNTER — TELEPHONE (OUTPATIENT)
Dept: FAMILY MEDICINE CLINIC | Facility: CLINIC | Age: 34
End: 2024-08-30
Payer: COMMERCIAL

## 2024-08-30 LAB
25(OH)D3 SERPL-MCNC: 17.5 NG/ML (ref 30–100)
ALBUMIN SERPL-MCNC: 4.3 G/DL (ref 3.5–5.2)
ALBUMIN/GLOB SERPL: 1.7 G/DL
ALP SERPL-CCNC: 92 U/L (ref 39–117)
ALT SERPL W P-5'-P-CCNC: 13 U/L (ref 1–33)
ANION GAP SERPL CALCULATED.3IONS-SCNC: 11.9 MMOL/L (ref 5–15)
AST SERPL-CCNC: 14 U/L (ref 1–32)
BACTERIA UR QL AUTO: ABNORMAL /HPF
BASOPHILS # BLD AUTO: 0.04 10*3/MM3 (ref 0–0.2)
BASOPHILS NFR BLD AUTO: 0.4 % (ref 0–1.5)
BILIRUB SERPL-MCNC: 0.4 MG/DL (ref 0–1.2)
BILIRUB UR QL STRIP: NEGATIVE
BUN SERPL-MCNC: 10 MG/DL (ref 6–20)
BUN/CREAT SERPL: 11.9 (ref 7–25)
CALCIUM SPEC-SCNC: 9.3 MG/DL (ref 8.6–10.5)
CHLORIDE SERPL-SCNC: 105 MMOL/L (ref 98–107)
CLARITY UR: CLEAR
CO2 SERPL-SCNC: 23.1 MMOL/L (ref 22–29)
COLOR UR: YELLOW
CREAT SERPL-MCNC: 0.84 MG/DL (ref 0.57–1)
DEPRECATED RDW RBC AUTO: 36 FL (ref 37–54)
EGFRCR SERPLBLD CKD-EPI 2021: 93.7 ML/MIN/1.73
EOSINOPHIL # BLD AUTO: 0.11 10*3/MM3 (ref 0–0.4)
EOSINOPHIL NFR BLD AUTO: 1.2 % (ref 0.3–6.2)
ERYTHROCYTE [DISTWIDTH] IN BLOOD BY AUTOMATED COUNT: 12 % (ref 12.3–15.4)
FOLATE SERPL-MCNC: 6.39 NG/ML (ref 4.78–24.2)
GLOBULIN UR ELPH-MCNC: 2.5 GM/DL
GLUCOSE SERPL-MCNC: 80 MG/DL (ref 65–99)
GLUCOSE UR STRIP-MCNC: NEGATIVE MG/DL
HBA1C MFR BLD: 5.1 % (ref 4.8–5.6)
HCT VFR BLD AUTO: 39.2 % (ref 34–46.6)
HGB BLD-MCNC: 13 G/DL (ref 12–15.9)
HGB UR QL STRIP.AUTO: NEGATIVE
HYALINE CASTS UR QL AUTO: ABNORMAL /LPF
IMM GRANULOCYTES # BLD AUTO: 0.03 10*3/MM3 (ref 0–0.05)
IMM GRANULOCYTES NFR BLD AUTO: 0.3 % (ref 0–0.5)
KETONES UR QL STRIP: ABNORMAL
LEUKOCYTE ESTERASE UR QL STRIP.AUTO: NEGATIVE
LYMPHOCYTES # BLD AUTO: 2.74 10*3/MM3 (ref 0.7–3.1)
LYMPHOCYTES NFR BLD AUTO: 29 % (ref 19.6–45.3)
MCH RBC QN AUTO: 27.8 PG (ref 26.6–33)
MCHC RBC AUTO-ENTMCNC: 33.2 G/DL (ref 31.5–35.7)
MCV RBC AUTO: 83.9 FL (ref 79–97)
MONOCYTES # BLD AUTO: 0.55 10*3/MM3 (ref 0.1–0.9)
MONOCYTES NFR BLD AUTO: 5.8 % (ref 5–12)
NEUTROPHILS NFR BLD AUTO: 5.99 10*3/MM3 (ref 1.7–7)
NEUTROPHILS NFR BLD AUTO: 63.3 % (ref 42.7–76)
NITRITE UR QL STRIP: NEGATIVE
NRBC BLD AUTO-RTO: 0 /100 WBC (ref 0–0.2)
PH UR STRIP.AUTO: 8 [PH] (ref 5–8)
PLATELET # BLD AUTO: 287 10*3/MM3 (ref 140–450)
PMV BLD AUTO: 11.6 FL (ref 6–12)
POTASSIUM SERPL-SCNC: 4.1 MMOL/L (ref 3.5–5.2)
PROT SERPL-MCNC: 6.8 G/DL (ref 6–8.5)
PROT UR QL STRIP: ABNORMAL
RBC # BLD AUTO: 4.67 10*6/MM3 (ref 3.77–5.28)
RBC # UR STRIP: ABNORMAL /HPF
REF LAB TEST METHOD: ABNORMAL
SODIUM SERPL-SCNC: 140 MMOL/L (ref 136–145)
SP GR UR STRIP: 1.03 (ref 1–1.03)
SQUAMOUS #/AREA URNS HPF: ABNORMAL /HPF
TSH SERPL DL<=0.05 MIU/L-ACNC: 2.51 UIU/ML (ref 0.27–4.2)
UROBILINOGEN UR QL STRIP: ABNORMAL
VIT B12 BLD-MCNC: 705 PG/ML (ref 211–946)
WBC # UR STRIP: ABNORMAL /HPF
WBC NRBC COR # BLD AUTO: 9.46 10*3/MM3 (ref 3.4–10.8)

## 2024-08-30 NOTE — TELEPHONE ENCOUNTER
----- Message from eDann Lopez sent at 8/30/2024  4:09 PM EDT -----      Please let know her Vit D was low, otherwise no concerns in regard to her labs so we will need to look at other sources of fatigue. Does she still have her Vit D supply at home? If so restart that, and if not let me know I can send her some in. Thank you

## 2024-08-30 NOTE — TELEPHONE ENCOUNTER
----- Message from Deann Lopez sent at 8/29/2024  5:51 PM EDT -----      Please let know moderate amount of stool in colon, continue mirlax and should see a BM in the next 2-3 days. Notify us if no improvement or worsening. Thank you

## 2024-08-30 NOTE — PROGRESS NOTES
Please let know her Vit D was low, otherwise no concerns in regard to her labs so we will need to look at other sources of fatigue. Does she still have her Vit D supply at home? If so restart that, and if not let me know I can send her some in. Thank you

## 2024-08-31 LAB — BACTERIA SPEC AEROBE CULT: NO GROWTH

## 2024-09-04 ENCOUNTER — TELEPHONE (OUTPATIENT)
Dept: FAMILY MEDICINE CLINIC | Facility: CLINIC | Age: 34
End: 2024-09-04
Payer: COMMERCIAL

## 2024-09-04 NOTE — TELEPHONE ENCOUNTER
----- Message from Deann Lopez sent at 9/4/2024  5:58 PM EDT -----      Can we please let the patient know No growth on urine culture- no uti. Thank you

## 2024-10-11 ENCOUNTER — OFFICE VISIT (OUTPATIENT)
Dept: FAMILY MEDICINE CLINIC | Facility: CLINIC | Age: 34
End: 2024-10-11
Payer: COMMERCIAL

## 2024-10-11 VITALS
SYSTOLIC BLOOD PRESSURE: 154 MMHG | HEIGHT: 63 IN | BODY MASS INDEX: 37.95 KG/M2 | HEART RATE: 98 BPM | OXYGEN SATURATION: 99 % | DIASTOLIC BLOOD PRESSURE: 88 MMHG | TEMPERATURE: 97 F | WEIGHT: 214.2 LBS

## 2024-10-11 DIAGNOSIS — F41.9 ANXIETY AND DEPRESSION: ICD-10-CM

## 2024-10-11 DIAGNOSIS — I10 PRIMARY HYPERTENSION: Primary | ICD-10-CM

## 2024-10-11 DIAGNOSIS — M79.7 FIBROMYALGIA: ICD-10-CM

## 2024-10-11 DIAGNOSIS — F90.2 ATTENTION DEFICIT HYPERACTIVITY DISORDER (ADHD), COMBINED TYPE: ICD-10-CM

## 2024-10-11 DIAGNOSIS — I10 PRIMARY HYPERTENSION: ICD-10-CM

## 2024-10-11 DIAGNOSIS — F32.A ANXIETY AND DEPRESSION: ICD-10-CM

## 2024-10-11 PROCEDURE — 3079F DIAST BP 80-89 MM HG: CPT | Performed by: NURSE PRACTITIONER

## 2024-10-11 PROCEDURE — 1125F AMNT PAIN NOTED PAIN PRSNT: CPT | Performed by: NURSE PRACTITIONER

## 2024-10-11 PROCEDURE — 3077F SYST BP >= 140 MM HG: CPT | Performed by: NURSE PRACTITIONER

## 2024-10-11 PROCEDURE — 99214 OFFICE O/P EST MOD 30 MIN: CPT | Performed by: NURSE PRACTITIONER

## 2024-10-11 PROCEDURE — 96372 THER/PROPH/DIAG INJ SC/IM: CPT | Performed by: NURSE PRACTITIONER

## 2024-10-11 RX ORDER — ATENOLOL 25 MG/1
25 TABLET ORAL DAILY
Qty: 90 TABLET | Refills: 0 | Status: SHIPPED | OUTPATIENT
Start: 2024-10-11 | End: 2024-10-14

## 2024-10-11 RX ORDER — HYDROCHLOROTHIAZIDE 12.5 MG/1
12.5 CAPSULE ORAL DAILY
Qty: 90 CAPSULE | Refills: 0 | Status: SHIPPED | OUTPATIENT
Start: 2024-10-11 | End: 2024-10-14

## 2024-10-11 RX ORDER — METHYLPREDNISOLONE 4 MG
TABLET, DOSE PACK ORAL
Qty: 21 TABLET | Refills: 0 | Status: SHIPPED | OUTPATIENT
Start: 2024-10-11

## 2024-10-11 RX ORDER — KETOROLAC TROMETHAMINE 30 MG/ML
30 INJECTION, SOLUTION INTRAMUSCULAR; INTRAVENOUS ONCE
Status: COMPLETED | OUTPATIENT
Start: 2024-10-11 | End: 2024-10-11

## 2024-10-11 RX ADMIN — KETOROLAC TROMETHAMINE 30 MG: 30 INJECTION, SOLUTION INTRAMUSCULAR; INTRAVENOUS at 15:19

## 2024-10-11 NOTE — PROGRESS NOTES
"[facility]     Chief Complaint  Joint Pain (Joints tingling and painful)    Cheryl Mcnulty is a 34 y.o. female who presents today to [facility] for Joint Pain (Joints tingling and painful).    HPI:   HPI     Patient reports she has been in the bed since Tuesday due to symptoms of depression and generalized body pain. States she has only been able to get out of the bed for food and bathroom purposes. She is not having any SI/HI, but reports feeling bad about herself that she cannot force herself to get up and go. She is worried about losing her job but this week feels like she has \"physically and mentally\" not been able to get out of bed.     She has seen [facility] and started a new medication regimen and this is week 1. She is having pain in hips, knees, elbows the worst- however all joints are currently affected.     In regard to depression she is agreeable to referral today, but plans to check with [facility] in [place] as well to see if she can get an appt with them, as this who her daughter sees. She was dx with ADHD approx 5 years ago.     Previous History:   Past Medical History:   Diagnosis Date    Congestive heart failure     in pregnancy    Fibromyalgia     Frequent UTI     on daily abx per urology    History of depression     History of ear infections     History of heart disease     History of lupus     skin rashes, joint pain, fatigue.   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 DRAINAGE OF OVARIAN CYST.;  Surgeon: Rachel Caruso DO;  Location:  [facility] OR;  " Service: Obstetrics/Gynecology    ORIF SCAPHOID W VASCULARIZED BONE GRAFT      TUBAL COAGULATION LAPAROSCOPIC Bilateral 08/13/2018    Procedure: TUBAL FALLOPE FILSHIE CLIPPING LAPAROSCOPIC;  Surgeon: Rachel Caruso DO;  Location: [facility] OR;  Service: Obstetrics/Gynecology    ULNA/RADIUS CLOSED REDUCTION  2006      Social History     Socioeconomic History    Marital status: Single   Tobacco Use    Smoking status: Never    Smokeless tobacco: Never   Vaping Use    Vaping status: Every Day    Substances: Nicotine    Devices: Disposable   Substance and Sexual Activity    Alcohol use: Yes     Comment: socially    Drug use: No    Sexual activity: Yes     Partners: Male     Birth control/protection: Surgical, Tubal ligation      Health Maintenance Due   Topic Date Due    ANNUAL PHYSICAL  Never done    INFLUENZA VACCINE  08/01/2024    COVID-19 Vaccine (4 - 2023-24 season) 09/01/2024        Current Medications:  Current Outpatient Medications   Medication Sig Dispense Refill    atenolol (Tenormin) 25 MG tablet Take 1 tablet by mouth Daily. 90 tablet 0    cholecalciferol (VITAMIN D3) 25 MCG (1000 UT) tablet Take 1 tablet by mouth Daily. 90 tablet 1    DULoxetine (CYMBALTA) 60 MG capsule Take 1 capsule by mouth 2 (Two) Times a Day. 60 capsule 2    hydroCHLOROthiazide (MICROZIDE) 12.5 MG capsule Take 1 capsule by mouth Daily. 90 capsule 0    hydroxychloroquine (PLAQUENIL) 200 MG tablet Take 1 tablet by mouth Daily.      vitamin B-12 (CYANOCOBALAMIN) 1000 MCG tablet Take 1 tablet by mouth Daily. 30 tablet 2    atomoxetine (STRATTERA) 60 MG capsule Once daily (Patient not taking: Reported on 8/29/2024) 30 capsule 2    hydrOXYzine (ATARAX) 10 MG tablet Take 1 tablet by mouth 3 (Three) Times a Day As Needed for Itching. (Patient not taking: Reported on 8/29/2024) 90 tablet 2    methylPREDNISolone (MEDROL) 4 MG dose pack Take as directed on package instructions. 21 tablet 0    tiZANidine (ZANAFLEX) 4 MG tablet Take 1  "tablet by mouth 2 (Two) Times a Day. (Patient not taking: Reported on 8/29/2024) 60 tablet 4    vitamin D (ERGOCALCIFEROL) 1.25 MG (95768 UT) capsule capsule Take 1 capsule by mouth 1 (One) Time Per Week. (Patient not taking: Reported on 8/29/2024) 5 capsule 2     No current facility-administered medications for this visit.       Allergies:   No Known Allergies    Vitals:   /88   Pulse 98   Temp 97 °F (36.1 °C)   Ht 160 cm (63\")   Wt 97.2 kg (214 lb 3.2 oz)   LMP  (LMP Unknown)   SpO2 99%   BMI 37.94 kg/m²   Estimated body mass index is 37.94 kg/m² as calculated from the following:    Height as of this encounter: 160 cm (63\").    Weight as of this encounter: 97.2 kg (214 lb 3.2 oz).             Physical Exam:   Physical Exam  Vitals reviewed.   Constitutional:       Comments: Tired appearing    HENT:      Head: Normocephalic.      Right Ear: Hearing and tympanic membrane normal.      Left Ear: Hearing and tympanic membrane normal.      Nose: Nose normal.      Mouth/Throat:      Pharynx: Oropharynx is clear.   Eyes:      Extraocular Movements: Extraocular movements intact.      Conjunctiva/sclera: Conjunctivae normal.   Cardiovascular:      Rate and Rhythm: Normal rate.      Heart sounds: Normal heart sounds.   Pulmonary:      Effort: Pulmonary effort is normal.      Breath sounds: Normal breath sounds.   Abdominal:      General: Bowel sounds are normal.      Palpations: Abdomen is soft.   Skin:     General: Skin is warm and dry.   Neurological:      Mental Status: She is alert. Mental status is at baseline.      Comments: Normal gait    Psychiatric:         Attention and Perception: Attention normal.         Mood and Affect: Mood is depressed. Affect is flat.         Speech: Speech normal.         Behavior: Behavior normal. Behavior is cooperative.         Thought Content: Thought content normal. Thought content does not include homicidal or suicidal ideation.         Cognition and Memory: Cognition " normal.         Judgment: Judgment normal.          Lab Results:   Lab on 08/29/2024   Component Date Value Ref Range Status    Urine Culture 08/29/2024 No growth   Final    Glucose 08/29/2024 80  65 - 99 mg/dL Final    BUN 08/29/2024 10  6 - 20 mg/dL Final    Creatinine 08/29/2024 0.84  0.57 - 1.00 mg/dL Final    Sodium 08/29/2024 140  136 - 145 mmol/L Final    Potassium 08/29/2024 4.1  3.5 - 5.2 mmol/L Final    Chloride 08/29/2024 105  98 - 107 mmol/L Final    CO2 08/29/2024 23.1  22.0 - 29.0 mmol/L Final    Calcium 08/29/2024 9.3  8.6 - 10.5 mg/dL Final    Total Protein 08/29/2024 6.8  6.0 - 8.5 g/dL Final    Albumin 08/29/2024 4.3  3.5 - 5.2 g/dL Final    ALT (SGPT) 08/29/2024 13  1 - 33 U/L Final    AST (SGOT) 08/29/2024 14  1 - 32 U/L Final    Alkaline Phosphatase 08/29/2024 92  39 - 117 U/L Final    Total Bilirubin 08/29/2024 0.4  0.0 - 1.2 mg/dL Final    Globulin 08/29/2024 2.5  gm/dL Final    A/G Ratio 08/29/2024 1.7  g/dL Final    BUN/Creatinine Ratio 08/29/2024 11.9  7.0 - 25.0 Final    Anion Gap 08/29/2024 11.9  5.0 - 15.0 mmol/L Final    eGFR 08/29/2024 93.7  >60.0 mL/min/1.73 Final    TSH 08/29/2024 2.510  0.270 - 4.200 uIU/mL Final    Hemoglobin A1C 08/29/2024 5.10  4.80 - 5.60 % Final    Vitamin B-12 08/29/2024 705  211 - 946 pg/mL Final    Folate 08/29/2024 6.39  4.78 - 24.20 ng/mL Final    25 Hydroxy, Vitamin D 08/29/2024 17.5 (L)  30.0 - 100.0 ng/ml Final    Color, UA 08/29/2024 Yellow  Yellow, Straw Final    Appearance, UA 08/29/2024 Clear  Clear Final    pH, UA 08/29/2024 8.0  5.0 - 8.0 Final    Specific Gravity, UA 08/29/2024 1.027  1.005 - 1.030 Final    Glucose, UA 08/29/2024 Negative  Negative Final    Ketones, UA 08/29/2024 Trace (A)  Negative Final    Bilirubin, UA 08/29/2024 Negative  Negative Final    Blood, UA 08/29/2024 Negative  Negative Final    Protein, UA 08/29/2024 30 mg/dL (1+) (A)  Negative Final    Leuk Esterase, UA 08/29/2024 Negative  Negative Final    Nitrite, UA 08/29/2024  Negative  Negative Final    Urobilinogen, UA 08/29/2024 1.0 E.U./dL  0.2 - 1.0 E.U./dL Final    RBC, UA 08/29/2024 0-2  None Seen, 0-2 /HPF Final    WBC, UA 08/29/2024 0-2  None Seen, 0-2 /HPF Final    Bacteria, UA 08/29/2024 None Seen  None Seen /HPF Final    Squamous Epithelial Cells, UA 08/29/2024 3-6 (A)  None Seen, 0-2 /HPF Final    Hyaline Casts, UA 08/29/2024 0-2  None Seen /LPF Final    Methodology 08/29/2024 Automated Microscopy   Final   Office Visit on 08/29/2024   Component Date Value Ref Range Status    Color 08/29/2024 Yellow  Yellow, Straw, Dark Yellow, Lizette Final    Clarity, UA 08/29/2024 Clear  Clear Final    Specific Gravity  08/29/2024 1.010  1.005 - 1.030 Final    pH, Urine 08/29/2024 7.5  5.0 - 8.0 Final    Leukocytes 08/29/2024 Negative  Negative Final    Nitrite, UA 08/29/2024 Negative  Negative Final    Protein, POC 08/29/2024 Negative  Negative mg/dL Final    Glucose, UA 08/29/2024 Negative  Negative mg/dL Final    Ketones, UA 08/29/2024 Negative  Negative Final    Urobilinogen, UA 08/29/2024 Normal  Normal, 0.2 E.U./dL Final    Bilirubin 08/29/2024 Negative  Negative Final    Blood, UA 08/29/2024 Negative  Negative Final    Lot Number 08/29/2024 [id number]   Final    Expiration Date 08/29/2024 01/14/2025   Final    WBC 08/29/2024 9.46  3.40 - 10.80 10*3/mm3 Final    RBC 08/29/2024 4.67  3.77 - 5.28 10*6/mm3 Final    Hemoglobin 08/29/2024 13.0  12.0 - 15.9 g/dL Final    Hematocrit 08/29/2024 39.2  34.0 - 46.6 % Final    MCV 08/29/2024 83.9  79.0 - 97.0 fL Final    MCH 08/29/2024 27.8  26.6 - 33.0 pg Final    MCHC 08/29/2024 33.2  31.5 - 35.7 g/dL Final    RDW 08/29/2024 12.0 (L)  12.3 - 15.4 % Final    RDW-SD 08/29/2024 36.0 (L)  37.0 - 54.0 fl Final    MPV 08/29/2024 11.6  6.0 - 12.0 fL Final    Platelets 08/29/2024 287  140 - 450 10*3/mm3 Final    Neutrophil % 08/29/2024 63.3  42.7 - 76.0 % Final    Lymphocyte % 08/29/2024 29.0  19.6 - 45.3 % Final    Monocyte % 08/29/2024 5.8  5.0 -  12.0 % Final    Eosinophil % 08/29/2024 1.2  0.3 - 6.2 % Final    Basophil % 08/29/2024 0.4  0.0 - 1.5 % Final    Immature Grans % 08/29/2024 0.3  0.0 - 0.5 % Final    Neutrophils, Absolute 08/29/2024 5.99  1.70 - 7.00 10*3/mm3 Final    Lymphocytes, Absolute 08/29/2024 2.74  0.70 - 3.10 10*3/mm3 Final    Monocytes, Absolute 08/29/2024 0.55  0.10 - 0.90 10*3/mm3 Final    Eosinophils, Absolute 08/29/2024 0.11  0.00 - 0.40 10*3/mm3 Final    Basophils, Absolute 08/29/2024 0.04  0.00 - 0.20 10*3/mm3 Final    Immature Grans, Absolute 08/29/2024 0.03  0.00 - 0.05 10*3/mm3 Final    nRBC 08/29/2024 0.0  0.0 - 0.2 /100 WBC Final       Results review: During today's encounter, all relevant clinical data has been reviewed.      Assessment and Plan  Diagnoses and all orders for this visit:    1. Primary hypertension (Primary)  -     hydroCHLOROthiazide (MICROZIDE) 12.5 MG capsule; Take 1 capsule by mouth Daily.  Dispense: 90 capsule; Refill: 0  -     atenolol (Tenormin) 25 MG tablet; Take 1 tablet by mouth Daily.  Dispense: 90 tablet; Refill: 0     1.) Refilling medication for patient today- reports she has been out for about 3-4 days and typically her blood pressure is controlled. Thinks the elevation is due to not having the medication. Is going to notify if blood pressure does not return to normal levels.     2. Attention deficit hyperactivity disorder (ADHD), combined type  -     Ambulatory Referral to Psychiatry    2) Patient would like to have eval per psychiatric services for further evaluation of ADHD, and due to her depression. She has no SI/HI at todays visit, no thoughts of self harm. Does have feelings of guilt however due to feeling unable to get of bed sometimes like she experienced this week.     3. Anxiety and depression  -     Ambulatory Referral to Psychiatry    (SEE ABOVE)     4. Fibromyalgia  -     methylPREDNISolone (MEDROL) 4 MG dose pack; Take as directed on package instructions.  Dispense: 21 tablet;  Refill: 0  -     ketorolac (TORADOL) injection 30 mg    4.) She has been referred to Rheumatology and had her first appt. States that they have restarted a medication regimen but it has not taken affect yet and she is experiencing some fatigue and generalized body pain and joint pain.      Patient requesting FMLA paperwork filled out for Tues-Friday due to chronic illness/condition leaving her unable to perform job duties. We will fill this out of her when she brings it back to office, and she is going to discuss with PCP about extended FMLA paperwork at upcoming appt until disease process gets under control.                 New Medications:   New Medications Ordered This Visit   Medications    hydroCHLOROthiazide (MICROZIDE) 12.5 MG capsule     Sig: Take 1 capsule by mouth Daily.     Dispense:  90 capsule     Refill:  0    atenolol (Tenormin) 25 MG tablet     Sig: Take 1 tablet by mouth Daily.     Dispense:  90 tablet     Refill:  0    methylPREDNISolone (MEDROL) 4 MG dose pack     Sig: Take as directed on package instructions.     Dispense:  21 tablet     Refill:  0    ketorolac (TORADOL) injection 30 mg       Discontinued Medications:   Medications Discontinued During This Encounter   Medication Reason    dicyclomine (BENTYL) 20 MG tablet     multivitamin with minerals tablet tablet     loratadine (Claritin) 10 MG tablet     phenazopyridine (PYRIDIUM) 200 MG tablet     ondansetron ODT (ZOFRAN-ODT) 4 MG disintegrating tablet     naltrexone-bupropion ER (CONTRAVE) 8-90 MG tablet     atenolol (Tenormin) 25 MG tablet Reorder    hydroCHLOROthiazide (MICROZIDE) 12.5 MG capsule Reorder              Visit Diagnoses:    ICD-10-CM ICD-9-CM   1. Primary hypertension  I10 401.9   2. Attention deficit hyperactivity disorder (ADHD), combined type  F90.2 314.01   3. Anxiety and depression  F41.9 300.00    F32.A 311   4. Fibromyalgia  M79.7 729.1            Follow Up:   Return if symptoms worsen or fail to improve.    Patient  was given instructions and counseling regarding her condition or for health maintenance advice. Please see specific information pulled into the AVS if appropriate.       This document has been electronically signed by EMELIA Sandy   October 14, 2024 09:46 EDT    Dictated Utilizing Dragon Dictation: Part of this note may be an electronic transcription/translation of spoken language to printed text using the Dragon Dictation System.

## 2024-10-14 RX ORDER — ATENOLOL 25 MG/1
25 TABLET ORAL DAILY
Qty: 90 TABLET | Refills: 0 | Status: SHIPPED | OUTPATIENT
Start: 2024-10-14

## 2024-10-14 RX ORDER — HYDROCHLOROTHIAZIDE 12.5 MG/1
12.5 CAPSULE ORAL DAILY
Qty: 90 CAPSULE | Refills: 0 | Status: SHIPPED | OUTPATIENT
Start: 2024-10-14

## 2024-10-31 ENCOUNTER — TELEPHONE (OUTPATIENT)
Dept: UROLOGY | Facility: CLINIC | Age: 34
End: 2024-10-31
Payer: COMMERCIAL

## 2024-10-31 DIAGNOSIS — N39.0 FREQUENT UTI: Primary | ICD-10-CM

## 2024-10-31 RX ORDER — NITROFURANTOIN 25; 75 MG/1; MG/1
100 CAPSULE ORAL DAILY
Qty: 56 CAPSULE | Refills: 3 | Status: SHIPPED | OUTPATIENT
Start: 2024-10-31

## 2024-10-31 NOTE — TELEPHONE ENCOUNTER
Patient called requesting a refill on her macrobid that she uses for suppressive therapy sent to [facility] pharmacy please.

## 2025-01-03 PROCEDURE — 99283 EMERGENCY DEPT VISIT LOW MDM: CPT

## 2025-01-03 RX ORDER — SODIUM CHLORIDE 0.9 % (FLUSH) 0.9 %
10 SYRINGE (ML) INJECTION AS NEEDED
Status: DISCONTINUED | OUTPATIENT
Start: 2025-01-03 | End: 2025-01-04 | Stop reason: HOSPADM

## 2025-01-04 ENCOUNTER — HOSPITAL ENCOUNTER (EMERGENCY)
Facility: HOSPITAL | Age: 35
Discharge: HOME | End: 2025-01-04
Payer: COMMERCIAL

## 2025-01-04 ENCOUNTER — APPOINTMENT (OUTPATIENT)
Dept: GENERAL RADIOLOGY | Facility: HOSPITAL | Age: 35
End: 2025-01-04
Payer: COMMERCIAL

## 2025-01-04 VITALS
HEIGHT: 63 IN | BODY MASS INDEX: 38.98 KG/M2 | DIASTOLIC BLOOD PRESSURE: 76 MMHG | WEIGHT: 220 LBS | OXYGEN SATURATION: 100 % | TEMPERATURE: 99.4 F | SYSTOLIC BLOOD PRESSURE: 137 MMHG | HEART RATE: 88 BPM | RESPIRATION RATE: 20 BRPM

## 2025-01-04 DIAGNOSIS — B33.8 RSV (RESPIRATORY SYNCYTIAL VIRUS INFECTION): Primary | ICD-10-CM

## 2025-01-04 LAB
ALBUMIN SERPL-MCNC: 4.3 G/DL (ref 3.5–5.2)
ALBUMIN/GLOB SERPL: 1.7 G/DL
ALP SERPL-CCNC: 102 U/L (ref 39–117)
ALT SERPL W P-5'-P-CCNC: 22 U/L (ref 1–33)
ANION GAP SERPL CALCULATED.3IONS-SCNC: 15.4 MMOL/L (ref 5–15)
AST SERPL-CCNC: 20 U/L (ref 1–32)
B PARAPERT DNA SPEC QL NAA+PROBE: NOT DETECTED
B PERT DNA SPEC QL NAA+PROBE: NOT DETECTED
BASOPHILS # BLD AUTO: 0.04 10*3/MM3 (ref 0–0.2)
BASOPHILS NFR BLD AUTO: 0.3 % (ref 0–1.5)
BILIRUB SERPL-MCNC: 0.2 MG/DL (ref 0–1.2)
BILIRUB UR QL STRIP: NEGATIVE
BUN SERPL-MCNC: 12 MG/DL (ref 6–20)
BUN/CREAT SERPL: 15 (ref 7–25)
C PNEUM DNA NPH QL NAA+NON-PROBE: NOT DETECTED
CALCIUM SPEC-SCNC: 9.1 MG/DL (ref 8.6–10.5)
CHLORIDE SERPL-SCNC: 104 MMOL/L (ref 98–107)
CLARITY UR: CLEAR
CO2 SERPL-SCNC: 21.6 MMOL/L (ref 22–29)
COLOR UR: YELLOW
CREAT SERPL-MCNC: 0.8 MG/DL (ref 0.57–1)
D-LACTATE SERPL-SCNC: 3.6 MMOL/L (ref 0.5–2)
D-LACTATE SERPL-SCNC: 4.1 MMOL/L (ref 0.5–2)
DEPRECATED RDW RBC AUTO: 41.1 FL (ref 37–54)
EGFRCR SERPLBLD CKD-EPI 2021: 99.3 ML/MIN/1.73
EOSINOPHIL # BLD AUTO: 0.01 10*3/MM3 (ref 0–0.4)
EOSINOPHIL NFR BLD AUTO: 0.1 % (ref 0.3–6.2)
ERYTHROCYTE [DISTWIDTH] IN BLOOD BY AUTOMATED COUNT: 13.4 % (ref 12.3–15.4)
FLUAV RNA RESP QL NAA+PROBE: NOT DETECTED
FLUAV SUBTYP SPEC NAA+PROBE: NOT DETECTED
FLUBV RNA ISLT QL NAA+PROBE: NOT DETECTED
FLUBV RNA ISLT QL NAA+PROBE: NOT DETECTED
GLOBULIN UR ELPH-MCNC: 2.6 GM/DL
GLUCOSE SERPL-MCNC: 138 MG/DL (ref 65–99)
GLUCOSE UR STRIP-MCNC: ABNORMAL MG/DL
HADV DNA SPEC NAA+PROBE: NOT DETECTED
HCOV 229E RNA SPEC QL NAA+PROBE: NOT DETECTED
HCOV HKU1 RNA SPEC QL NAA+PROBE: NOT DETECTED
HCOV NL63 RNA SPEC QL NAA+PROBE: NOT DETECTED
HCOV OC43 RNA SPEC QL NAA+PROBE: NOT DETECTED
HCT VFR BLD AUTO: 33.6 % (ref 34–46.6)
HGB BLD-MCNC: 10.8 G/DL (ref 12–15.9)
HGB UR QL STRIP.AUTO: NEGATIVE
HMPV RNA NPH QL NAA+NON-PROBE: NOT DETECTED
HOLD SPECIMEN: NORMAL
HOLD SPECIMEN: NORMAL
HPIV1 RNA ISLT QL NAA+PROBE: NOT DETECTED
HPIV2 RNA SPEC QL NAA+PROBE: NOT DETECTED
HPIV3 RNA NPH QL NAA+PROBE: NOT DETECTED
HPIV4 P GENE NPH QL NAA+PROBE: NOT DETECTED
IMM GRANULOCYTES # BLD AUTO: 0.12 10*3/MM3 (ref 0–0.05)
IMM GRANULOCYTES NFR BLD AUTO: 0.8 % (ref 0–0.5)
KETONES UR QL STRIP: ABNORMAL
LEUKOCYTE ESTERASE UR QL STRIP.AUTO: NEGATIVE
LYMPHOCYTES # BLD AUTO: 1.01 10*3/MM3 (ref 0.7–3.1)
LYMPHOCYTES NFR BLD AUTO: 6.7 % (ref 19.6–45.3)
M PNEUMO IGG SER IA-ACNC: NOT DETECTED
MCH RBC QN AUTO: 27.3 PG (ref 26.6–33)
MCHC RBC AUTO-ENTMCNC: 32.1 G/DL (ref 31.5–35.7)
MCV RBC AUTO: 84.8 FL (ref 79–97)
MONOCYTES # BLD AUTO: 0.48 10*3/MM3 (ref 0.1–0.9)
MONOCYTES NFR BLD AUTO: 3.2 % (ref 5–12)
NEUTROPHILS NFR BLD AUTO: 13.41 10*3/MM3 (ref 1.7–7)
NEUTROPHILS NFR BLD AUTO: 88.9 % (ref 42.7–76)
NITRITE UR QL STRIP: NEGATIVE
NRBC BLD AUTO-RTO: 0 /100 WBC (ref 0–0.2)
PH UR STRIP.AUTO: 5.5 [PH] (ref 5–8)
PLATELET # BLD AUTO: 305 10*3/MM3 (ref 140–450)
PMV BLD AUTO: 11 FL (ref 6–12)
POTASSIUM SERPL-SCNC: 4 MMOL/L (ref 3.5–5.2)
PROT SERPL-MCNC: 6.9 G/DL (ref 6–8.5)
PROT UR QL STRIP: ABNORMAL
RBC # BLD AUTO: 3.96 10*6/MM3 (ref 3.77–5.28)
RHINOVIRUS RNA SPEC NAA+PROBE: NOT DETECTED
RSV RNA NPH QL NAA+NON-PROBE: DETECTED
SARS-COV-2 RNA RESP QL NAA+PROBE: NOT DETECTED
SARS-COV-2 RNA RESP QL NAA+PROBE: NOT DETECTED
SODIUM SERPL-SCNC: 141 MMOL/L (ref 136–145)
SP GR UR STRIP: >1.03 (ref 1–1.03)
UROBILINOGEN UR QL STRIP: ABNORMAL
WBC NRBC COR # BLD AUTO: 15.07 10*3/MM3 (ref 3.4–10.8)
WHOLE BLOOD HOLD COAG: NORMAL
WHOLE BLOOD HOLD SPECIMEN: NORMAL

## 2025-01-04 PROCEDURE — 87636 SARSCOV2 & INF A&B AMP PRB: CPT

## 2025-01-04 PROCEDURE — 71045 X-RAY EXAM CHEST 1 VIEW: CPT

## 2025-01-04 PROCEDURE — 83605 ASSAY OF LACTIC ACID: CPT

## 2025-01-04 PROCEDURE — 96375 TX/PRO/DX INJ NEW DRUG ADDON: CPT

## 2025-01-04 PROCEDURE — 85025 COMPLETE CBC W/AUTO DIFF WBC: CPT

## 2025-01-04 PROCEDURE — 25010000002 PIPERACILLIN SOD-TAZOBACTAM PER 1 G

## 2025-01-04 PROCEDURE — 94799 UNLISTED PULMONARY SVC/PX: CPT

## 2025-01-04 PROCEDURE — 0202U NFCT DS 22 TRGT SARS-COV-2: CPT

## 2025-01-04 PROCEDURE — 25010000002 DEXAMETHASONE SODIUM PHOSPHATE 10 MG/ML SOLUTION

## 2025-01-04 PROCEDURE — 81003 URINALYSIS AUTO W/O SCOPE: CPT

## 2025-01-04 PROCEDURE — 96365 THER/PROPH/DIAG IV INF INIT: CPT

## 2025-01-04 PROCEDURE — 25010000002 KETOROLAC TROMETHAMINE PER 15 MG

## 2025-01-04 PROCEDURE — 36415 COLL VENOUS BLD VENIPUNCTURE: CPT

## 2025-01-04 PROCEDURE — 94640 AIRWAY INHALATION TREATMENT: CPT

## 2025-01-04 PROCEDURE — 25810000003 SEPSIS FLUID NS 0.9 % SOLUTION

## 2025-01-04 PROCEDURE — 71045 X-RAY EXAM CHEST 1 VIEW: CPT | Performed by: RADIOLOGY

## 2025-01-04 PROCEDURE — 80053 COMPREHEN METABOLIC PANEL: CPT

## 2025-01-04 PROCEDURE — 87040 BLOOD CULTURE FOR BACTERIA: CPT

## 2025-01-04 RX ORDER — KETOROLAC TROMETHAMINE 30 MG/ML
30 INJECTION, SOLUTION INTRAMUSCULAR; INTRAVENOUS ONCE
Status: COMPLETED | OUTPATIENT
Start: 2025-01-04 | End: 2025-01-04

## 2025-01-04 RX ORDER — DEXAMETHASONE SODIUM PHOSPHATE 10 MG/ML
10 INJECTION, SOLUTION INTRAMUSCULAR; INTRAVENOUS ONCE
Status: COMPLETED | OUTPATIENT
Start: 2025-01-04 | End: 2025-01-04

## 2025-01-04 RX ORDER — ALBUTEROL SULFATE 1.25 MG/3ML
1 SOLUTION RESPIRATORY (INHALATION) EVERY 4 HOURS PRN
Qty: 30 EACH | Refills: 0 | Status: SHIPPED | OUTPATIENT
Start: 2025-01-04

## 2025-01-04 RX ORDER — IPRATROPIUM BROMIDE AND ALBUTEROL SULFATE 2.5; .5 MG/3ML; MG/3ML
3 SOLUTION RESPIRATORY (INHALATION) ONCE
Status: COMPLETED | OUTPATIENT
Start: 2025-01-04 | End: 2025-01-04

## 2025-01-04 RX ORDER — ALBUTEROL SULFATE 90 UG/1
2 INHALANT RESPIRATORY (INHALATION) ONCE
Status: COMPLETED | OUTPATIENT
Start: 2025-01-04 | End: 2025-01-04

## 2025-01-04 RX ADMIN — PIPERACILLIN SODIUM AND TAZOBACTAM SODIUM 3.38 G: 3; .375 INJECTION, POWDER, LYOPHILIZED, FOR SOLUTION INTRAVENOUS at 03:11

## 2025-01-04 RX ADMIN — KETOROLAC TROMETHAMINE 30 MG: 30 INJECTION, SOLUTION INTRAMUSCULAR at 05:04

## 2025-01-04 RX ADMIN — ALBUTEROL SULFATE 2 PUFF: 90 AEROSOL, METERED RESPIRATORY (INHALATION) at 02:00

## 2025-01-04 RX ADMIN — IPRATROPIUM BROMIDE AND ALBUTEROL SULFATE 3 ML: .5; 2.5 SOLUTION RESPIRATORY (INHALATION) at 05:05

## 2025-01-04 RX ADMIN — DEXAMETHASONE SODIUM PHOSPHATE 10 MG: 10 INJECTION INTRAMUSCULAR; INTRAVENOUS at 01:42

## 2025-01-04 RX ADMIN — SODIUM CHLORIDE 2142 ML: 9 INJECTION, SOLUTION INTRAVENOUS at 02:35

## 2025-01-04 NOTE — ED PROVIDER NOTES
"Subjective   History of Present Illness  Patient is a 34-year-old female who presents today with complaints of cough and congestion.  She reports that she has been \"sick\" for approximately 4 to 5 weeks.  She reports she has been seen by her family doctor as well as local urgent cares given steroids, antibiotics, and IM Toradol with no relief.  She reports that yesterday and tonight she has been having increased shortness of breath and wheezing.  She denies any chest pain/pressure/tightness.  She reports that she has had some fever at home.  She is afebrile upon arrival.  She reports she is currently taking steroids and an unknown antibiotic from the local urgent care however reports that she is getting no relief.        Review of Systems   Constitutional: Negative.  Negative for fever.   HENT:  Positive for congestion, rhinorrhea and voice change.    Respiratory:  Positive for cough, shortness of breath and wheezing. Negative for chest tightness.    Cardiovascular: Negative.  Negative for chest pain.   Gastrointestinal: Negative.  Negative for abdominal pain.   Endocrine: Negative.    Genitourinary: Negative.  Negative for dysuria.   Musculoskeletal:  Positive for myalgias.   Skin: Negative.    Neurological: Negative.    Psychiatric/Behavioral: Negative.     All other systems reviewed and are negative.      Past Medical History:   Diagnosis Date    Congestive heart failure     in pregnancy    Fibromyalgia     Frequent UTI     on daily abx per urology    History of depression     History of ear infections     History of heart disease     History of lupus     skin rashes, joint pain, fatigue.   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 DRAINAGE OF OVARIAN CYST.;  Surgeon: Rachel Caruso DO;  Location:  [facility];  Service: Obstetrics/Gynecology    ORIF SCAPHOID W VASCULARIZED BONE GRAFT      TUBAL COAGULATION LAPAROSCOPIC Bilateral 2018    Procedure: TUBAL FALLOPE FILSHIE CLIPPING LAPAROSCOPIC;  Surgeon: Rachel Caruso DO;  Location:  [facility];  Service: Obstetrics/Gynecology    ULNA/RADIUS CLOSED REDUCTION         Family History   Problem Relation Age of Onset    Heart disease Mother     Thyroid disease Mother     Anxiety disorder Mother     Depression Mother     Bipolar disorder Mother     Heart disease Father     Cancer Father     Heart disease Maternal Grandmother     Diabetes Maternal Grandmother     Diabetes Maternal Grandfather     Lupus Paternal Grandmother     Rheum arthritis Paternal Grandmother        Social History     Socioeconomic History    Marital status: Single   Tobacco Use    Smoking status: Never    Smokeless tobacco: Never   Vaping Use    Vaping status: Every Day    Substances: Nicotine    Devices: Disposable   Substance and Sexual Activity    Alcohol use: Yes     Comment: socially    Drug use: No    Sexual activity: Yes     Partners: Male     Birth control/protection: Surgical, Tubal ligation           Objective   Physical Exam  Vitals and nursing note reviewed.   Constitutional:       General: She is not in acute distress.     Appearance: She is well-developed. She is ill-appearing and diaphoretic.   HENT:      Head: Normocephalic and atraumatic.      Right Ear: External ear normal.      Left Ear: External ear normal.      Nose: Nose normal. Congestion present.   Eyes:      Conjunctiva/sclera: Conjunctivae normal.      Pupils: Pupils are equal, round, and reactive to light.   Neck:      Vascular: No JVD.      Trachea: No tracheal deviation.   Cardiovascular:      Rate and  Rhythm: Normal rate and regular rhythm.      Heart sounds: Normal heart sounds. No murmur heard.  Pulmonary:      Effort: Pulmonary effort is normal. No respiratory distress.      Breath sounds: No stridor. Wheezing present.   Chest:      Chest wall: Tenderness present.   Abdominal:      General: Bowel sounds are normal.      Palpations: Abdomen is soft.      Tenderness: There is no abdominal tenderness.   Musculoskeletal:         General: Tenderness present. No deformity. Normal range of motion.      Cervical back: Normal range of motion and neck supple.   Skin:     General: Skin is warm and dry.      Coloration: Skin is pale.      Findings: No erythema or rash.   Neurological:      Mental Status: She is alert and oriented to person, place, and time.      Cranial Nerves: No cranial nerve deficit.   Psychiatric:         Behavior: Behavior normal.         Thought Content: Thought content normal.       Results for orders placed or performed during the hospital encounter of 01/04/25   Comprehensive Metabolic Panel    Collection Time: 01/04/25 12:30 AM    Specimen: Arm, Right; Blood   Result Value Ref Range    Glucose 138 (H) 65 - 99 mg/dL    BUN 12 6 - 20 mg/dL    Creatinine 0.80 0.57 - 1.00 mg/dL    Sodium 141 136 - 145 mmol/L    Potassium 4.0 3.5 - 5.2 mmol/L    Chloride 104 98 - 107 mmol/L    CO2 21.6 (L) 22.0 - 29.0 mmol/L    Calcium 9.1 8.6 - 10.5 mg/dL    Total Protein 6.9 6.0 - 8.5 g/dL    Albumin 4.3 3.5 - 5.2 g/dL    ALT (SGPT) 22 1 - 33 U/L    AST (SGOT) 20 1 - 32 U/L    Alkaline Phosphatase 102 39 - 117 U/L    Total Bilirubin 0.2 0.0 - 1.2 mg/dL    Globulin 2.6 gm/dL    A/G Ratio 1.7 g/dL    BUN/Creatinine Ratio 15.0 7.0 - 25.0    Anion Gap 15.4 (H) 5.0 - 15.0 mmol/L    eGFR 99.3 >60.0 mL/min/1.73   CBC Auto Differential    Collection Time: 01/04/25 12:30 AM    Specimen: Arm, Right; Blood   Result Value Ref Range    WBC 15.07 (H) 3.40 - 10.80 10*3/mm3    RBC 3.96 3.77 - 5.28 10*6/mm3    Hemoglobin 10.8 (L)  12.0 - 15.9 g/dL    Hematocrit 33.6 (L) 34.0 - 46.6 %    MCV 84.8 79.0 - 97.0 fL    MCH 27.3 26.6 - 33.0 pg    MCHC 32.1 31.5 - 35.7 g/dL    RDW 13.4 12.3 - 15.4 %    RDW-SD 41.1 37.0 - 54.0 fl    MPV 11.0 6.0 - 12.0 fL    Platelets 305 140 - 450 10*3/mm3    Neutrophil % 88.9 (H) 42.7 - 76.0 %    Lymphocyte % 6.7 (L) 19.6 - 45.3 %    Monocyte % 3.2 (L) 5.0 - 12.0 %    Eosinophil % 0.1 (L) 0.3 - 6.2 %    Basophil % 0.3 0.0 - 1.5 %    Immature Grans % 0.8 (H) 0.0 - 0.5 %    Neutrophils, Absolute 13.41 (H) 1.70 - 7.00 10*3/mm3    Lymphocytes, Absolute 1.01 0.70 - 3.10 10*3/mm3    Monocytes, Absolute 0.48 0.10 - 0.90 10*3/mm3    Eosinophils, Absolute 0.01 0.00 - 0.40 10*3/mm3    Basophils, Absolute 0.04 0.00 - 0.20 10*3/mm3    Immature Grans, Absolute 0.12 (H) 0.00 - 0.05 10*3/mm3    nRBC 0.0 0.0 - 0.2 /100 WBC   Green Top (Gel)    Collection Time: 01/04/25 12:30 AM   Result Value Ref Range    Extra Tube Hold for add-ons.    Lavender Top    Collection Time: 01/04/25 12:30 AM   Result Value Ref Range    Extra Tube hold for add-on    Gold Top - SST    Collection Time: 01/04/25 12:30 AM   Result Value Ref Range    Extra Tube Hold for add-ons.    Light Blue Top    Collection Time: 01/04/25 12:30 AM   Result Value Ref Range    Extra Tube Hold for add-ons.    COVID-19 and FLU A/B PCR, 1 HR TAT - Swab, Nasopharynx    Collection Time: 01/04/25  1:04 AM    Specimen: Nasopharynx; Swab   Result Value Ref Range    COVID19 Not Detected Not Detected - Ref. Range    Influenza A PCR Not Detected Not Detected    Influenza B PCR Not Detected Not Detected   Lactic Acid, Plasma    Collection Time: 01/04/25  1:33 AM    Specimen: Blood   Result Value Ref Range    Lactate 4.1 (C) 0.5 - 2.0 mmol/L   Urinalysis With Microscopic If Indicated (No Culture) - Urine, Clean Catch    Collection Time: 01/04/25  2:44 AM    Specimen: Urine, Clean Catch   Result Value Ref Range    Color, UA Yellow Yellow, Straw    Appearance, UA Clear Clear    pH, UA 5.5  5.0 - 8.0    Specific Gravity, UA >1.030 (H) 1.005 - 1.030    Glucose,  mg/dL (Trace) (A) Negative    Ketones, UA Trace (A) Negative    Bilirubin, UA Negative Negative    Blood, UA Negative Negative    Protein, UA Trace (A) Negative    Leuk Esterase, UA Negative Negative    Nitrite, UA Negative Negative    Urobilinogen, UA 0.2 E.U./dL 0.2 - 1.0 E.U./dL   Respiratory Panel PCR w/COVID-19(SARS-CoV-2) [facility] In-House, NP Swab in UTM/VTM, 2 HR TAT - Swab, Nasopharynx    Collection Time: 01/04/25  3:12 AM    Specimen: Nasopharynx; Swab   Result Value Ref Range    ADENOVIRUS, PCR Not Detected Not Detected    Coronavirus 229E Not Detected Not Detected    Coronavirus HKU1 Not Detected Not Detected    Coronavirus NL63 Not Detected Not Detected    Coronavirus OC43 Not Detected Not Detected    COVID19 Not Detected Not Detected - Ref. Range    Human Metapneumovirus Not Detected Not Detected    Human Rhinovirus/Enterovirus Not Detected Not Detected    Influenza A PCR Not Detected Not Detected    Influenza B PCR Not Detected Not Detected    Parainfluenza Virus 1 Not Detected Not Detected    Parainfluenza Virus 2 Not Detected Not Detected    Parainfluenza Virus 3 Not Detected Not Detected    Parainfluenza Virus 4 Not Detected Not Detected    RSV, PCR Detected (A) Not Detected    Bordetella pertussis pcr Not Detected Not Detected    Bordetella parapertussis PCR Not Detected Not Detected    Chlamydophila pneumoniae PCR Not Detected Not Detected    Mycoplasma pneumo by PCR Not Detected Not Detected   STAT Lactic Acid, Reflex    Collection Time: 01/04/25  5:10 AM    Specimen: Blood   Result Value Ref Range    Lactate 3.6 (C) 0.5 - 2.0 mmol/L     XR Chest 1 View    Result Date: 1/4/2025   Normal heart size. Mild bronchial inflammation. No lobar consolidation or edema. Hypoinflated lungs   This report was finalized on 1/4/2025 3:56 AM by Dl Mobley MD.         Procedures           ED Course  ED Course as of  "01/04/25 0725   Sat Jan 04, 2025   0550 Spoke with Dr. Jimenez who states that since patient feels much better, presents much better and her symptoms, as well as her wheezing has now dissipated although patient's lactic acid is still elevated, it is decreasing.    Dr. Jimenez feels that patient may be discharged home and follow-up with primary care.  Patient also reports that she is wishing to be discharged home and follow-up with her primary care.  She verbalizes understanding of coming back with any worsening symptoms. [KH]      ED Course User Index  [KH] Ivanna Nguyễn, APRN                                                       Medical Decision Making  Patient is a 34-year-old female who presents today with complaints of cough and congestion.  She reports that she has been \"sick\" for approximately 4 to 5 weeks.  She reports she has been seen by her family doctor as well as local urgent cares given steroids, antibiotics, and IM Toradol with no relief.  She reports that yesterday and tonight she has been having increased shortness of breath and wheezing.  She denies any chest pain/pressure/tightness.  She reports that she has had some fever at home.  She is afebrile upon arrival.  She reports she is currently taking steroids and an unknown antibiotic from the local urgent care however reports that she is getting no relief.    Problems Addressed:  RSV (respiratory syncytial virus infection): complicated acute illness or injury    Amount and/or Complexity of Data Reviewed  Labs: ordered.  Radiology: ordered.    Risk  Prescription drug management.        Final diagnoses:   RSV (respiratory syncytial virus infection)       ED Disposition  ED Disposition       ED Disposition   Discharge    Condition   Stable    Comment   --               Amy Leija, APRN  [address] [place] [address]  [phone]    Schedule an appointment as soon as possible for a visit in 1 week  Recheck Hemoglobin         Medication List    "     New Prescriptions      albuterol 1.25 MG/3ML nebulizer solution  Commonly known as: ACCUNEB  Take 3 mL by nebulization Every 4 (Four) Hours As Needed for Shortness of Air or Wheezing.               Where to Get Your Medications        These medications were sent to [facility] - [place], [place] - [address] [place] [address] - [phone]  - [phone]   [address] [place] [address]      Phone: [phone]   albuterol 1.25 MG/3ML nebulizer solution            Ivanna Nguyễn, APRN  01/04/25 0726

## 2025-01-09 LAB
BACTERIA SPEC AEROBE CULT: NORMAL
BACTERIA SPEC AEROBE CULT: NORMAL

## 2025-01-20 ENCOUNTER — APPOINTMENT (OUTPATIENT)
Dept: CT IMAGING | Facility: HOSPITAL | Age: 35
End: 2025-01-20
Payer: COMMERCIAL

## 2025-01-20 ENCOUNTER — APPOINTMENT (OUTPATIENT)
Dept: GENERAL RADIOLOGY | Facility: HOSPITAL | Age: 35
End: 2025-01-20
Payer: COMMERCIAL

## 2025-01-20 ENCOUNTER — HOSPITAL ENCOUNTER (EMERGENCY)
Facility: HOSPITAL | Age: 35
Discharge: HOME | End: 2025-01-20
Attending: EMERGENCY MEDICINE | Admitting: EMERGENCY MEDICINE
Payer: COMMERCIAL

## 2025-01-20 ENCOUNTER — HOSPITAL ENCOUNTER (EMERGENCY)
Facility: HOSPITAL | Age: 35
Discharge: HOME | End: 2025-01-21
Attending: EMERGENCY MEDICINE | Admitting: EMERGENCY MEDICINE
Payer: COMMERCIAL

## 2025-01-20 VITALS
SYSTOLIC BLOOD PRESSURE: 144 MMHG | BODY MASS INDEX: 39.56 KG/M2 | DIASTOLIC BLOOD PRESSURE: 88 MMHG | RESPIRATION RATE: 18 BRPM | HEART RATE: 78 BPM | HEIGHT: 62 IN | WEIGHT: 215 LBS | TEMPERATURE: 98.5 F | OXYGEN SATURATION: 98 %

## 2025-01-20 DIAGNOSIS — M54.2 NECK PAIN: Primary | ICD-10-CM

## 2025-01-20 DIAGNOSIS — J10.1 INFLUENZA A: ICD-10-CM

## 2025-01-20 LAB
ALBUMIN SERPL-MCNC: 3.8 G/DL (ref 3.5–5.2)
ALBUMIN SERPL-MCNC: 4.1 G/DL (ref 3.5–5.2)
ALBUMIN/GLOB SERPL: 1.4 G/DL
ALBUMIN/GLOB SERPL: 1.5 G/DL
ALP SERPL-CCNC: 89 U/L (ref 39–117)
ALP SERPL-CCNC: 95 U/L (ref 39–117)
ALT SERPL W P-5'-P-CCNC: 17 U/L (ref 1–33)
ALT SERPL W P-5'-P-CCNC: 17 U/L (ref 1–33)
ANION GAP SERPL CALCULATED.3IONS-SCNC: 11.8 MMOL/L (ref 5–15)
ANION GAP SERPL CALCULATED.3IONS-SCNC: 12.6 MMOL/L (ref 5–15)
AST SERPL-CCNC: 19 U/L (ref 1–32)
AST SERPL-CCNC: 20 U/L (ref 1–32)
B PARAPERT DNA SPEC QL NAA+PROBE: NOT DETECTED
B PERT DNA SPEC QL NAA+PROBE: NOT DETECTED
BACTERIA UR QL AUTO: ABNORMAL /HPF
BASOPHILS # BLD AUTO: 0.02 10*3/MM3 (ref 0–0.2)
BASOPHILS # BLD AUTO: 0.03 10*3/MM3 (ref 0–0.2)
BASOPHILS NFR BLD AUTO: 0.4 % (ref 0–1.5)
BASOPHILS NFR BLD AUTO: 0.4 % (ref 0–1.5)
BILIRUB SERPL-MCNC: 0.2 MG/DL (ref 0–1.2)
BILIRUB SERPL-MCNC: 0.3 MG/DL (ref 0–1.2)
BILIRUB UR QL STRIP: NEGATIVE
BUN SERPL-MCNC: 10 MG/DL (ref 6–20)
BUN SERPL-MCNC: 8 MG/DL (ref 6–20)
BUN/CREAT SERPL: 9.1 (ref 7–25)
BUN/CREAT SERPL: 9.7 (ref 7–25)
C PNEUM DNA NPH QL NAA+NON-PROBE: NOT DETECTED
CALCIUM SPEC-SCNC: 8.4 MG/DL (ref 8.6–10.5)
CALCIUM SPEC-SCNC: 8.9 MG/DL (ref 8.6–10.5)
CHLORIDE SERPL-SCNC: 104 MMOL/L (ref 98–107)
CHLORIDE SERPL-SCNC: 104 MMOL/L (ref 98–107)
CLARITY UR: ABNORMAL
CO2 SERPL-SCNC: 21.2 MMOL/L (ref 22–29)
CO2 SERPL-SCNC: 22.4 MMOL/L (ref 22–29)
COLOR UR: YELLOW
CREAT SERPL-MCNC: 0.88 MG/DL (ref 0.57–1)
CREAT SERPL-MCNC: 1.03 MG/DL (ref 0.57–1)
CRP SERPL-MCNC: 0.69 MG/DL (ref 0–0.5)
CRP SERPL-MCNC: 1.47 MG/DL (ref 0–0.5)
D-LACTATE SERPL-SCNC: 1.3 MMOL/L (ref 0.5–2)
D-LACTATE SERPL-SCNC: 1.5 MMOL/L (ref 0.5–2)
DEPRECATED RDW RBC AUTO: 38.7 FL (ref 37–54)
DEPRECATED RDW RBC AUTO: 39.3 FL (ref 37–54)
EGFRCR SERPLBLD CKD-EPI 2021: 73.3 ML/MIN/1.73
EGFRCR SERPLBLD CKD-EPI 2021: 88.6 ML/MIN/1.73
EOSINOPHIL # BLD AUTO: 0.04 10*3/MM3 (ref 0–0.4)
EOSINOPHIL # BLD AUTO: 0.08 10*3/MM3 (ref 0–0.4)
EOSINOPHIL NFR BLD AUTO: 0.7 % (ref 0.3–6.2)
EOSINOPHIL NFR BLD AUTO: 1 % (ref 0.3–6.2)
ERYTHROCYTE [DISTWIDTH] IN BLOOD BY AUTOMATED COUNT: 12.8 % (ref 12.3–15.4)
ERYTHROCYTE [DISTWIDTH] IN BLOOD BY AUTOMATED COUNT: 12.9 % (ref 12.3–15.4)
FLUAV H3 RNA NPH QL NAA+PROBE: DETECTED
FLUBV RNA ISLT QL NAA+PROBE: NOT DETECTED
GEN 5 1HR TROPONIN T REFLEX: <6 NG/L
GLOBULIN UR ELPH-MCNC: 2.6 GM/DL
GLOBULIN UR ELPH-MCNC: 2.9 GM/DL
GLUCOSE SERPL-MCNC: 105 MG/DL (ref 65–99)
GLUCOSE SERPL-MCNC: 91 MG/DL (ref 65–99)
GLUCOSE UR STRIP-MCNC: NEGATIVE MG/DL
HADV DNA SPEC NAA+PROBE: NOT DETECTED
HCG SERPL QL: NEGATIVE
HCOV 229E RNA SPEC QL NAA+PROBE: NOT DETECTED
HCOV HKU1 RNA SPEC QL NAA+PROBE: NOT DETECTED
HCOV NL63 RNA SPEC QL NAA+PROBE: NOT DETECTED
HCOV OC43 RNA SPEC QL NAA+PROBE: NOT DETECTED
HCT VFR BLD AUTO: 33.8 % (ref 34–46.6)
HCT VFR BLD AUTO: 35.6 % (ref 34–46.6)
HETEROPH AB SER QL LA: NEGATIVE
HGB BLD-MCNC: 10.9 G/DL (ref 12–15.9)
HGB BLD-MCNC: 11.3 G/DL (ref 12–15.9)
HGB UR QL STRIP.AUTO: ABNORMAL
HMPV RNA NPH QL NAA+NON-PROBE: NOT DETECTED
HOLD SPECIMEN: NORMAL
HPIV1 RNA ISLT QL NAA+PROBE: NOT DETECTED
HPIV2 RNA SPEC QL NAA+PROBE: NOT DETECTED
HPIV3 RNA NPH QL NAA+PROBE: NOT DETECTED
HPIV4 P GENE NPH QL NAA+PROBE: NOT DETECTED
HYALINE CASTS UR QL AUTO: ABNORMAL /LPF
IMM GRANULOCYTES # BLD AUTO: 0.02 10*3/MM3 (ref 0–0.05)
IMM GRANULOCYTES # BLD AUTO: 0.03 10*3/MM3 (ref 0–0.05)
IMM GRANULOCYTES NFR BLD AUTO: 0.4 % (ref 0–0.5)
IMM GRANULOCYTES NFR BLD AUTO: 0.4 % (ref 0–0.5)
KETONES UR QL STRIP: NEGATIVE
LEUKOCYTE ESTERASE UR QL STRIP.AUTO: NEGATIVE
LYMPHOCYTES # BLD AUTO: 0.69 10*3/MM3 (ref 0.7–3.1)
LYMPHOCYTES # BLD AUTO: 1.01 10*3/MM3 (ref 0.7–3.1)
LYMPHOCYTES NFR BLD AUTO: 12.1 % (ref 19.6–45.3)
LYMPHOCYTES NFR BLD AUTO: 12.5 % (ref 19.6–45.3)
M PNEUMO IGG SER IA-ACNC: NOT DETECTED
MAGNESIUM SERPL-MCNC: 1.8 MG/DL (ref 1.6–2.6)
MCH RBC QN AUTO: 26.3 PG (ref 26.6–33)
MCH RBC QN AUTO: 27.2 PG (ref 26.6–33)
MCHC RBC AUTO-ENTMCNC: 31.7 G/DL (ref 31.5–35.7)
MCHC RBC AUTO-ENTMCNC: 32.2 G/DL (ref 31.5–35.7)
MCV RBC AUTO: 83 FL (ref 79–97)
MCV RBC AUTO: 84.3 FL (ref 79–97)
MONOCYTES # BLD AUTO: 0.56 10*3/MM3 (ref 0.1–0.9)
MONOCYTES # BLD AUTO: 0.65 10*3/MM3 (ref 0.1–0.9)
MONOCYTES NFR BLD AUTO: 8.1 % (ref 5–12)
MONOCYTES NFR BLD AUTO: 9.9 % (ref 5–12)
NEUTROPHILS NFR BLD AUTO: 4.35 10*3/MM3 (ref 1.7–7)
NEUTROPHILS NFR BLD AUTO: 6.25 10*3/MM3 (ref 1.7–7)
NEUTROPHILS NFR BLD AUTO: 76.5 % (ref 42.7–76)
NEUTROPHILS NFR BLD AUTO: 77.6 % (ref 42.7–76)
NITRITE UR QL STRIP: NEGATIVE
NRBC BLD AUTO-RTO: 0 /100 WBC (ref 0–0.2)
NRBC BLD AUTO-RTO: 0 /100 WBC (ref 0–0.2)
PH UR STRIP.AUTO: 6.5 [PH] (ref 5–8)
PLATELET # BLD AUTO: 192 10*3/MM3 (ref 140–450)
PLATELET # BLD AUTO: 222 10*3/MM3 (ref 140–450)
PMV BLD AUTO: 10.4 FL (ref 6–12)
PMV BLD AUTO: 10.5 FL (ref 6–12)
POTASSIUM SERPL-SCNC: 3.5 MMOL/L (ref 3.5–5.2)
POTASSIUM SERPL-SCNC: 3.7 MMOL/L (ref 3.5–5.2)
PROCALCITONIN SERPL-MCNC: 0.05 NG/ML (ref 0–0.25)
PROT SERPL-MCNC: 6.4 G/DL (ref 6–8.5)
PROT SERPL-MCNC: 7 G/DL (ref 6–8.5)
PROT UR QL STRIP: NEGATIVE
RBC # BLD AUTO: 4.01 10*6/MM3 (ref 3.77–5.28)
RBC # BLD AUTO: 4.29 10*6/MM3 (ref 3.77–5.28)
RBC # UR STRIP: ABNORMAL /HPF
REF LAB TEST METHOD: ABNORMAL
RHINOVIRUS RNA SPEC NAA+PROBE: NOT DETECTED
RSV RNA NPH QL NAA+NON-PROBE: NOT DETECTED
S PYO AG THROAT QL: NEGATIVE
SARS-COV-2 RNA RESP QL NAA+PROBE: NOT DETECTED
SODIUM SERPL-SCNC: 137 MMOL/L (ref 136–145)
SODIUM SERPL-SCNC: 139 MMOL/L (ref 136–145)
SP GR UR STRIP: 1.02 (ref 1–1.03)
SQUAMOUS #/AREA URNS HPF: ABNORMAL /HPF
TROPONIN T NUMERIC DELTA: NORMAL
TROPONIN T SERPL HS-MCNC: <6 NG/L
TSH SERPL DL<=0.05 MIU/L-ACNC: 1.98 UIU/ML (ref 0.27–4.2)
UROBILINOGEN UR QL STRIP: ABNORMAL
WBC # UR STRIP: ABNORMAL /HPF
WBC NRBC COR # BLD AUTO: 5.68 10*3/MM3 (ref 3.4–10.8)
WBC NRBC COR # BLD AUTO: 8.05 10*3/MM3 (ref 3.4–10.8)
WHOLE BLOOD HOLD COAG: NORMAL
WHOLE BLOOD HOLD COAG: NORMAL
WHOLE BLOOD HOLD SPECIMEN: NORMAL
WHOLE BLOOD HOLD SPECIMEN: NORMAL

## 2025-01-20 PROCEDURE — 36415 COLL VENOUS BLD VENIPUNCTURE: CPT

## 2025-01-20 PROCEDURE — 80050 GENERAL HEALTH PANEL: CPT | Performed by: EMERGENCY MEDICINE

## 2025-01-20 PROCEDURE — 83605 ASSAY OF LACTIC ACID: CPT | Performed by: EMERGENCY MEDICINE

## 2025-01-20 PROCEDURE — 96375 TX/PRO/DX INJ NEW DRUG ADDON: CPT

## 2025-01-20 PROCEDURE — 71045 X-RAY EXAM CHEST 1 VIEW: CPT

## 2025-01-20 PROCEDURE — 80053 COMPREHEN METABOLIC PANEL: CPT | Performed by: EMERGENCY MEDICINE

## 2025-01-20 PROCEDURE — 71045 X-RAY EXAM CHEST 1 VIEW: CPT | Performed by: RADIOLOGY

## 2025-01-20 PROCEDURE — 84703 CHORIONIC GONADOTROPIN ASSAY: CPT | Performed by: EMERGENCY MEDICINE

## 2025-01-20 PROCEDURE — 36415 COLL VENOUS BLD VENIPUNCTURE: CPT | Performed by: EMERGENCY MEDICINE

## 2025-01-20 PROCEDURE — 25810000003 SEPSIS FLUID NS 0.9 % SOLUTION: Performed by: EMERGENCY MEDICINE

## 2025-01-20 PROCEDURE — 85025 COMPLETE CBC W/AUTO DIFF WBC: CPT | Performed by: EMERGENCY MEDICINE

## 2025-01-20 PROCEDURE — 83735 ASSAY OF MAGNESIUM: CPT | Performed by: EMERGENCY MEDICINE

## 2025-01-20 PROCEDURE — 84145 PROCALCITONIN (PCT): CPT | Performed by: EMERGENCY MEDICINE

## 2025-01-20 PROCEDURE — 86140 C-REACTIVE PROTEIN: CPT | Performed by: EMERGENCY MEDICINE

## 2025-01-20 PROCEDURE — 0202U NFCT DS 22 TRGT SARS-COV-2: CPT | Performed by: EMERGENCY MEDICINE

## 2025-01-20 PROCEDURE — 25510000001 IOPAMIDOL 61 % SOLUTION: Performed by: EMERGENCY MEDICINE

## 2025-01-20 PROCEDURE — 70491 CT SOFT TISSUE NECK W/DYE: CPT

## 2025-01-20 PROCEDURE — 99285 EMERGENCY DEPT VISIT HI MDM: CPT

## 2025-01-20 PROCEDURE — 70450 CT HEAD/BRAIN W/O DYE: CPT | Performed by: RADIOLOGY

## 2025-01-20 PROCEDURE — 87081 CULTURE SCREEN ONLY: CPT | Performed by: EMERGENCY MEDICINE

## 2025-01-20 PROCEDURE — 87040 BLOOD CULTURE FOR BACTERIA: CPT | Performed by: EMERGENCY MEDICINE

## 2025-01-20 PROCEDURE — 70450 CT HEAD/BRAIN W/O DYE: CPT

## 2025-01-20 PROCEDURE — 96374 THER/PROPH/DIAG INJ IV PUSH: CPT

## 2025-01-20 PROCEDURE — 99283 EMERGENCY DEPT VISIT LOW MDM: CPT

## 2025-01-20 PROCEDURE — 81001 URINALYSIS AUTO W/SCOPE: CPT | Performed by: EMERGENCY MEDICINE

## 2025-01-20 PROCEDURE — 84484 ASSAY OF TROPONIN QUANT: CPT | Performed by: EMERGENCY MEDICINE

## 2025-01-20 PROCEDURE — 70491 CT SOFT TISSUE NECK W/DYE: CPT | Performed by: RADIOLOGY

## 2025-01-20 PROCEDURE — 25010000002 ORPHENADRINE CITRATE PER 60 MG: Performed by: EMERGENCY MEDICINE

## 2025-01-20 PROCEDURE — 93005 ELECTROCARDIOGRAM TRACING: CPT | Performed by: EMERGENCY MEDICINE

## 2025-01-20 PROCEDURE — 86308 HETEROPHILE ANTIBODY SCREEN: CPT | Performed by: EMERGENCY MEDICINE

## 2025-01-20 PROCEDURE — 25010000002 KETOROLAC TROMETHAMINE PER 15 MG: Performed by: EMERGENCY MEDICINE

## 2025-01-20 PROCEDURE — 87880 STREP A ASSAY W/OPTIC: CPT | Performed by: EMERGENCY MEDICINE

## 2025-01-20 RX ORDER — HYDROCODONE BITARTRATE AND ACETAMINOPHEN 10; 325 MG/1; MG/1
1 TABLET ORAL ONCE
Status: COMPLETED | OUTPATIENT
Start: 2025-01-20 | End: 2025-01-20

## 2025-01-20 RX ORDER — CYCLOBENZAPRINE HCL 10 MG
10 TABLET ORAL 3 TIMES DAILY PRN
Qty: 10 TABLET | Refills: 0 | Status: SHIPPED | OUTPATIENT
Start: 2025-01-20

## 2025-01-20 RX ORDER — IOPAMIDOL 612 MG/ML
100 INJECTION, SOLUTION INTRAVASCULAR
Status: COMPLETED | OUTPATIENT
Start: 2025-01-20 | End: 2025-01-20

## 2025-01-20 RX ORDER — ORPHENADRINE CITRATE 30 MG/ML
60 INJECTION INTRAMUSCULAR; INTRAVENOUS ONCE
Status: COMPLETED | OUTPATIENT
Start: 2025-01-20 | End: 2025-01-20

## 2025-01-20 RX ORDER — KETOROLAC TROMETHAMINE 30 MG/ML
30 INJECTION, SOLUTION INTRAMUSCULAR; INTRAVENOUS ONCE
Status: COMPLETED | OUTPATIENT
Start: 2025-01-20 | End: 2025-01-20

## 2025-01-20 RX ORDER — KETOROLAC TROMETHAMINE 10 MG/1
10 TABLET, FILM COATED ORAL EVERY 6 HOURS PRN
Qty: 10 TABLET | Refills: 0 | Status: SHIPPED | OUTPATIENT
Start: 2025-01-20

## 2025-01-20 RX ORDER — SODIUM CHLORIDE 0.9 % (FLUSH) 0.9 %
10 SYRINGE (ML) INJECTION AS NEEDED
Status: DISCONTINUED | OUTPATIENT
Start: 2025-01-20 | End: 2025-01-21 | Stop reason: HOSPADM

## 2025-01-20 RX ORDER — HYDROCODONE BITARTRATE AND ACETAMINOPHEN 10; 325 MG/1; MG/1
1 TABLET ORAL EVERY 6 HOURS PRN
Qty: 6 TABLET | Refills: 0 | Status: SHIPPED | OUTPATIENT
Start: 2025-01-20

## 2025-01-20 RX ORDER — SODIUM CHLORIDE 0.9 % (FLUSH) 0.9 %
10 SYRINGE (ML) INJECTION AS NEEDED
Status: DISCONTINUED | OUTPATIENT
Start: 2025-01-20 | End: 2025-01-20 | Stop reason: HOSPADM

## 2025-01-20 RX ADMIN — HYDROCODONE BITARTRATE AND ACETAMINOPHEN 1 TABLET: 10; 325 TABLET ORAL at 06:24

## 2025-01-20 RX ADMIN — ORPHENADRINE CITRATE 60 MG: 30 INJECTION, SOLUTION INTRAMUSCULAR; INTRAVENOUS at 04:02

## 2025-01-20 RX ADMIN — KETOROLAC TROMETHAMINE 30 MG: 30 INJECTION, SOLUTION INTRAMUSCULAR at 04:04

## 2025-01-20 RX ADMIN — SODIUM CHLORIDE 2073 ML: 9 INJECTION, SOLUTION INTRAVENOUS at 03:59

## 2025-01-20 RX ADMIN — IOPAMIDOL 70 ML: 612 INJECTION, SOLUTION INTRAVENOUS at 04:47

## 2025-01-20 NOTE — DISCHARGE INSTRUCTIONS
Home in care of .  Rest.  Drink lots of fluids.  Local heat to the back of your neck.  Medications as I have prescribed for you.  If you are not better by this evening, then return to the emergency department see me for a lumbar puncture, as we have discussed.  Return to the emergency department immediately if symptoms worsen or any problems.

## 2025-01-20 NOTE — Clinical Note
[facility] EMERGENCY DEPARTMENT  [address] [facility] [address]  Phone: [phone]    Cheryl Mcnulty was seen and treated in our emergency department on 1/20/2025.  She may return to work on 01/22/2025.         Thank you for choosing [facility].    Pa Granados MD

## 2025-01-20 NOTE — Clinical Note
[facility] EMERGENCY DEPARTMENT  [address] [facility] [address]  Phone: [phone]    Víctor New accompanied Cheryl Mcnulty to the emergency department on 1/20/2025. They may return to work on 01/22/2025.        Thank you for choosing [facility].    Pa Granados MD

## 2025-01-20 NOTE — ED PROVIDER NOTES
Subjective   History of Present Illness  Patient is a 34-year-old female who was seen here on , found to have RSV, please refer to that visit for details.  She presents today complaining of a 4-day history of worsening neck pain.  She states that she had been coughing so much that she felt like she pulled a muscle in the left side of the back of her neck, which soon crossed over into the right side of the back of her neck as well.  It hurts posteriorly, hurts anteriorly in her throat, radiates down to her shoulders bilaterally.  It is worse with movement.  It is worse with coughing.  She has intermittently been having headaches, has been feeling dizzy.  She has had some nausea without vomiting.  She states that throughout her heart has been beating fast, for about the last hour.  She denies fever, chills, chest pain, shortness of breath, syncope or near syncope, focal numbness or weakness, other symptoms or other complaints.      Review of Systems   All other systems reviewed and are negative.      Past Medical History:   Diagnosis Date    Congestive heart failure     in pregnancy    Fibromyalgia     Frequent UTI     on daily abx per urology    History of depression     History of ear infections     History of heart disease     History of lupus     skin rashes, joint pain, fatigue.   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 DRAINAGE OF OVARIAN CYST.;  Surgeon: Rachel Caruso DO;  Location: [facility];  Service: Obstetrics/Gynecology    ORIF SCAPHOID W VASCULARIZED BONE GRAFT      TUBAL COAGULATION  LAPAROSCOPIC Bilateral 08/13/2018    Procedure: TUBAL FALLOPE FILSHIE CLIPPING LAPAROSCOPIC;  Surgeon: Rachel Caruso DO;  Location: [facility] OR;  Service: Obstetrics/Gynecology    ULNA/RADIUS CLOSED REDUCTION  2006       Family History   Problem Relation Age of Onset    Heart disease Mother     Thyroid disease Mother     Anxiety disorder Mother     Depression Mother     Bipolar disorder Mother     Heart disease Father     Cancer Father     Heart disease Maternal Grandmother     Diabetes Maternal Grandmother     Diabetes Maternal Grandfather     Lupus Paternal Grandmother     Rheum arthritis Paternal Grandmother        Social History     Socioeconomic History    Marital status: Single   Tobacco Use    Smoking status: Never    Smokeless tobacco: Never   Vaping Use    Vaping status: Every Day    Substances: Nicotine    Devices: Disposable   Substance and Sexual Activity    Alcohol use: Yes     Comment: socially    Drug use: No    Sexual activity: Yes     Partners: Male     Birth control/protection: Surgical, Tubal ligation           Objective   Physical Exam  Vitals and nursing note reviewed.   Constitutional:       General: She is not in acute distress.     Appearance: She is well-developed. She is not toxic-appearing or diaphoretic.      Comments: Well-developed well-nourished female, alert and well-oriented, does not appear to be in acute distress.   HENT:      Head: Normocephalic and atraumatic.      Mouth/Throat:      Comments: Oropharynx is moist.  Mild injection posteriorly.  The uvula is mildly to moderately swollen and erythematous.  Eyes:      General: No scleral icterus.     Extraocular Movements: Extraocular movements intact.      Pupils: Pupils are equal, round, and reactive to light.   Neck:      Trachea: No tracheal deviation.      Comments: The neck is soft and supple.  She has some discomfort with full flexion of the neck but there is no rigidity, no Kernig sign, no Brudzinski sign.  Trachea  is midline.  No appreciable masses.  Cardiovascular:      Rate and Rhythm: Regular rhythm. Tachycardia present.   Pulmonary:      Effort: Pulmonary effort is normal. No respiratory distress.      Breath sounds: Normal breath sounds.   Chest:      Chest wall: No tenderness.   Abdominal:      General: Bowel sounds are normal.      Palpations: Abdomen is soft.      Tenderness: There is no abdominal tenderness. There is no guarding or rebound.   Musculoskeletal:         General: No tenderness. Normal range of motion.      Cervical back: Normal range of motion.      Right lower leg: No edema.      Left lower leg: No edema.   Skin:     General: Skin is warm and dry.      Capillary Refill: Capillary refill takes less than 2 seconds.      Coloration: Skin is not pale.   Neurological:      General: No focal deficit present.      Mental Status: She is alert and oriented to person, place, and time.      GCS: GCS eye subscore is 4. GCS verbal subscore is 5. GCS motor subscore is 6.      Motor: No abnormal muscle tone.   Psychiatric:         Mood and Affect: Mood normal.         Behavior: Behavior normal.         Procedures  CT Soft Tissue Neck With Contrast   Final Result       NO ACUTE ABNORMALITY IN THE NECK.       This report was finalized on 1/20/2025 5:54 AM by Cheryl Lundberg MD.          CT Head Without Contrast   Final Result       NO ACUTE INTRACRANIAL ABNORMALITY.       EMPTY SELLA, WHICH COULD BE INCIDENTAL OR INDICATIVE OF IDIOPATHIC   INTRACRANIAL HYPERTENSION.                   This report was finalized on 1/20/2025 5:50 AM by Cheryl Lundberg MD.          XR Chest 1 View   Final Result       Hypoinflated lungs.   Mild central pulmonary vascular congestion.   Possible mild bronchial inflammation.   No lobar consolidation or pleural effusion.   No fracture or pneumothorax.           This report was finalized on 1/20/2025 4:09 AM by Dl Mobley MD.            Results for orders placed or performed during the hospital  encounter of 01/20/25   ECG 12 Lead QT Measurement    Collection Time: 01/20/25  3:01 AM   Result Value Ref Range    QT Interval 326 ms    QTC Interval 443 ms   Comprehensive Metabolic Panel    Collection Time: 01/20/25  3:35 AM    Specimen: Arm, Left; Blood   Result Value Ref Range    Glucose 91 65 - 99 mg/dL    BUN 10 6 - 20 mg/dL    Creatinine 1.03 (H) 0.57 - 1.00 mg/dL    Sodium 139 136 - 145 mmol/L    Potassium 3.5 3.5 - 5.2 mmol/L    Chloride 104 98 - 107 mmol/L    CO2 22.4 22.0 - 29.0 mmol/L    Calcium 8.9 8.6 - 10.5 mg/dL    Total Protein 7.0 6.0 - 8.5 g/dL    Albumin 4.1 3.5 - 5.2 g/dL    ALT (SGPT) 17 1 - 33 U/L    AST (SGOT) 19 1 - 32 U/L    Alkaline Phosphatase 95 39 - 117 U/L    Total Bilirubin 0.3 0.0 - 1.2 mg/dL    Globulin 2.9 gm/dL    A/G Ratio 1.4 g/dL    BUN/Creatinine Ratio 9.7 7.0 - 25.0    Anion Gap 12.6 5.0 - 15.0 mmol/L    eGFR 73.3 >60.0 mL/min/1.73   High Sensitivity Troponin T    Collection Time: 01/20/25  3:35 AM    Specimen: Arm, Left; Blood   Result Value Ref Range    HS Troponin T <6 <14 ng/L   TSH Rfx On Abnormal To Free T4    Collection Time: 01/20/25  3:35 AM    Specimen: Arm, Left; Blood   Result Value Ref Range    TSH 1.980 0.270 - 4.200 uIU/mL   Magnesium    Collection Time: 01/20/25  3:35 AM    Specimen: Arm, Left; Blood   Result Value Ref Range    Magnesium 1.8 1.6 - 2.6 mg/dL   hCG, Serum, Qualitative    Collection Time: 01/20/25  3:35 AM    Specimen: Arm, Left; Blood   Result Value Ref Range    HCG Qualitative Negative Negative   CBC Auto Differential    Collection Time: 01/20/25  3:35 AM    Specimen: Blood   Result Value Ref Range    WBC 8.05 3.40 - 10.80 10*3/mm3    RBC 4.29 3.77 - 5.28 10*6/mm3    Hemoglobin 11.3 (L) 12.0 - 15.9 g/dL    Hematocrit 35.6 34.0 - 46.6 %    MCV 83.0 79.0 - 97.0 fL    MCH 26.3 (L) 26.6 - 33.0 pg    MCHC 31.7 31.5 - 35.7 g/dL    RDW 12.8 12.3 - 15.4 %    RDW-SD 38.7 37.0 - 54.0 fl    MPV 10.5 6.0 - 12.0 fL    Platelets 222 140 - 450 10*3/mm3     Neutrophil % 77.6 (H) 42.7 - 76.0 %    Lymphocyte % 12.5 (L) 19.6 - 45.3 %    Monocyte % 8.1 5.0 - 12.0 %    Eosinophil % 1.0 0.3 - 6.2 %    Basophil % 0.4 0.0 - 1.5 %    Immature Grans % 0.4 0.0 - 0.5 %    Neutrophils, Absolute 6.25 1.70 - 7.00 10*3/mm3    Lymphocytes, Absolute 1.01 0.70 - 3.10 10*3/mm3    Monocytes, Absolute 0.65 0.10 - 0.90 10*3/mm3    Eosinophils, Absolute 0.08 0.00 - 0.40 10*3/mm3    Basophils, Absolute 0.03 0.00 - 0.20 10*3/mm3    Immature Grans, Absolute 0.03 0.00 - 0.05 10*3/mm3    nRBC 0.0 0.0 - 0.2 /100 WBC   Lactic Acid, Plasma    Collection Time: 01/20/25  3:35 AM    Specimen: Blood   Result Value Ref Range    Lactate 1.5 0.5 - 2.0 mmol/L   C-reactive Protein    Collection Time: 01/20/25  3:35 AM    Specimen: Arm, Left; Blood   Result Value Ref Range    C-Reactive Protein 0.69 (H) 0.00 - 0.50 mg/dL   Mononucleosis Screen    Collection Time: 01/20/25  3:35 AM    Specimen: Arm, Left; Blood   Result Value Ref Range    Monospot Negative Negative   Green Top (Gel)    Collection Time: 01/20/25  3:35 AM   Result Value Ref Range    Extra Tube Hold for add-ons.    Lavender Top    Collection Time: 01/20/25  3:35 AM   Result Value Ref Range    Extra Tube hold for add-on    Light Blue Top    Collection Time: 01/20/25  3:35 AM   Result Value Ref Range    Extra Tube Hold for add-ons.    Rapid Strep A Screen - Swab, Throat    Collection Time: 01/20/25  3:40 AM    Specimen: Throat; Swab   Result Value Ref Range    Strep A Ag Negative Negative   High Sensitivity Troponin T 1Hr    Collection Time: 01/20/25  4:55 AM    Specimen: Arm, Left; Blood   Result Value Ref Range    HS Troponin T <6 <14 ng/L    Troponin T Numeric Delta     Respiratory Panel PCR w/COVID-19(SARS-CoV-2) STEVE/JAMES/SILVIO/PAD/COR/JULIO In-House, NP Swab in UTM/VTM, 2 HR TAT - Swab, Nasopharynx    Collection Time: 01/20/25  5:37 AM    Specimen: Nasopharynx; Swab   Result Value Ref Range    ADENOVIRUS, PCR Not Detected Not Detected    Coronavirus  229E Not Detected Not Detected    Coronavirus HKU1 Not Detected Not Detected    Coronavirus NL63 Not Detected Not Detected    Coronavirus OC43 Not Detected Not Detected    COVID19 Not Detected Not Detected - Ref. Range    Human Metapneumovirus Not Detected Not Detected    Human Rhinovirus/Enterovirus Not Detected Not Detected    Influenza A H3 Detected (A) Not Detected    Influenza B PCR Not Detected Not Detected    Parainfluenza Virus 1 Not Detected Not Detected    Parainfluenza Virus 2 Not Detected Not Detected    Parainfluenza Virus 3 Not Detected Not Detected    Parainfluenza Virus 4 Not Detected Not Detected    RSV, PCR Not Detected Not Detected    Bordetella pertussis pcr Not Detected Not Detected    Bordetella parapertussis PCR Not Detected Not Detected    Chlamydophila pneumoniae PCR Not Detected Not Detected    Mycoplasma pneumo by PCR Not Detected Not Detected   Urinalysis With Microscopic If Indicated (No Culture) - Urine, Clean Catch    Collection Time: 01/20/25  5:37 AM    Specimen: Urine, Clean Catch   Result Value Ref Range    Color, UA Yellow Yellow, Straw    Appearance, UA Cloudy (A) Clear    pH, UA 6.5 5.0 - 8.0    Specific Gravity, UA 1.017 1.005 - 1.030    Glucose, UA Negative Negative    Ketones, UA Negative Negative    Bilirubin, UA Negative Negative    Blood, UA Large (3+) (A) Negative    Protein, UA Negative Negative    Leuk Esterase, UA Negative Negative    Nitrite, UA Negative Negative    Urobilinogen, UA 0.2 E.U./dL 0.2 - 1.0 E.U./dL   Urinalysis, Microscopic Only - Urine, Clean Catch    Collection Time: 01/20/25  5:37 AM    Specimen: Urine, Clean Catch   Result Value Ref Range    RBC, UA 6-10 (A) None Seen, 0-2 /HPF    WBC, UA 3-5 (A) None Seen, 0-2 /HPF    Bacteria, UA 1+ (A) None Seen /HPF    Squamous Epithelial Cells, UA 7-12 (A) None Seen, 0-2 /HPF    Hyaline Casts, UA 0-2 None Seen /LPF    Methodology Manual Light Microscopy                 ED Course  ED Course as of 01/20/25 6584    Mon Jan 20, 2025   0310 EKG in triage at 0301 shows sinus tachycardia, rate 111.  CT interval 122, QRS duration 74, QTc 443 ms.  Baseline artifact.  No apparent acute ischemia.  No evidence of STEMI. [CM]   0558 Patient voices improvement in her symptoms, still has pain.  Still has pain with flexion but is able to flex her chin all the way up to her chest.  No rigidity, no Kernig sign no Brudzinski sign.  Her diagnostic workup is unrevealing.  Respiratory PCR is still in process.  I had a long talk with the patient and her  about the situation.  She does not wish for me to do a lumbar puncture this morning, will not consent to it this morning.  She does however agree that she will go home and rest today, drink plenty of fluids, take the medications that I prescribe for her as directed, and if she is not better by this evening or certainly if she is worse she will return to the ED this evening and allow me to perform a lumbar puncture.  Her  is in agreement with this plan.  He advises that he will make sure that she comes back if she is not better.  Patient is discharged home into his care. [CM]   0630 Patient's viral panel just resulted, positive for influenza A.  Patient and her  are notified of this.  No change in plan based upon this.  She will return to see me this evening for lumbar puncture if not improving. [CM]      ED Course User Index  [CM] Pa Granados MD                                                       Medical Decision Making      Final diagnoses:   Neck pain   Influenza A       ED Disposition  ED Disposition       ED Disposition   Discharge    Condition   Stable    Comment   --               Amy Leija, APRN  [address] [place] [id number]  [phone]    Go in 2 days      [facility] EMERGENCY DEPARTMENT  [address] [facility] [id number] [phone]  Go to   If symptoms worsen         Medication List        New Prescriptions       cyclobenzaprine 10 MG tablet  Commonly known as: FLEXERIL  Take 1 tablet by mouth 3 (Three) Times a Day As Needed for Muscle Spasms.     HYDROcodone-acetaminophen  MG per tablet  Commonly known as: NORCO  Take 1 tablet by mouth Every 6 (Six) Hours As Needed for Severe Pain.     ketorolac 10 MG tablet  Commonly known as: TORADOL  Take 1 tablet by mouth Every 6 (Six) Hours As Needed (Pain).            Stop      tiZANidine 4 MG tablet  Commonly known as: ZANAFLEX               Where to Get Your Medications        These medications were sent to [address] [phone]  - [phone]   [address] [place] [address]      Phone: [phone]   cyclobenzaprine 10 MG tablet  HYDROcodone-acetaminophen  MG per tablet  ketorolac 10 MG tablet       Please note that portions of this note were completed with a voice recognition program.        Pa Granados MD  01/20/25 0620

## 2025-01-21 VITALS
DIASTOLIC BLOOD PRESSURE: 79 MMHG | OXYGEN SATURATION: 97 % | RESPIRATION RATE: 18 BRPM | BODY MASS INDEX: 39.56 KG/M2 | SYSTOLIC BLOOD PRESSURE: 124 MMHG | TEMPERATURE: 99.8 F | HEIGHT: 62 IN | WEIGHT: 214.95 LBS | HEART RATE: 80 BPM

## 2025-01-21 LAB
APPEARANCE CSF: CLEAR
C GATTII+NEOFOR DNA CSF QL NAA+NON-PROBE: NOT DETECTED
CMV DNA CSF QL NAA+PROBE: NOT DETECTED
COLOR CSF: COLORLESS
E COLI K1 DNA CSF QL NAA+NON-PROBE: NOT DETECTED
EV RNA CSF QL NAA+PROBE: NOT DETECTED
GLUCOSE CSF-MCNC: 61 MG/DL (ref 40–70)
GP B STREP DNA SPEC QL NAA+PROBE: NOT DETECTED
HAEM INFLU SEROTYP DNA SPEC NAA+PROBE: NOT DETECTED
HHV6 DNA CSF QL NAA+PROBE: NOT DETECTED
HOLD SPECIMEN: NORMAL
HOLD SPECIMEN: NORMAL
HSV1 DNA CSF QL NAA+PROBE: NOT DETECTED
HSV2 DNA CSF QL NAA+PROBE: NOT DETECTED
L MONOCYTOG RRNA SPEC QL PROBE: NOT DETECTED
N MEN DNA SPEC QL NAA+PROBE: NOT DETECTED
NUC CELL # CSF MANUAL: 2 /MM3 (ref 0–5)
PARECHOVIRUS A RNA CSF QL NAA+NON-PROBE: NOT DETECTED
PROCALCITONIN SERPL-MCNC: 0.06 NG/ML (ref 0–0.25)
PROT CSF-MCNC: 17.3 MG/DL (ref 15–45)
QT INTERVAL: 326 MS
QTC INTERVAL: 443 MS
RBC # CSF MANUAL: 1 /MM3 (ref 0–0)
S PNEUM DNA CSF QL NAA+NON-PROBE: NOT DETECTED
SPECIMEN VOL CSF: 1 ML
TUBE # CSF: 4
VZV DNA CSF QL NAA+PROBE: NOT DETECTED

## 2025-01-21 PROCEDURE — 82040 ASSAY OF SERUM ALBUMIN: CPT | Performed by: EMERGENCY MEDICINE

## 2025-01-21 PROCEDURE — 25810000003 SODIUM CHLORIDE 0.9 % SOLUTION: Performed by: EMERGENCY MEDICINE

## 2025-01-21 PROCEDURE — 25010000002 ORPHENADRINE CITRATE PER 60 MG: Performed by: EMERGENCY MEDICINE

## 2025-01-21 PROCEDURE — 96375 TX/PRO/DX INJ NEW DRUG ADDON: CPT

## 2025-01-21 PROCEDURE — 82945 GLUCOSE OTHER FLUID: CPT | Performed by: EMERGENCY MEDICINE

## 2025-01-21 PROCEDURE — 25010000002 KETOROLAC TROMETHAMINE PER 15 MG: Performed by: EMERGENCY MEDICINE

## 2025-01-21 PROCEDURE — 87205 SMEAR GRAM STAIN: CPT | Performed by: EMERGENCY MEDICINE

## 2025-01-21 PROCEDURE — 83916 OLIGOCLONAL BANDS: CPT | Performed by: EMERGENCY MEDICINE

## 2025-01-21 PROCEDURE — 87070 CULTURE OTHR SPECIMN AEROBIC: CPT | Performed by: EMERGENCY MEDICINE

## 2025-01-21 PROCEDURE — 83873 ASSAY OF CSF PROTEIN: CPT | Performed by: EMERGENCY MEDICINE

## 2025-01-21 PROCEDURE — 82784 ASSAY IGA/IGD/IGG/IGM EACH: CPT | Performed by: EMERGENCY MEDICINE

## 2025-01-21 PROCEDURE — 25010000002 CEFTRIAXONE PER 250 MG: Performed by: EMERGENCY MEDICINE

## 2025-01-21 PROCEDURE — 89050 BODY FLUID CELL COUNT: CPT | Performed by: EMERGENCY MEDICINE

## 2025-01-21 PROCEDURE — 96365 THER/PROPH/DIAG IV INF INIT: CPT

## 2025-01-21 PROCEDURE — 84157 ASSAY OF PROTEIN OTHER: CPT | Performed by: EMERGENCY MEDICINE

## 2025-01-21 PROCEDURE — 82042 OTHER SOURCE ALBUMIN QUAN EA: CPT | Performed by: EMERGENCY MEDICINE

## 2025-01-21 PROCEDURE — 87015 SPECIMEN INFECT AGNT CONCNTJ: CPT | Performed by: EMERGENCY MEDICINE

## 2025-01-21 PROCEDURE — 25010000002 MIDAZOLAM PER 1 MG: Performed by: EMERGENCY MEDICINE

## 2025-01-21 PROCEDURE — 87483 CNS DNA AMP PROBE TYPE 12-25: CPT | Performed by: EMERGENCY MEDICINE

## 2025-01-21 RX ORDER — ORPHENADRINE CITRATE 30 MG/ML
60 INJECTION INTRAMUSCULAR; INTRAVENOUS ONCE
Status: COMPLETED | OUTPATIENT
Start: 2025-01-21 | End: 2025-01-21

## 2025-01-21 RX ORDER — MIDAZOLAM HYDROCHLORIDE 1 MG/ML
4 INJECTION, SOLUTION INTRAMUSCULAR; INTRAVENOUS ONCE
Status: COMPLETED | OUTPATIENT
Start: 2025-01-21 | End: 2025-01-21

## 2025-01-21 RX ORDER — KETOROLAC TROMETHAMINE 30 MG/ML
30 INJECTION, SOLUTION INTRAMUSCULAR; INTRAVENOUS ONCE
Status: COMPLETED | OUTPATIENT
Start: 2025-01-21 | End: 2025-01-21

## 2025-01-21 RX ADMIN — MIDAZOLAM HYDROCHLORIDE 1.5 MG: 1 INJECTION, SOLUTION INTRAMUSCULAR; INTRAVENOUS at 01:25

## 2025-01-21 RX ADMIN — KETOROLAC TROMETHAMINE 30 MG: 30 INJECTION, SOLUTION INTRAMUSCULAR; INTRAVENOUS at 03:10

## 2025-01-21 RX ADMIN — ORPHENADRINE CITRATE 60 MG: 30 INJECTION, SOLUTION INTRAMUSCULAR; INTRAVENOUS at 03:10

## 2025-01-21 RX ADMIN — SODIUM CHLORIDE 2000 MG: 9 INJECTION, SOLUTION INTRAVENOUS at 03:09

## 2025-01-21 RX ADMIN — SODIUM CHLORIDE 1000 ML: 900 INJECTION, SOLUTION INTRAVENOUS at 03:10

## 2025-01-21 NOTE — ED NOTES
Versed given by direction of Dr. Granados at bedside. Only 1.5 mg necessary for intended effect. Pt tolerated well.

## 2025-01-21 NOTE — ED PROVIDER NOTES
Subjective   History of Present Illness  Patient is a 34-year-old female whom I saw late last night/early this morning with neck pain.  Please refer to the chart for further details.  She would not consent to a lumbar puncture at that time, but agreed to come back tonight if she failed to get better or if she got worse.  She did test positive for influenza A, was not treated with Tamiflu as she had been symptomatic for at least 4 days.  She presents tonight complaining of worsened neck pain and stiffness, states that she took a hydrocodone tablet that I had prescribed for her before coming tonight.  She and her  report that she has been reluctant to take this as she is scared of becoming addicted.  She continues to have a nonproductive cough.  No fever, chest pain, dyspnea, abdominal pain, vomiting, other symptoms or other complaints.      Review of Systems   All other systems reviewed and are negative.      Past Medical History:   Diagnosis Date    Congestive heart failure     in pregnancy    Fibromyalgia     Frequent UTI     on daily abx per urology    History of depression     History of ear infections     History of heart disease     History of lupus     skin rashes, joint pain, fatigue.   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 DRAINAGE OF OVARIAN CYST.;  Surgeon: Rachel Caruso DO;  Location: [facility];  Service: Obstetrics/Gynecology    ORIF SCAPHOID W VASCULARIZED BONE GRAFT      TUBAL COAGULATION LAPAROSCOPIC Bilateral 2018    Procedure: TUBAL FALLOPE FILSHIE CLIPPING LAPAROSCOPIC;   Surgeon: Rachel Caruso DO;  Location: [facility] OR;  Service: Obstetrics/Gynecology    ULNA/RADIUS CLOSED REDUCTION  2006       Family History   Problem Relation Age of Onset    Heart disease Mother     Thyroid disease Mother     Anxiety disorder Mother     Depression Mother     Bipolar disorder Mother     Heart disease Father     Cancer Father     Heart disease Maternal Grandmother     Diabetes Maternal Grandmother     Diabetes Maternal Grandfather     Lupus Paternal Grandmother     Rheum arthritis Paternal Grandmother        Social History     Socioeconomic History    Marital status: Single   Tobacco Use    Smoking status: Never    Smokeless tobacco: Never   Vaping Use    Vaping status: Every Day    Substances: Nicotine    Devices: Disposable   Substance and Sexual Activity    Alcohol use: Yes     Comment: socially    Drug use: No    Sexual activity: Yes     Partners: Male     Birth control/protection: Surgical, Tubal ligation           Objective   Physical Exam  Vitals and nursing note reviewed.   Constitutional:       Appearance: She is well-developed. She is not toxic-appearing or diaphoretic.      Comments: Well-developed well-nourished female, alert and well-oriented, appears quite uncomfortable.     HENT:      Head: Normocephalic and atraumatic.   Eyes:      General: No scleral icterus.     Pupils: Pupils are equal, round, and reactive to light.   Neck:      Trachea: No tracheal deviation.      Comments: Diffuse tenderness throughout the posterior neck musculature.  Range of motion is intact but painful.  There is certainly more stiffness present tonight than it was last night.  No rigidity however.  No Kernig sign, no Brudzinski sign.  Cardiovascular:      Rate and Rhythm: Regular rhythm.   Pulmonary:      Effort: Pulmonary effort is normal. No respiratory distress.      Breath sounds: Normal breath sounds.   Chest:      Chest wall: No tenderness.   Abdominal:      General: Bowel sounds are normal.       Palpations: Abdomen is soft.      Tenderness: There is no abdominal tenderness. There is no guarding or rebound.   Musculoskeletal:         General: Normal range of motion.      Cervical back: Tenderness present.      Right lower leg: No edema.      Left lower leg: No edema.   Skin:     General: Skin is warm and dry.      Capillary Refill: Capillary refill takes less than 2 seconds.      Coloration: Skin is not pale.   Neurological:      General: No focal deficit present.      Mental Status: She is alert and oriented to person, place, and time.      GCS: GCS eye subscore is 4. GCS verbal subscore is 5. GCS motor subscore is 6.      Motor: No abnormal muscle tone.   Psychiatric:         Mood and Affect: Mood normal.         Behavior: Behavior normal.         Lumbar Puncture    Date/Time: 1/21/2025 1:53 AM    Performed by: Pa Granados MD  Authorized by: Pa Granados MD    Consent:     Consent obtained:  Written    Consent given by:  Patient  Universal protocol:     Patient identity confirmed:  Verbally with patient and arm band  Pre-procedure details:     Procedure purpose:  Diagnostic    Preparation: Patient was prepped and draped in usual sterile fashion    Anesthesia:     Anesthesia method:  Local infiltration    Local anesthetic:  Lidocaine 1% w/o epi  Procedure details:     Lumbar space:  L4-L5 interspace    Patient position:  L lateral decubitus    Needle gauge:  22    Needle type:  Spinal needle - Quincke tip    Needle length (in):  3.5    Number of attempts:  2    Fluid appearance:  Clear    Tubes of fluid:  4    Total volume (ml):  8  Post-procedure details:     Puncture site:  Adhesive bandage applied    Procedure completion:  Tolerated well, no immediate complications  Comments:      On initial approach I got a drop of blood in the needle.  This needle was discarded, patient's back was arched more and I inserted the needle approximately 2 mm cephalad from the initial approach, smooth  entry, clear fluid, tolerated well, no immediate complications.    Results for orders placed or performed during the hospital encounter of 01/20/25   Comprehensive Metabolic Panel    Collection Time: 01/20/25 11:29 PM    Specimen: Arm, Right; Blood   Result Value Ref Range    Glucose 105 (H) 65 - 99 mg/dL    BUN 8 6 - 20 mg/dL    Creatinine 0.88 0.57 - 1.00 mg/dL    Sodium 137 136 - 145 mmol/L    Potassium 3.7 3.5 - 5.2 mmol/L    Chloride 104 98 - 107 mmol/L    CO2 21.2 (L) 22.0 - 29.0 mmol/L    Calcium 8.4 (L) 8.6 - 10.5 mg/dL    Total Protein 6.4 6.0 - 8.5 g/dL    Albumin 3.8 3.5 - 5.2 g/dL    ALT (SGPT) 17 1 - 33 U/L    AST (SGOT) 20 1 - 32 U/L    Alkaline Phosphatase 89 39 - 117 U/L    Total Bilirubin 0.2 0.0 - 1.2 mg/dL    Globulin 2.6 gm/dL    A/G Ratio 1.5 g/dL    BUN/Creatinine Ratio 9.1 7.0 - 25.0    Anion Gap 11.8 5.0 - 15.0 mmol/L    eGFR 88.6 >60.0 mL/min/1.73   Procalcitonin    Collection Time: 01/20/25 11:29 PM    Specimen: Arm, Right; Blood   Result Value Ref Range    Procalcitonin 0.06 0.00 - 0.25 ng/mL   C-reactive Protein    Collection Time: 01/20/25 11:29 PM    Specimen: Arm, Right; Blood   Result Value Ref Range    C-Reactive Protein 1.47 (H) 0.00 - 0.50 mg/dL   Lactic Acid, Plasma    Collection Time: 01/20/25 11:29 PM    Specimen: Arm, Right; Blood   Result Value Ref Range    Lactate 1.3 0.5 - 2.0 mmol/L   CBC Auto Differential    Collection Time: 01/20/25 11:29 PM    Specimen: Arm, Right; Blood   Result Value Ref Range    WBC 5.68 3.40 - 10.80 10*3/mm3    RBC 4.01 3.77 - 5.28 10*6/mm3    Hemoglobin 10.9 (L) 12.0 - 15.9 g/dL    Hematocrit 33.8 (L) 34.0 - 46.6 %    MCV 84.3 79.0 - 97.0 fL    MCH 27.2 26.6 - 33.0 pg    MCHC 32.2 31.5 - 35.7 g/dL    RDW 12.9 12.3 - 15.4 %    RDW-SD 39.3 37.0 - 54.0 fl    MPV 10.4 6.0 - 12.0 fL    Platelets 192 140 - 450 10*3/mm3    Neutrophil % 76.5 (H) 42.7 - 76.0 %    Lymphocyte % 12.1 (L) 19.6 - 45.3 %    Monocyte % 9.9 5.0 - 12.0 %    Eosinophil % 0.7 0.3 - 6.2  %    Basophil % 0.4 0.0 - 1.5 %    Immature Grans % 0.4 0.0 - 0.5 %    Neutrophils, Absolute 4.35 1.70 - 7.00 10*3/mm3    Lymphocytes, Absolute 0.69 (L) 0.70 - 3.10 10*3/mm3    Monocytes, Absolute 0.56 0.10 - 0.90 10*3/mm3    Eosinophils, Absolute 0.04 0.00 - 0.40 10*3/mm3    Basophils, Absolute 0.02 0.00 - 0.20 10*3/mm3    Immature Grans, Absolute 0.02 0.00 - 0.05 10*3/mm3    nRBC 0.0 0.0 - 0.2 /100 WBC   Green Top (Gel)    Collection Time: 01/20/25 11:29 PM   Result Value Ref Range    Extra Tube Hold for add-ons.    Lavender Top    Collection Time: 01/20/25 11:29 PM   Result Value Ref Range    Extra Tube hold for add-on    Gold Top - SST    Collection Time: 01/20/25 11:29 PM   Result Value Ref Range    Extra Tube Hold for add-ons.    Light Blue Top    Collection Time: 01/20/25 11:29 PM   Result Value Ref Range    Extra Tube Hold for add-ons.    Body Fluid Culture - Cerebrospinal Fluid, Lumbar Puncture    Collection Time: 01/21/25  1:54 AM    Specimen: Lumbar Puncture; Cerebrospinal Fluid   Result Value Ref Range    Gram Stain No organisms seen    Meningitis / Encephalitis Panel, PCR - Cerebrospinal Fluid, Lumbar Puncture    Collection Time: 01/21/25  1:54 AM    Specimen: Lumbar Puncture; Cerebrospinal Fluid   Result Value Ref Range    ESCHERICHIA COLI K1, PCR Not Detected Not Detected    HAEMOPHILUS INFLUENZAE, PCR Not Detected Not Detected    LISTERIA MONOCYTOGENES, PCR Not Detected Not Detected    NEISSERIA MENINGITIDIS, PCR Not Detected Not Detected    STREPTOCOCCUS AGALACTIAE, PCR Not Detected Not Detected    STREPTOCOCCUS PNEUMONIAE, PCR Not Detected Not Detected    CYTOMEGALOVIRUS (CMV), PCR Not Detected Not Detected    ENTEROVIRUS, PCR Not Detected Not Detected    HERPES SIMPLEX VIRUS 1 (HSV-1), PCR Not Detected Not Detected    HERPES SIMPLEX VIRUS 2 (HSV-2), PCR Not Detected Not Detected    HUMAN PARECHOVIRUS, PCR Not Detected Not Detected    VARICELLA ZOSTER VIRUS (VZV), PCR Not Detected Not Detected     CRYPTOCOCCUS NEOFORMANS / GATTII, PCR Not Detected Not Detected    HUMAN HERPES VIRUS 6 PCR Not Detected Not Detected   Protein, CSF - Cerebrospinal Fluid, Lumbar Puncture    Collection Time: 01/21/25  1:54 AM    Specimen: Lumbar Puncture; Cerebrospinal Fluid   Result Value Ref Range    Protein, Total (CSF) 17.3 15.0 - 45.0 mg/dL   Glucose, CSF - Cerebrospinal Fluid, Lumbar Puncture    Collection Time: 01/21/25  1:54 AM    Specimen: Lumbar Puncture; Cerebrospinal Fluid   Result Value Ref Range    Glucose, CSF 61 40 - 70 mg/dL   Cell Count, CSF - Cerebrospinal Fluid, Lumbar Puncture    Collection Time: 01/21/25  1:54 AM    Specimen: Lumbar Puncture; Cerebrospinal Fluid   Result Value Ref Range    Color, CSF Colorless Colorless    Appearance, CSF Clear Clear    RBC, CSF 1 (H) 0 - 0 /mm3    Nucleated Cells, CSF 2 0 - 5 /mm3    Volume, CSF 1.0 mL    Tube Number, CSF 4    CSF Tube - Cerebrospinal Fluid, Lumbar Puncture    Collection Time: 01/21/25  1:54 AM    Specimen: Lumbar Puncture; Cerebrospinal Fluid   Result Value Ref Range    Extra Tube Hold for add-ons.    Red Top    Collection Time: 01/21/25  2:48 AM   Result Value Ref Range    Extra Tube Hold for add-ons.                 ED Course  ED Course as of 01/21/25 0430   Tue Jan 21, 2025   0425 Patient had requesting that I add some MS testing to her CSF, as she had previously been recommended to have a lumbar puncture for MS testing but she never did have the procedure done.  Patient's emergency department stay has not been complicated.  She tolerated her lumbar puncture without difficulty.  Patient, her  and I discussed her test results and her plan of care.  They voiced understanding and agreement.  Patient is discharged home in care of her , advised rest, plenty of fluids and medications as I prescribed yesterday.  She has follow-up closely with her PCP.  She is to return to the emergency department right away if symptoms worsen or any problems. [CM]       ED Course User Index  [CM] Pa Granados MD                                                       Medical Decision Making      Final diagnoses:   Neck pain   Influenza A       ED Disposition  ED Disposition       ED Disposition   Discharge    Condition   Stable    Comment   --               Amy Leija, APRN  [address] [place] [address]  [phone]    Go in 2 days      [facility] EMERGENCY DEPARTMENT  [address] [place] [id number] [phone]  Go to            Medication List      No changes were made to your prescriptions during this visit.       Please note that portions of this note were completed with a voice recognition program.        Pa Granados MD  01/21/25 [id number]

## 2025-01-21 NOTE — DISCHARGE INSTRUCTIONS
Home in care of .  Rest.  Drink plenty of fluids.  Take the medications that I prescribed for you yesterday as directed.  See your primary care provider in the office in 1 to 2 days.  Return to the emergency department right away if symptoms worsen or any problems.

## 2025-01-22 ENCOUNTER — OFFICE VISIT (OUTPATIENT)
Dept: FAMILY MEDICINE CLINIC | Facility: CLINIC | Age: 35
End: 2025-01-22
Payer: COMMERCIAL

## 2025-01-22 VITALS
HEART RATE: 114 BPM | HEIGHT: 62 IN | TEMPERATURE: 98.7 F | BODY MASS INDEX: 43.28 KG/M2 | WEIGHT: 235.2 LBS | OXYGEN SATURATION: 98 % | SYSTOLIC BLOOD PRESSURE: 136 MMHG | DIASTOLIC BLOOD PRESSURE: 82 MMHG

## 2025-01-22 DIAGNOSIS — M54.2 NECK PAIN: ICD-10-CM

## 2025-01-22 DIAGNOSIS — J10.1 INFLUENZA A: Primary | ICD-10-CM

## 2025-01-22 LAB — BACTERIA SPEC AEROBE CULT: NORMAL

## 2025-01-22 PROCEDURE — 99213 OFFICE O/P EST LOW 20 MIN: CPT | Performed by: FAMILY MEDICINE

## 2025-01-22 PROCEDURE — 1126F AMNT PAIN NOTED NONE PRSNT: CPT | Performed by: FAMILY MEDICINE

## 2025-01-22 PROCEDURE — 3075F SYST BP GE 130 - 139MM HG: CPT | Performed by: FAMILY MEDICINE

## 2025-01-22 PROCEDURE — 1159F MED LIST DOCD IN RCRD: CPT | Performed by: FAMILY MEDICINE

## 2025-01-22 PROCEDURE — 1160F RVW MEDS BY RX/DR IN RCRD: CPT | Performed by: FAMILY MEDICINE

## 2025-01-22 PROCEDURE — 3079F DIAST BP 80-89 MM HG: CPT | Performed by: FAMILY MEDICINE

## 2025-01-23 LAB
ALB CSF/SERPL: 3 {RATIO} (ref 0–8)
ALBUMIN CSF-MCNC: 10 MG/DL (ref 7–29)
ALBUMIN SERPL-MCNC: 3.8 G/DL (ref 3.9–4.9)
IGG CSF-MCNC: 1.6 MG/DL (ref 0–6.7)
IGG SERPL-MCNC: 720 MG/DL (ref 586–1602)
IGG SYNTH RATE SER+CSF CALC-MRATE: 0.9 MG/DAY
IGG/ALB CLEAR SER+CSF-RTO: 0.8 (ref 0–0.7)
IGG/ALB CSF: 0.16 {RATIO} (ref 0–0.25)
MBP CSF-MCNC: 2 NG/ML (ref 0–3.7)
OLIGOCLONAL BANDS.IT SER+CSF QL: ABNORMAL

## 2025-01-23 NOTE — PROGRESS NOTES
"Chief Complaint  Cough    Subjective          Cheryl Mcnulty presents to [facility]  Cough      History of Present Illness  The patient is a 34-year-old female who presents for a hospital follow-up for neck pain.    She reports an improvement in her condition today. She was diagnosed with influenza A, which presented with severe symptoms including neck immobility, initially attributed to coughing and a potential muscle strain. She also tested positive for RSV last week. She suspects that her lupus medication may be contributing to her overall discomfort. She has been experiencing significant neck pain, which she believes prompted the lumbar puncture procedure. The results of this procedure were normal, with no evidence of meningitis. She continues to experience a low-grade fever. She has been managing her symptoms with ibuprofen and Tylenol. She has completed a course of steroids for RSV. Despite her illness, she has continued to work, although she had to take leave yesterday and today. She is considering applying for FMLA due to her ongoing health issues.    MEDICATIONS  Current: ibuprofen, Tylenol, Toradol, Flexeril    Review of Systems   Respiratory:  Positive for cough.          Objective   Vital Signs:   /82 (BP Location: Right arm, Patient Position: Sitting, Cuff Size: Large Adult)   Pulse 114   Temp 98.7 °F (37.1 °C) (Temporal)   Ht 157.5 cm (62\")   Wt 107 kg (235 lb 3.2 oz)   SpO2 98%   BMI 43.02 kg/m²     Physical Exam      Physical Exam  Constitutional:       General: She is not in acute distress.     Appearance: Normal appearance. She is well-developed and well-groomed. She is not ill-appearing, toxic-appearing or diaphoretic.   HENT:      Head: Normocephalic.      Nose: Nose normal. No congestion or rhinorrhea.      Mouth/Throat:      Mouth: Mucous membranes are moist.      Pharynx: Oropharynx is clear. No oropharyngeal exudate or posterior oropharyngeal " erythema.   Eyes:      General: Lids are normal.         Right eye: No discharge.         Left eye: No discharge.      Extraocular Movements: Extraocular movements intact.      Pupils: Pupils are equal, round, and reactive to light.   Neck:      Vascular: No carotid bruit.   Cardiovascular:      Rate and Rhythm: Normal rate and regular rhythm.      Pulses: Normal pulses.      Heart sounds: Normal heart sounds. No murmur heard.     No friction rub. No gallop.   Pulmonary:      Effort: Pulmonary effort is normal. No respiratory distress.      Breath sounds: Normal breath sounds. No stridor. No wheezing, rhonchi or rales.   Chest:      Chest wall: No tenderness.   Abdominal:      General: Bowel sounds are normal. There is no distension.      Palpations: Abdomen is soft. There is no mass.      Tenderness: There is no abdominal tenderness. There is no right CVA tenderness, left CVA tenderness, guarding or rebound.      Hernia: No hernia is present.   Musculoskeletal:         General: No swelling or tenderness. Normal range of motion.      Cervical back: Normal range of motion and neck supple. No rigidity or tenderness.      Right lower leg: No edema.      Left lower leg: No edema.   Lymphadenopathy:      Cervical: No cervical adenopathy.   Skin:     General: Skin is warm.      Capillary Refill: Capillary refill takes less than 2 seconds.      Coloration: Skin is not jaundiced.      Findings: No bruising, erythema or rash.   Neurological:      General: No focal deficit present.      Mental Status: She is alert and oriented to person, place, and time.      Motor: Motor function is intact. No weakness.      Coordination: Coordination is intact.      Gait: Gait is intact. Gait normal.   Psychiatric:         Attention and Perception: Attention normal.         Mood and Affect: Mood normal.         Speech: Speech normal.         Behavior: Behavior normal.         Cognition and Memory: Cognition normal.         Judgment:  Judgment normal.        Result Review :          Results  Laboratory Studies  Tested positive for influenza A. RSV positive.    Imaging  Chest x-ray showed no pneumonia.    Testing  Lumbar puncture showed no signs of meningitis.              Assessment and Plan    Diagnoses and all orders for this visit:    1. Influenza A (Primary)  Comments:  tylenol and nsaids as needed    2. Neck pain  Comments:  continue muscle relaxer and nsaids as needed. lumbar puncture results reviewed with patient      Assessment & Plan  1. Post-hospitalization follow-up for cervicalgia.  Her condition is improving, but she continues to experience pain. The chest radiograph conducted 2 days prior did not reveal any signs of pneumonia. She has been advised to manage her symptoms with over-the-counter medications such as ibuprofen and Tylenol. She has also been instructed to wear a mask when in close proximity to others. She has been informed that she may need to submit an FMLA form if she requires more than 3 consecutive days off work due to her illness.    2. Influenza A.  She tested positive for Influenza A and has been experiencing severe symptoms, including neck pain and low-grade fever. She has been taking ibuprofen and Tylenol to manage her fever and pain. She has been advised to continue these medications as needed. She has also been instructed to rest and avoid strenuous activities until her symptoms improve.    3. Respiratory Syncytial Virus (RSV) infection.  She reported having RSV last week and has been experiencing a persistent cough. She was prescribed a steroid pack to help manage her symptoms. She has been informed that the cough may persist for 10 to 14 days. She has been advised to monitor her symptoms and seek further medical attention if they worsen.    PROCEDURE  The patient underwent a lumbar puncture procedure during her recent hospitalization.    Patient's Body mass index is 43.02 kg/m². indicating that she is morbidly  obese (BMI > 40 or > 35 with obesity - related health condition). Obesity-related health conditions include the following: hypertension. Obesity is unchanged. BMI is is above average; BMI management plan is completed. We discussed low calorie, low carb based diet program, portion control, and increasing exercise..    Follow Up   No follow-ups on file.  Patient was given instructions and counseling regarding her condition or for health maintenance advice. Please see specific information pulled into the AVS if appropriate.     This document has been electronically signed by EMELIA Blake  January 23, 2025 08:58 EST     Patient or patient representative verbalized consent for the use of Ambient Listening during the visit with  EMELIA Blake for chart documentation. 1/23/2025  08:57 EST

## 2025-01-25 LAB
BACTERIA SPEC AEROBE CULT: NORMAL
BACTERIA SPEC AEROBE CULT: NORMAL

## 2025-01-26 LAB
BACTERIA FLD CULT: NORMAL
GRAM STN SPEC: NORMAL

## 2025-02-17 ENCOUNTER — TELEPHONE (OUTPATIENT)
Dept: FAMILY MEDICINE CLINIC | Facility: CLINIC | Age: 35
End: 2025-02-17
Payer: COMMERCIAL

## 2025-06-18 ENCOUNTER — OFFICE VISIT (OUTPATIENT)
Dept: FAMILY MEDICINE CLINIC | Facility: CLINIC | Age: 35
End: 2025-06-18
Payer: COMMERCIAL

## 2025-06-18 ENCOUNTER — TELEPHONE (OUTPATIENT)
Dept: FAMILY MEDICINE CLINIC | Facility: CLINIC | Age: 35
End: 2025-06-18

## 2025-06-18 VITALS
TEMPERATURE: 97.7 F | HEART RATE: 94 BPM | SYSTOLIC BLOOD PRESSURE: 118 MMHG | DIASTOLIC BLOOD PRESSURE: 84 MMHG | HEIGHT: 62 IN | BODY MASS INDEX: 40.48 KG/M2 | WEIGHT: 220 LBS | OXYGEN SATURATION: 97 %

## 2025-06-18 DIAGNOSIS — Z86.59 HISTORY OF DEPRESSION: ICD-10-CM

## 2025-06-18 DIAGNOSIS — R30.0 DYSURIA: Primary | ICD-10-CM

## 2025-06-18 DIAGNOSIS — M79.7 FIBROMYALGIA: ICD-10-CM

## 2025-06-18 DIAGNOSIS — I10 PRIMARY HYPERTENSION: ICD-10-CM

## 2025-06-18 LAB
BILIRUB BLD-MCNC: NEGATIVE MG/DL
CLARITY, POC: CLEAR
COLOR UR: YELLOW
EXPIRATION DATE: ABNORMAL
GLUCOSE UR STRIP-MCNC: NEGATIVE MG/DL
KETONES UR QL: NEGATIVE
LEUKOCYTE EST, POC: ABNORMAL
Lab: ABNORMAL
NITRITE UR-MCNC: NEGATIVE MG/ML
PH UR: 6 [PH] (ref 5–8)
PROT UR STRIP-MCNC: NEGATIVE MG/DL
RBC # UR STRIP: ABNORMAL /UL
SP GR UR: 1.01 (ref 1–1.03)
UROBILINOGEN UR QL: ABNORMAL

## 2025-06-18 PROCEDURE — 87086 URINE CULTURE/COLONY COUNT: CPT | Performed by: NURSE PRACTITIONER

## 2025-06-18 PROCEDURE — 87186 SC STD MICRODIL/AGAR DIL: CPT | Performed by: NURSE PRACTITIONER

## 2025-06-18 PROCEDURE — 81001 URINALYSIS AUTO W/SCOPE: CPT | Performed by: NURSE PRACTITIONER

## 2025-06-18 PROCEDURE — 87077 CULTURE AEROBIC IDENTIFY: CPT | Performed by: NURSE PRACTITIONER

## 2025-06-18 RX ORDER — ATOMOXETINE 60 MG/1
CAPSULE ORAL
Qty: 30 CAPSULE | Refills: 2 | Status: SHIPPED | OUTPATIENT
Start: 2025-06-18 | End: 2025-06-18

## 2025-06-18 RX ORDER — DULOXETIN HYDROCHLORIDE 60 MG/1
60 CAPSULE, DELAYED RELEASE ORAL 2 TIMES DAILY
Qty: 60 CAPSULE | Refills: 2 | Status: SHIPPED | OUTPATIENT
Start: 2025-06-18 | End: 2025-06-18

## 2025-06-18 RX ORDER — HYDROCHLOROTHIAZIDE 12.5 MG/1
12.5 CAPSULE ORAL DAILY
Qty: 14 CAPSULE | Refills: 0 | Status: SHIPPED | OUTPATIENT
Start: 2025-06-18

## 2025-06-18 RX ORDER — ERGOCALCIFEROL 1.25 MG/1
50000 CAPSULE, LIQUID FILLED ORAL WEEKLY
Qty: 5 CAPSULE | Refills: 2 | Status: SHIPPED | OUTPATIENT
Start: 2025-06-18 | End: 2025-06-18

## 2025-06-18 RX ORDER — CHOLECALCIFEROL (VITAMIN D3) 25 MCG
1000 TABLET ORAL DAILY
Qty: 90 TABLET | Refills: 1 | Status: SHIPPED | OUTPATIENT
Start: 2025-06-18 | End: 2025-06-18

## 2025-06-18 RX ORDER — CYCLOBENZAPRINE HCL 10 MG
10 TABLET ORAL 3 TIMES DAILY PRN
Qty: 10 TABLET | Refills: 0 | Status: SHIPPED | OUTPATIENT
Start: 2025-06-18 | End: 2025-06-18

## 2025-06-18 RX ORDER — DULOXETIN HYDROCHLORIDE 30 MG/1
30 CAPSULE, DELAYED RELEASE ORAL DAILY
Qty: 14 CAPSULE | Refills: 0 | Status: SHIPPED | OUTPATIENT
Start: 2025-06-18

## 2025-06-18 RX ORDER — ATENOLOL 25 MG/1
25 TABLET ORAL DAILY
Qty: 14 TABLET | Refills: 0 | Status: SHIPPED | OUTPATIENT
Start: 2025-06-18

## 2025-06-18 RX ORDER — NITROFURANTOIN 25; 75 MG/1; MG/1
100 CAPSULE ORAL 2 TIMES DAILY
COMMUNITY
Start: 2025-06-18

## 2025-06-18 RX ORDER — HYDROCHLOROTHIAZIDE 12.5 MG/1
12.5 CAPSULE ORAL DAILY
Qty: 90 CAPSULE | Refills: 0 | Status: SHIPPED | OUTPATIENT
Start: 2025-06-18 | End: 2025-06-18

## 2025-06-18 RX ORDER — PHENAZOPYRIDINE HYDROCHLORIDE 200 MG/1
200 TABLET, FILM COATED ORAL 3 TIMES DAILY PRN
Qty: 6 TABLET | Refills: 0 | Status: SHIPPED | OUTPATIENT
Start: 2025-06-18 | End: 2025-06-20

## 2025-06-18 RX ORDER — ATENOLOL 25 MG/1
25 TABLET ORAL DAILY
Qty: 90 TABLET | Refills: 0 | Status: SHIPPED | OUTPATIENT
Start: 2025-06-18 | End: 2025-06-18

## 2025-06-19 LAB
BACTERIA UR QL AUTO: ABNORMAL /HPF
BILIRUB UR QL STRIP: NEGATIVE
CLARITY UR: CLEAR
COLOR UR: YELLOW
GLUCOSE UR STRIP-MCNC: NEGATIVE MG/DL
HGB UR QL STRIP.AUTO: ABNORMAL
HYALINE CASTS UR QL AUTO: ABNORMAL /LPF
KETONES UR QL STRIP: NEGATIVE
LEUKOCYTE ESTERASE UR QL STRIP.AUTO: ABNORMAL
NITRITE UR QL STRIP: NEGATIVE
PH UR STRIP.AUTO: 6.5 [PH] (ref 5–8)
PROT UR QL STRIP: NEGATIVE
RBC # UR STRIP: ABNORMAL /HPF
REF LAB TEST METHOD: ABNORMAL
SP GR UR STRIP: 1.01 (ref 1–1.03)
SQUAMOUS #/AREA URNS HPF: ABNORMAL /HPF
UROBILINOGEN UR QL STRIP: ABNORMAL
WBC # UR STRIP: ABNORMAL /HPF

## 2025-06-20 ENCOUNTER — RESULTS FOLLOW-UP (OUTPATIENT)
Dept: FAMILY MEDICINE CLINIC | Facility: CLINIC | Age: 35
End: 2025-06-20
Payer: COMMERCIAL

## 2025-06-20 LAB — BACTERIA SPEC AEROBE CULT: ABNORMAL

## 2025-06-20 NOTE — PROGRESS NOTES
Please let know results did confirm UTI and her antibiotic should take care of the bacteria. Have a good weekend, thank you

## 2025-06-23 NOTE — PROGRESS NOTES
[facility]     Chief Complaint  med refills (Pt been without BP meds for a month and a half. ) and Difficulty Urinating (Painful urination. Dip test done. )    Cheryl Mcnulty is a 35 y.o. female who presents today to [facility] for med refills (Pt been without BP meds for a month and a half. ) and Difficulty Urinating (Painful urination. Dip test done. ).    HPI:   Difficulty Urinating     History of Present Illness  The patient presents for evaluation of urinary tract infection (UTI), hypertension, and depression.    She reports experiencing severe pain associated with a UTI, characterized by dysuria and right lower back discomfort. She has a history of recurrent UTIs, typically occurring every 2 to 4 months since the age of 12, often accompanied by nephrolithiasis. She is currently on phenazopyridine and was previously on a daily antibiotic regimen under the care of a nephrologist, which she has not adhered to for the past 1.5 to 2 months. She has observed hematuria but reports no febrile episodes. She plans to resume her preventative nitrofurantoin treatment after completion of current treatment    She also mentions non-compliance with her antihypertensive medication for the past 1.5 to 2 months, resulting in uncontrolled hypertension. She took an atenolol tablet last night and is requesting a refill of her hydrochlorothiazide prescription.    She has a known diagnosis of major depressive disorder and was previously on duloxetine, which she found beneficial for both her depressive symptoms and fibromyalgia pain.    Previous History:   Past Medical History:   Diagnosis Date    Congestive heart failure     in pregnancy    Fibromyalgia     Frequent UTI     on daily abx per urology    History of depression     History of ear infections     History of heart disease     History of lupus     skin rashes, joint pain, fatigue.   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 DRAINAGE OF OVARIAN CYST.;  Surgeon: Rachel Caruso DO;  Location:  [facility];  Service: Obstetrics/Gynecology    ORIF SCAPHOID W VASCULARIZED BONE GRAFT      TUBAL COAGULATION LAPAROSCOPIC Bilateral 2018    Procedure: TUBAL FALLOPE FILSHIE CLIPPING LAPAROSCOPIC;  Surgeon: Rachel Caruso DO;  Location:  [facility];  Service: Obstetrics/Gynecology    ULNA/RADIUS CLOSED REDUCTION        Social History     Socioeconomic History    Marital status: Single   Tobacco Use    Smoking status: Never    Smokeless tobacco: Never   Vaping Use    Vaping status: Every Day    Substances: Nicotine    Devices: Disposable   Substance and Sexual Activity    Alcohol use: Yes     Comment: socially    Drug use: No    Sexual activity: Yes     Partners: Male     Birth control/protection: Surgical, Tubal ligation      Health Maintenance Due   Topic Date Due    ANNUAL PHYSICAL  Never done    COVID-19 Vaccine ( season) 2024        Current Medications:  Current Outpatient Medications   Medication Sig Dispense Refill    atenolol (TENORMIN) 25 MG tablet Take 1 tablet by mouth Daily. 14 tablet 0    hydroCHLOROthiazide (MICROZIDE) 12.5 MG capsule Take 1 capsule by mouth Daily. 14 capsule 0    hydroxychloroquine (PLAQUENIL) 200 MG tablet Take 1 tablet by mouth Daily.      nitrofurantoin, macrocrystal-monohydrate, (MACROBID) 100 MG capsule Take 1 capsule by mouth 2 (Two) Times a Day.      albuterol (ACCUNEB) 1.25 MG/3ML nebulizer solution Take 3 mL by nebulization Every 4 (Four) Hours As Needed for Shortness of Air or Wheezing. (Patient  "not taking: Reported on 6/18/2025) 30 each 0    amoxicillin-clavulanate (AUGMENTIN) 875-125 MG per tablet Take 1 tablet by mouth 2 (Two) Times a Day for 7 days. 14 tablet 0    DULoxetine (Cymbalta) 30 MG capsule Take 1 capsule by mouth Daily. 14 capsule 0     No current facility-administered medications for this visit.       Allergies:   No Known Allergies    Vitals:   /84 (BP Location: Right arm, Patient Position: Sitting, Cuff Size: Adult)   Pulse 94   Temp 97.7 °F (36.5 °C) (Temporal)   Ht 157.5 cm (62\")   Wt 99.8 kg (220 lb)   SpO2 97%   BMI 40.24 kg/m²   Estimated body mass index is 40.24 kg/m² as calculated from the following:    Height as of this encounter: 157.5 cm (62\").    Weight as of this encounter: 99.8 kg (220 lb).               Physical Exam:   Physical Exam  Vitals reviewed.   Constitutional:       Appearance: Normal appearance.   HENT:      Head: Normocephalic.      Right Ear: Tympanic membrane and ear canal normal.      Left Ear: Tympanic membrane and ear canal normal.      Nose: Nose normal.      Mouth/Throat:      Mouth: Mucous membranes are moist.      Pharynx: Oropharynx is clear.   Eyes:      Extraocular Movements: Extraocular movements intact.      Conjunctiva/sclera: Conjunctivae normal.   Cardiovascular:      Rate and Rhythm: Normal rate.      Heart sounds: Normal heart sounds.   Pulmonary:      Effort: Pulmonary effort is normal.      Breath sounds: Normal breath sounds.   Abdominal:      General: Bowel sounds are normal.      Palpations: Abdomen is soft.   Skin:     General: Skin is warm and dry.   Neurological:      Mental Status: She is alert and oriented to person, place, and time. Mental status is at baseline.      Comments: Normal gait    Psychiatric:         Mood and Affect: Mood normal.         Behavior: Behavior normal.         Thought Content: Thought content normal.         Judgment: Judgment normal.        Physical Exam  Respiratory: Clear to auscultation, no " wheezing, rales or rhonchi  Cardiovascular: Regular rate and rhythm, no murmurs, rubs, or gallops       Lab Results:   Office Visit on 06/18/2025   Component Date Value Ref Range Status    Color 06/18/2025 Yellow  Yellow, Straw, Dark Yellow, Lizette Final    Clarity, UA 06/18/2025 Clear  Clear Final    Specific Gravity  06/18/2025 1.010  1.005 - 1.030 Final    pH, Urine 06/18/2025 6.0  5.0 - 8.0 Final    Leukocytes 06/18/2025 Trace (A)  Negative Final    Nitrite, UA 06/18/2025 Negative  Negative Final    Protein, POC 06/18/2025 Negative  Negative mg/dL Final    Glucose, UA 06/18/2025 Negative  Negative mg/dL Final    Ketones, UA 06/18/2025 Negative  Negative Final    Urobilinogen, UA 06/18/2025 0.2 E.U./dL  Normal, 0.2 E.U./dL Final    Bilirubin 06/18/2025 Negative  Negative Final    Blood, UA 06/18/2025 3+ (A)  Negative Final    Lot Number 06/18/2025 [id number]   Final    Expiration Date 06/18/2025 07/10/2026   Final    Urine Culture 06/18/2025 >100,000 CFU/mL Escherichia coli (A)   Final    Color, UA 06/18/2025 Yellow  Yellow, Straw Final    Appearance, UA 06/18/2025 Clear  Clear Final    pH, UA 06/18/2025 6.5  5.0 - 8.0 Final    Specific Gravity, UA 06/18/2025 1.010  1.005 - 1.030 Final    Glucose, UA 06/18/2025 Negative  Negative Final    Ketones, UA 06/18/2025 Negative  Negative Final    Bilirubin, UA 06/18/2025 Negative  Negative Final    Blood, UA 06/18/2025 Moderate (2+) (A)  Negative Final    Protein, UA 06/18/2025 Negative  Negative Final    Leuk Esterase, UA 06/18/2025 Moderate (2+) (A)  Negative Final    Nitrite, UA 06/18/2025 Negative  Negative Final    Urobilinogen, UA 06/18/2025 0.2 E.U./dL  0.2 - 1.0 E.U./dL Final    RBC, UA 06/18/2025 6-10 (A)  None Seen, 0-2 /HPF Final    WBC,  06/18/2025 6-10 (A)  None Seen, 0-2 /HPF Final    Bacteria,  06/18/2025 None Seen  None Seen /HPF Final    Squamous Epithelial Cells,  06/18/2025 3-6 (A)  None Seen, 0-2 /HPF Final    Hyaline Casts,  06/18/2025  None Seen  None Seen /LPF Final    Methodology 06/18/2025 Automated Microscopy   Final     Results  Labs   - Urine test: Shows blood    Results review: During today's encounter, all relevant clinical data has been reviewed.      Assessment and Plan  Diagnoses and all orders for this visit:    1. Dysuria (Primary)  -     Urine Culture - Urine, Urine, Clean Catch; Future  -     Urinalysis With Microscopic - Urine, Clean Catch; Future  -     POCT urinalysis dipstick, automated  -     Urine Culture - Urine, Urine, Clean Catch  -     Urinalysis With Microscopic - Urine, Clean Catch    2. Fibromyalgia    Other orders  -     Discontinue: atenolol (TENORMIN) 25 MG tablet; Take 1 tablet by mouth Daily.  Dispense: 90 tablet; Refill: 0  -     Discontinue: atomoxetine (STRATTERA) 60 MG capsule; Once daily  Dispense: 30 capsule; Refill: 2  -     Discontinue: cholecalciferol (VITAMIN D3) 25 MCG (1000 UT) tablet; Take 1 tablet by mouth Daily.  Dispense: 90 tablet; Refill: 1  -     Discontinue: cyclobenzaprine (FLEXERIL) 10 MG tablet; Take 1 tablet by mouth 3 (Three) Times a Day As Needed for Muscle Spasms.  Dispense: 10 tablet; Refill: 0  -     Discontinue: DULoxetine (CYMBALTA) 60 MG capsule; Take 1 capsule by mouth 2 (Two) Times a Day.  Dispense: 60 capsule; Refill: 2  -     Discontinue: hydroCHLOROthiazide (MICROZIDE) 12.5 MG capsule; Take 1 capsule by mouth Daily.  Dispense: 90 capsule; Refill: 0  -     Discontinue: vitamin D (ERGOCALCIFEROL) 1.25 MG (58783 UT) capsule capsule; Take 1 capsule by mouth 1 (One) Time Per Week.  Dispense: 5 capsule; Refill: 2  -     amoxicillin-clavulanate (AUGMENTIN) 875-125 MG per tablet; Take 1 tablet by mouth 2 (Two) Times a Day for 7 days.  Dispense: 14 tablet; Refill: 0  -     phenazopyridine (Pyridium) 200 MG tablet; Take 1 tablet by mouth 3 (Three) Times a Day As Needed for Bladder Spasms or Dysuria for up to 2 days.  Dispense: 6 tablet; Refill: 0  -     DULoxetine (Cymbalta) 30 MG  capsule; Take 1 capsule by mouth Daily.  Dispense: 14 capsule; Refill: 0  -     atenolol (TENORMIN) 25 MG tablet; Take 1 tablet by mouth Daily.  Dispense: 14 tablet; Refill: 0  -     hydroCHLOROthiazide (MICROZIDE) 12.5 MG capsule; Take 1 capsule by mouth Daily.  Dispense: 14 capsule; Refill: 0      Assessment & Plan  Urinary Tract Infection (UTI).  - Reports burning during urination and right lower back pain.  - Urine test showed blood, indicating a possible UTI.  - Augmentin prescribed, advised to start Macrobid prophylactically after completing Augmentin course. Pyridium prescribed for symptomatic relief.  - Urine culture will be sent to the lab for further analysis. Will be informed if antibiotic therapy needs to be changed. Advised to seek immediate medical attention at the ER if experiencing fever, nausea, vomiting, or escalating pain.    Hypertension.  - Blood pressure readings are within normal range today.  - 2-week supply of hydrochlorothiazide and atenolol will be provided.  - Advised to schedule an appointment with Amy for lab work and further discussion regarding medication regimen.    Depression.  - History of depression, previously on Cymbalta which helped with depression and fibromyalgia pain.  - Cymbalta 30 mg prescribed, with the option to increase dosage during next visit.    Follow-up  - Scheduled for a follow-up visit with Amy in 10 weeks.            New Medications:   New Medications Ordered This Visit   Medications    amoxicillin-clavulanate (AUGMENTIN) 875-125 MG per tablet     Sig: Take 1 tablet by mouth 2 (Two) Times a Day for 7 days.     Dispense:  14 tablet     Refill:  0    phenazopyridine (Pyridium) 200 MG tablet     Sig: Take 1 tablet by mouth 3 (Three) Times a Day As Needed for Bladder Spasms or Dysuria for up to 2 days.     Dispense:  6 tablet     Refill:  0    DULoxetine (Cymbalta) 30 MG capsule     Sig: Take 1 capsule by mouth Daily.     Dispense:  14 capsule     Refill:  0     atenolol (TENORMIN) 25 MG tablet     Sig: Take 1 tablet by mouth Daily.     Dispense:  14 tablet     Refill:  0     This prescription was filled and sold today on 10/11/2024. Any refills authorized will be placed on file for the next fill    hydroCHLOROthiazide (MICROZIDE) 12.5 MG capsule     Sig: Take 1 capsule by mouth Daily.     Dispense:  14 capsule     Refill:  0     This prescription was filled and sold today on 10/11/2024. Any refills authorized will be placed on file for the next fill       Discontinued Medications:   Medications Discontinued During This Encounter   Medication Reason    HYDROcodone-acetaminophen (NORCO)  MG per tablet *Therapy completed    hydrOXYzine (ATARAX) 10 MG tablet *Therapy completed    atomoxetine (STRATTERA) 60 MG capsule Reorder    cholecalciferol (VITAMIN D3) 25 MCG (1000 UT) tablet Reorder    DULoxetine (CYMBALTA) 60 MG capsule Reorder    vitamin D (ERGOCALCIFEROL) 1.25 MG (63156 UT) capsule capsule Reorder    hydroCHLOROthiazide (MICROZIDE) 12.5 MG capsule Reorder    atenolol (TENORMIN) 25 MG tablet Reorder    cyclobenzaprine (FLEXERIL) 10 MG tablet Reorder    atenolol (TENORMIN) 25 MG tablet     atomoxetine (STRATTERA) 60 MG capsule     cholecalciferol (VITAMIN D3) 25 MCG (1000 UT) tablet     cyclobenzaprine (FLEXERIL) 10 MG tablet     DULoxetine (CYMBALTA) 60 MG capsule     hydroCHLOROthiazide (MICROZIDE) 12.5 MG capsule     vitamin D (ERGOCALCIFEROL) 1.25 MG (49561 UT) capsule capsule     ketorolac (TORADOL) 10 MG tablet               Visit Diagnoses:    ICD-10-CM ICD-9-CM   1. Dysuria  R30.0 788.1   2. Fibromyalgia  M79.7 729.1            Follow Up:   Return in about 2 weeks (around 7/2/2025).    Patient was given instructions and counseling regarding her condition or for health maintenance advice. Please see specific information pulled into the AVS if appropriate.     Patient or patient representative verbalized consent for the use of Ambient Listening during the  visit with  EMELIA Sandy for chart documentation. 6/22/2025  20:32 EDT      This document has been electronically signed by EMELIA Sandy   June 22, 2025 20:12 EDT    Dictated Utilizing Dragon Dictation: Part of this note may be an electronic transcription/translation of spoken language to printed text using the Dragon Dictation System.

## 2025-07-02 ENCOUNTER — LAB (OUTPATIENT)
Dept: FAMILY MEDICINE CLINIC | Facility: CLINIC | Age: 35
End: 2025-07-02
Payer: COMMERCIAL

## 2025-07-02 ENCOUNTER — OFFICE VISIT (OUTPATIENT)
Dept: FAMILY MEDICINE CLINIC | Facility: CLINIC | Age: 35
End: 2025-07-02
Payer: COMMERCIAL

## 2025-07-02 VITALS
WEIGHT: 215 LBS | SYSTOLIC BLOOD PRESSURE: 126 MMHG | BODY MASS INDEX: 39.56 KG/M2 | DIASTOLIC BLOOD PRESSURE: 84 MMHG | HEIGHT: 62 IN | TEMPERATURE: 98.4 F | OXYGEN SATURATION: 99 % | HEART RATE: 85 BPM

## 2025-07-02 DIAGNOSIS — I10 PRIMARY HYPERTENSION: ICD-10-CM

## 2025-07-02 DIAGNOSIS — I10 PRIMARY HYPERTENSION: Primary | ICD-10-CM

## 2025-07-02 DIAGNOSIS — Z20.5 EXPOSURE TO HEPATITIS C: ICD-10-CM

## 2025-07-02 LAB
ALBUMIN SERPL-MCNC: 4.2 G/DL (ref 3.5–5.2)
ALBUMIN/GLOB SERPL: 1.6 G/DL
ALP SERPL-CCNC: 93 U/L (ref 39–117)
ALT SERPL W P-5'-P-CCNC: 10 U/L (ref 1–33)
ANION GAP SERPL CALCULATED.3IONS-SCNC: 9.9 MMOL/L (ref 5–15)
AST SERPL-CCNC: 14 U/L (ref 1–32)
BASOPHILS # BLD AUTO: 0.05 10*3/MM3 (ref 0–0.2)
BASOPHILS NFR BLD AUTO: 0.5 % (ref 0–1.5)
BILIRUB SERPL-MCNC: 0.3 MG/DL (ref 0–1.2)
BUN SERPL-MCNC: 9 MG/DL (ref 6–20)
BUN/CREAT SERPL: 8.5 (ref 7–25)
CALCIUM SPEC-SCNC: 9.5 MG/DL (ref 8.6–10.5)
CHLORIDE SERPL-SCNC: 104 MMOL/L (ref 98–107)
CHOLEST SERPL-MCNC: 170 MG/DL (ref 0–200)
CO2 SERPL-SCNC: 25.1 MMOL/L (ref 22–29)
CREAT SERPL-MCNC: 1.06 MG/DL (ref 0.57–1)
DEPRECATED RDW RBC AUTO: 40.1 FL (ref 37–54)
EGFRCR SERPLBLD CKD-EPI 2021: 70.4 ML/MIN/1.73
EOSINOPHIL # BLD AUTO: 0.18 10*3/MM3 (ref 0–0.4)
EOSINOPHIL NFR BLD AUTO: 1.9 % (ref 0.3–6.2)
ERYTHROCYTE [DISTWIDTH] IN BLOOD BY AUTOMATED COUNT: 13 % (ref 12.3–15.4)
GLOBULIN UR ELPH-MCNC: 2.6 GM/DL
GLUCOSE SERPL-MCNC: 84 MG/DL (ref 65–99)
HAV IGM SERPL QL IA: NORMAL
HBA1C MFR BLD: 4.8 % (ref 4.8–5.6)
HBV CORE IGM SERPL QL IA: NORMAL
HBV SURFACE AG SERPL QL IA: NORMAL
HCT VFR BLD AUTO: 39.9 % (ref 34–46.6)
HCV AB SER QL: NORMAL
HDLC SERPL-MCNC: 49 MG/DL (ref 40–60)
HGB BLD-MCNC: 12.7 G/DL (ref 12–15.9)
IMM GRANULOCYTES # BLD AUTO: 0.02 10*3/MM3 (ref 0–0.05)
IMM GRANULOCYTES NFR BLD AUTO: 0.2 % (ref 0–0.5)
LDLC SERPL CALC-MCNC: 102 MG/DL (ref 0–100)
LDLC/HDLC SERPL: 2.04 {RATIO}
LYMPHOCYTES # BLD AUTO: 3.71 10*3/MM3 (ref 0.7–3.1)
LYMPHOCYTES NFR BLD AUTO: 39.5 % (ref 19.6–45.3)
MCH RBC QN AUTO: 27 PG (ref 26.6–33)
MCHC RBC AUTO-ENTMCNC: 31.8 G/DL (ref 31.5–35.7)
MCV RBC AUTO: 84.7 FL (ref 79–97)
MONOCYTES # BLD AUTO: 0.62 10*3/MM3 (ref 0.1–0.9)
MONOCYTES NFR BLD AUTO: 6.6 % (ref 5–12)
NEUTROPHILS NFR BLD AUTO: 4.81 10*3/MM3 (ref 1.7–7)
NEUTROPHILS NFR BLD AUTO: 51.3 % (ref 42.7–76)
NRBC BLD AUTO-RTO: 0 /100 WBC (ref 0–0.2)
PLATELET # BLD AUTO: 267 10*3/MM3 (ref 140–450)
PMV BLD AUTO: 11.3 FL (ref 6–12)
POTASSIUM SERPL-SCNC: 4 MMOL/L (ref 3.5–5.2)
PROT SERPL-MCNC: 6.8 G/DL (ref 6–8.5)
RBC # BLD AUTO: 4.71 10*6/MM3 (ref 3.77–5.28)
SODIUM SERPL-SCNC: 139 MMOL/L (ref 136–145)
TRIGL SERPL-MCNC: 105 MG/DL (ref 0–150)
VLDLC SERPL-MCNC: 19 MG/DL (ref 5–40)
WBC NRBC COR # BLD AUTO: 9.39 10*3/MM3 (ref 3.4–10.8)

## 2025-07-02 PROCEDURE — 80053 COMPREHEN METABOLIC PANEL: CPT | Performed by: FAMILY MEDICINE

## 2025-07-02 PROCEDURE — 3079F DIAST BP 80-89 MM HG: CPT | Performed by: FAMILY MEDICINE

## 2025-07-02 PROCEDURE — 99214 OFFICE O/P EST MOD 30 MIN: CPT | Performed by: FAMILY MEDICINE

## 2025-07-02 PROCEDURE — 83036 HEMOGLOBIN GLYCOSYLATED A1C: CPT | Performed by: FAMILY MEDICINE

## 2025-07-02 PROCEDURE — 36415 COLL VENOUS BLD VENIPUNCTURE: CPT

## 2025-07-02 PROCEDURE — 80061 LIPID PANEL: CPT | Performed by: FAMILY MEDICINE

## 2025-07-02 PROCEDURE — 1126F AMNT PAIN NOTED NONE PRSNT: CPT | Performed by: FAMILY MEDICINE

## 2025-07-02 PROCEDURE — 85025 COMPLETE CBC W/AUTO DIFF WBC: CPT | Performed by: FAMILY MEDICINE

## 2025-07-02 PROCEDURE — 3074F SYST BP LT 130 MM HG: CPT | Performed by: FAMILY MEDICINE

## 2025-07-02 PROCEDURE — 80074 ACUTE HEPATITIS PANEL: CPT | Performed by: FAMILY MEDICINE

## 2025-07-02 RX ORDER — HYDROCHLOROTHIAZIDE 12.5 MG/1
12.5 CAPSULE ORAL DAILY
Qty: 90 CAPSULE | Refills: 0 | Status: SHIPPED | OUTPATIENT
Start: 2025-07-02

## 2025-07-02 RX ORDER — ATENOLOL 25 MG/1
25 TABLET ORAL DAILY
Qty: 90 TABLET | Refills: 0 | Status: SHIPPED | OUTPATIENT
Start: 2025-07-02

## 2025-07-02 RX ORDER — ONDANSETRON 4 MG/1
4 TABLET, ORALLY DISINTEGRATING ORAL EVERY 8 HOURS PRN
COMMUNITY
Start: 2025-06-18

## 2025-07-02 RX ORDER — DULOXETIN HYDROCHLORIDE 60 MG/1
60 CAPSULE, DELAYED RELEASE ORAL DAILY
Qty: 90 CAPSULE | Refills: 0 | Status: SHIPPED | OUTPATIENT
Start: 2025-07-02

## 2025-07-02 NOTE — PROGRESS NOTES
"Chief Complaint  Hypertension    Subjective          Cheryl Mcnulty presents to [facility]  Hypertension    History of Present Illness  The patient is a 35-year-old female who presents for a follow-up appointment.    She has been adhering to her prescribed medication regimen and requires refills. She inquired about the approval of weight losartan for Medicaid. She is not diabetic and her blood pressure is well-controlled.    She is seeking an increase in her duloxetine dosage, which was initially set at 30 mg for a duration of 2 weeks. This request comes after a period of approximately 1.5 to 2 months without any medication.    She also mentions that she is overdue for her routine blood work and would like to have it done during this visit.    Review of Systems      Objective   Vital Signs:   /84 (BP Location: Right arm, Patient Position: Sitting, Cuff Size: Adult)   Pulse 85   Temp 98.4 °F (36.9 °C) (Temporal)   Ht 157.5 cm (62\")   Wt 97.5 kg (215 lb)   SpO2 99%   BMI 39.32 kg/m²     Physical Exam  Respiratory: Clear to auscultation, no wheezing, rales or rhonchi  Cardiovascular: Regular rate and rhythm, no murmurs, rubs, or gallops    Physical Exam  Constitutional:       General: She is not in acute distress.     Appearance: Normal appearance. She is well-developed and well-groomed. She is not ill-appearing, toxic-appearing or diaphoretic.   HENT:      Head: Normocephalic.      Nose: Nose normal. No congestion or rhinorrhea.      Mouth/Throat:      Mouth: Mucous membranes are moist.      Pharynx: Oropharynx is clear. No oropharyngeal exudate or posterior oropharyngeal erythema.   Eyes:      General: Lids are normal.         Right eye: No discharge.         Left eye: No discharge.      Extraocular Movements: Extraocular movements intact.      Pupils: Pupils are equal, round, and reactive to light.   Neck:      Vascular: No carotid bruit.   Cardiovascular:      Rate and " Rhythm: Normal rate and regular rhythm.      Pulses: Normal pulses.      Heart sounds: Normal heart sounds. No murmur heard.     No friction rub. No gallop.   Pulmonary:      Effort: Pulmonary effort is normal. No respiratory distress.      Breath sounds: Normal breath sounds. No stridor. No wheezing, rhonchi or rales.   Chest:      Chest wall: No tenderness.   Abdominal:      General: Bowel sounds are normal. There is no distension.      Palpations: Abdomen is soft. There is no mass.      Tenderness: There is no abdominal tenderness. There is no right CVA tenderness, left CVA tenderness, guarding or rebound.      Hernia: No hernia is present.   Musculoskeletal:         General: No swelling or tenderness. Normal range of motion.      Cervical back: Normal range of motion and neck supple. No rigidity or tenderness.      Right lower leg: No edema.      Left lower leg: No edema.   Lymphadenopathy:      Cervical: No cervical adenopathy.   Skin:     General: Skin is warm.      Capillary Refill: Capillary refill takes less than 2 seconds.      Coloration: Skin is not jaundiced.      Findings: No bruising, erythema or rash.   Neurological:      General: No focal deficit present.      Mental Status: She is alert and oriented to person, place, and time.      Motor: Motor function is intact. No weakness.      Coordination: Coordination is intact.      Gait: Gait is intact. Gait normal.   Psychiatric:         Attention and Perception: Attention normal.         Mood and Affect: Mood normal.         Speech: Speech normal.         Behavior: Behavior normal.         Cognition and Memory: Cognition normal.         Judgment: Judgment normal.        Result Review :          Results                Assessment and Plan    Diagnoses and all orders for this visit:    1. Primary hypertension (Primary)  -     CBC Auto Differential; Future  -     Comprehensive Metabolic Panel; Future  -     Hemoglobin A1c; Future  -     Lipid Panel;  Future    2. Exposure to hepatitis C  -     Hepatitis panel, acute; Future    Other orders  -     atenolol (TENORMIN) 25 MG tablet; Take 1 tablet by mouth Daily.  Dispense: 90 tablet; Refill: 0  -     hydroCHLOROthiazide (MICROZIDE) 12.5 MG capsule; Take 1 capsule by mouth Daily.  Dispense: 90 capsule; Refill: 0  -     DULoxetine (CYMBALTA) 60 MG capsule; Take 1 capsule by mouth Daily.  Dispense: 90 capsule; Refill: 0      Assessment & Plan  1. Hypertension.  - Blood pressure readings are within the normal range.  - Physical exam findings indicate stable blood pressure.  - Discussion included medication adherence and Medicaid approval for weight losartan.  - Prescription refill for losartan will be provided.    2. Depression.  - Patient requested an increase in duloxetine dosage.  - Current dosage of 30 mg has been taken for 2 weeks.  - Plan to increase duloxetine dosage to 60 mg.  - Prescription for the adjusted dosage will be sent to pharmacy.    3. Health maintenance.  - Patient is past due for blood work.  - Labs will be conducted today to monitor overall health status.  - Discussion included the need for routine blood work.  - Labs will be ordered and conducted during the visit.    Patient's Body mass index is 39.32 kg/m². indicating that she is obese (BMI >30). Obesity-related health conditions include the following: hypertension. Obesity is unchanged. BMI is is above average; BMI management plan is completed. We discussed low calorie, low carb based diet program, portion control, and increasing exercise..    Follow Up   Return in about 3 months (around 10/2/2025), or labs today.  Patient was given instructions and counseling regarding her condition or for health maintenance advice. Please see specific information pulled into the AVS if appropriate.     This document has been electronically signed by EMELIA Blake  July 2, 2025 14:50 EDT     Patient or patient representative verbalized consent for the  use of Ambient Listening during the visit with  EMELIA Blake for chart documentation. 7/2/2025  14:50 EDT

## 2025-07-03 ENCOUNTER — RESULTS FOLLOW-UP (OUTPATIENT)
Dept: FAMILY MEDICINE CLINIC | Facility: CLINIC | Age: 35
End: 2025-07-03
Payer: COMMERCIAL

## (undated) DEVICE — GOWN,REINF,POLY,ECL,PP SLV,XXL: Brand: MEDLINE

## (undated) DEVICE — ENDOPATH XCEL BLADELESS TROCARS WITH STABILITY SLEEVES: Brand: ENDOPATH XCEL

## (undated) DEVICE — ENCORE® LATEX MICRO SIZE 8, STERILE LATEX POWDER-FREE SURGICAL GLOVE: Brand: ENCORE

## (undated) DEVICE — TUBING, SUCTION, 1/4" X 20', STRAIGHT: Brand: MEDLINE INDUSTRIES, INC.

## (undated) DEVICE — PAD SANI MAXI W/ADHS SNG WRP 11IN

## (undated) DEVICE — COR GYN LAPAROSCOPY: Brand: MEDLINE INDUSTRIES, INC.

## (undated) DEVICE — ENDOPATH XCEL UNIVERSAL TROCAR STABLILITY SLEEVES: Brand: ENDOPATH XCEL

## (undated) DEVICE — SKIN AFFIX SURG ADHESIVE 72/CS 0.55ML: Brand: MEDLINE

## (undated) DEVICE — [HIGH FLOW INSUFFLATOR,  DO NOT USE IF PACKAGE IS DAMAGED,  KEEP DRY,  KEEP AWAY FROM SUNLIGHT,  PROTECT FROM HEAT AND RADIOACTIVE SOURCES.]: Brand: PNEUMOSURE

## (undated) DEVICE — SUT MNCRYL 4/0 PS2 18 IN

## (undated) DEVICE — HARMONIC ACE +7 LAPAROSCOPIC SHEARS ADVANCED HEMOSTASIS 5MM DIAMETER 36CM SHAFT LENGTH  FOR USE WITH GRAY HAND PIECE ONLY: Brand: HARMONIC ACE

## (undated) DEVICE — 2, DISPOSABLE SUCTION/IRRIGATOR WITH DISPOSABLE TIP: Brand: STRYKEFLOW